# Patient Record
Sex: FEMALE | Race: WHITE | Employment: UNEMPLOYED | URBAN - METROPOLITAN AREA
[De-identification: names, ages, dates, MRNs, and addresses within clinical notes are randomized per-mention and may not be internally consistent; named-entity substitution may affect disease eponyms.]

---

## 2017-11-21 PROBLEM — M54.2 CERVICAL PAIN (NECK): Status: ACTIVE | Noted: 2017-11-21

## 2018-07-25 ENCOUNTER — HOSPITAL ENCOUNTER (EMERGENCY)
Age: 58
Discharge: PSYCHIATRIC OR SUBSTANCE USE FACILITY | End: 2018-07-26
Attending: EMERGENCY MEDICINE
Payer: COMMERCIAL

## 2018-07-25 VITALS
HEIGHT: 65 IN | TEMPERATURE: 98.1 F | OXYGEN SATURATION: 100 % | RESPIRATION RATE: 14 BRPM | BODY MASS INDEX: 17.99 KG/M2 | DIASTOLIC BLOOD PRESSURE: 63 MMHG | HEART RATE: 57 BPM | SYSTOLIC BLOOD PRESSURE: 126 MMHG | WEIGHT: 108 LBS

## 2018-07-25 DIAGNOSIS — F32.A DEPRESSION WITH SUICIDAL IDEATION: Primary | ICD-10-CM

## 2018-07-25 DIAGNOSIS — R45.851 DEPRESSION WITH SUICIDAL IDEATION: Primary | ICD-10-CM

## 2018-07-25 LAB
ACETAMINOPHEN LEVEL: <5 MCG/ML (ref 10–30)
ALBUMIN SERPL-MCNC: 4.7 G/DL (ref 3.5–5.2)
ALP BLD-CCNC: 66 U/L (ref 35–104)
ALT SERPL-CCNC: 12 U/L (ref 0–32)
AMPHETAMINE SCREEN, URINE: NOT DETECTED
ANION GAP SERPL CALCULATED.3IONS-SCNC: 17 MMOL/L (ref 7–16)
AST SERPL-CCNC: 23 U/L (ref 0–31)
BACTERIA: NORMAL /HPF
BARBITURATE SCREEN URINE: NOT DETECTED
BASOPHILS ABSOLUTE: 0.04 E9/L (ref 0–0.2)
BASOPHILS RELATIVE PERCENT: 0.8 % (ref 0–2)
BENZODIAZEPINE SCREEN, URINE: NOT DETECTED
BILIRUB SERPL-MCNC: 1.1 MG/DL (ref 0–1.2)
BILIRUBIN URINE: NEGATIVE
BLOOD, URINE: ABNORMAL
BUN BLDV-MCNC: 5 MG/DL (ref 6–20)
CALCIUM SERPL-MCNC: 10.2 MG/DL (ref 8.6–10.2)
CANNABINOID SCREEN URINE: NOT DETECTED
CHLORIDE BLD-SCNC: 88 MMOL/L (ref 98–107)
CLARITY: CLEAR
CO2: 23 MMOL/L (ref 22–29)
COCAINE METABOLITE SCREEN URINE: NOT DETECTED
COLOR: YELLOW
CREAT SERPL-MCNC: 0.7 MG/DL (ref 0.5–1)
EKG ATRIAL RATE: 59 BPM
EKG P AXIS: 75 DEGREES
EKG P-R INTERVAL: 158 MS
EKG Q-T INTERVAL: 436 MS
EKG QRS DURATION: 92 MS
EKG QTC CALCULATION (BAZETT): 431 MS
EKG R AXIS: 96 DEGREES
EKG T AXIS: 87 DEGREES
EKG VENTRICULAR RATE: 59 BPM
EOSINOPHILS ABSOLUTE: 0.02 E9/L (ref 0.05–0.5)
EOSINOPHILS RELATIVE PERCENT: 0.4 % (ref 0–6)
ETHANOL: <10 MG/DL (ref 0–0.08)
GFR AFRICAN AMERICAN: >60
GFR NON-AFRICAN AMERICAN: >60 ML/MIN/1.73
GLUCOSE BLD-MCNC: 78 MG/DL (ref 74–109)
GLUCOSE URINE: NEGATIVE MG/DL
HCT VFR BLD CALC: 34.6 % (ref 34–48)
HEMOGLOBIN: 12.3 G/DL (ref 11.5–15.5)
IMMATURE GRANULOCYTES #: 0.02 E9/L
IMMATURE GRANULOCYTES %: 0.4 % (ref 0–5)
KETONES, URINE: 15 MG/DL
LEUKOCYTE ESTERASE, URINE: NEGATIVE
LYMPHOCYTES ABSOLUTE: 1.74 E9/L (ref 1.5–4)
LYMPHOCYTES RELATIVE PERCENT: 34.9 % (ref 20–42)
MCH RBC QN AUTO: 31 PG (ref 26–35)
MCHC RBC AUTO-ENTMCNC: 35.5 % (ref 32–34.5)
MCV RBC AUTO: 87.2 FL (ref 80–99.9)
METHADONE SCREEN, URINE: NOT DETECTED
MONOCYTES ABSOLUTE: 0.3 E9/L (ref 0.1–0.95)
MONOCYTES RELATIVE PERCENT: 6 % (ref 2–12)
NEUTROPHILS ABSOLUTE: 2.87 E9/L (ref 1.8–7.3)
NEUTROPHILS RELATIVE PERCENT: 57.5 % (ref 43–80)
NITRITE, URINE: NEGATIVE
OPIATE SCREEN URINE: NOT DETECTED
PDW BLD-RTO: 12.2 FL (ref 11.5–15)
PH UA: 6 (ref 5–9)
PHENCYCLIDINE SCREEN URINE: NOT DETECTED
PLATELET # BLD: 274 E9/L (ref 130–450)
PMV BLD AUTO: 10.2 FL (ref 7–12)
POTASSIUM SERPL-SCNC: 3.4 MMOL/L (ref 3.5–5)
PROPOXYPHENE SCREEN: NOT DETECTED
PROTEIN UA: NEGATIVE MG/DL
RBC # BLD: 3.97 E12/L (ref 3.5–5.5)
RBC UA: NORMAL /HPF (ref 0–2)
SALICYLATE, SERUM: <0.3 MG/DL (ref 0–30)
SODIUM BLD-SCNC: 128 MMOL/L (ref 132–146)
SPECIFIC GRAVITY UA: <=1.005 (ref 1–1.03)
T4 TOTAL: 7.6 MCG/DL (ref 4.5–11.7)
TOTAL PROTEIN: 7.3 G/DL (ref 6.4–8.3)
TRICYCLIC ANTIDEPRESSANTS SCREEN SERUM: NEGATIVE NG/ML
TSH SERPL DL<=0.05 MIU/L-ACNC: 1.04 UIU/ML (ref 0.27–4.2)
UROBILINOGEN, URINE: 0.2 E.U./DL
WBC # BLD: 5 E9/L (ref 4.5–11.5)
WBC UA: NORMAL /HPF (ref 0–5)

## 2018-07-25 PROCEDURE — 81001 URINALYSIS AUTO W/SCOPE: CPT

## 2018-07-25 PROCEDURE — 80307 DRUG TEST PRSMV CHEM ANLYZR: CPT

## 2018-07-25 PROCEDURE — 85025 COMPLETE CBC W/AUTO DIFF WBC: CPT

## 2018-07-25 PROCEDURE — 2580000003 HC RX 258: Performed by: NURSE PRACTITIONER

## 2018-07-25 PROCEDURE — 80053 COMPREHEN METABOLIC PANEL: CPT

## 2018-07-25 PROCEDURE — G0480 DRUG TEST DEF 1-7 CLASSES: HCPCS

## 2018-07-25 PROCEDURE — 36415 COLL VENOUS BLD VENIPUNCTURE: CPT

## 2018-07-25 PROCEDURE — 6370000000 HC RX 637 (ALT 250 FOR IP): Performed by: EMERGENCY MEDICINE

## 2018-07-25 PROCEDURE — 84443 ASSAY THYROID STIM HORMONE: CPT

## 2018-07-25 PROCEDURE — 84436 ASSAY OF TOTAL THYROXINE: CPT

## 2018-07-25 PROCEDURE — 99285 EMERGENCY DEPT VISIT HI MDM: CPT

## 2018-07-25 RX ORDER — LANOLIN ALCOHOL/MO/W.PET/CERES
5 CREAM (GRAM) TOPICAL NIGHTLY PRN
Status: ON HOLD | COMMUNITY
End: 2019-04-26

## 2018-07-25 RX ORDER — TOPIRAMATE 100 MG/1
200 TABLET, FILM COATED ORAL DAILY
Status: ON HOLD | COMMUNITY
End: 2019-07-31 | Stop reason: HOSPADM

## 2018-07-25 RX ORDER — LANOLIN ALCOHOL/MO/W.PET/CERES
6 CREAM (GRAM) TOPICAL ONCE
Status: COMPLETED | OUTPATIENT
Start: 2018-07-25 | End: 2018-07-25

## 2018-07-25 RX ORDER — TOPIRAMATE 100 MG/1
200 TABLET, FILM COATED ORAL ONCE
Status: COMPLETED | OUTPATIENT
Start: 2018-07-25 | End: 2018-07-25

## 2018-07-25 RX ORDER — 0.9 % SODIUM CHLORIDE 0.9 %
1000 INTRAVENOUS SOLUTION INTRAVENOUS ONCE
Status: COMPLETED | OUTPATIENT
Start: 2018-07-25 | End: 2018-07-25

## 2018-07-25 RX ADMIN — SODIUM CHLORIDE 1000 ML: 9 INJECTION, SOLUTION INTRAVENOUS at 19:53

## 2018-07-25 RX ADMIN — TOPIRAMATE 200 MG: 100 TABLET, FILM COATED ORAL at 21:55

## 2018-07-25 RX ADMIN — MELATONIN 3 MG ORAL TABLET 6 MG: 3 TABLET ORAL at 21:55

## 2018-07-25 ASSESSMENT — PATIENT HEALTH QUESTIONNAIRE - PHQ9: SUM OF ALL RESPONSES TO PHQ QUESTIONS 1-9: 15

## 2018-07-25 NOTE — ED PROVIDER NOTES
None.     Able to care for self: Yes. Able to control self: Yes. ROS    Pertinent positives and negatives are stated within HPI, all other systems reviewed and are negative. Past Surgical History:  has a past surgical history that includes cyst removal (Right, 2008); Toe Surgery (1999); and laparoscopy (1980). Social History:  reports that she has never smoked. She has never used smokeless tobacco. She reports that she does not drink alcohol or use drugs. Family History: family history includes Cancer in her mother; Heart Failure in her father. Allergies: Ibuprofen and Carbamazepine and analogs    Physical Exam           ED Triage Vitals [07/25/18 1524]   BP Temp Temp src Pulse Resp SpO2 Height Weight   115/87 97.3 °F (36.3 °C) -- 92 18 100 % 5' 5\" (1.651 m) 108 lb (49 kg)      Oxygen Saturation Interpretation: Normal.    General Appearance:  street clothes. Constitutional:   Level of Consciousness: Awake and alert. ETOH: No.          Distress: none. Cooperativeness: cooperative. Eyes:  PERRL, EOMI, no discharge or conjunctival injection. Ears:  External ears without lesions. Throat:  Pharynx without injection, exudate, or tonsillar hypertrophy. Airway patient. Neck:  Normal ROM. Supple. Respiratory:  Clear to auscultation and breath sounds equal.  CV:  Regular rate and rhythm, normal heart sounds, without pathological murmurs, ectopy, gallops, or rubs. GI:  Abdomen Soft, nontender, good bowel sounds. No firm or pulsatile mass. Back:  No costovertebral tenderness. Integument:  Normal turgor. Warm, dry, without visible rash, unless noted elsewhere. Lymphatics: No lymphangitis or adenopathy noted. Neurological:  Oriented. Motor functions intact. Psychiatric:        Thought Process:       Coherent:  Yes. Delusions / Paranoia: no evidence of paranoia. Flight of ideas:  No.         Rambling conversation:  No.       Affect: sad; flat effect.        Suicidal ideation:  suicidal ideation with no plan or intent. Homicidal ideation:  no homicidal ideation. Perceptions:  denies any perceptual disturbance present. Insight: above average. Judgement: above average.     Lab / Imaging Results   (All laboratory and radiology results have been personally reviewed by myself)  Labs:  Results for orders placed or performed during the hospital encounter of 07/25/18   CBC auto differential   Result Value Ref Range    WBC 5.0 4.5 - 11.5 E9/L    RBC 3.97 3.50 - 5.50 E12/L    Hemoglobin 12.3 11.5 - 15.5 g/dL    Hematocrit 34.6 34.0 - 48.0 %    MCV 87.2 80.0 - 99.9 fL    MCH 31.0 26.0 - 35.0 pg    MCHC 35.5 (H) 32.0 - 34.5 %    RDW 12.2 11.5 - 15.0 fL    Platelets 659 516 - 925 E9/L    MPV 10.2 7.0 - 12.0 fL    Neutrophils % 57.5 43.0 - 80.0 %    Immature Granulocytes % 0.4 0.0 - 5.0 %    Lymphocytes % 34.9 20.0 - 42.0 %    Monocytes % 6.0 2.0 - 12.0 %    Eosinophils % 0.4 0.0 - 6.0 %    Basophils % 0.8 0.0 - 2.0 %    Neutrophils # 2.87 1.80 - 7.30 E9/L    Immature Granulocytes # 0.02 E9/L    Lymphocytes # 1.74 1.50 - 4.00 E9/L    Monocytes # 0.30 0.10 - 0.95 E9/L    Eosinophils # 0.02 (L) 0.05 - 0.50 E9/L    Basophils # 0.04 0.00 - 0.20 E9/L   Comprehensive Metabolic Panel   Result Value Ref Range    Sodium 128 (L) 132 - 146 mmol/L    Potassium 3.4 (L) 3.5 - 5.0 mmol/L    Chloride 88 (L) 98 - 107 mmol/L    CO2 23 22 - 29 mmol/L    Anion Gap 17 (H) 7 - 16 mmol/L    Glucose 78 74 - 109 mg/dL    BUN 5 (L) 6 - 20 mg/dL    CREATININE 0.7 0.5 - 1.0 mg/dL    GFR Non-African American >60 >=60 mL/min/1.73    GFR African American >60     Calcium 10.2 8.6 - 10.2 mg/dL    Total Protein 7.3 6.4 - 8.3 g/dL    Alb 4.7 3.5 - 5.2 g/dL    Total Bilirubin 1.1 0.0 - 1.2 mg/dL    Alkaline Phosphatase 66 35 - 104 U/L    ALT 12 0 - 32 U/L    AST 23 0 - 31 U/L   TSH without Reflex   Result Value Ref Range    TSH 1.040 0.270 - 4.200 uIU/mL   Serum Drug Screen   Result Value Ref Range ensure accuracy; however, inadvertent computerized transcription errors may be present.   END OF ED PROVIDER NOTE      Armin Ramirez, IFEANYI - CNP  07/25/18 1022

## 2018-07-26 ENCOUNTER — HOSPITAL ENCOUNTER (INPATIENT)
Dept: HOSPITAL 83 - 3N | Age: 58
LOS: 11 days | Discharge: HOME | DRG: 885 | End: 2018-08-06
Attending: PSYCHIATRY & NEUROLOGY | Admitting: PSYCHIATRY & NEUROLOGY
Payer: COMMERCIAL

## 2018-07-26 VITALS — BODY MASS INDEX: 17.99 KG/M2 | HEIGHT: 65 IN | WEIGHT: 108 LBS

## 2018-07-26 VITALS — DIASTOLIC BLOOD PRESSURE: 88 MMHG | SYSTOLIC BLOOD PRESSURE: 138 MMHG

## 2018-07-26 VITALS — DIASTOLIC BLOOD PRESSURE: 75 MMHG

## 2018-07-26 VITALS — DIASTOLIC BLOOD PRESSURE: 88 MMHG

## 2018-07-26 VITALS — DIASTOLIC BLOOD PRESSURE: 83 MMHG

## 2018-07-26 DIAGNOSIS — Z88.6: ICD-10-CM

## 2018-07-26 DIAGNOSIS — E78.5: ICD-10-CM

## 2018-07-26 DIAGNOSIS — R63.6: ICD-10-CM

## 2018-07-26 DIAGNOSIS — Z79.84: ICD-10-CM

## 2018-07-26 DIAGNOSIS — R35.0: ICD-10-CM

## 2018-07-26 DIAGNOSIS — F32.9: ICD-10-CM

## 2018-07-26 DIAGNOSIS — Z80.3: ICD-10-CM

## 2018-07-26 DIAGNOSIS — R03.0: ICD-10-CM

## 2018-07-26 DIAGNOSIS — Z79.899: ICD-10-CM

## 2018-07-26 DIAGNOSIS — Z88.8: ICD-10-CM

## 2018-07-26 DIAGNOSIS — K58.9: ICD-10-CM

## 2018-07-26 DIAGNOSIS — G43.909: ICD-10-CM

## 2018-07-26 DIAGNOSIS — K59.04: ICD-10-CM

## 2018-07-26 DIAGNOSIS — Z82.49: ICD-10-CM

## 2018-07-26 DIAGNOSIS — Z98.51: ICD-10-CM

## 2018-07-26 DIAGNOSIS — Z87.828: ICD-10-CM

## 2018-07-26 DIAGNOSIS — E87.1: ICD-10-CM

## 2018-07-26 DIAGNOSIS — F25.1: Primary | ICD-10-CM

## 2018-07-26 DIAGNOSIS — R45.851: ICD-10-CM

## 2018-07-26 LAB
25(OH)D3 SERPL-MCNC: 29.3 NG/ML (ref 30–100)
ALBUMIN SERPL-MCNC: 4.4 GM/DL (ref 3.1–4.5)
ALP SERPL-CCNC: 65 U/L (ref 45–117)
ALT SERPL W P-5'-P-CCNC: 21 U/L (ref 12–78)
APPEARANCE UR: CLEAR
AST SERPL-CCNC: 20 IU/L (ref 3–35)
BACTERIA #/AREA URNS HPF: (no result) /[HPF]
BASOPHILS # BLD AUTO: 0.1 10*3/UL (ref 0–0.1)
BASOPHILS NFR BLD AUTO: 1 % (ref 0–1)
BILIRUB UR QL STRIP: NEGATIVE
BUN SERPL-MCNC: 5 MG/DL (ref 7–24)
CHLORIDE SERPL-SCNC: 101 MMOL/L (ref 98–107)
CHOLEST SERPL-MCNC: 190 MG/DL (ref ?–200)
COLOR UR: YELLOW
CREAT SERPL-MCNC: 0.67 MG/DL (ref 0.55–1.02)
EOSINOPHIL # BLD AUTO: 0 10*3/UL (ref 0–0.4)
EOSINOPHIL # BLD AUTO: 0.2 % (ref 1–4)
EPI CELLS #/AREA URNS HPF: (no result) /[HPF]
ERYTHROCYTE [DISTWIDTH] IN BLOOD BY AUTOMATED COUNT: 12.2 % (ref 0–14.5)
GLUCOSE UR QL: NEGATIVE
HCT VFR BLD AUTO: 35.7 % (ref 37–47)
HDLC SERPL-MCNC: 71 MG/DL (ref 40–60)
HGB BLD-MCNC: 12.4 G/DL (ref 12–16)
HGB UR QL STRIP: (no result)
KETONES UR QL STRIP: (no result)
LDLC SERPL DIRECT ASSAY-MCNC: 104 MG/DL (ref 9–159)
LEUKOCYTE ESTERASE UR QL STRIP: NEGATIVE
LYMPHOCYTES # BLD AUTO: 1.1 10*3/UL (ref 1.3–4.4)
LYMPHOCYTES NFR BLD AUTO: 23 % (ref 27–41)
MCH RBC QN AUTO: 31.1 PG (ref 27–31)
MCHC RBC AUTO-ENTMCNC: 34.7 G/DL (ref 33–37)
MCV RBC AUTO: 89.5 FL (ref 81–99)
MONOCYTES # BLD AUTO: 0.2 10*3/UL (ref 0.1–1)
MONOCYTES NFR BLD MANUAL: 4.9 % (ref 3–9)
MUCOUS THREADS URNS QL MICRO: (no result)
NEUT #: 3.4 10*3/UL (ref 2.3–7.9)
NEUT %: 70.7 % (ref 47–73)
NITRITE UR QL STRIP: NEGATIVE
NRBC BLD QL AUTO: 0 10*3/UL (ref 0–0)
PH UR STRIP: 6 [PH] (ref 5–9)
PLATELET # BLD AUTO: 289 10*3/UL (ref 130–400)
PMV BLD AUTO: 10.1 FL (ref 9.6–12.3)
POTASSIUM SERPL-SCNC: 3.5 MMOL/L (ref 3.5–5.1)
PROT SERPL-MCNC: 7.3 GM/DL (ref 6.4–8.2)
RBC # BLD AUTO: 3.99 10*6/UL (ref 4.1–5.1)
RBC #/AREA URNS HPF: (no result) RBC/HPF (ref 0–2)
SODIUM SERPL-SCNC: 136 MMOL/L (ref 136–145)
SP GR UR: 1.01 (ref 1–1.03)
TRIGL SERPL-MCNC: 74 MG/DL (ref ?–150)
TSH SERPL DL<=0.005 MIU/L-ACNC: 1.16 UIU/ML (ref 0.36–4.75)
UROBILINOGEN UR STRIP-MCNC: 0.2 E.U./DL (ref 0.2–1)
VLDLC SERPL CALC-MCNC: 15 MG/DL (ref 6–40)
WBC #/AREA URNS HPF: (no result) WBC/HPF (ref 0–5)
WBC NRBC COR # BLD AUTO: 4.9 10*3/UL (ref 4.8–10.8)

## 2018-07-26 NOTE — PROGRESS NOTES
Tammy Richardson called and stated that [facility] will transport patient. ETA 0045 a.m.

## 2018-07-26 NOTE — ED NOTES
Report called to Tacho Ruth RN at [facility].    OK to be sent to [facility] with PIV in.      Malu Palencia RN  07/26/18 0002

## 2018-07-27 VITALS — SYSTOLIC BLOOD PRESSURE: 126 MMHG | DIASTOLIC BLOOD PRESSURE: 88 MMHG

## 2018-07-27 VITALS — SYSTOLIC BLOOD PRESSURE: 128 MMHG | DIASTOLIC BLOOD PRESSURE: 78 MMHG

## 2018-07-28 VITALS — SYSTOLIC BLOOD PRESSURE: 126 MMHG | DIASTOLIC BLOOD PRESSURE: 76 MMHG

## 2018-07-28 VITALS — DIASTOLIC BLOOD PRESSURE: 77 MMHG

## 2018-07-29 VITALS — SYSTOLIC BLOOD PRESSURE: 115 MMHG | DIASTOLIC BLOOD PRESSURE: 70 MMHG

## 2018-07-29 VITALS — DIASTOLIC BLOOD PRESSURE: 69 MMHG

## 2018-07-30 VITALS — DIASTOLIC BLOOD PRESSURE: 65 MMHG | SYSTOLIC BLOOD PRESSURE: 96 MMHG

## 2018-07-30 VITALS — DIASTOLIC BLOOD PRESSURE: 73 MMHG

## 2018-07-31 VITALS — DIASTOLIC BLOOD PRESSURE: 82 MMHG

## 2018-07-31 VITALS — DIASTOLIC BLOOD PRESSURE: 65 MMHG | SYSTOLIC BLOOD PRESSURE: 123 MMHG

## 2018-08-01 VITALS — DIASTOLIC BLOOD PRESSURE: 72 MMHG | SYSTOLIC BLOOD PRESSURE: 112 MMHG

## 2018-08-01 VITALS — DIASTOLIC BLOOD PRESSURE: 84 MMHG

## 2018-08-02 VITALS — DIASTOLIC BLOOD PRESSURE: 66 MMHG

## 2018-08-02 VITALS — DIASTOLIC BLOOD PRESSURE: 79 MMHG

## 2018-08-03 VITALS — SYSTOLIC BLOOD PRESSURE: 140 MMHG | DIASTOLIC BLOOD PRESSURE: 68 MMHG

## 2018-08-03 VITALS — DIASTOLIC BLOOD PRESSURE: 79 MMHG

## 2018-08-04 VITALS — DIASTOLIC BLOOD PRESSURE: 75 MMHG

## 2018-08-04 VITALS — DIASTOLIC BLOOD PRESSURE: 73 MMHG

## 2018-08-05 VITALS — DIASTOLIC BLOOD PRESSURE: 56 MMHG

## 2018-08-05 VITALS — DIASTOLIC BLOOD PRESSURE: 80 MMHG | SYSTOLIC BLOOD PRESSURE: 114 MMHG

## 2018-08-06 VITALS — DIASTOLIC BLOOD PRESSURE: 76 MMHG

## 2018-12-14 ENCOUNTER — TELEPHONE (OUTPATIENT)
Dept: NEUROLOGY | Age: 58
End: 2018-12-14

## 2019-02-07 ENCOUNTER — APPOINTMENT (OUTPATIENT)
Dept: CT IMAGING | Age: 59
End: 2019-02-07
Payer: COMMERCIAL

## 2019-02-07 ENCOUNTER — HOSPITAL ENCOUNTER (EMERGENCY)
Age: 59
Discharge: HOME | End: 2019-02-07
Payer: COMMERCIAL

## 2019-02-07 VITALS
SYSTOLIC BLOOD PRESSURE: 163 MMHG | BODY MASS INDEX: 16.66 KG/M2 | OXYGEN SATURATION: 100 % | HEART RATE: 59 BPM | TEMPERATURE: 98.4 F | DIASTOLIC BLOOD PRESSURE: 87 MMHG | WEIGHT: 100 LBS | RESPIRATION RATE: 16 BRPM | HEIGHT: 65 IN

## 2019-02-07 DIAGNOSIS — R63.4 RECENT UNEXPLAINED WEIGHT LOSS: ICD-10-CM

## 2019-02-07 DIAGNOSIS — R52 GENERALIZED BODY ACHES: Primary | ICD-10-CM

## 2019-02-07 LAB
ALBUMIN SERPL-MCNC: 4.3 G/DL (ref 3.5–5.2)
ALP BLD-CCNC: 46 U/L (ref 35–104)
ALT SERPL-CCNC: 14 U/L (ref 0–32)
AMORPHOUS: ABNORMAL
ANION GAP SERPL CALCULATED.3IONS-SCNC: 13 MMOL/L (ref 7–16)
AST SERPL-CCNC: 15 U/L (ref 0–31)
BACTERIA: ABNORMAL /HPF
BASOPHILS ABSOLUTE: 0.04 E9/L (ref 0–0.2)
BASOPHILS RELATIVE PERCENT: 1 % (ref 0–2)
BILIRUB SERPL-MCNC: 0.3 MG/DL (ref 0–1.2)
BILIRUBIN URINE: NEGATIVE
BLOOD, URINE: ABNORMAL
BUN BLDV-MCNC: 8 MG/DL (ref 6–20)
CALCIUM SERPL-MCNC: 9.5 MG/DL (ref 8.6–10.2)
CHLORIDE BLD-SCNC: 105 MMOL/L (ref 98–107)
CLARITY: ABNORMAL
CO2: 20 MMOL/L (ref 22–29)
COLOR: YELLOW
CREAT SERPL-MCNC: 0.7 MG/DL (ref 0.5–1)
EKG ATRIAL RATE: 62 BPM
EKG P AXIS: 82 DEGREES
EKG P-R INTERVAL: 142 MS
EKG Q-T INTERVAL: 406 MS
EKG QRS DURATION: 88 MS
EKG QTC CALCULATION (BAZETT): 412 MS
EKG R AXIS: 97 DEGREES
EKG T AXIS: 80 DEGREES
EKG VENTRICULAR RATE: 62 BPM
EOSINOPHILS ABSOLUTE: 0.03 E9/L (ref 0.05–0.5)
EOSINOPHILS RELATIVE PERCENT: 0.8 % (ref 0–6)
EPITHELIAL CELLS, UA: ABNORMAL /HPF
GFR AFRICAN AMERICAN: >60
GFR NON-AFRICAN AMERICAN: >60 ML/MIN/1.73
GLUCOSE BLD-MCNC: 90 MG/DL (ref 74–99)
GLUCOSE URINE: NEGATIVE MG/DL
HCT VFR BLD CALC: 39.3 % (ref 34–48)
HEMOGLOBIN: 12.6 G/DL (ref 11.5–15.5)
IMMATURE GRANULOCYTES #: 0.01 E9/L
IMMATURE GRANULOCYTES %: 0.3 % (ref 0–5)
KETONES, URINE: NEGATIVE MG/DL
LACTIC ACID: 1.2 MMOL/L (ref 0.5–2.2)
LEUKOCYTE ESTERASE, URINE: NEGATIVE
LIPASE: 48 U/L (ref 13–60)
LYMPHOCYTES ABSOLUTE: 1.31 E9/L (ref 1.5–4)
LYMPHOCYTES RELATIVE PERCENT: 33 % (ref 20–42)
MAGNESIUM: 2.1 MG/DL (ref 1.6–2.6)
MCH RBC QN AUTO: 30.9 PG (ref 26–35)
MCHC RBC AUTO-ENTMCNC: 32.1 % (ref 32–34.5)
MCV RBC AUTO: 96.3 FL (ref 80–99.9)
MONOCYTES ABSOLUTE: 0.21 E9/L (ref 0.1–0.95)
MONOCYTES RELATIVE PERCENT: 5.3 % (ref 2–12)
NEUTROPHILS ABSOLUTE: 2.37 E9/L (ref 1.8–7.3)
NEUTROPHILS RELATIVE PERCENT: 59.6 % (ref 43–80)
NITRITE, URINE: NEGATIVE
PDW BLD-RTO: 13.2 FL (ref 11.5–15)
PH UA: 7.5 (ref 5–9)
PLATELET # BLD: 288 E9/L (ref 130–450)
PMV BLD AUTO: 10.2 FL (ref 7–12)
POTASSIUM REFLEX MAGNESIUM: 3.2 MMOL/L (ref 3.5–5)
PROTEIN UA: NEGATIVE MG/DL
RBC # BLD: 4.08 E12/L (ref 3.5–5.5)
RBC UA: ABNORMAL /HPF (ref 0–2)
SODIUM BLD-SCNC: 138 MMOL/L (ref 132–146)
SPECIFIC GRAVITY UA: 1.01 (ref 1–1.03)
TOTAL PROTEIN: 7 G/DL (ref 6.4–8.3)
TROPONIN: <0.01 NG/ML (ref 0–0.03)
TSH SERPL DL<=0.05 MIU/L-ACNC: 1.57 UIU/ML (ref 0.27–4.2)
UROBILINOGEN, URINE: 0.2 E.U./DL
WBC # BLD: 4 E9/L (ref 4.5–11.5)
WBC UA: ABNORMAL /HPF (ref 0–5)

## 2019-02-07 PROCEDURE — 6360000002 HC RX W HCPCS: Performed by: NURSE PRACTITIONER

## 2019-02-07 PROCEDURE — 2580000003 HC RX 258: Performed by: RADIOLOGY

## 2019-02-07 PROCEDURE — 87088 URINE BACTERIA CULTURE: CPT

## 2019-02-07 PROCEDURE — 6360000004 HC RX CONTRAST MEDICATION: Performed by: RADIOLOGY

## 2019-02-07 PROCEDURE — 36415 COLL VENOUS BLD VENIPUNCTURE: CPT

## 2019-02-07 PROCEDURE — 74177 CT ABD & PELVIS W/CONTRAST: CPT

## 2019-02-07 PROCEDURE — 83690 ASSAY OF LIPASE: CPT

## 2019-02-07 PROCEDURE — 99284 EMERGENCY DEPT VISIT MOD MDM: CPT

## 2019-02-07 PROCEDURE — 6370000000 HC RX 637 (ALT 250 FOR IP): Performed by: NURSE PRACTITIONER

## 2019-02-07 PROCEDURE — 85025 COMPLETE CBC W/AUTO DIFF WBC: CPT

## 2019-02-07 PROCEDURE — 80053 COMPREHEN METABOLIC PANEL: CPT

## 2019-02-07 PROCEDURE — 84484 ASSAY OF TROPONIN QUANT: CPT

## 2019-02-07 PROCEDURE — 93005 ELECTROCARDIOGRAM TRACING: CPT | Performed by: NURSE PRACTITIONER

## 2019-02-07 PROCEDURE — 84443 ASSAY THYROID STIM HORMONE: CPT

## 2019-02-07 PROCEDURE — 96374 THER/PROPH/DIAG INJ IV PUSH: CPT

## 2019-02-07 PROCEDURE — 83735 ASSAY OF MAGNESIUM: CPT

## 2019-02-07 PROCEDURE — 83605 ASSAY OF LACTIC ACID: CPT

## 2019-02-07 PROCEDURE — 81001 URINALYSIS AUTO W/SCOPE: CPT

## 2019-02-07 RX ORDER — METRONIDAZOLE 500 MG/1
500 TABLET ORAL 3 TIMES DAILY
Status: ON HOLD | COMMUNITY
End: 2019-04-26

## 2019-02-07 RX ORDER — DEXAMETHASONE SODIUM PHOSPHATE 10 MG/ML
6 INJECTION INTRAMUSCULAR; INTRAVENOUS ONCE
Status: COMPLETED | OUTPATIENT
Start: 2019-02-07 | End: 2019-02-07

## 2019-02-07 RX ORDER — POTASSIUM CHLORIDE 20 MEQ/1
40 TABLET, EXTENDED RELEASE ORAL ONCE
Status: COMPLETED | OUTPATIENT
Start: 2019-02-07 | End: 2019-02-07

## 2019-02-07 RX ORDER — SODIUM CHLORIDE 0.9 % (FLUSH) 0.9 %
10 SYRINGE (ML) INJECTION
Status: COMPLETED | OUTPATIENT
Start: 2019-02-07 | End: 2019-02-07

## 2019-02-07 RX ADMIN — Medication 10 ML: at 13:46

## 2019-02-07 RX ADMIN — DEXAMETHASONE SODIUM PHOSPHATE 6 MG: 10 INJECTION INTRAMUSCULAR; INTRAVENOUS at 16:02

## 2019-02-07 RX ADMIN — IOPAMIDOL 110 ML: 755 INJECTION, SOLUTION INTRAVENOUS at 13:46

## 2019-02-07 RX ADMIN — POTASSIUM CHLORIDE 40 MEQ: 20 TABLET, EXTENDED RELEASE ORAL at 14:51

## 2019-02-09 LAB
ORGANISM: ABNORMAL
URINE CULTURE, ROUTINE: ABNORMAL
URINE CULTURE, ROUTINE: ABNORMAL

## 2019-04-01 ENCOUNTER — OFFICE VISIT (OUTPATIENT)
Dept: NEUROLOGY | Age: 59
End: 2019-04-01
Payer: COMMERCIAL

## 2019-04-01 VITALS
DIASTOLIC BLOOD PRESSURE: 90 MMHG | BODY MASS INDEX: 16.16 KG/M2 | RESPIRATION RATE: 12 BRPM | HEIGHT: 65 IN | WEIGHT: 97 LBS | SYSTOLIC BLOOD PRESSURE: 150 MMHG

## 2019-04-01 DIAGNOSIS — G89.29 CHRONIC FACIAL PAIN: Primary | ICD-10-CM

## 2019-04-01 DIAGNOSIS — R51.9 CHRONIC FACIAL PAIN: Primary | ICD-10-CM

## 2019-04-01 DIAGNOSIS — K06.8 PAIN IN GUMS: ICD-10-CM

## 2019-04-01 DIAGNOSIS — R68.84 PAIN IN LOWER JAW: ICD-10-CM

## 2019-04-01 PROCEDURE — G8427 DOCREV CUR MEDS BY ELIG CLIN: HCPCS | Performed by: PSYCHIATRY & NEUROLOGY

## 2019-04-01 PROCEDURE — 3017F COLORECTAL CA SCREEN DOC REV: CPT | Performed by: PSYCHIATRY & NEUROLOGY

## 2019-04-01 PROCEDURE — G8419 CALC BMI OUT NRM PARAM NOF/U: HCPCS | Performed by: PSYCHIATRY & NEUROLOGY

## 2019-04-01 PROCEDURE — 99204 OFFICE O/P NEW MOD 45 MIN: CPT | Performed by: PSYCHIATRY & NEUROLOGY

## 2019-04-01 PROCEDURE — 1036F TOBACCO NON-USER: CPT | Performed by: PSYCHIATRY & NEUROLOGY

## 2019-04-01 RX ORDER — VENLAFAXINE HYDROCHLORIDE 37.5 MG/1
CAPSULE, EXTENDED RELEASE ORAL
Status: ON HOLD | COMMUNITY
Start: 2019-03-01 | End: 2019-04-26 | Stop reason: SINTOL

## 2019-04-01 RX ORDER — ARIPIPRAZOLE 300 MG
KIT INTRAMUSCULAR
Status: ON HOLD | COMMUNITY
Start: 2019-03-12 | End: 2019-05-07 | Stop reason: HOSPADM

## 2019-04-01 RX ORDER — LACTULOSE 10 G/15ML
SOLUTION ORAL
Refills: 0 | Status: ON HOLD | COMMUNITY
Start: 2019-03-29 | End: 2019-07-31 | Stop reason: HOSPADM

## 2019-04-01 ASSESSMENT — ENCOUNTER SYMPTOMS
EYES NEGATIVE: 1
RESPIRATORY NEGATIVE: 1
ALLERGIC/IMMUNOLOGIC NEGATIVE: 1
GASTROINTESTINAL NEGATIVE: 1
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
## 2019-04-25 ENCOUNTER — HOSPITAL ENCOUNTER (INPATIENT)
Age: 59
LOS: 12 days | Discharge: HOME | DRG: 750 | End: 2019-05-07
Attending: EMERGENCY MEDICINE | Admitting: PSYCHIATRY & NEUROLOGY
Payer: COMMERCIAL

## 2019-04-25 DIAGNOSIS — R45.851 DEPRESSION WITH SUICIDAL IDEATION: Primary | ICD-10-CM

## 2019-04-25 DIAGNOSIS — F32.A DEPRESSION WITH SUICIDAL IDEATION: Primary | ICD-10-CM

## 2019-04-25 LAB
ACETAMINOPHEN LEVEL: <5 MCG/ML (ref 10–30)
ALBUMIN SERPL-MCNC: 4.8 G/DL (ref 3.5–5.2)
ALP BLD-CCNC: 77 U/L (ref 35–104)
ALT SERPL-CCNC: 25 U/L (ref 0–32)
AMPHETAMINE SCREEN, URINE: NOT DETECTED
ANION GAP SERPL CALCULATED.3IONS-SCNC: 10 MMOL/L (ref 7–16)
AST SERPL-CCNC: 22 U/L (ref 0–31)
BACTERIA: ABNORMAL /HPF
BARBITURATE SCREEN URINE: NOT DETECTED
BASOPHILS ABSOLUTE: 0.06 E9/L (ref 0–0.2)
BASOPHILS RELATIVE PERCENT: 1.7 % (ref 0–2)
BENZODIAZEPINE SCREEN, URINE: NOT DETECTED
BILIRUB SERPL-MCNC: 0.7 MG/DL (ref 0–1.2)
BILIRUBIN URINE: NEGATIVE
BLOOD, URINE: NEGATIVE
BUN BLDV-MCNC: 3 MG/DL (ref 6–20)
CALCIUM SERPL-MCNC: 10 MG/DL (ref 8.6–10.2)
CANNABINOID SCREEN URINE: NOT DETECTED
CHLORIDE BLD-SCNC: 94 MMOL/L (ref 98–107)
CLARITY: CLEAR
CO2: 26 MMOL/L (ref 22–29)
COCAINE METABOLITE SCREEN URINE: NOT DETECTED
COLOR: YELLOW
CREAT SERPL-MCNC: 0.6 MG/DL (ref 0.5–1)
EOSINOPHILS ABSOLUTE: 0.03 E9/L (ref 0.05–0.5)
EOSINOPHILS RELATIVE PERCENT: 0.8 % (ref 0–6)
ETHANOL: <10 MG/DL (ref 0–0.08)
GFR AFRICAN AMERICAN: >60
GFR NON-AFRICAN AMERICAN: >60 ML/MIN/1.73
GLUCOSE BLD-MCNC: 90 MG/DL (ref 74–99)
GLUCOSE URINE: NEGATIVE MG/DL
HCT VFR BLD CALC: 38.1 % (ref 34–48)
HEMOGLOBIN: 13.1 G/DL (ref 11.5–15.5)
IMMATURE GRANULOCYTES #: 0.01 E9/L
IMMATURE GRANULOCYTES %: 0.3 % (ref 0–5)
KETONES, URINE: NEGATIVE MG/DL
LEUKOCYTE ESTERASE, URINE: ABNORMAL
LYMPHOCYTES ABSOLUTE: 1.88 E9/L (ref 1.5–4)
LYMPHOCYTES RELATIVE PERCENT: 51.8 % (ref 20–42)
MCH RBC QN AUTO: 31.9 PG (ref 26–35)
MCHC RBC AUTO-ENTMCNC: 34.4 % (ref 32–34.5)
MCV RBC AUTO: 92.7 FL (ref 80–99.9)
METHADONE SCREEN, URINE: NOT DETECTED
MONOCYTES ABSOLUTE: 0.24 E9/L (ref 0.1–0.95)
MONOCYTES RELATIVE PERCENT: 6.6 % (ref 2–12)
NEUTROPHILS ABSOLUTE: 1.41 E9/L (ref 1.8–7.3)
NEUTROPHILS RELATIVE PERCENT: 38.8 % (ref 43–80)
NITRITE, URINE: NEGATIVE
OPIATE SCREEN URINE: NOT DETECTED
PDW BLD-RTO: 12 FL (ref 11.5–15)
PH UA: 7 (ref 5–9)
PHENCYCLIDINE SCREEN URINE: NOT DETECTED
PLATELET # BLD: 334 E9/L (ref 130–450)
PMV BLD AUTO: 10.2 FL (ref 7–12)
POTASSIUM SERPL-SCNC: 3.3 MMOL/L (ref 3.5–5)
PROPOXYPHENE SCREEN: NOT DETECTED
PROTEIN UA: NEGATIVE MG/DL
RBC # BLD: 4.11 E12/L (ref 3.5–5.5)
RBC UA: ABNORMAL /HPF (ref 0–2)
SALICYLATE, SERUM: <0.3 MG/DL (ref 0–30)
SODIUM BLD-SCNC: 130 MMOL/L (ref 132–146)
SPECIFIC GRAVITY UA: <=1.005 (ref 1–1.03)
TOTAL PROTEIN: 7.4 G/DL (ref 6.4–8.3)
TRICYCLIC ANTIDEPRESSANTS SCREEN SERUM: NEGATIVE NG/ML
UROBILINOGEN, URINE: 0.2 E.U./DL
WBC # BLD: 3.6 E9/L (ref 4.5–11.5)
WBC UA: ABNORMAL /HPF (ref 0–5)

## 2019-04-25 PROCEDURE — G0480 DRUG TEST DEF 1-7 CLASSES: HCPCS

## 2019-04-25 PROCEDURE — 81001 URINALYSIS AUTO W/SCOPE: CPT

## 2019-04-25 PROCEDURE — 85025 COMPLETE CBC W/AUTO DIFF WBC: CPT

## 2019-04-25 PROCEDURE — 83036 HEMOGLOBIN GLYCOSYLATED A1C: CPT

## 2019-04-25 PROCEDURE — 36415 COLL VENOUS BLD VENIPUNCTURE: CPT

## 2019-04-25 PROCEDURE — 80307 DRUG TEST PRSMV CHEM ANLYZR: CPT

## 2019-04-25 PROCEDURE — 80053 COMPREHEN METABOLIC PANEL: CPT

## 2019-04-25 PROCEDURE — 1240000000 HC EMOTIONAL WELLNESS R&B

## 2019-04-25 PROCEDURE — 99285 EMERGENCY DEPT VISIT HI MDM: CPT
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
## 2019-04-25 NOTE — ED NOTES
Pt is alert and oriented x4, cooperative. Pt admits to SI with thoughts of shooting herself. Pt does not have access to guns. Denies HI. Denies A/V hallucinations. No other complaints. Ambulates with a steady gait. Will continue to monitor.       Kayce Grissom RN  04/25/19 7600

## 2019-04-25 NOTE — ED NOTES
FIRST PROVIDER CONTACT ASSESSMENT NOTE      Department of Emergency Medicine   4/25/19  1:45 PM    Chief Complaint: Suicidal (sent from [employer] [facility] for eval, SI with plan)    History of Present Illness   Any Pollock is a 62 y.o. female who presents to the ED for suicidal ideation with plan to shoot herself in the head. She has a past psychiatric history of schizophrenia. She denies HI, AH/VH. PsychMargery Smaller     Focused Physical Exam:  VS:  BP (!) 140/87   Pulse 65   Temp 97.1 °F (36.2 °C) (Temporal)   Resp 17   Wt 94 lb (42.6 kg)   LMP 08/25/2011 (Exact Date)   SpO2 100%   BMI 15.64 kg/m²      General: Alert and in no apparent distress   CVS: Regular rate for age, normal rhythm  Resp: Clear and equal bilaterally with good airflow, no respiratory distress    Medical History:  has a past medical history of Chronic idiopathic constipation, Depression, Head injury, Hyperlipidemia, IBS (irritable bowel syndrome), Jaw pain, Migraines, neuralgic, Pain in gums, and Schizophrenia ([facility] [address] Surgical History:  has a past surgical history that includes cyst removal (Right, 2008); Toe Surgery (1999); and laparoscopy (1980). Social History:  reports that she has never smoked. She has never used smokeless tobacco. She reports that she does not drink alcohol or use drugs. Family History: family history includes Cancer in her mother; Heart Failure in her father.     *ALLERGIES*     Ibuprofen and Carbamazepine and analogs     Initial Plan of Care:  Initiate Treatment-Testing, Proceed toTreatment Area When Bed Available for ED Attending/MLP to Continue Care    -----------------END OF FIRST PROVIDER CONTACT ASSESSMENT NOTE--------------  Electronically signed by oYhana Morgan PA-C   DD: 4/25/19         Yohana Morgan PA-C  04/25/19 5806

## 2019-04-25 NOTE — ED NOTES
SW REVIEWED INTERN ASSESSMENT. PATIENT PRESENTING WITH SI W/ PLAN. PATIENT WILLING TO SIGN IN VOLUNTARILY. SW WILL PURSUE INPATIENT ADMISSION FOR SAFETY/STABILIZATION.      MAYRA Kinney, [place]  04/25/19 9974

## 2019-04-25 NOTE — ED PROVIDER NOTES
HPI:  4/25/19, Time: 7:23 PM         Any Jang is a 62 y.o. female presenting to the ED for SI with plans of shooting herself  from [place] today. She has mental health history. Agrees to stay voluntarily at this time. Does not have access to guns. The complaint has been persistent, moderate in severity, and worsened by nothing. Patient denies fever/chills, cough, congestion, chest pain, shortness of breath, edema, headache, visual disturbances, focal paresthesias, focal weakness, abdominal pain, nausea, vomiting, diarrhea, constipation, dysuria, hematuria, trauma, neck or back pain or other complaints. ROS:   Pertinent positives and negatives are stated within HPI, all other systems reviewed and are negative.      --------------------------------------------- PAST HISTORY ---------------------------------------------  Past Medical History:  has a past medical history of Chronic idiopathic constipation, Depression, Head injury, Hyperlipidemia, IBS (irritable bowel syndrome), Jaw pain, Migraines, neuralgic, Pain in gums, and Schizophrenia ([facility] Past Surgical History:  has a past surgical history that includes cyst removal (Right, 2008); Toe Surgery (1999); and laparoscopy (1980). Social History:  reports that she has never smoked. She has never used smokeless tobacco. She reports that she does not drink alcohol or use drugs. Family History: family history includes Cancer in her mother; Heart Failure in her father. The patients home medications have been reviewed.     Allergies: Ibuprofen and Carbamazepine and analogs        ---------------------------------------------------PHYSICAL EXAM--------------------------------------    Constitutional:  Well developed, well nourished, no acute distress, non-toxic appearance   Eyes:  PERRL, conjunctiva normal, EOMI  HENT:  Atraumatic, external ears normal, nose normal, oropharynx moist. Neck- normal range of motion  Respiratory:  No respiratory distress, normal breath sounds, no rales, no wheezing   Cardiovascular:  Normal rate, normal rhythm, no murmurs, no gallops, no rubs. Radial pulses 2+ bilaterally. GI:  Soft, nondistended, normal bowel sounds, nontender, no organomegaly, no mass, no rebound, no guarding   Musculoskeletal:  No edema, no deformities. Back- no tenderness  Integument:  Well hydrated. Adequate perfusion. Neurologic:  Alert & oriented x 3, CN 2-12 normal, normal gait, no focal deficits noted. Psychiatric:    General: She is cooperative. Mood: depressed. Affect: flat in affect. Thought Process: intact/within normal limits. Suicidal Thoughts: with specific plan    Homicidal Thoughts: none   Hallucinations: none        -------------------------------------------------- RESULTS -------------------------------------------------  I have personally reviewed all laboratory and imaging results for this patient. Results are listed below.      LABS:  Results for orders placed or performed during the hospital encounter of 04/25/19   CBC Auto Differential   Result Value Ref Range    WBC 3.6 (L) 4.5 - 11.5 E9/L    RBC 4.11 3.50 - 5.50 E12/L    Hemoglobin 13.1 11.5 - 15.5 g/dL    Hematocrit 38.1 34.0 - 48.0 %    MCV 92.7 80.0 - 99.9 fL    MCH 31.9 26.0 - 35.0 pg    MCHC 34.4 32.0 - 34.5 %    RDW 12.0 11.5 - 15.0 fL    Platelets 722 474 - 345 E9/L    MPV 10.2 7.0 - 12.0 fL    Neutrophils % 38.8 (L) 43.0 - 80.0 %    Immature Granulocytes % 0.3 0.0 - 5.0 %    Lymphocytes % 51.8 (H) 20.0 - 42.0 %    Monocytes % 6.6 2.0 - 12.0 %    Eosinophils % 0.8 0.0 - 6.0 %    Basophils % 1.7 0.0 - 2.0 %    Neutrophils # 1.41 (L) 1.80 - 7.30 E9/L    Immature Granulocytes # 0.01 E9/L    Lymphocytes # 1.88 1.50 - 4.00 E9/L    Monocytes # 0.24 0.10 - 0.95 E9/L    Eosinophils # 0.03 (L) 0.05 - 0.50 E9/L    Basophils # 0.06 0.00 - 0.20 E9/L   Comprehensive Metabolic Panel   Result Value Ref Range    Sodium 130 (L) 132 - 146 mmol/L Potassium 3.3 (L) 3.5 - 5.0 mmol/L    Chloride 94 (L) 98 - 107 mmol/L    CO2 26 22 - 29 mmol/L    Anion Gap 10 7 - 16 mmol/L    Glucose 90 74 - 99 mg/dL    BUN 3 (L) 6 - 20 mg/dL    CREATININE 0.6 0.5 - 1.0 mg/dL    GFR Non-African American >60 >=60 mL/min/1.73    GFR African American >60     Calcium 10.0 8.6 - 10.2 mg/dL    Total Protein 7.4 6.4 - 8.3 g/dL    Alb 4.8 3.5 - 5.2 g/dL    Total Bilirubin 0.7 0.0 - 1.2 mg/dL    Alkaline Phosphatase 77 35 - 104 U/L    ALT 25 0 - 32 U/L    AST 22 0 - 31 U/L   Urine Drug Screen   Result Value Ref Range    Amphetamine Screen, Urine NOT DETECTED Negative <1000 ng/mL    Barbiturate Screen, Ur NOT DETECTED Negative < 200 ng/mL    Benzodiazepine Screen, Urine NOT DETECTED Negative < 200 ng/mL    Cannabinoid Scrn, Ur NOT DETECTED Negative < 50ng/mL    Cocaine Metabolite Screen, Urine NOT DETECTED Negative < 300 ng/mL    Opiate Scrn, Ur NOT DETECTED Negative < 300ng/mL    PCP Screen, Urine NOT DETECTED Negative < 25 ng/mL    Methadone Screen, Urine NOT DETECTED Negative <300 ng/mL    Propoxyphene Scrn, Ur NOT DETECTED Negative <300 ng/mL   Serum Drug Screen   Result Value Ref Range    Ethanol Lvl <10 mg/dL    Acetaminophen Level <5.0 (L) 10.0 - 39.0 mcg/mL    Salicylate, Serum <9.7 0.0 - 30.0 mg/dL    TCA Scrn NEGATIVE Cutoff:300 ng/mL   Urinalysis   Result Value Ref Range    Color, UA Yellow Straw/Yellow    Clarity, UA Clear Clear    Glucose, Ur Negative Negative mg/dL    Bilirubin Urine Negative Negative    Ketones, Urine Negative Negative mg/dL    Specific Gravity, UA <=1.005 1.005 - 1.030    Blood, Urine Negative Negative    pH, UA 7.0 5.0 - 9.0    Protein, UA Negative Negative mg/dL    Urobilinogen, Urine 0.2 <2.0 E.U./dL    Nitrite, Urine Negative Negative    Leukocyte Esterase, Urine TRACE (A) Negative   Microscopic Urinalysis   Result Value Ref Range    WBC, UA 2-5 0 - 5 /HPF    RBC, UA 0-1 0 - 2 /HPF    Bacteria, UA RARE (A) /HPF       RADIOLOGY:  Interpreted by Radiologist.  No orders to display       ------------------------- NURSING NOTES AND VITALS REVIEWED ---------------------------   The nursing notes within the ED encounter and vital signs as below have been reviewed by myself. BP (!) 140/87   Pulse 65   Temp 97.1 °F (36.2 °C) (Temporal)   Resp 17   Wt 94 lb (42.6 kg)   LMP 08/25/2011 (Exact Date)   SpO2 100%   BMI 15.64 kg/m²   Oxygen Saturation Interpretation: Normal    The patients available past medical records and past encounters were reviewed. ------------------------------ ED COURSE/MEDICAL DECISION MAKING----------------------  Medications - No data to display        Procedures:  none      Medical Decision Making:    Admit to mental health. Medically clear at this time    This patient's ED course included: a personal history and physicial eaxmination    This patient has remained hemodynamically stable during their ED course. Consultations:             Social work    Critical Care: none        Counseling: The emergency provider has spoken with the patient and discussed todays results, in addition to providing specific details for the plan of care and counseling regarding the diagnosis and prognosis. Questions are answered at this time and they are agreeable with the plan.       --------------------------------- IMPRESSION AND DISPOSITION ---------------------------------    IMPRESSION  1.  Depression with suicidal ideation        DISPOSITION  Disposition: Admit to mental health unit - medically cleared for admission  Patient condition is stable                 Aashish Marcelo DO  04/25/19 2010

## 2019-04-25 NOTE — ED NOTES
Patient accepted to [address] [place] [address], room 7304 by Dr. Princeton Mcburney. Sarita in admitting notified.       Suzanna Romano RN  04/25/19 8136

## 2019-04-26 PROBLEM — E43 SEVERE PROTEIN-CALORIE MALNUTRITION (HCC): Status: ACTIVE | Noted: 2019-04-26

## 2019-04-26 PROBLEM — F25.1 SCHIZOAFFECTIVE DISORDER, DEPRESSIVE TYPE (HCC): Status: ACTIVE | Noted: 2019-04-26

## 2019-04-26 LAB
ALBUMIN SERPL-MCNC: 4.4 G/DL (ref 3.5–5.2)
ALP BLD-CCNC: 71 U/L (ref 35–104)
ALT SERPL-CCNC: 24 U/L (ref 0–32)
ANION GAP SERPL CALCULATED.3IONS-SCNC: 11 MMOL/L (ref 7–16)
AST SERPL-CCNC: 24 U/L (ref 0–31)
BILIRUB SERPL-MCNC: 0.5 MG/DL (ref 0–1.2)
BUN BLDV-MCNC: 6 MG/DL (ref 6–20)
CALCIUM SERPL-MCNC: 9.8 MG/DL (ref 8.6–10.2)
CHLORIDE BLD-SCNC: 100 MMOL/L (ref 98–107)
CO2: 23 MMOL/L (ref 22–29)
CREAT SERPL-MCNC: 0.6 MG/DL (ref 0.5–1)
GFR AFRICAN AMERICAN: >60
GFR NON-AFRICAN AMERICAN: >60 ML/MIN/1.73
GLUCOSE BLD-MCNC: 80 MG/DL (ref 74–99)
HBA1C MFR BLD: 5 % (ref 4–5.6)
METER GLUCOSE: 115 MG/DL (ref 74–99)
METER GLUCOSE: 67 MG/DL (ref 74–99)
POTASSIUM SERPL-SCNC: 3.9 MMOL/L (ref 3.5–5)
SODIUM BLD-SCNC: 134 MMOL/L (ref 132–146)
TOTAL PROTEIN: 6.6 G/DL (ref 6.4–8.3)

## 2019-04-26 PROCEDURE — 1240000000 HC EMOTIONAL WELLNESS R&B

## 2019-04-26 PROCEDURE — 36415 COLL VENOUS BLD VENIPUNCTURE: CPT

## 2019-04-26 PROCEDURE — 6370000000 HC RX 637 (ALT 250 FOR IP): Performed by: NURSE PRACTITIONER

## 2019-04-26 PROCEDURE — 80053 COMPREHEN METABOLIC PANEL: CPT

## 2019-04-26 PROCEDURE — 99221 1ST HOSP IP/OBS SF/LOW 40: CPT | Performed by: NURSE PRACTITIONER

## 2019-04-26 PROCEDURE — 82962 GLUCOSE BLOOD TEST: CPT

## 2019-04-26 RX ORDER — BENZTROPINE MESYLATE 1 MG/ML
2 INJECTION INTRAMUSCULAR; INTRAVENOUS 2 TIMES DAILY PRN
Status: DISCONTINUED | OUTPATIENT
Start: 2019-04-26 | End: 2019-05-07 | Stop reason: HOSPADM

## 2019-04-26 RX ORDER — LORAZEPAM 0.5 MG/1
0.5 TABLET ORAL EVERY 8 HOURS PRN
Status: DISCONTINUED | OUTPATIENT
Start: 2019-04-26 | End: 2019-05-07 | Stop reason: HOSPADM

## 2019-04-26 RX ORDER — ACETAMINOPHEN 325 MG/1
650 TABLET ORAL EVERY 4 HOURS PRN
Status: DISCONTINUED | OUTPATIENT
Start: 2019-04-26 | End: 2019-05-07 | Stop reason: HOSPADM

## 2019-04-26 RX ORDER — HALOPERIDOL 5 MG/ML
2 INJECTION INTRAMUSCULAR EVERY 4 HOURS PRN
Status: DISCONTINUED | OUTPATIENT
Start: 2019-04-26 | End: 2019-05-07 | Stop reason: HOSPADM

## 2019-04-26 RX ORDER — ARIPIPRAZOLE 2 MG/1
2 TABLET ORAL 2 TIMES DAILY
Status: DISCONTINUED | OUTPATIENT
Start: 2019-04-26 | End: 2019-04-30

## 2019-04-26 RX ORDER — VENLAFAXINE HYDROCHLORIDE 37.5 MG/1
37.5 CAPSULE, EXTENDED RELEASE ORAL
Status: DISCONTINUED | OUTPATIENT
Start: 2019-04-27 | End: 2019-04-27

## 2019-04-26 RX ORDER — NICOTINE 21 MG/24HR
1 PATCH, TRANSDERMAL 24 HOURS TRANSDERMAL DAILY
Status: DISCONTINUED | OUTPATIENT
Start: 2019-04-26 | End: 2019-04-28

## 2019-04-26 RX ORDER — CITALOPRAM 20 MG/1
20 TABLET ORAL DAILY
Status: ON HOLD | COMMUNITY
End: 2019-04-26

## 2019-04-26 RX ORDER — TOPIRAMATE 100 MG/1
200 TABLET, FILM COATED ORAL DAILY
Status: DISCONTINUED | OUTPATIENT
Start: 2019-04-26 | End: 2019-05-07 | Stop reason: HOSPADM

## 2019-04-26 RX ORDER — MAGNESIUM HYDROXIDE/ALUMINUM HYDROXICE/SIMETHICONE 120; 1200; 1200 MG/30ML; MG/30ML; MG/30ML
30 SUSPENSION ORAL PRN
Status: DISCONTINUED | OUTPATIENT
Start: 2019-04-26 | End: 2019-05-07 | Stop reason: HOSPADM

## 2019-04-26 RX ADMIN — ARIPIPRAZOLE 2 MG: 2 TABLET ORAL at 21:42

## 2019-04-26 RX ADMIN — ARIPIPRAZOLE 2 MG: 2 TABLET ORAL at 15:28

## 2019-04-26 RX ADMIN — TOPIRAMATE 200 MG: 100 TABLET, FILM COATED ORAL at 15:28

## 2019-04-26 ASSESSMENT — SLEEP AND FATIGUE QUESTIONNAIRES
DIFFICULTY ARISING: NO
RESTFUL SLEEP: NO
DIFFICULTY STAYING ASLEEP: YES
SLEEP PATTERN: DIFFICULTY FALLING ASLEEP
DO YOU USE A SLEEP AID: NO
DIFFICULTY FALLING ASLEEP: YES
AVERAGE NUMBER OF SLEEP HOURS: 2
DO YOU HAVE DIFFICULTY SLEEPING: YES

## 2019-04-26 ASSESSMENT — PAIN SCALES - GENERAL
PAINLEVEL_OUTOF10: 0

## 2019-04-26 ASSESSMENT — PATIENT HEALTH QUESTIONNAIRE - PHQ9: SUM OF ALL RESPONSES TO PHQ QUESTIONS 1-9: 21

## 2019-04-26 NOTE — PROGRESS NOTES
Recreation assessment completed. Shared she has been reluctant to participate in groups, due to her discomfort and \"stomach issues\". Also related pain in lower back, and \"not a social person\" as barriers. Accepting of encouragement to interact more this evening and try to sit with one person at dinner.

## 2019-04-26 NOTE — PLAN OF CARE
Problem: Malnutrition  (NI-5.2)  Goal: Food and/or Nutrient Delivery  Description- Ensure BID, Magic cup daily   Individualized approach for food/nutrient provision.   Outcome: Met This Shift
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
## 2019-04-26 NOTE — PROGRESS NOTES
poor appetite PTA)     · Anthropometric Measures:  · Ht: 5' 5\" (165.1 cm)   · Current Body Wt: 88 lb (39.9 kg)(4/26 standing scale )  · Usual Body Wt: (~120-130lb per pt.  No EMR hx on file)  · % Weight Change:subjective ~35lb (30%) wt loss in unknown time frame, unable to verify   · Ideal Body Wt: 125 lb (56.7 kg), % Ideal Body 70%  · BMI Classification: BMI <18.5 Underweight    Nutrition Interventions:   Education Initiated, Continued Inpatient Monitoring(protein importance/ ONS options discussed w/ pt)    Nutrition Evaluation:   · Evaluation: Goals set   · Goals: Pt to consume >75% meals and ONS    · Monitoring: Meal Intake, Supplement Intake, Diet Tolerance, Mental Status/Confusion, Monitor Bowel Function, Weight, Pertinent Labs      Electronically signed by Yennifer Crabtree RD, LD on 4/26/19 at 3:46 PM    Contact Number: Ext [phone]

## 2019-04-26 NOTE — PROGRESS NOTES
Patient attended goals group. Shared daily goal of \" Get better with my emotions, feel less depressed\".
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
## 2019-04-27 PROBLEM — E11.9 TYPE 2 DIABETES MELLITUS, WITHOUT LONG-TERM CURRENT USE OF INSULIN (HCC): Status: ACTIVE | Noted: 2019-04-27

## 2019-04-27 LAB
METER GLUCOSE: 72 MG/DL (ref 74–99)
METER GLUCOSE: 83 MG/DL (ref 74–99)

## 2019-04-27 PROCEDURE — 6370000000 HC RX 637 (ALT 250 FOR IP): Performed by: CLINICAL NURSE SPECIALIST

## 2019-04-27 PROCEDURE — 6370000000 HC RX 637 (ALT 250 FOR IP): Performed by: NURSE PRACTITIONER

## 2019-04-27 PROCEDURE — 6370000000 HC RX 637 (ALT 250 FOR IP): Performed by: PSYCHIATRY & NEUROLOGY

## 2019-04-27 PROCEDURE — 1240000000 HC EMOTIONAL WELLNESS R&B

## 2019-04-27 PROCEDURE — 82962 GLUCOSE BLOOD TEST: CPT

## 2019-04-27 PROCEDURE — 99231 SBSQ HOSP IP/OBS SF/LOW 25: CPT | Performed by: PSYCHIATRY & NEUROLOGY

## 2019-04-27 RX ORDER — VENLAFAXINE HYDROCHLORIDE 75 MG/1
75 CAPSULE, EXTENDED RELEASE ORAL
Status: DISCONTINUED | OUTPATIENT
Start: 2019-04-28 | End: 2019-05-02

## 2019-04-27 RX ORDER — LACTULOSE 10 G/15ML
10 SOLUTION ORAL DAILY
Status: DISCONTINUED | OUTPATIENT
Start: 2019-04-27 | End: 2019-05-07 | Stop reason: HOSPADM

## 2019-04-27 RX ADMIN — VENLAFAXINE HYDROCHLORIDE 37.5 MG: 37.5 CAPSULE, EXTENDED RELEASE ORAL at 09:54

## 2019-04-27 RX ADMIN — ARIPIPRAZOLE 2 MG: 2 TABLET ORAL at 09:54

## 2019-04-27 RX ADMIN — LACTULOSE 10 G: 20 SOLUTION ORAL at 15:09

## 2019-04-27 RX ADMIN — TOPIRAMATE 200 MG: 100 TABLET, FILM COATED ORAL at 09:54

## 2019-04-27 RX ADMIN — ACETAMINOPHEN 650 MG: 325 TABLET, FILM COATED ORAL at 11:42

## 2019-04-27 RX ADMIN — ARIPIPRAZOLE 2 MG: 2 TABLET ORAL at 20:36

## 2019-04-27 ASSESSMENT — PAIN SCALES - GENERAL
PAINLEVEL_OUTOF10: 0
PAINLEVEL_OUTOF10: 5
PAINLEVEL_OUTOF10: 1
PAINLEVEL_OUTOF10: 0

## 2019-04-27 NOTE — GROUP NOTE
Group Therapy Note    Date: April 27    Group Start Time: 2252  Group End Time: 4990  Group Topic: Healthy Living/Wellness    [facility]  [id number], RN    Pt attended and participated in self care group.         Signature:  Torrey Alegre RN

## 2019-04-27 NOTE — PROGRESS NOTES
DATE OF SERVICE:     4/27/2019    Tai Youngblood seen today for the purpose of continuation of care. Nursing, social work reports, laboratory studies and vital signs are reviewed. Patient chief complaint today is:             [x] Depression      [x] Anxiety        [] Psychosis         [x] Suicidal/Homicidal                         [] Delusions           [] Aggression          Subjective:     Patient was in her room, still feels depressed and suicidal, complaints of constipation, stomach and back pain. Sleep:  [] Good [x] Fair  [] Poor  Appetite:  [] Good [x] Fair  [] Poor    Depression:  [] Mild [] Moderate [x] Severe                [x] Constant [] Sporadic     Anxiety: [] Mild [x] Moderate [] Severe    [x] Constant [] Sporadic     Delusions: [] Mild [] Moderate [] Severe     [] Constant [] Sporadic     [] Paranoid [] Somatic [] Grandiose     Hallucinations: [] Mild [] Moderate [] Severe     [] Constant [] Sporadic    [] Auditory  [] Visual [] Tactile       Suicidal: [] Constant [x] Sporadic  Homicidal: [] Constant [] Sporadic    Unscheduled Medications     [] Patient Receiving Emergency Medications \" Chemical Restraint\"   [] Requesting PRN medications for anxiety    Psychiatric Review of systems  Delusions:  [x] Denies [] Endorses   Withdrawals:  [x] Denies [] Endorses    Hallucinations: [x] Denies [] Endorses    Extra Pyramidal Symptoms: [x] Denies [] Endorses      Medical Review of Systems:     All other than marked systmes have been reviewed and are all negative.     Constitutional Symptoms: []  fever []  Chills  Skin Symptoms: [] rash []  Pruritus   Eye Symptoms: [] Vision unchanged []  recent vision problems[] blurred vision   Respiratory Symptoms:[] shortness of breath [] cough  Cardiovascular Symptoms:  [] chest pain   [] palpitations   Gastrointestinal Symptoms: []  abdominal pain []  nausea []  vomiting []  diarrhea  Genitourinary Symptoms: []  dysuria  []  hematuria   Musculoskeletal

## 2019-04-27 NOTE — PLAN OF CARE
Pt denies SI, HI, and AVH. Pt states she sometimes has thoughts that the world would be better off without her. Pt rates depression 6/10 and denies anxiety. Pt appears worried. Pt has multiple somatic complaints. Pt states her neck, back, stomach, and vagina hurt. Pt is preoccupied with being constipated. IFEANYI Sierra notified. Will continue to observe and support.        Problem: Depressive Behavior With or Without Suicide Precautions:  Goal: Able to verbalize acceptance of life and situations over which he or she has no control  Description  Able to verbalize acceptance of life and situations over which he or she has no control  Outcome: Ongoing  Goal: Able to verbalize and/or display a decrease in depressive symptoms  Description  Able to verbalize and/or display a decrease in depressive symptoms  4/27/2019 0958 by Get Ureña RN  Outcome: Ongoing  4/26/2019 2327 by Debbie Vázquez RN  Outcome: Not Met This Shift  Goal: Ability to disclose and discuss suicidal ideas will improve  Description  Ability to disclose and discuss suicidal ideas will improve  Outcome: Ongoing

## 2019-04-27 NOTE — PLAN OF CARE
Patient is resting in bed with eyes closed. No signs of distress or discomfort. No unit issues. Safety needs met. Will continue to monitor closely.           Problem: Suicide risk  Goal: Provide patient with safe environment  Description  Provide patient with safe environment  Outcome: Met This Shift         Electronically signed by Frances Bah RN on 4/27/2019 at 3:28 AM

## 2019-04-27 NOTE — PROGRESS NOTES
Spoke with Leonid Smith, nurse at \[facility]\"", she stated the pt got long acting injection of 300mg Abilify Maintain on 4/10/19 and the pt takes this every 4 weeks. Leonid Smith faxed over documentation of this from their facility which was place in pt soft chart.

## 2019-04-27 NOTE — PROGRESS NOTES
Pt has no plans to hurt her self or others at this times. Pt did tell this nurse she wanted to at home but feels safe in the hospital. Pt took all medications. Pt is social with peers and attends groups.

## 2019-04-27 NOTE — PLAN OF CARE
Patient has been isolative to room. Did not come out for snacks or relaxation activity. Patient did take medication this evening. No complaints or concerns verbalized at this time. Will continue to monitor and offer support.        Problem: Depressive Behavior With or Without Suicide Precautions:  Goal: Able to verbalize and/or display a decrease in depressive symptoms  Description  Able to verbalize and/or display a decrease in depressive symptoms  Outcome: Not Met This Shift           Electronically signed by Abelino Francis RN on 4/26/2019 at 11:29 PM

## 2019-04-27 NOTE — GROUP NOTE
Group Therapy Note    Date: April 27    Group Start Time: 1135  Group End Time: 0135  Group Topic: Cognitive Skills    [facility]  [address], [address]        Group Therapy Note    Attendees: 7                                                                Type of Group: Community Meeting/psychoeducation     Wellness Binder Information  Module Name: basics to mental health wellness  Session Number: na  Objective: Will be able to identify simple steps to increase ones wellness. Status After Intervention:  Improved  Participation Level: Active Listener and Interactive  Participation Quality: Appropriate, Attentive and Sharing  Speech: normal   Discipline Responsible: Psychoeducational Specialist  Signature:  ALEX Ann        Notes: attended group pleasant, shared goal for the day as to keep taking my meds and take my shower.    Thought Process/Content: Logical  Affective Functioning: Congruent  Mood: euthymic  Level of consciousness:  Alert, Oriented x4 and Attentive  Response to Learning: Able to verbalize/acknowledge new learning, Able to retain information and Progressing to goal  Endings: None Reported  Modes of Intervention: Education, Support, Socialization, Exploration and Problem-solving  Discipline Responsible: Psychoeducational Specialist  Signature:  Lino James
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
## 2019-04-27 NOTE — GROUP NOTE
Group Therapy Note    Date: April 27    Group Start Time: 7983  Group End Time: 0360  Group Topic: Healthy Living/Wellness    [facility]  [address], RN    Pt attended and participated in nursing education group.       Signature:  Marlene Medrano RN

## 2019-04-28 ENCOUNTER — APPOINTMENT (OUTPATIENT)
Dept: GENERAL RADIOLOGY | Age: 59
DRG: 750 | End: 2019-04-28
Payer: COMMERCIAL

## 2019-04-28 LAB
METER GLUCOSE: 112 MG/DL (ref 74–99)
METER GLUCOSE: 85 MG/DL (ref 74–99)

## 2019-04-28 PROCEDURE — 6370000000 HC RX 637 (ALT 250 FOR IP): Performed by: NURSE PRACTITIONER

## 2019-04-28 PROCEDURE — 6370000000 HC RX 637 (ALT 250 FOR IP): Performed by: PSYCHIATRY & NEUROLOGY

## 2019-04-28 PROCEDURE — 1240000000 HC EMOTIONAL WELLNESS R&B

## 2019-04-28 PROCEDURE — 6370000000 HC RX 637 (ALT 250 FOR IP): Performed by: CLINICAL NURSE SPECIALIST

## 2019-04-28 PROCEDURE — 82962 GLUCOSE BLOOD TEST: CPT

## 2019-04-28 PROCEDURE — 99231 SBSQ HOSP IP/OBS SF/LOW 25: CPT | Performed by: PSYCHIATRY & NEUROLOGY

## 2019-04-28 PROCEDURE — 74018 RADEX ABDOMEN 1 VIEW: CPT

## 2019-04-28 RX ADMIN — ARIPIPRAZOLE 2 MG: 2 TABLET ORAL at 20:36

## 2019-04-28 RX ADMIN — TOPIRAMATE 200 MG: 100 TABLET, FILM COATED ORAL at 08:40

## 2019-04-28 RX ADMIN — VENLAFAXINE HYDROCHLORIDE 75 MG: 75 CAPSULE, EXTENDED RELEASE ORAL at 08:41

## 2019-04-28 RX ADMIN — LACTULOSE 10 G: 20 SOLUTION ORAL at 08:40

## 2019-04-28 RX ADMIN — ARIPIPRAZOLE 2 MG: 2 TABLET ORAL at 08:41

## 2019-04-28 ASSESSMENT — PAIN SCALES - GENERAL
PAINLEVEL_OUTOF10: 0
PAINLEVEL_OUTOF10: 0

## 2019-04-28 NOTE — PROGRESS NOTES
DATE OF SERVICE:     4/28/2019    Lula Carmichael seen today for the purpose of continuation of care. Nursing, social work reports, laboratory studies and vital signs are reviewed. Patient chief complaint today is:             [] Depression      [x] Anxiety        [] Psychosis         [] Suicidal/Homicidal                         [] Delusions           [] Aggression          Subjective:     Patient was in her room, still very somatic, talked about constipation and neck pain. She says, her mood is better and she is not suicidal.    Sleep:  [] Good [x] Fair  [] Poor  Appetite:  [] Good [x] Fair  [] Poor    Depression:  [x] Mild [] Moderate [] Severe                [x] Constant [] Sporadic     Anxiety: [] Mild [x] Moderate [] Severe    [x] Constant [] Sporadic     Delusions: [] Mild [] Moderate [] Severe     [] Constant [] Sporadic     [] Paranoid [] Somatic [] Grandiose     Hallucinations: [] Mild [] Moderate [] Severe     [] Constant [] Sporadic    [] Auditory  [] Visual [] Tactile       Suicidal: [] Constant [x] Sporadic  Homicidal: [] Constant [] Sporadic    Unscheduled Medications     [] Patient Receiving Emergency Medications \" Chemical Restraint\"   [] Requesting PRN medications for anxiety    Psychiatric Review of systems  Delusions:  [x] Denies [] Endorses   Withdrawals:  [x] Denies [] Endorses    Hallucinations: [x] Denies [] Endorses    Extra Pyramidal Symptoms: [x] Denies [] Endorses      Medical Review of Systems:     All other than marked systmes have been reviewed and are all negative.     Constitutional Symptoms: []  fever []  Chills  Skin Symptoms: [] rash []  Pruritus   Eye Symptoms: [] Vision unchanged []  recent vision problems[] blurred vision   Respiratory Symptoms:[] shortness of breath [] cough  Cardiovascular Symptoms:  [] chest pain   [] palpitations   Gastrointestinal Symptoms: []  abdominal pain []  nausea []  vomiting []  diarrhea  Genitourinary Symptoms: []  dysuria  []  hematuria Musculoskeletal Symptoms: []  back pain []  muscle pain []  joint pain  Neurologic Symptoms: []  headache []  dizziness  Hematolymphoid Symptoms: [] Adenopathy [] Bruises   [] Schimosis       Mental Status Examination:    Cognition:      [x] Alert  [x] Awake  [x] Oriented  [x] Person  [x] Place [x] Time      [] drowsy  [] tired  [] lethargic  [] distractable  [] Other     Attention/Concentration:   [x] Attentive  [] Distracted        Memory Recent and Remote: [] Intact   [] Impaired [] Partially Impaired     Language: [x] Able to recognize and name objects          [] Unable to recognize and name Objects    Fund of Knowledge:  [] Poor []  Fair  [] Good    Speech: [x] Normal  [] Soft  [] Slow  [] Fast [] Pressured            [] Loud [] Dysarthria  [] Incoherent    Appearance: [] Well Groomed  [x] Casual Dressed  [] Unkept  [] Disheveled          [] Normal weight[] Thin  [] Overweight  [] Obese           Attitude: [x] Positive  [] Hostile  [] Demanding  [] Guarded  [] Defensive         [x] Cooperative  []  Uncooperative      Behavior:  [x] Normal Gait  [] Walks with Assistance  [] Cinthya Chair    [] Walks with [place]  [] In Hospital Bed  [] Sitting in Chair    Muscle-Skeletal:  [] Normal Muscle Tone [] Muscle Atrophy       [] Abnormal Muscle Movement     Eye Contact:  [x] Good eye contact  [] Intermittent Eye Contact  [] Poor Eye Contact     Mood: [x] Depressed  [x] Anxious  [] Irritated  [] Euthymic   [] Angry [] Restless    Affect:  [x] Congruent  [] Incongruent  [] Labile  [] Constricted  [] Flat  [] Bizarre     Thought Process and Association:  [x] Logical [] Illogical       [x] Linear and Goal Directed  [] Tangential  [] Circumstantial     Thought Content:  [] Denies [x] Endorses [] Suicidal [] Homicidal  [] Delusional      [] Paranoid  [x] Somatic  [] Grandiose    Perception: [x]  None  [] Auditory   [] Visual  [] tactile   [] olfactory  [] Illusions         Insight: [] Intact  [x] Fair  [] Limited    Judgement: [] Intact  [x] Fair  [x] Limited      Assessment/Plan:        Patient Active Problem List   Diagnosis Code    Chest pain R07.9    Schizo-affective schizophrenia, chronic condition ([facility] [address] F25.8    Hyponatremia E87.1    Trigeminal neuralgia syndrome G50.0    Chronic nonintractable headache R51    Anxiety F41.9    Chronic constipation K59.09    Head injury S09.90XA    Jaw pain R68.84    Pain in gums K06.8    Schizophrenia (HCC) F20.9    PTSD (post-traumatic stress disorder) F43.10    Other headache syndrome G44.89    Cervical pain (neck) M54.2    Depression with suicidal ideation F32.9, R45.851    Schizoaffective disorder, depressive type ([facility] [address] F25.1    Severe protein-calorie malnutrition ([facility]) E43    Type 2 diabetes mellitus, without long-term current use of insulin ([facility]) E11.9         Plan:    []  Patient is refusing medications  [] Improving as expected   [x] Not improving as expected   [] Worsening    []  At Baseline      Cont meds  Encourage activity. Reason for more than one antipsychotic:  [] N/A  [] 3 failed monotherapy(drugs tried):  [] Cross over to a new antipsychotic  [] Taper to monotherapy from polypharmacy  [] Augmentation of Clozapine therapy due to treatment resistance to single therapy      Signed:   Haim Lemon  4/28/2019  2:10 PM

## 2019-04-28 NOTE — GROUP NOTE
Group Therapy Note    Date: April 28    Group Start Time: 1400  Group End Time: 7404  Group Topic: Healthy Living/Wellness    [facility]  [address], RN    Pt attended and participated in afternoon Healthy Living and Wellness group on Self-esteem and self-worth and the power of positive thinking.     Group Therapy Note    Attendees: 11/14

## 2019-04-28 NOTE — GROUP NOTE
Group Therapy Note    Date: April 28    Group Start Time: 1600  Group End Time: 1630  Group Topic: Healthy Living/Wellness    [facility] 2    Kasia Chan RN        Group Therapy Note: Pt attend group therapy and actively participated.      Attendees:9/13     Signature:  Kasia Chan RN

## 2019-04-28 NOTE — GROUP NOTE
Group Therapy Note    Date: April 28    Group Start Time: 1630  Group End Time: 1700  Group Topic: Healthy Living/Wellness    [facility] Renuka RN    Pt attended and participated in wellness group or music therapy.         Signature:  Cheyanne Franco RN

## 2019-04-28 NOTE — PROGRESS NOTES
Patient has been isolative to her room so far this shift. Patient denies all but does complain of neck pain and vaginal pain. Patient states that she went to the doctor and had a Pap smear done, but the pain started after she had that done so the \"doctor wouldn't know that I was in pain, but I do have another appointment in May. \"  Encouraged patient to keep that appointment. Patient denies burning on urination or any itching, the only complaint being pain. Patient is calm and cooperative with care. Patient did not want to come out for snack and states \"I've had enough to eat today. I feel bloated because I ate too much. \"  Patient is encouraged to continue to work towards discharge goal by complying with medications, attending groups and to seek staff if feelings are overwhelming. Environmental rounds completed per unit policy to maintain safety of everyone on the unit. Staff will offer support and interventions as requested or required.

## 2019-04-28 NOTE — PLAN OF CARE
Pt denies SI, HI, and AVH. Pt rates depression 2/10 and denies anxiety. Pt is obsessed with knowing that staff believes she takes her Topamax for headaches not weight loss. Pt told staff this over ten times today without being prompted and without mention of Topamax.    Problem: Depressive Behavior With or Without Suicide Precautions:  Goal: Able to verbalize and/or display a decrease in depressive symptoms  Description  Able to verbalize and/or display a decrease in depressive symptoms  Outcome: Ongoing  Goal: Ability to disclose and discuss suicidal ideas will improve  Description  Ability to disclose and discuss suicidal ideas will improve  Outcome: Ongoing     Problem: Depressive Behavior With or Without Suicide Precautions:  Goal: Able to verbalize acceptance of life and situations over which he or she has no control  Description  Able to verbalize acceptance of life and situations over which he or she has no control  Outcome: Not Met This Shift

## 2019-04-29 LAB
METER GLUCOSE: 119 MG/DL (ref 74–99)
METER GLUCOSE: 85 MG/DL (ref 74–99)

## 2019-04-29 PROCEDURE — 1240000000 HC EMOTIONAL WELLNESS R&B

## 2019-04-29 PROCEDURE — 6370000000 HC RX 637 (ALT 250 FOR IP): Performed by: NURSE PRACTITIONER

## 2019-04-29 PROCEDURE — 99231 SBSQ HOSP IP/OBS SF/LOW 25: CPT | Performed by: NURSE PRACTITIONER

## 2019-04-29 PROCEDURE — 6370000000 HC RX 637 (ALT 250 FOR IP): Performed by: PSYCHIATRY & NEUROLOGY

## 2019-04-29 PROCEDURE — 82962 GLUCOSE BLOOD TEST: CPT

## 2019-04-29 PROCEDURE — 6370000000 HC RX 637 (ALT 250 FOR IP): Performed by: CLINICAL NURSE SPECIALIST

## 2019-04-29 RX ADMIN — LACTULOSE 10 G: 20 SOLUTION ORAL at 09:42

## 2019-04-29 RX ADMIN — TOPIRAMATE 200 MG: 100 TABLET, FILM COATED ORAL at 09:42

## 2019-04-29 RX ADMIN — VENLAFAXINE HYDROCHLORIDE 75 MG: 75 CAPSULE, EXTENDED RELEASE ORAL at 09:42

## 2019-04-29 RX ADMIN — ACETAMINOPHEN 650 MG: 325 TABLET, FILM COATED ORAL at 20:22

## 2019-04-29 RX ADMIN — ARIPIPRAZOLE 2 MG: 2 TABLET ORAL at 09:42

## 2019-04-29 RX ADMIN — ARIPIPRAZOLE 2 MG: 2 TABLET ORAL at 20:22

## 2019-04-29 RX ADMIN — ACETAMINOPHEN 650 MG: 325 TABLET, FILM COATED ORAL at 15:09

## 2019-04-29 ASSESSMENT — PAIN DESCRIPTION - FREQUENCY: FREQUENCY: INTERMITTENT

## 2019-04-29 ASSESSMENT — PAIN SCALES - GENERAL
PAINLEVEL_OUTOF10: 0
PAINLEVEL_OUTOF10: 8
PAINLEVEL_OUTOF10: 0
PAINLEVEL_OUTOF10: 8
PAINLEVEL_OUTOF10: 0

## 2019-04-29 ASSESSMENT — PAIN DESCRIPTION - DESCRIPTORS: DESCRIPTORS: ACHING

## 2019-04-29 ASSESSMENT — PAIN DESCRIPTION - PROGRESSION: CLINICAL_PROGRESSION: NOT CHANGED

## 2019-04-29 ASSESSMENT — PAIN DESCRIPTION - ONSET: ONSET: ON-GOING

## 2019-04-29 ASSESSMENT — PAIN DESCRIPTION - PAIN TYPE: TYPE: CHRONIC PAIN

## 2019-04-29 NOTE — GROUP NOTE
Group Therapy Note    Date: April 29    Group Start Time: 1630  Group End Time: 1700  Group Topic: Group Therapy    [facility] 2    Mercy Crabtree RN        Group Therapy Note    Attendees: Patient attended and participated in self care discussion group           Signature:  Mercy Crabtree RN

## 2019-04-29 NOTE — PROGRESS NOTES
PT order received. Per nursing staff, pt is up ad raisa on unit and is independent for functional mobility. Pt does not demonstrate any skilled PT needs at this time. PT order will be discontinued at this time.     Belgica Nelson PT, DPT  License [id number]

## 2019-04-29 NOTE — PROGRESS NOTES
joint pain  Neurologic Symptoms: []  headache []  dizziness  Hematolymphoid Symptoms: [] Adenopathy [] Bruises   [] Schimosis       Mental Status Examination:    Cognition:      [x] Alert  [x] Awake  [x] Oriented  [x] Person  [x] Place [x] Time      [] drowsy  [] tired  [] lethargic  [] distractable  [] Other     Attention/Concentration:   [x] Attentive  [] Distracted        Memory Recent and Remote: [] Intact   [] Impaired [] Partially Impaired     Language: [x] Able to recognize and name objects          [] Unable to recognize and name Objects    Fund of Knowledge:  [] Poor []  Fair  [] Good    Speech: [x] Normal  [] Soft  [] Slow  [] Fast [] Pressured            [] Loud [] Dysarthria  [] Incoherent    Appearance: [] Well Groomed  [x] Casual Dressed  [] Unkept  [] Disheveled          [] Normal weight[] Thin  [] Overweight  [] Obese           Attitude: [x] Positive  [] Hostile  [] Demanding  [] Guarded  [] Defensive         [x] Cooperative  []  Uncooperative      Behavior:  [x] Normal Gait  [] Walks with Assistance  [] Low Kugel    [] Walks with [place]  [] In Hospital Bed  [] Sitting in Chair    Muscle-Skeletal:  [x] Normal Muscle Tone [] Muscle Atrophy       [] Abnormal Muscle Movement     Eye Contact:  [x] Good eye contact  [] Intermittent Eye Contact  [] Poor Eye Contact     Mood: [x] Depressed  [x] Anxious  [] Irritated  [] Euthymic   [] Angry [] Restless    Affect:  [x] Congruent  [] Incongruent  [] Labile  [] Constricted  [] Flat  [] Bizarre     Thought Process and Association:  [x] Logical [] Illogical       [x] Linear and Goal Directed  [] Tangential  [] Circumstantial     Thought Content:  [] Denies [x] Endorses [] Suicidal [] Homicidal  [] Delusional      [] Paranoid  [x] Somatic  [] Grandiose    Perception: [x]  None  [] Auditory   [] Visual  [] tactile   [] olfactory  [] Illusions         Insight: [] Intact  [x] Fair  [] Limited    Judgement:  [] Intact  [x] Fair  [x] Limited      Assessment/Plan: Patient Active Problem List   Diagnosis Code    Chest pain R07.9    Schizo-affective schizophrenia, chronic condition ([facility] [address] F25.8    Hyponatremia E87.1    Trigeminal neuralgia syndrome G50.0    Chronic nonintractable headache R51    Anxiety F41.9    Chronic constipation K59.09    Head injury S09.90XA    Jaw pain R68.84    Pain in gums K06.8    Schizophrenia (HCC) F20.9    PTSD (post-traumatic stress disorder) F43.10    Other headache syndrome G44.89    Cervical pain (neck) M54.2    Depression with suicidal ideation F32.9, R45.851    Schizoaffective disorder, depressive type (HCC) F25.1    Severe protein-calorie malnutrition (HCC) E43    Type 2 diabetes mellitus, without long-term current use of insulin ([facility]) E11.9         Plan:    []  Patient is refusing medications  [] Improving as expected   [x] Not improving as expected   [] Worsening    []  At Baseline      Cont meds  Encourage activity.       Reason for more than one antipsychotic:  [x] N/A  [] 3 failed monotherapy(drugs tried):  [] Cross over to a new antipsychotic  [] Taper to monotherapy from polypharmacy  [] Augmentation of Clozapine therapy due to treatment resistance to single therapy      Signed:  Desiree Junior  4/29/2019  11:22 AM

## 2019-04-29 NOTE — PROGRESS NOTES
Patient has been isolative to her room so far this shift. Patient is still having somatic complaints, complaining of inability to have a bowel movement, as well as neck and abdominal pain. Patient states that the constipation is a normal problem for her. Patient states that she has not had a BM since Wednesday. Patient denies all and states \"I really don't want to make it seem like my medical problems are an obsession, but I'm worried because I haven't been able to go. \"  Patient denies depression or anxiety, but states \"I don't feel like leaving my room\" when telling the patient that snack would be out soon and that she should get something to eat before bed. Patient is encouraged to continue to work towards discharge goal by complying with medications, attending groups and to seek staff if feelings are overwhelming. Environmental rounds completed per unit policy to maintain safety of everyone on the unit. Staff will offer support and interventions as requested or required.

## 2019-04-30 LAB
METER GLUCOSE: 79 MG/DL (ref 74–99)
METER GLUCOSE: 92 MG/DL (ref 74–99)

## 2019-04-30 PROCEDURE — 99232 SBSQ HOSP IP/OBS MODERATE 35: CPT | Performed by: NURSE PRACTITIONER

## 2019-04-30 PROCEDURE — 1240000000 HC EMOTIONAL WELLNESS R&B

## 2019-04-30 PROCEDURE — 82962 GLUCOSE BLOOD TEST: CPT

## 2019-04-30 PROCEDURE — 6370000000 HC RX 637 (ALT 250 FOR IP): Performed by: CLINICAL NURSE SPECIALIST

## 2019-04-30 PROCEDURE — 6370000000 HC RX 637 (ALT 250 FOR IP): Performed by: PSYCHIATRY & NEUROLOGY

## 2019-04-30 PROCEDURE — 6370000000 HC RX 637 (ALT 250 FOR IP): Performed by: NURSE PRACTITIONER

## 2019-04-30 RX ORDER — SENNA PLUS 8.6 MG/1
2 TABLET ORAL ONCE
Status: COMPLETED | OUTPATIENT
Start: 2019-04-30 | End: 2019-04-30

## 2019-04-30 RX ORDER — ARIPIPRAZOLE 15 MG/1
15 TABLET ORAL EVERY EVENING
Status: DISCONTINUED | OUTPATIENT
Start: 2019-04-30 | End: 2019-05-06

## 2019-04-30 RX ORDER — ARIPIPRAZOLE 5 MG/1
5 TABLET ORAL 2 TIMES DAILY
Status: DISCONTINUED | OUTPATIENT
Start: 2019-04-30 | End: 2019-04-30

## 2019-04-30 RX ADMIN — ARIPIPRAZOLE 2 MG: 2 TABLET ORAL at 08:25

## 2019-04-30 RX ADMIN — LACTULOSE 10 G: 20 SOLUTION ORAL at 08:26

## 2019-04-30 RX ADMIN — VENLAFAXINE HYDROCHLORIDE 75 MG: 75 CAPSULE, EXTENDED RELEASE ORAL at 08:26

## 2019-04-30 RX ADMIN — TOPIRAMATE 200 MG: 100 TABLET, FILM COATED ORAL at 08:25

## 2019-04-30 RX ADMIN — SENNOSIDES 17.2 MG: 8.6 TABLET, FILM COATED ORAL at 13:42

## 2019-04-30 RX ADMIN — ARIPIPRAZOLE 15 MG: 15 TABLET ORAL at 21:15

## 2019-04-30 ASSESSMENT — PAIN SCALES - GENERAL
PAINLEVEL_OUTOF10: 0
PAINLEVEL_OUTOF10: 4
PAINLEVEL_OUTOF10: 0

## 2019-04-30 ASSESSMENT — PAIN DESCRIPTION - PAIN TYPE: TYPE: CHRONIC PAIN

## 2019-04-30 ASSESSMENT — PAIN DESCRIPTION - FREQUENCY: FREQUENCY: INTERMITTENT

## 2019-04-30 NOTE — PROGRESS NOTES
Occupational Therapy      Occupational Therapy referral received. Patient up and observed ambulating in hallway, carrying food tray and water pitcher. Patient states she dressed herself, reviewed with patient proper donning of brace. Patient does not demonstrate any skilled OT needs   OT order discontinued.

## 2019-04-30 NOTE — PROGRESS NOTES
Pt resting in bed. Awake. Denies anxiety and depression states \"I'm just worried about everybody\", when asked who specifically Pt continues to say that she worries about everybody and everything. Pt isolative to room this shift. Declined relaxation group. Currently denies SI, HI and hallucinations. C/o neck pain and vaginal pain. Described vaginal pain as aching. States that she had the pain for years and couldn't tell how  It originated. Tylenol administered for her c/o aching neck and vaginal pain.

## 2019-04-30 NOTE — PLAN OF CARE
Pt resting in bed with eyes closed, no observed abnormalities. Close observations continue.   Problem: Depressive Behavior With or Without Suicide Precautions:  Goal: Absence of self-harm  Description  Absence of self-harm  Outcome: Met This Shift

## 2019-04-30 NOTE — GROUP NOTE
Group Therapy Note    Date: April 30    Group Start Time: 1630  Group End Time: 1700  Group Topic: Healthy Living/Wellness    [facility] [address], RN        Group Therapy Note    Attendees: 8/13    Self- Care group, discussed benefits of journaling              Signature:  Brittnee Mercado RN

## 2019-04-30 NOTE — PROGRESS NOTES
DATE OF SERVICE:     4/30/2019    Sary Thomason seen today for the purpose of continuation of care. Nursing, social work reports, laboratory studies and vital signs are reviewed. Patient chief complaint today is:             [] Depression      [x] Anxiety        [] Psychosis         [] Suicidal/Homicidal                         [x] Delusions           [] Aggression          Subjective:     Patient remains somatic and delusional. Appears sad. Denies SI. Med compliant and goes to groups. Sleep:  [] Good [x] Fair  [] Poor  Appetite:  [] Good [x] Fair  [] Poor    Depression:  [x] Mild [] Moderate [] Severe                [x] Constant [] Sporadic     Anxiety: [] Mild [x] Moderate [] Severe    [x] Constant [] Sporadic     Delusions: [] Mild [] Moderate [x] Severe     [x] Constant [] Sporadic     [] Paranoid [x] Somatic [] Grandiose     Hallucinations: [] Mild [] Moderate [] Severe     [] Constant [] Sporadic    [] Auditory  [] Visual [] Tactile       Suicidal: [] Constant [] Sporadic  Homicidal: [] Constant [] Sporadic    Unscheduled Medications     [] Patient Receiving Emergency Medications \" Chemical Restraint\"   [] Requesting PRN medications for anxiety    Psychiatric Review of systems  Delusions:  [x] Denies [] Endorses   Withdrawals:  [x] Denies [] Endorses    Hallucinations: [x] Denies [] Endorses    Extra Pyramidal Symptoms: [x] Denies [] Endorses      Medical Review of Systems:     All other than marked systmes have been reviewed and are all negative.     Constitutional Symptoms: []  fever []  Chills  Skin Symptoms: [] rash []  Pruritus   Eye Symptoms: [] Vision unchanged []  recent vision problems[] blurred vision   Respiratory Symptoms:[] shortness of breath [] cough  Cardiovascular Symptoms:  [] chest pain   [] palpitations   Gastrointestinal Symptoms: []  abdominal pain []  nausea []  vomiting []  diarrhea  Genitourinary Symptoms: []  dysuria  []  hematuria   Musculoskeletal Symptoms: []  back Limited      Assessment/Plan:        Patient Active Problem List   Diagnosis Code    Chest pain R07.9    Schizo-affective schizophrenia, chronic condition ([facility] [address] F25.8    Hyponatremia E87.1    Trigeminal neuralgia syndrome G50.0    Chronic nonintractable headache R51    Anxiety F41.9    Chronic constipation K59.09    Head injury S09.90XA    Jaw pain R68.84    Pain in gums K06.8    Schizophrenia (HCC) F20.9    PTSD (post-traumatic stress disorder) F43.10    Other headache syndrome G44.89    Cervical pain (neck) M54.2    Depression with suicidal ideation F32.9, R45.851    Schizoaffective disorder, depressive type ([facility] [address] F25.1    Severe protein-calorie malnutrition ([facility] [address] E43    Type 2 diabetes mellitus, without long-term current use of insulin ([facility]) E11.9         Plan:    []  Patient is refusing medications  [] Improving as expected   [x] Not improving as expected   [] Worsening    []  At Baseline      Increase Abilify 15 Q evening      Reason for more than one antipsychotic:  [x] N/A  [] 3 failed monotherapy(drugs tried):  [] Cross over to a new antipsychotic  [] Taper to monotherapy from polypharmacy  [] Augmentation of Clozapine therapy due to treatment resistance to single therapy      Signed:  Alex Lorenzana  4/30/2019  4:26 PM

## 2019-04-30 NOTE — PLAN OF CARE
Patient affect pleasant and cooperative. Denies SI, HI, and hallucinations. Accepting of meds and group therapies. No unit problems.

## 2019-05-01 LAB
METER GLUCOSE: 111 MG/DL (ref 74–99)
METER GLUCOSE: 62 MG/DL (ref 74–99)

## 2019-05-01 PROCEDURE — 6370000000 HC RX 637 (ALT 250 FOR IP): Performed by: NURSE PRACTITIONER

## 2019-05-01 PROCEDURE — 99231 SBSQ HOSP IP/OBS SF/LOW 25: CPT | Performed by: NURSE PRACTITIONER

## 2019-05-01 PROCEDURE — 1240000000 HC EMOTIONAL WELLNESS R&B

## 2019-05-01 PROCEDURE — 6370000000 HC RX 637 (ALT 250 FOR IP): Performed by: CLINICAL NURSE SPECIALIST

## 2019-05-01 PROCEDURE — 82962 GLUCOSE BLOOD TEST: CPT

## 2019-05-01 PROCEDURE — 6370000000 HC RX 637 (ALT 250 FOR IP): Performed by: PSYCHIATRY & NEUROLOGY

## 2019-05-01 RX ADMIN — VENLAFAXINE HYDROCHLORIDE 75 MG: 75 CAPSULE, EXTENDED RELEASE ORAL at 08:49

## 2019-05-01 RX ADMIN — ARIPIPRAZOLE 15 MG: 15 TABLET ORAL at 18:12

## 2019-05-01 RX ADMIN — ACETAMINOPHEN 650 MG: 325 TABLET, FILM COATED ORAL at 13:09

## 2019-05-01 RX ADMIN — LACTULOSE 10 G: 20 SOLUTION ORAL at 08:50

## 2019-05-01 RX ADMIN — ACETAMINOPHEN 650 MG: 325 TABLET, FILM COATED ORAL at 08:48

## 2019-05-01 RX ADMIN — TOPIRAMATE 200 MG: 100 TABLET, FILM COATED ORAL at 08:49

## 2019-05-01 ASSESSMENT — PAIN DESCRIPTION - LOCATION: LOCATION: VAGINA

## 2019-05-01 ASSESSMENT — PAIN DESCRIPTION - PROGRESSION: CLINICAL_PROGRESSION: NOT CHANGED

## 2019-05-01 ASSESSMENT — PAIN SCALES - GENERAL
PAINLEVEL_OUTOF10: 0
PAINLEVEL_OUTOF10: 3
PAINLEVEL_OUTOF10: 2
PAINLEVEL_OUTOF10: 3
PAINLEVEL_OUTOF10: 3
PAINLEVEL_OUTOF10: 2

## 2019-05-01 ASSESSMENT — PAIN DESCRIPTION - FREQUENCY: FREQUENCY: INTERMITTENT

## 2019-05-01 NOTE — PLAN OF CARE
Problem: Depressive Behavior With or Without Suicide Precautions:  Goal: Ability to disclose and discuss suicidal ideas will improve  Description  Ability to disclose and discuss suicidal ideas will improve  Outcome: Met This Shift     Problem: Suicide risk  Goal: Provide patient with safe environment  Description  Provide patient with safe environment  Outcome: Ongoing     Problem: Depressive Behavior With or Without Suicide Precautions:  Goal: Able to verbalize acceptance of life and situations over which he or she has no control  Description  Able to verbalize acceptance of life and situations over which he or she has no control  Outcome: Ongoing     Problem: Depressive Behavior With or Without Suicide Precautions:  Goal: Able to verbalize and/or display a decrease in depressive symptoms  Description  Able to verbalize and/or display a decrease in depressive symptoms  4/30/2019 2034 by Carin Beyer RN  Outcome: Ongoing     Problem: Depressive Behavior With or Without Suicide Precautions:  Goal: Able to verbalize support systems  Description  Able to verbalize support systems  4/30/2019 2034 by Carin Beyer RN  Outcome: Ongoing     Problem: Depressive Behavior With or Without Suicide Precautions:  Goal: Absence of self-harm  Description  Absence of self-harm  Outcome: Ongoing

## 2019-05-01 NOTE — PROGRESS NOTES
DATE OF SERVICE:     5/1/2019    Orville Ya seen today for the purpose of continuation of care. Nursing, social work reports, laboratory studies and vital signs are reviewed. Patient chief complaint today is:             [] Depression      [x] Anxiety        [] Psychosis         [] Suicidal/Homicidal                         [x] Delusions           [] Aggression          Subjective:     Patient is not eating much of meals. Remains delusional and somatic with c/o of vaginal pain for the last month. Has OB appt set up. Denies SI. Med compliant and goes to groups. Sleep:  [] Good [x] Fair  [] Poor  Appetite:  [] Good [x] Fair  [] Poor    Depression:  [x] Mild [] Moderate [] Severe                [x] Constant [] Sporadic     Anxiety: [] Mild [x] Moderate [] Severe    [x] Constant [] Sporadic     Delusions: [] Mild [] Moderate [x] Severe     [x] Constant [] Sporadic     [] Paranoid [x] Somatic [] Grandiose     Hallucinations: [] Mild [] Moderate [] Severe     [] Constant [] Sporadic    [] Auditory  [] Visual [] Tactile       Suicidal: [] Constant [] Sporadic  Homicidal: [] Constant [] Sporadic    Unscheduled Medications     [] Patient Receiving Emergency Medications \" Chemical Restraint\"   [] Requesting PRN medications for anxiety    Psychiatric Review of systems  Delusions:  [x] Denies [] Endorses   Withdrawals:  [x] Denies [] Endorses    Hallucinations: [x] Denies [] Endorses    Extra Pyramidal Symptoms: [x] Denies [] Endorses      Medical Review of Systems:     All other than marked systmes have been reviewed and are all negative.     Constitutional Symptoms: []  fever []  Chills  Skin Symptoms: [] rash []  Pruritus   Eye Symptoms: [] Vision unchanged []  recent vision problems[] blurred vision   Respiratory Symptoms:[] shortness of breath [] cough  Cardiovascular Symptoms:  [] chest pain   [] palpitations   Gastrointestinal Symptoms: []  abdominal pain []  nausea []  vomiting [] diarrhea  Genitourinary Symptoms: []  dysuria  []  hematuria   Musculoskeletal Symptoms: []  back pain []  muscle pain []  joint pain  Neurologic Symptoms: []  headache []  dizziness  Hematolymphoid Symptoms: [] Adenopathy [] Bruises   [] Schimosis       Mental Status Examination:    Cognition:      [x] Alert  [x] Awake  [x] Oriented  [x] Person  [x] Place [x] Time      [] drowsy  [] tired  [] lethargic  [] distractable  [] Other     Attention/Concentration:   [x] Attentive  [] Distracted        Memory Recent and Remote: [] Intact   [] Impaired [] Partially Impaired     Language: [x] Able to recognize and name objects          [] Unable to recognize and name Objects    Fund of Knowledge:  [] Poor []  Fair  [] Good    Speech: [x] Normal  [] Soft  [] Slow  [] Fast [] Pressured            [] Loud [] Dysarthria  [] Incoherent    Appearance: [] Well Groomed  [x] Casual Dressed  [] Unkept  [] Disheveled          [] Normal weight[] Thin  [] Overweight  [] Obese           Attitude: [x] Positive  [] Hostile  [] Demanding  [] Guarded  [] Defensive         [x] Cooperative  []  Uncooperative      Behavior:  [x] Normal Gait  [] Walks with Assistance  [] Cinthya Chair    [] Walks with Mallory Pickler  [] In Hospital Bed  [] Sitting in Chair    Muscle-Skeletal:  [x] Normal Muscle Tone [] Muscle Atrophy       [] Abnormal Muscle Movement     Eye Contact:  [x] Good eye contact  [] Intermittent Eye Contact  [] Poor Eye Contact     Mood: [x] Depressed  [x] Anxious  [] Irritated  [] Euthymic   [] Angry [] Restless    Affect:  [x] Congruent  [] Incongruent  [] Labile  [] Constricted  [] Flat  [] Bizarre     Thought Process and Association:  [x] Logical [] Illogical       [x] Linear and Goal Directed  [] Tangential  [] Circumstantial     Thought Content:  [] Denies [x] Endorses [] Suicidal [] Homicidal  [] Delusional      [] Paranoid  [x] Somatic  [] Grandiose    Perception: [x]  None  [] Auditory   [] Visual  [] tactile   [] olfactory  [] Illusions Insight: [] Intact  [x] Fair  [] Limited    Judgement:  [] Intact  [x] Fair  [x] Limited      Assessment/Plan:        Patient Active Problem List   Diagnosis Code    Chest pain R07.9    Schizo-affective schizophrenia, chronic condition ([facility] [address] F25.8    Hyponatremia E87.1    Trigeminal neuralgia syndrome G50.0    Chronic nonintractable headache R51    Anxiety F41.9    Chronic constipation K59.09    Head injury S09.90XA    Jaw pain R68.84    Pain in gums K06.8    Schizophrenia ([facility] [address] F20.9    PTSD (post-traumatic stress disorder) F43.10    Other headache syndrome G44.89    Cervical pain (neck) M54.2    Depression with suicidal ideation F32.9, R45.851    Schizoaffective disorder, depressive type ([facility] [address] F25.1    Severe protein-calorie malnutrition ([facility] [address] E43    Type 2 diabetes mellitus, without long-term current use of insulin ([facility] [address] E11.9         Plan:    []  Patient is refusing medications  [] Improving as expected   [x] Not improving as expected   [] Worsening    []  At Baseline      Calorie count  Daily wts.      Reason for more than one antipsychotic:  [x] N/A  [] 3 failed monotherapy(drugs tried):  [] Cross over to a new antipsychotic  [] Taper to monotherapy from polypharmacy  [] Augmentation of Clozapine therapy due to treatment resistance to single therapy      Signed:  Kofi Benavidez  5/1/2019  10:09 AM

## 2019-05-01 NOTE — PROGRESS NOTES
Emaciated and only 25 % of meal taken and refused her nutrition drink this evening. Isolative to room . Obsessive thoughts about having vaginal pain and neck pain which she said she had prior to admission. Denies vaginal drainage. Denies suicidal ideations or wanting to harm self.

## 2019-05-01 NOTE — PROGRESS NOTES
Delusional obsessions ,stated \"afraid there was going to be a law suite with the bank\" unable to explain why she thought this . Focused on vaginal discomfort and explained that this was specialty of OB-GYN  And has appoinment for this. Paranoia noted and isolative to room.

## 2019-05-02 LAB
METER GLUCOSE: 90 MG/DL (ref 74–99)
METER GLUCOSE: 97 MG/DL (ref 74–99)

## 2019-05-02 PROCEDURE — 6370000000 HC RX 637 (ALT 250 FOR IP): Performed by: NURSE PRACTITIONER

## 2019-05-02 PROCEDURE — 6370000000 HC RX 637 (ALT 250 FOR IP): Performed by: CLINICAL NURSE SPECIALIST

## 2019-05-02 PROCEDURE — 1240000000 HC EMOTIONAL WELLNESS R&B

## 2019-05-02 PROCEDURE — 99232 SBSQ HOSP IP/OBS MODERATE 35: CPT | Performed by: NURSE PRACTITIONER

## 2019-05-02 PROCEDURE — 82962 GLUCOSE BLOOD TEST: CPT

## 2019-05-02 RX ADMIN — LACTULOSE 10 G: 20 SOLUTION ORAL at 09:08

## 2019-05-02 RX ADMIN — VENLAFAXINE HYDROCHLORIDE 112.5 MG: 75 CAPSULE, EXTENDED RELEASE ORAL at 09:08

## 2019-05-02 RX ADMIN — TOPIRAMATE 200 MG: 100 TABLET, FILM COATED ORAL at 09:08

## 2019-05-02 RX ADMIN — ARIPIPRAZOLE 15 MG: 15 TABLET ORAL at 16:12

## 2019-05-02 ASSESSMENT — PAIN SCALES - GENERAL: PAINLEVEL_OUTOF10: 0

## 2019-05-02 NOTE — PROGRESS NOTES
Patient seclusive to room and self. Medication compliant. Flat and sad. Cooperative. Will continue to monitor.

## 2019-05-02 NOTE — PLAN OF CARE
Isolative to room most of this shift  denies SI/HI  States her depression is getting better but continues to have the vaginal pressure but will have to learn to live with it    Attending select groups     will continue to monitor

## 2019-05-02 NOTE — PROGRESS NOTES
Signing off, please call with any needs that arise.    Thank you for consultation   Sherif Aquino, [address] [facility] Team

## 2019-05-02 NOTE — PLAN OF CARE
Problem: Malnutrition  (NI-5.2)  Goal: Food and/or Nutrient Delivery  Description- Continue current ONS   Individualized approach for food/nutrient provision.   Outcome: Met This Shift

## 2019-05-02 NOTE — PROGRESS NOTES
None     · Current Nutrition Therapies:  · Oral Diet Orders: General   · Oral Diet intake: 26-50%(average/ variable )  · Oral Nutrition Supplement (ONS) Orders: Standard High Calorie Oral Supplement, Frozen Oral Supplement(Ensure BID & magic cup daily )  · ONS intake: 51-75%(average )     · Anthropometric Measures:  · Ht: 5' 5\" (165.1 cm)   · Current Body Wt: 93 lb (42.2 kg)(5/2 actual )  · Admission Body Wt: 88 lb (39.9 kg)(4/26 standing scale)  · Usual Body Wt: (~120-130lb per pt. No EMR hx on file)  · % Weight Change:   subjective ~35lb (30%) wt loss in unknown time frame, unable to verify   · Ideal Body Wt: 125 lb (56.7 kg), % Ideal Body 74%  · BMI Classification: BMI <18.5 Underweight    Nutrition Interventions:   Continued Inpatient Monitoring, Coordination of Care, Education Initiated(Re-encouraged po intake meals/ONS.  Calorie count envelope left w/ RN at desk )    Nutrition Evaluation:   · Evaluation: Goals set   · Goals: Pt to consume >75% meals and ONS    · Monitoring: Meal Intake, Supplement Intake, Diet Tolerance, Skin Integrity, I&O, Mental Status/Confusion, Weight, Pertinent Labs, Monitor Bowel Function      Electronically signed by Coleen Fothergill, RD, LD on 5/2/19 at 2:42 PM    Contact Number: Ext [phone]

## 2019-05-02 NOTE — PLAN OF CARE
Patient is isolative and evasive. Patient presents with delusions, obsessions and somatic delusions and possesses little insight into condition. Patient is fixated on itching. Patient is eating poorly, only 25% of meals. Patient denies SI, HI, and AVH. Patient is free from loud outbursts and is in good control. Patient presents depressed and anxious. However, patient presents anxious when talking to others. Patient is taking meds and is no on the floor issues. Will monitor closely.

## 2019-05-02 NOTE — PROGRESS NOTES
DATE OF SERVICE:     5/2/2019    Orville Ya seen today for the purpose of continuation of care. Nursing, social work reports, laboratory studies and vital signs are reviewed. Patient chief complaint today is:             [x] Depression      [x] Anxiety        [] Psychosis         [] Suicidal/Homicidal                         [x] Delusions           [] Aggression          Subjective:     Patient states that depression and anxiety is not getting any better. Appears flat and sad. Multiple somatic complaints. Remains delusional. Denies SI. Med compliant and goes to groups. Sleep:  [] Good [] Fair  [x] Poor  Appetite:  [] Good [x] Fair  [] Poor     Depression:  [] Mild [x] Moderate [] Severe                [x] Constant [] Sporadic     Anxiety: [] Mild [x] Moderate [] Severe    [x] Constant [] Sporadic     Delusions: [] Mild [] Moderate [x] Severe     [x] Constant [] Sporadic     [] Paranoid [x] Somatic [] Grandiose     Hallucinations: [] Mild [] Moderate [] Severe     [] Constant [] Sporadic    [] Auditory  [] Visual [] Tactile       Suicidal: [] Constant [] Sporadic  Homicidal: [] Constant [] Sporadic    Unscheduled Medications     [] Patient Receiving Emergency Medications \" Chemical Restraint\"   [] Requesting PRN medications for anxiety    Psychiatric Review of systems  Delusions:  [x] Denies [] Endorses   Withdrawals:  [x] Denies [] Endorses    Hallucinations: [x] Denies [] Endorses    Extra Pyramidal Symptoms: [x] Denies [] Endorses      Medical Review of Systems:     All other than marked systmes have been reviewed and are all negative.     Constitutional Symptoms: []  fever []  Chills  Skin Symptoms: [] rash []  Pruritus   Eye Symptoms: [] Vision unchanged []  recent vision problems[] blurred vision   Respiratory Symptoms:[] shortness of breath [] cough  Cardiovascular Symptoms:  [] chest pain   [] palpitations   Gastrointestinal Symptoms: []  abdominal pain []  nausea []  vomiting [] diarrhea  Genitourinary Symptoms: []  dysuria  []  hematuria   Musculoskeletal Symptoms: []  back pain []  muscle pain []  joint pain  Neurologic Symptoms: []  headache []  dizziness  Hematolymphoid Symptoms: [] Adenopathy [] Bruises   [] Schimosis       Mental Status Examination:    Cognition:      [x] Alert  [x] Awake  [x] Oriented  [x] Person  [x] Place [x] Time      [] drowsy  [] tired  [] lethargic  [] distractable  [] Other     Attention/Concentration:   [x] Attentive  [] Distracted        Memory Recent and Remote: [] Intact   [] Impaired [] Partially Impaired     Language: [x] Able to recognize and name objects          [] Unable to recognize and name Objects    Fund of Knowledge:  [] Poor []  Fair  [] Good    Speech: [x] Normal  [] Soft  [] Slow  [] Fast [] Pressured            [] Loud [] Dysarthria  [] Incoherent    Appearance: [] Well Groomed  [x] Casual Dressed  [] Unkept  [] Disheveled          [] Normal weight[] Thin  [] Overweight  [] Obese           Attitude: [x] Positive  [] Hostile  [] Demanding  [] Guarded  [] Defensive         [x] Cooperative  []  Uncooperative      Behavior:  [x] Normal Gait  [] Walks with Assistance  [] Cinthya Chair    [] Walks with Jose Sos  [] In Hospital Bed  [] Sitting in Chair    Muscle-Skeletal:  [x] Normal Muscle Tone [] Muscle Atrophy       [] Abnormal Muscle Movement     Eye Contact:  [x] Good eye contact  [] Intermittent Eye Contact  [] Poor Eye Contact     Mood: [x] Depressed  [x] Anxious  [] Irritated  [] Euthymic   [] Angry [] Restless    Affect:  [x] Congruent  [] Incongruent  [] Labile  [] Constricted  [] Flat  [] Bizarre     Thought Process and Association:  [x] Logical [] Illogical       [x] Linear and Goal Directed  [] Tangential  [] Circumstantial     Thought Content:  [] Denies [x] Endorses [] Suicidal [] Homicidal  [] Delusional      [] Paranoid  [x] Somatic  [] Grandiose    Perception: [x]  None  [] Auditory   [] Visual  [] tactile   [] olfactory  [] Illusions Insight: [] Intact  [x] Fair  [] Limited    Judgement:  [] Intact  [x] Fair  [x] Limited      Assessment/Plan:        Patient Active Problem List   Diagnosis Code    Chest pain R07.9    Schizo-affective schizophrenia, chronic condition ([facility] [address] F25.8    Hyponatremia E87.1    Trigeminal neuralgia syndrome G50.0    Chronic nonintractable headache R51    Anxiety F41.9    Chronic constipation K59.09    Head injury S09.90XA    Jaw pain R68.84    Pain in gums K06.8    Schizophrenia ([facility] [address] F20.9    PTSD (post-traumatic stress disorder) F43.10    Other headache syndrome G44.89    Cervical pain (neck) M54.2    Depression with suicidal ideation F32.9, R45.851    Schizoaffective disorder, depressive type ([facility] [address] F25.1    Severe protein-calorie malnutrition ([facility] [address] E43    Type 2 diabetes mellitus, without long-term current use of insulin ([facility] [address] E11.9         Plan:    []  Patient is refusing medications  [] Improving as expected   [x] Not improving as expected   [] Worsening    []  At Baseline      Increase Effexor    Reason for more than one antipsychotic:  [x] N/A  [] 3 failed monotherapy(drugs tried):  [] Cross over to a new antipsychotic  [] Taper to monotherapy from polypharmacy  [] Augmentation of Clozapine therapy due to treatment resistance to single therapy      Signed:  Papito Aranda  5/2/2019  5:26 PM

## 2019-05-02 NOTE — PROGRESS NOTES
Patient attended community meeting/morning stretch. Patient identified goal for the day as:  \"Get better\"

## 2019-05-02 NOTE — PLAN OF CARE
Patient was alert and oriented, denies SI,HI or hallucinations. Patient affect was flat and isolative to her room. Verbalizes worried about others and educated patient that if there is something that she can do about to fix what ever worried about then do it, if not then not worth worrying about. Also educated to get out of room and socialize, that by staying in room to self her mind will continue to think the same stuff that she worries about all the time. Will continue to monitor, provide needs and safe environment with continued rounding.

## 2019-05-03 LAB
METER GLUCOSE: 102 MG/DL (ref 74–99)
METER GLUCOSE: 86 MG/DL (ref 74–99)

## 2019-05-03 PROCEDURE — 82962 GLUCOSE BLOOD TEST: CPT

## 2019-05-03 PROCEDURE — 6370000000 HC RX 637 (ALT 250 FOR IP): Performed by: NURSE PRACTITIONER

## 2019-05-03 PROCEDURE — 6370000000 HC RX 637 (ALT 250 FOR IP): Performed by: CLINICAL NURSE SPECIALIST

## 2019-05-03 PROCEDURE — 1240000000 HC EMOTIONAL WELLNESS R&B

## 2019-05-03 PROCEDURE — 99231 SBSQ HOSP IP/OBS SF/LOW 25: CPT | Performed by: NURSE PRACTITIONER

## 2019-05-03 RX ADMIN — LACTULOSE 10 G: 20 SOLUTION ORAL at 08:50

## 2019-05-03 RX ADMIN — ARIPIPRAZOLE 15 MG: 15 TABLET ORAL at 17:06

## 2019-05-03 RX ADMIN — VENLAFAXINE HYDROCHLORIDE 112.5 MG: 75 CAPSULE, EXTENDED RELEASE ORAL at 08:50

## 2019-05-03 RX ADMIN — TOPIRAMATE 200 MG: 100 TABLET, FILM COATED ORAL at 08:50

## 2019-05-03 ASSESSMENT — PAIN SCALES - GENERAL
PAINLEVEL_OUTOF10: 0
PAINLEVEL_OUTOF10: 0

## 2019-05-03 NOTE — PROGRESS NOTES
Patient declined to attend community meeting. Patient identified goal for the day as:  \"Get better\"

## 2019-05-03 NOTE — PROGRESS NOTES
3:45- 4:35       Patient was active participant in leisure activity. Able to express way to increase satisfaction and socialization. Was 1 of 10 in group.

## 2019-05-03 NOTE — PROGRESS NOTES
DATE OF SERVICE:     5/3/2019    Ghanshyam Rodriguez seen today for the purpose of continuation of care. Nursing, social work reports, laboratory studies and vital signs are reviewed. Patient chief complaint today is:             [x] Depression      [x] Anxiety        [] Psychosis         [] Suicidal/Homicidal                         [x] Delusions           [] Aggression          Subjective:     Patient states she feels medication is helping with anxiety and depression. Moods appear to be improving. Patient on calorie count admits to giving food away to other patients. Asked her not to do that because we need an accurate calorie count. Remains somatic and delusional. Denies SI. Med compliant and goes to groups. Sleep:  [] Good [] Fair  [x] Poor  Appetite:  [] Good [x] Fair  [] Poor     Depression:  [] Mild [x] Moderate [] Severe                [x] Constant [] Sporadic     Anxiety: [] Mild [x] Moderate [] Severe    [x] Constant [] Sporadic     Delusions: [] Mild [] Moderate [x] Severe     [x] Constant [] Sporadic     [] Paranoid [x] Somatic [] Grandiose     Hallucinations: [] Mild [] Moderate [] Severe     [] Constant [] Sporadic    [] Auditory  [] Visual [] Tactile       Suicidal: [] Constant [] Sporadic  Homicidal: [] Constant [] Sporadic    Unscheduled Medications     [] Patient Receiving Emergency Medications \" Chemical Restraint\"   [] Requesting PRN medications for anxiety    Psychiatric Review of systems  Delusions:  [x] Denies [] Endorses   Withdrawals:  [x] Denies [] Endorses    Hallucinations: [x] Denies [] Endorses    Extra Pyramidal Symptoms: [x] Denies [] Endorses      Medical Review of Systems:     All other than marked systmes have been reviewed and are all negative.     Constitutional Symptoms: []  fever []  Chills  Skin Symptoms: [] rash []  Pruritus   Eye Symptoms: [] Vision unchanged []  recent vision problems[] blurred vision   Respiratory Symptoms:[] shortness of breath [] Delusional      [] Paranoid  [x] Somatic  [] Grandiose    Perception: [x]  None  [] Auditory   [] Visual  [] tactile   [] olfactory  [] Illusions         Insight: [] Intact  [x] Fair  [] Limited    Judgement:  [] Intact  [x] Fair  [x] Limited      Assessment/Plan:        Patient Active Problem List   Diagnosis Code    Chest pain R07.9    Schizo-affective schizophrenia, chronic condition ([facility] [address] F25.8    Hyponatremia E87.1    Trigeminal neuralgia syndrome G50.0    Chronic nonintractable headache R51    Anxiety F41.9    Chronic constipation K59.09    Head injury S09.90XA    Jaw pain R68.84    Pain in gums K06.8    Schizophrenia (HCC) F20.9    PTSD (post-traumatic stress disorder) F43.10    Other headache syndrome G44.89    Cervical pain (neck) M54.2    Depression with suicidal ideation F32.9, R45.851    Schizoaffective disorder, depressive type ([facility]) F25.1    Severe protein-calorie malnutrition ([facility] [address] E43    Type 2 diabetes mellitus, without long-term current use of insulin ([facility] E11.9         Plan:    []  Patient is refusing medications  [] Improving as expected   [x] Not improving as expected   [] Worsening    []  At Baseline      Continue current treatment      Reason for more than one antipsychotic:  [x] N/A  [] 3 failed monotherapy(drugs tried):  [] Cross over to a new antipsychotic  [] Taper to monotherapy from polypharmacy  [] Augmentation of Clozapine therapy due to treatment resistance to single therapy      Signed:  Oscar Rodriguez  5/3/2019  8:47 AM

## 2019-05-03 NOTE — PROGRESS NOTES
Observed to be isolative most of afternoon. Willing to accept encouragement to try to increase being out of room more.

## 2019-05-03 NOTE — GROUP NOTE
Group Therapy Note    Date: May 3    Group Start Time: 0125  Group End Time: 0200  Group Topic: Cognitive Skills    [facility]    MAYRA Joseph, LIZA      Number of participants:9  Type of group: Cognitive Skills  Mode of intervention: Education, Support, Exploration and Problem-solving  Topic: To identify strengths and growth areas  Objective: Identify personal strengths and interact with others         Status After Intervention:  Improved    Participation Level:  Active Listener and Interactive    Participation Quality: Appropriate, Attentive and Sharing      Speech:  normal      Thought Process/Content: Logical      Affective Functioning: Congruent      Mood: euthymic      Level of consciousness:  Oriented x4      Response to Learning: Able to verbalize current knowledge/experience and Able to verbalize/acknowledge new learning      Endings: None Reported        Discipline Responsible: /Counselor      Signature:  MAYRA Joseph, LIZA
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
## 2019-05-03 NOTE — PROGRESS NOTES
cough  Cardiovascular Symptoms:  [] chest pain   [] palpitations   Gastrointestinal Symptoms: []  abdominal pain []  nausea []  vomiting []  diarrhea  Genitourinary Symptoms: []  dysuria  []  hematuria   Musculoskeletal Symptoms: []  back pain []  muscle pain []  joint pain  Neurologic Symptoms: []  headache []  dizziness  Hematolymphoid Symptoms: [] Adenopathy [] Bruises   [] Schimosis       Mental Status Examination:    Cognition:      [x] Alert  [x] Awake  [x] Oriented  [x] Person  [x] Place [x] Time      [] drowsy  [] tired  [] lethargic  [] distractable  [] Other     Attention/Concentration:   [x] Attentive  [] Distracted        Memory Recent and Remote: [] Intact   [] Impaired [] Partially Impaired     Language: [x] Able to recognize and name objects          [] Unable to recognize and name Objects    Fund of Knowledge:  [] Poor []  Fair  [] Good    Speech: [x] Normal  [] Soft  [] Slow  [] Fast [] Pressured            [] Loud [] Dysarthria  [] Incoherent    Appearance: [] Well Groomed  [x] Casual Dressed  [] Unkept  [] Disheveled          [] Normal weight[] Thin  [] Overweight  [] Obese           Attitude: [x] Positive  [] Hostile  [] Demanding  [] Guarded  [] Defensive         [x] Cooperative  []  Uncooperative      Behavior:  [x] Normal Gait  [] Walks with Assistance  [] Cinthya Chair    [] Walks with Sai Los  [] In Hospital Bed  [] Sitting in Chair    Muscle-Skeletal:  [x] Normal Muscle Tone [] Muscle Atrophy       [] Abnormal Muscle Movement     Eye Contact:  [x] Good eye contact  [] Intermittent Eye Contact  [] Poor Eye Contact     Mood: [x] Depressed  [x] Anxious  [] Irritated  [] Euthymic   [] Angry [] Restless    Affect:  [x] Congruent  [] Incongruent  [] Labile  [] Constricted  [] Flat  [] Bizarre     Thought Process and Association:  [x] Logical [] Illogical       [x] Linear and Goal Directed  [] Tangential  [] Circumstantial     Thought Content:  [] Denies [x] Endorses [] Suicidal [] Homicidal  [] Delusional      [] Paranoid  [x] Somatic  [] Grandiose    Perception: [x]  None  [] Auditory   [] Visual  [] tactile   [] olfactory  [] Illusions         Insight: [] Intact  [x] Fair  [] Limited    Judgement:  [] Intact  [x] Fair  [x] Limited      Assessment/Plan:        Patient Active Problem List   Diagnosis Code    Chest pain R07.9    Schizo-affective schizophrenia, chronic condition ([facility] F25.8    Hyponatremia E87.1    Trigeminal neuralgia syndrome G50.0    Chronic nonintractable headache R51    Anxiety F41.9    Chronic constipation K59.09    Head injury S09.90XA    Jaw pain R68.84    Pain in gums K06.8    Schizophrenia (HCC) F20.9    PTSD (post-traumatic stress disorder) F43.10    Other headache syndrome G44.89    Cervical pain (neck) M54.2    Depression with suicidal ideation F32.9, R45.851    Schizoaffective disorder, depressive type ([facility]) F25.1    Severe protein-calorie malnutrition ([facility] E43    Type 2 diabetes mellitus, without long-term current use of insulin ([facility] E11.9         Plan:    []  Patient is refusing medications  [] Improving as expected   [x] Not improving as expected   [] Worsening    []  At Baseline      Continue current treatment  Have patient eat by herself to get accurate calorie count    Reason for more than one antipsychotic:  [x] N/A  [] 3 failed monotherapy(drugs tried):  [] Cross over to a new antipsychotic  [] Taper to monotherapy from polypharmacy  [] Augmentation of Clozapine therapy due to treatment resistance to single therapy      Signed:  Naida Patel  5/3/2019  6:02 PM

## 2019-05-04 LAB — METER GLUCOSE: 88 MG/DL (ref 74–99)

## 2019-05-04 PROCEDURE — 1240000000 HC EMOTIONAL WELLNESS R&B

## 2019-05-04 PROCEDURE — 99231 SBSQ HOSP IP/OBS SF/LOW 25: CPT | Performed by: NURSE PRACTITIONER

## 2019-05-04 PROCEDURE — 6370000000 HC RX 637 (ALT 250 FOR IP): Performed by: CLINICAL NURSE SPECIALIST

## 2019-05-04 PROCEDURE — 6370000000 HC RX 637 (ALT 250 FOR IP): Performed by: NURSE PRACTITIONER

## 2019-05-04 PROCEDURE — 82962 GLUCOSE BLOOD TEST: CPT

## 2019-05-04 RX ADMIN — LACTULOSE 10 G: 20 SOLUTION ORAL at 08:40

## 2019-05-04 RX ADMIN — VENLAFAXINE HYDROCHLORIDE 112.5 MG: 75 CAPSULE, EXTENDED RELEASE ORAL at 08:39

## 2019-05-04 RX ADMIN — ARIPIPRAZOLE 15 MG: 15 TABLET ORAL at 17:19

## 2019-05-04 RX ADMIN — TOPIRAMATE 200 MG: 100 TABLET, FILM COATED ORAL at 08:39

## 2019-05-04 ASSESSMENT — PAIN SCALES - GENERAL: PAINLEVEL_OUTOF10: 0

## 2019-05-04 NOTE — PLAN OF CARE
Patient presently denies suicidal, homicidal thoughts, or hallucinations. Patient is isolative this shift. Patient encouraged by this staff member to eat more. Compliant with medications. Will continue to monitor and support throughout remainder of shift.

## 2019-05-04 NOTE — PROGRESS NOTES
DATE OF SERVICE:     5/4/2019    Lazarus Needs seen today for the purpose of continuation of care. Nursing, social work reports, laboratory studies and vital signs are reviewed. Patient chief complaint today is:             [x] Depression      [x] Anxiety        [] Psychosis         [] Suicidal/Homicidal                         [x] Delusions           [] Aggression          Subjective:     Patient ate most of her breakfast today. States moods are better, but still worries a lot. Remains somatic and delusional. Denies SI. Med compliant and goes to groups. Slept fair. Sleep:  [] Good [x] Fair  [] Poor  Appetite:  [] Good [x] Fair  [] Poor     Depression:  [] Mild [x] Moderate [] Severe                [x] Constant [] Sporadic     Anxiety: [] Mild [] Moderate [x] Severe    [x] Constant [] Sporadic     Delusions: [] Mild [] Moderate [x] Severe     [x] Constant [] Sporadic     [] Paranoid [x] Somatic [] Grandiose     Hallucinations: [] Mild [] Moderate [] Severe     [] Constant [] Sporadic    [] Auditory  [] Visual [] Tactile       Suicidal: [] Constant [] Sporadic  Homicidal: [] Constant [] Sporadic    Unscheduled Medications     [] Patient Receiving Emergency Medications \" Chemical Restraint\"   [] Requesting PRN medications for anxiety    Psychiatric Review of systems  Delusions:  [x] Denies [] Endorses   Withdrawals:  [x] Denies [] Endorses    Hallucinations: [x] Denies [] Endorses    Extra Pyramidal Symptoms: [x] Denies [] Endorses      Medical Review of Systems:     All other than marked systmes have been reviewed and are all negative.     Constitutional Symptoms: []  fever []  Chills  Skin Symptoms: [] rash []  Pruritus   Eye Symptoms: [] Vision unchanged []  recent vision problems[] blurred vision   Respiratory Symptoms:[] shortness of breath [] cough  Cardiovascular Symptoms:  [] chest pain   [] palpitations   Gastrointestinal Symptoms: []  abdominal pain []  nausea []  vomiting [] diarrhea  Genitourinary Symptoms: []  dysuria  []  hematuria   Musculoskeletal Symptoms: []  back pain []  muscle pain []  joint pain  Neurologic Symptoms: []  headache []  dizziness  Hematolymphoid Symptoms: [] Adenopathy [] Bruises   [] Schimosis       Mental Status Examination:    Cognition:      [x] Alert  [x] Awake  [x] Oriented  [x] Person  [x] Place [x] Time      [] drowsy  [] tired  [] lethargic  [] distractable  [] Other     Attention/Concentration:   [x] Attentive  [] Distracted        Memory Recent and Remote: [] Intact   [] Impaired [] Partially Impaired     Language: [x] Able to recognize and name objects          [] Unable to recognize and name Objects    Fund of Knowledge:  [] Poor []  Fair  [] Good    Speech: [x] Normal  [] Soft  [] Slow  [] Fast [] Pressured            [] Loud [] Dysarthria  [] Incoherent    Appearance: [] Well Groomed  [x] Casual Dressed  [] Unkept  [] Disheveled          [] Normal weight[] Thin  [] Overweight  [] Obese           Attitude: [x] Positive  [] Hostile  [] Demanding  [] Guarded  [] Defensive         [x] Cooperative  []  Uncooperative      Behavior:  [x] Normal Gait  [] Walks with Assistance  [] Cinthya Chair    [] Walks with Francyne Boots  [] In Hospital Bed  [] Sitting in Chair    Muscle-Skeletal:  [x] Normal Muscle Tone [] Muscle Atrophy       [] Abnormal Muscle Movement     Eye Contact:  [x] Good eye contact  [] Intermittent Eye Contact  [] Poor Eye Contact     Mood: [x] Depressed  [x] Anxious  [] Irritated  [] Euthymic   [] Angry [] Restless    Affect:  [x] Congruent  [] Incongruent  [] Labile  [] Constricted  [] Flat  [] Bizarre     Thought Process and Association:  [x] Logical [] Illogical       [x] Linear and Goal Directed  [] Tangential  [] Circumstantial     Thought Content:  [] Denies [x] Endorses [] Suicidal [] Homicidal  [] Delusional      [] Paranoid  [x] Somatic  [] Grandiose    Perception: [x]  None  [] Auditory   [] Visual  [] tactile   [] olfactory  [] Illusions Insight: [] Intact  [x] Fair  [] Limited    Judgement:  [] Intact  [x] Fair  [x] Limited      Assessment/Plan:        Patient Active Problem List   Diagnosis Code    Chest pain R07.9    Schizo-affective schizophrenia, chronic condition ([facility] F25.8    Hyponatremia E87.1    Trigeminal neuralgia syndrome G50.0    Chronic nonintractable headache R51    Anxiety F41.9    Chronic constipation K59.09    Head injury S09.90XA    Jaw pain R68.84    Pain in gums K06.8    Schizophrenia ([facility] F20.9    PTSD (post-traumatic stress disorder) F43.10    Other headache syndrome G44.89    Cervical pain (neck) M54.2    Depression with suicidal ideation F32.9, R45.851    Schizoaffective disorder, depressive type ([facility] F25.1    Severe protein-calorie malnutrition ([facility] E43    Type 2 diabetes mellitus, without long-term current use of insulin ([facility] E11.9         Plan:    []  Patient is refusing medications  [x] Improving as expected   [] Not improving as expected   [] Worsening    []  At Baseline      Continue current treatment      Reason for more than one antipsychotic:  [x] N/A  [] 3 failed monotherapy(drugs tried):  [] Cross over to a new antipsychotic  [] Taper to monotherapy from polypharmacy  [] Augmentation of Clozapine therapy due to treatment resistance to single therapy      Signed:  Bola Heath  5/4/2019  9:40 AM

## 2019-05-04 NOTE — PLAN OF CARE
Pt is stable and overall cooperative. Pt denies suicidal ideations. Pt denies homicidal ideations. Pt denies hallucinations. Pt has eaten approx: 75% of breakfast tray. Pt reports goal \"to get better\" pr pt. Will follow and monitor.

## 2019-05-05 LAB — METER GLUCOSE: 81 MG/DL (ref 74–99)

## 2019-05-05 PROCEDURE — 1240000000 HC EMOTIONAL WELLNESS R&B

## 2019-05-05 PROCEDURE — 82962 GLUCOSE BLOOD TEST: CPT

## 2019-05-05 PROCEDURE — 6370000000 HC RX 637 (ALT 250 FOR IP): Performed by: CLINICAL NURSE SPECIALIST

## 2019-05-05 PROCEDURE — 6370000000 HC RX 637 (ALT 250 FOR IP): Performed by: NURSE PRACTITIONER

## 2019-05-05 PROCEDURE — 99231 SBSQ HOSP IP/OBS SF/LOW 25: CPT | Performed by: NURSE PRACTITIONER

## 2019-05-05 RX ADMIN — LACTULOSE 10 G: 20 SOLUTION ORAL at 09:44

## 2019-05-05 RX ADMIN — TOPIRAMATE 200 MG: 100 TABLET, FILM COATED ORAL at 09:45

## 2019-05-05 RX ADMIN — VENLAFAXINE HYDROCHLORIDE 112.5 MG: 75 CAPSULE, EXTENDED RELEASE ORAL at 09:45

## 2019-05-05 RX ADMIN — ARIPIPRAZOLE 15 MG: 15 TABLET ORAL at 17:31

## 2019-05-05 ASSESSMENT — PAIN SCALES - GENERAL: PAINLEVEL_OUTOF10: 0

## 2019-05-05 NOTE — GROUP NOTE
Group Therapy Note    Date: May 4    Group Start Time:   Group End Time:   Group Topic: Wrap-Up    [facility]  1    Azar Ramos RN        Group Therapy Note    Attendees: Patient attended [address] [place] [address] group.         Patient's Goal:  ***    Notes:  ***    Status After Intervention:  {Status After Intervention:732505811}    Participation Level: {Participation Level:016625398}    Participation Quality: {[facility] PARTICIPATION QUALITY:241410319}      Speech:  {[facility] CD_SPEECH:35828}      Thought Process/Content: {Thought Process/Content:207391922}      Affective Functioning: {Affective Functionin}      Mood: {Mood:151273602}      Level of consciousness:  {Level of consciousness:493099389}      Response to Learning: {[facility] Responses to Learnin}      Endings: {[facility] Endings:28740}    Modes of Intervention: {[facility] Modes of Intervention:930048109}      Discipline Responsible: {[facility] Multidisciplinary:194646452}      Signature:  Azar Ramos RN

## 2019-05-05 NOTE — PLAN OF CARE
Pt is stable. Appears to be flat in affect. Pt denies suicidal or homicidal ideations. Pt continues to have vaginal discomforts and lower neck pain as described yesterday and that Jey [email or web address] Is aware. Pt denies hallucinations. Will follow and monitor.

## 2019-05-05 NOTE — PROGRESS NOTES
diarrhea  Genitourinary Symptoms: []  dysuria  []  hematuria   Musculoskeletal Symptoms: []  back pain []  muscle pain []  joint pain  Neurologic Symptoms: []  headache []  dizziness  Hematolymphoid Symptoms: [] Adenopathy [] Bruises   [] Schimosis       Mental Status Examination:    Cognition:      [x] Alert  [x] Awake  [x] Oriented  [x] Person  [x] Place [x] Time      [] drowsy  [] tired  [] lethargic  [] distractable  [] Other     Attention/Concentration:   [x] Attentive  [] Distracted        Memory Recent and Remote: [] Intact   [] Impaired [] Partially Impaired     Language: [x] Able to recognize and name objects          [] Unable to recognize and name Objects    Fund of Knowledge:  [] Poor []  Fair  [] Good    Speech: [x] Normal  [] Soft  [] Slow  [] Fast [] Pressured            [] Loud [] Dysarthria  [] Incoherent    Appearance: [] Well Groomed  [x] Casual Dressed  [] Unkept  [] Disheveled          [] Normal weight[] Thin  [] Overweight  [] Obese           Attitude: [x] Positive  [] Hostile  [] Demanding  [] Guarded  [] Defensive         [x] Cooperative  []  Uncooperative      Behavior:  [x] Normal Gait  [] Walks with Assistance  [] Cinthya Chair    [] Walks with Jose Sos  [] In Hospital Bed  [] Sitting in Chair    Muscle-Skeletal:  [x] Normal Muscle Tone [] Muscle Atrophy       [] Abnormal Muscle Movement     Eye Contact:  [x] Good eye contact  [] Intermittent Eye Contact  [] Poor Eye Contact     Mood: [x] Depressed  [x] Anxious  [] Irritated  [] Euthymic   [] Angry [] Restless    Affect:  [x] Congruent  [] Incongruent  [] Labile  [] Constricted  [] Flat  [] Bizarre     Thought Process and Association:  [x] Logical [] Illogical       [x] Linear and Goal Directed  [] Tangential  [] Circumstantial     Thought Content:  [] Denies [x] Endorses [] Suicidal [] Homicidal  [] Delusional      [] Paranoid  [x] Somatic  [] Grandiose    Perception: [x]  None  [] Auditory   [] Visual  [] tactile   [] olfactory  [] Illusions Insight: [] Intact  [x] Fair  [] Limited    Judgement:  [] Intact  [x] Fair  [x] Limited      Assessment/Plan:        Patient Active Problem List   Diagnosis Code    Chest pain R07.9    Schizo-affective schizophrenia, chronic condition ([facility] F25.8    Hyponatremia E87.1    Trigeminal neuralgia syndrome G50.0    Chronic nonintractable headache R51    Anxiety F41.9    Chronic constipation K59.09    Head injury S09.90XA    Jaw pain R68.84    Pain in gums K06.8    Schizophrenia ([facility] F20.9    PTSD (post-traumatic stress disorder) F43.10    Other headache syndrome G44.89    Cervical pain (neck) M54.2    Depression with suicidal ideation F32.9, R45.851    Schizoaffective disorder, depressive type ([facility] F25.1    Severe protein-calorie malnutrition ([facility] E43    Type 2 diabetes mellitus, without long-term current use of insulin ([facility] E11.9         Plan:    []  Patient is refusing medications  [x] Improving as expected   [] Not improving as expected   [] Worsening    []  At Baseline      Continue current treatment  Pos d/c tomorrow or Tues.        Reason for more than one antipsychotic:  [x] N/A  [] 3 failed monotherapy(drugs tried):  [] Cross over to a new antipsychotic  [] Taper to monotherapy from polypharmacy  [] Augmentation of Clozapine therapy due to treatment resistance to single therapy      Signed:  Giovanna Castañeda  5/5/2019  10:44 AM

## 2019-05-05 NOTE — PROGRESS NOTES
5/5/ 2019Daily Calorie Count:     Breakfast Lunch Dinner Total   Calories (kcal) 630      Protein (gm) 26        Comments:     5/4 /2019  Daily Calorie Count:     Breakfast Lunch Dinner Total   Calories (kcal) 500    Protein (gm) 13 34 4 51     Comments:       5/3/19 Daily Calorie Count:   Breakfast Lunch Dinner Total   Calories (kcal) 710    Protein (gm) 33 15 9 27     Comments: Pt consumed 100% Ensure for breakfast     Electronically signed by Skip Thomas RD, LD on 5/5/2019 at 2:55 PM

## 2019-05-05 NOTE — GROUP NOTE
Group Therapy Note    Date: May 4    Group Start Time: 2000  Group End Time: 2030  Group Topic: Wrap-Up    [facility]  1    Catalina Mock, JAMAL        Group Therapy Note  Patient attended [address] [facility] group.

## 2019-05-05 NOTE — PLAN OF CARE
Pt is stable and alert. Pt denies suicidal or homicidal ideations. Pt denies hallucinations. Pt appears to be focused on her vaginal area discomfort. Pt reports goal \" to get better\" pr pt.

## 2019-05-06 LAB
METER GLUCOSE: 82 MG/DL (ref 74–99)
METER GLUCOSE: 88 MG/DL (ref 74–99)

## 2019-05-06 PROCEDURE — 82962 GLUCOSE BLOOD TEST: CPT

## 2019-05-06 PROCEDURE — 6370000000 HC RX 637 (ALT 250 FOR IP): Performed by: CLINICAL NURSE SPECIALIST

## 2019-05-06 PROCEDURE — 6370000000 HC RX 637 (ALT 250 FOR IP): Performed by: PSYCHIATRY & NEUROLOGY

## 2019-05-06 PROCEDURE — 6370000000 HC RX 637 (ALT 250 FOR IP): Performed by: NURSE PRACTITIONER

## 2019-05-06 PROCEDURE — 99231 SBSQ HOSP IP/OBS SF/LOW 25: CPT | Performed by: NURSE PRACTITIONER

## 2019-05-06 PROCEDURE — 1240000000 HC EMOTIONAL WELLNESS R&B

## 2019-05-06 RX ORDER — ARIPIPRAZOLE 10 MG/1
20 TABLET ORAL EVERY EVENING
Status: DISCONTINUED | OUTPATIENT
Start: 2019-05-06 | End: 2019-05-07 | Stop reason: HOSPADM

## 2019-05-06 RX ORDER — VENLAFAXINE HYDROCHLORIDE 150 MG/1
150 CAPSULE, EXTENDED RELEASE ORAL
Status: DISCONTINUED | OUTPATIENT
Start: 2019-05-06 | End: 2019-05-07 | Stop reason: HOSPADM

## 2019-05-06 RX ADMIN — LACTULOSE 10 G: 20 SOLUTION ORAL at 08:38

## 2019-05-06 RX ADMIN — ACETAMINOPHEN 650 MG: 325 TABLET, FILM COATED ORAL at 17:46

## 2019-05-06 RX ADMIN — TOPIRAMATE 200 MG: 100 TABLET, FILM COATED ORAL at 08:38

## 2019-05-06 RX ADMIN — ARIPIPRAZOLE 20 MG: 10 TABLET ORAL at 17:46

## 2019-05-06 RX ADMIN — VENLAFAXINE HYDROCHLORIDE 150 MG: 150 CAPSULE, EXTENDED RELEASE ORAL at 11:18

## 2019-05-06 ASSESSMENT — PAIN DESCRIPTION - LOCATION: LOCATION: VAGINA

## 2019-05-06 ASSESSMENT — PAIN SCALES - GENERAL
PAINLEVEL_OUTOF10: 0
PAINLEVEL_OUTOF10: 0
PAINLEVEL_OUTOF10: 6
PAINLEVEL_OUTOF10: 0

## 2019-05-06 ASSESSMENT — PAIN DESCRIPTION - PAIN TYPE: TYPE: CHRONIC PAIN

## 2019-05-06 NOTE — GROUP NOTE
Group Therapy Note    Date: May 6    Group Start Time: 7204  Group End Time: 7846  Group Topic: Psychoeducation    [facility]  [id number] [place], [place]        Group Therapy Note    Attendees:7                                                    Wellness Binder Information  Module Name:  Id of daily stressors   Objective: patient will be able to id daily and life stressors currently experiencing. Response to Learning: Able to verbalize/acknowledge new learning, Able to retain information and Progressing to goal  Endings: None Reported  Modes of Intervention: Education, Support, Socialization and Problem-solving  Discipline Responsible: Psychoeducation Specialist.   Signature:  ALEX Lombardo      Notes:  Kept to self and quiet in group. Willing to share when prompted. Status After Intervention:  Improved    Participation Level:  Active Listener and Interactive    Participation Quality: Appropriate, Attentive, Sharing and Supportive      Speech:  normal      Thought Process/Content: Logical      Affective Functioning: Congruent      Mood: euthymic      Level of consciousness:  Alert, Oriented x4 and Attentive      Response to Learning: Able to verbalize/acknowledge new learning, Able to retain information and Progressing to goal      Endings: None Reported    Modes of Intervention: Education, Support, Socialization, Exploration and Problem-solving      Discipline Responsible: Psychoeducational Specialist      Signature:  Reji Quinteros

## 2019-05-06 NOTE — PROGRESS NOTES
Patient isolates to room. Calm and cooperative. Pleasant. No reports of SI, HI, or hallucinations at this time. Purposeful rounding continued.

## 2019-05-06 NOTE — PLAN OF CARE
Patient remains somatic with pelvis pain. Pleasant and cooperative. Denies SI/HI and hallucinations.

## 2019-05-06 NOTE — GROUP NOTE
Group Therapy Note    Date: May 5    Group Start Time: 2000  Group End Time: 2022  Group Topic: Wrap-Up    [facility] 1    Fely Comer, JAMAL; Karthik Marte, JAMAL        Group Therapy Note           Pt attended and participated in  group.

## 2019-05-06 NOTE — PROGRESS NOTES
Musculoskeletal Symptoms: []  back pain []  muscle pain []  joint pain  Neurologic Symptoms: []  headache []  dizziness  Hematolymphoid Symptoms: [] Adenopathy [] Bruises   [] Schimosis       Mental Status Examination:    Cognition:      [x] Alert  [x] Awake  [x] Oriented  [x] Person  [x] Place [x] Time      [] drowsy  [] tired  [] lethargic  [] distractable  [] Other     Attention/Concentration:   [x] Attentive  [] Distracted        Memory Recent and Remote: [x] Intact   [] Impaired [] Partially Impaired     Language: [x] Able to recognize and name objects          [] Unable to recognize and name Objects    Fund of Knowledge:  [] Poor []  Fair  [] Good    Speech: [x] Normal  [] Soft  [] Slow  [] Fast [] Pressured            [] Loud [] Dysarthria  [] Incoherent    Appearance: [] Well Groomed  [x] Casual Dressed  [] Unkept  [] Disheveled          [] Normal weight[] Thin  [] Overweight  [] Obese           Attitude: [x] Positive  [] Hostile  [] Demanding  [] Guarded  [] Defensive         [x] Cooperative  []  Uncooperative      Behavior:  [x] Normal Gait  [] Walks with Assistance  [] Cinthya Chair    [] Walks with Paulo Chito  [] In Hospital Bed  [] Sitting in Chair    Muscle-Skeletal:  [x] Normal Muscle Tone [] Muscle Atrophy       [] Abnormal Muscle Movement     Eye Contact:  [x] Good eye contact  [] Intermittent Eye Contact  [] Poor Eye Contact     Mood: [x] Depressed  [x] Anxious  [] Irritated  [] Euthymic   [] Angry [] Restless    Affect:  [x] Congruent  [] Incongruent  [] Labile  [] Constricted  [] Flat  [] Bizarre     Thought Process and Association:  [x] Logical [] Illogical       [x] Linear and Goal Directed  [] Tangential  [] Circumstantial     Thought Content:  [] Denies [x] Endorses [] Suicidal [] Homicidal  [] Delusional      [] Paranoid  [x] Somatic  [] Grandiose    Perception: [x]  None  [] Auditory   [] Visual  [] tactile   [] olfactory  [] Illusions         Insight: [] Intact  [] Fair  [x] Limited Judgement:  [] Intact  [] Fair  [x] Limited      Assessment/Plan:        Patient Active Problem List   Diagnosis Code    Chest pain R07.9    Schizo-affective schizophrenia, chronic condition ([facility] F25.8    Hyponatremia E87.1    Trigeminal neuralgia syndrome G50.0    Chronic nonintractable headache R51    Anxiety F41.9    Chronic constipation K59.09    Head injury S09.90XA    Jaw pain R68.84    Pain in gums K06.8    Schizophrenia (HCC) F20.9    PTSD (post-traumatic stress disorder) F43.10    Other headache syndrome G44.89    Cervical pain (neck) M54.2    Depression with suicidal ideation F32.9, R45.851    Schizoaffective disorder, depressive type ([facility]) F25.1    Severe protein-calorie malnutrition ([facility]) E43    Type 2 diabetes mellitus, without long-term current use of insulin ([facility]) E11.9         Plan:    []  Patient is refusing medications  [x] Improving as expected   [] Not improving as expected   [] Worsening    []  At Baseline      Will increase Effexor to 150 mg and Abilify to 20 mg       Reason for more than one antipsychotic:  [x] N/A  [] 3 failed monotherapy(drugs tried):  [] Cross over to a new antipsychotic  [] Taper to monotherapy from polypharmacy  [] Augmentation of Clozapine therapy due to treatment resistance to single therapy      Signed:  Madhavi Garcia  5/6/2019  8:20 AM

## 2019-05-07 VITALS
HEIGHT: 65 IN | OXYGEN SATURATION: 100 % | SYSTOLIC BLOOD PRESSURE: 148 MMHG | RESPIRATION RATE: 14 BRPM | HEART RATE: 71 BPM | WEIGHT: 92.5 LBS | DIASTOLIC BLOOD PRESSURE: 91 MMHG | TEMPERATURE: 97.8 F | BODY MASS INDEX: 15.41 KG/M2

## 2019-05-07 LAB — METER GLUCOSE: 90 MG/DL (ref 74–99)

## 2019-05-07 PROCEDURE — 82962 GLUCOSE BLOOD TEST: CPT

## 2019-05-07 PROCEDURE — 6370000000 HC RX 637 (ALT 250 FOR IP): Performed by: NURSE PRACTITIONER

## 2019-05-07 PROCEDURE — 6370000000 HC RX 637 (ALT 250 FOR IP): Performed by: CLINICAL NURSE SPECIALIST

## 2019-05-07 PROCEDURE — 99238 HOSP IP/OBS DSCHRG MGMT 30/<: CPT | Performed by: NURSE PRACTITIONER

## 2019-05-07 RX ORDER — ARIPIPRAZOLE 20 MG/1
20 TABLET ORAL EVERY EVENING
Qty: 30 TABLET | Refills: 0 | Status: ON HOLD | OUTPATIENT
Start: 2019-05-07 | End: 2019-07-31 | Stop reason: HOSPADM

## 2019-05-07 RX ORDER — VENLAFAXINE HYDROCHLORIDE 150 MG/1
150 CAPSULE, EXTENDED RELEASE ORAL
Qty: 30 CAPSULE | Refills: 0 | Status: ON HOLD | OUTPATIENT
Start: 2019-05-07 | End: 2019-07-31 | Stop reason: HOSPADM

## 2019-05-07 RX ADMIN — VENLAFAXINE HYDROCHLORIDE 150 MG: 150 CAPSULE, EXTENDED RELEASE ORAL at 10:21

## 2019-05-07 RX ADMIN — TOPIRAMATE 200 MG: 100 TABLET, FILM COATED ORAL at 10:21

## 2019-05-07 RX ADMIN — LACTULOSE 10 G: 20 SOLUTION ORAL at 10:21

## 2019-05-07 ASSESSMENT — PAIN SCALES - GENERAL: PAINLEVEL_OUTOF10: 0

## 2019-05-07 NOTE — PROGRESS NOTES
CLINICAL PHARMACY NOTE: MEDS TO [address] Select Patient?: No  Total # of Prescriptions Filled: 2   The following medications were delivered to the patient:  · Venlafaxine er 150  Total # of Interventions Completed: 3  Time Spent (min): 30    Additional Documentation:

## 2019-05-07 NOTE — PROGRESS NOTES
Pt discharged with followings belongings:   Dentures: None  Vision - Corrective Lenses: Glasses  Hearing Aid: None  Jewelry: Ring, Watch  Body Piercings Removed: N/A  Clothing: Undergarments (Comment), Socks, Shirt, Pants  Were All Patient Medications Collected?: Not Applicable  Other Valuables: Cell phone, Money (Comment), Costella Cheadle, Other (Comment)   Valuables sent home with patient on discharge. Valuables retrieved from safe, Security envelope number:   13 $ and change and returned to patient. Patient left department with Departure Mode: By self, In cab via Mobility at Departure: Ambulatory, discharged to Discharged to: Private Residence. Patient education on aftercare instructions: yes  Patient verbalize understanding of AVS:   yes. Given suicide prevention handout   . Discharged in stable condition.   Status EXAM upon discharge:  Status and Exam  Normal: No  Facial Expression: Sad, Flat  Affect: Blunt  Level of Consciousness: Alert  Mood:Normal: No  Mood: Anxious, Depressed  Motor Activity:Normal: No  Motor Activity: Decreased  Interview Behavior: Cooperative  Preception: Johnson to Person, [place] Amen to Time, Johnson to Place, Johnson to Situation  Attention:Normal: No  Attention: Unable to Concentrate  Thought Processes: Perseveration  Thought Content:Normal: No  Thought Content: Obsessions, Preoccupations  Hallucinations: None  Delusions: Yes  Delusions: Obsessions  Memory:Normal: No  Memory: Poor Recent, Poor Remote  Insight and Judgment: No  Insight and Judgment: Poor Judgment, Poor Insight, Unrealistic  Present Suicidal Ideation: No  Present Homicidal Ideation: No    Brian Antunez RN

## 2019-05-07 NOTE — DISCHARGE SUMMARY
Physician Discharge Summary      Physical Examination   VS/Measurements:     BP (!) 148/91   Pulse 71   Temp 97.8 °F (36.6 °C) (Oral)   Resp 14   Ht 5' 5\" (1.651 m)   Wt 92 lb 8 oz (42 kg)   LMP 08/25/2011 (Exact Date)   SpO2 100%   BMI 15.39 kg/m²         Discharge Medications:              Leticia Kaplan   Home Medication Instructions [facility]:[id number]    Printed on:05/07/19 0818   Medication Information                      ARIPiprazole (ABILIFY) 20 MG tablet  Take 1 tablet by mouth every evening             lactulose (CHRONULAC) 10 GM/15ML solution  TK 15 ML PO QD PRN             metFORMIN (GLUCOPHAGE) 500 MG tablet  Take 1 tablet by mouth 2 times daily             topiramate (TOPAMAX) 100 MG tablet  Take 200 mg by mouth daily              venlafaxine (EFFEXOR XR) 150 MG extended release capsule  Take 1 capsule by mouth daily (with breakfast)                     Psychiatric: Mental Status Examination:    Cognition:      [x] Alert  [x] Awake  [x] Oriented  [x] Person  [x] Place [x] Time    [] drowsy  [] tired  [] lethargic  [] distractable       Attention/Concentration:   [x] Attentive  [] Distracted        Memory Recent and Remote: [x] Intact   [] Impaired [] Partially Impaired     Language: [] Able to recognize and name objects          [] Unable to recognize and name Objects    Fund of Knowledge:  [] Poor []  Fair  [] Good    Speech: [x] Normal  [] Soft  [] Slow  [] Fast [] Pressured            [] Loud [] Dysarthria  [] Incoherent       Appearance: [] Well Groomed  [x] Casual Dressed  [] Unkept  [] Disheveled          [] Normal weight[] Thin  [] Overweight  [] Obese           Attitude: [] Positive  [] Hostile  [] Demanding  [] Guarded  [] Defensive         [x] Cooperative  []  Uncooperative      Behavior:  [x] Normal Gait  [] Walks with Assistance  [] Cinthya Chair    [] Walks with Esau Hutton  [] In Hospital Bed  [] Sitting in Chair    Muscle-Skeletal:  [x] Normal Muscle Tone [] Muscle Atrophy       [] Abnormal Muscle Movement     Eye Contact:  [x] Good eye contact  [] Intermittent Eye Contact  [] Poor Eye Contact     Mood: [] Depressed  [] Anxious  [] Irritated  [x] Euthymic   [] Angry [] Restless    Affect:  [x] Congruent  [] Incongruent  [] Labile  [] Constricted  [] Flat  [] Bizarre     Thought Process and Association:  [] Logical [] Illogical       [x] Linear and Goal Directed  [] Tangential  [] Circumstantial     Thought Content:  [x] Denies [] Endorses [] Suicidal [] Homicidal  [] Delusional      [] Paranoid  [] Somatic  [] Grandiose    Perception: [x]  None  [] Auditory   [] Visual  [] tactile   [] olfactory  [] Illusions         Insight: [] Intact  [x] Fair  [] Limited    Judgement:  [] Intact  [x] Fair  [] Limited    Hospital Course:   Admit Date: 4/25/2019     Discharge Date: 5/7/2019  Admitted from:  [x]  Emergency Room  []  Home  []  Another facility   []  [facility]     Admitting diagnosis:   Patient Active Problem List   Diagnosis    Chest pain    [facility]ffective [facility] condition [facility]    Hyponatremia    Trigeminal neuralgia syndrome    Chronic nonintractable headache    Anxiety    Chronic constipation    Head injury    Jaw pain    Pain in gums    Schizophrenia ([facility]    PTSD (post-traumatic stress disorder)    Other headache syndrome    Cervical pain (neck)    Depression with suicidal ideation    Schizoaffective disorder, depressive type ([facility]    Severe protein-calorie malnutrition ([facility]    Type 2 diabetes mellitus, without long-term current use of insulin ([facility] [facility]      Length of stay:  12 days              Any Cole was admitted in Psychiatric unit  from ER with psychosis. Patient was treated            With the above . Patient responded well to the treatment.      Discharge Summary Plan:     Discharge Status:    [x] Improved [] Unchanged    [] Worse       Discharge instructions given:  [x] Patient    [] Family [] Other         Discharge disposition:  [x] Home [] Step Down unit  [] Group Home []  [facility]                                                    [] [facility]    [] AMA  [] Other           Prescriptions: Continue same medications, review with patient.        Reason for more than one antipsychotic:  [x] N/A  [] 3 failed monotherapy(drugs tried):  [] Cross over to a new antipsychotic  [] Taper to monotherapy from polypharmacy  [] Augmentation of Clozapine therapy due to treatment resistance to single therapy      Diagnosis:        Patient Active Problem List   Diagnosis Code    Chest pain R07.9    Schizo-affective schizophrenia, chronic condition ([address] F25.8    Hyponatremia E87.1    Trigeminal neuralgia syndrome G50.0    Chronic nonintractable headache R51    Anxiety F41.9    Chronic constipation K59.09    Head injury S09.90XA    Jaw pain R68.84    Pain in gums K06.8    Schizophrenia ([address] F20.9    PTSD (post-traumatic stress disorder) F43.10    Other headache syndrome G44.89    Cervical pain (neck) M54.2    Depression with suicidal ideation F32.9, R45.851    Schizoaffective disorder, depressive type ([address] F25.1    Severe protein-calorie malnutrition ([address] E43    Type 2 diabetes mellitus, without long-term current use of insulin ([address] E11.9       Education and Follow-up:  Counseled:  [x] Patient     [] Family    [] Guardian      Signed:   Rosi Anthony   5/7/2019   8:18 AM

## 2019-07-02 ENCOUNTER — HOSPITAL ENCOUNTER (INPATIENT)
Age: 59
LOS: 3 days | Discharge: HOME | DRG: 426 | End: 2019-07-05
Attending: EMERGENCY MEDICINE | Admitting: INTERNAL MEDICINE
Payer: COMMERCIAL

## 2019-07-02 ENCOUNTER — APPOINTMENT (OUTPATIENT)
Dept: GENERAL RADIOLOGY | Age: 59
DRG: 426 | End: 2019-07-02
Payer: COMMERCIAL

## 2019-07-02 DIAGNOSIS — R10.2 VAGINAL PAIN: ICD-10-CM

## 2019-07-02 DIAGNOSIS — E87.1 HYPONATREMIA: ICD-10-CM

## 2019-07-02 DIAGNOSIS — R06.02 SOB (SHORTNESS OF BREATH): Primary | ICD-10-CM

## 2019-07-02 LAB
ALBUMIN SERPL-MCNC: 4.8 G/DL (ref 3.5–5.2)
ALP BLD-CCNC: 70 U/L (ref 35–104)
ALT SERPL-CCNC: 23 U/L (ref 0–32)
ANION GAP SERPL CALCULATED.3IONS-SCNC: 12 MMOL/L (ref 7–16)
AST SERPL-CCNC: 24 U/L (ref 0–31)
BILIRUB SERPL-MCNC: 1.3 MG/DL (ref 0–1.2)
BILIRUBIN URINE: NEGATIVE
BLOOD, URINE: ABNORMAL
BUN BLDV-MCNC: 5 MG/DL (ref 6–20)
CALCIUM SERPL-MCNC: 9.8 MG/DL (ref 8.6–10.2)
CHLORIDE BLD-SCNC: 82 MMOL/L (ref 98–107)
CLARITY: CLEAR
CO2: 28 MMOL/L (ref 22–29)
COLOR: YELLOW
CREAT SERPL-MCNC: 0.6 MG/DL (ref 0.5–1)
D DIMER: <200 NG/ML DDU
GFR AFRICAN AMERICAN: >60
GFR NON-AFRICAN AMERICAN: >60 ML/MIN/1.73
GLUCOSE BLD-MCNC: 112 MG/DL (ref 74–99)
GLUCOSE URINE: NEGATIVE MG/DL
HCT VFR BLD CALC: 33.3 % (ref 34–48)
HEMOGLOBIN: 12 G/DL (ref 11.5–15.5)
KETONES, URINE: NEGATIVE MG/DL
LACTIC ACID: 2 MMOL/L (ref 0.5–2.2)
LEUKOCYTE ESTERASE, URINE: NEGATIVE
LIPASE: 101 U/L (ref 13–60)
MCH RBC QN AUTO: 31.9 PG (ref 26–35)
MCHC RBC AUTO-ENTMCNC: 36 % (ref 32–34.5)
MCV RBC AUTO: 88.6 FL (ref 80–99.9)
NITRITE, URINE: NEGATIVE
PDW BLD-RTO: 11.8 FL (ref 11.5–15)
PH UA: 8.5 (ref 5–9)
PLATELET # BLD: 275 E9/L (ref 130–450)
PMV BLD AUTO: 9.6 FL (ref 7–12)
POTASSIUM SERPL-SCNC: 3.2 MMOL/L (ref 3.5–5)
PROTEIN UA: NEGATIVE MG/DL
RBC # BLD: 3.76 E12/L (ref 3.5–5.5)
SODIUM BLD-SCNC: 122 MMOL/L (ref 132–146)
SPECIFIC GRAVITY UA: <=1.005 (ref 1–1.03)
TOTAL PROTEIN: 7.4 G/DL (ref 6.4–8.3)
TROPONIN: <0.01 NG/ML (ref 0–0.03)
UROBILINOGEN, URINE: 0.2 E.U./DL
WBC # BLD: 4.5 E9/L (ref 4.5–11.5)

## 2019-07-02 PROCEDURE — 2060000000 HC ICU INTERMEDIATE R&B

## 2019-07-02 PROCEDURE — 80053 COMPREHEN METABOLIC PANEL: CPT

## 2019-07-02 PROCEDURE — 87591 N.GONORRHOEAE DNA AMP PROB: CPT

## 2019-07-02 PROCEDURE — 71045 X-RAY EXAM CHEST 1 VIEW: CPT

## 2019-07-02 PROCEDURE — 87210 SMEAR WET MOUNT SALINE/INK: CPT

## 2019-07-02 PROCEDURE — 93005 ELECTROCARDIOGRAM TRACING: CPT | Performed by: EMERGENCY MEDICINE

## 2019-07-02 PROCEDURE — 83690 ASSAY OF LIPASE: CPT

## 2019-07-02 PROCEDURE — 36415 COLL VENOUS BLD VENIPUNCTURE: CPT

## 2019-07-02 PROCEDURE — 84484 ASSAY OF TROPONIN QUANT: CPT

## 2019-07-02 PROCEDURE — 85378 FIBRIN DEGRADE SEMIQUANT: CPT

## 2019-07-02 PROCEDURE — 99285 EMERGENCY DEPT VISIT HI MDM: CPT

## 2019-07-02 PROCEDURE — 2580000003 HC RX 258: Performed by: EMERGENCY MEDICINE

## 2019-07-02 PROCEDURE — 83605 ASSAY OF LACTIC ACID: CPT

## 2019-07-02 PROCEDURE — 81001 URINALYSIS AUTO W/SCOPE: CPT

## 2019-07-02 PROCEDURE — 87491 CHLMYD TRACH DNA AMP PROBE: CPT

## 2019-07-02 PROCEDURE — 85027 COMPLETE CBC AUTOMATED: CPT

## 2019-07-02 RX ORDER — SODIUM CHLORIDE 9 MG/ML
INJECTION, SOLUTION INTRAVENOUS CONTINUOUS
Status: DISCONTINUED | OUTPATIENT
Start: 2019-07-02 | End: 2019-07-05 | Stop reason: HOSPADM

## 2019-07-02 RX ADMIN — SODIUM CHLORIDE: 9 INJECTION, SOLUTION INTRAVENOUS at 22:04

## 2019-07-02 ASSESSMENT — PAIN DESCRIPTION - DESCRIPTORS: DESCRIPTORS: SORE

## 2019-07-02 ASSESSMENT — PAIN DESCRIPTION - PAIN TYPE: TYPE: CHRONIC PAIN

## 2019-07-02 ASSESSMENT — PAIN DESCRIPTION - FREQUENCY: FREQUENCY: CONTINUOUS

## 2019-07-02 ASSESSMENT — PAIN SCALES - GENERAL: PAINLEVEL_OUTOF10: 10

## 2019-07-03 PROBLEM — E43 SEVERE PROTEIN-CALORIE MALNUTRITION (HCC): Status: ACTIVE | Noted: 2019-07-03

## 2019-07-03 LAB
ALBUMIN SERPL-MCNC: 4.6 G/DL (ref 3.5–5.2)
ALP BLD-CCNC: 68 U/L (ref 35–104)
ALT SERPL-CCNC: 21 U/L (ref 0–32)
AMORPHOUS: ABNORMAL
ANION GAP SERPL CALCULATED.3IONS-SCNC: 13 MMOL/L (ref 7–16)
AST SERPL-CCNC: 20 U/L (ref 0–31)
BACTERIA: ABNORMAL /HPF
BILIRUB SERPL-MCNC: 1.1 MG/DL (ref 0–1.2)
BUN BLDV-MCNC: 4 MG/DL (ref 6–20)
CALCIUM SERPL-MCNC: 9.8 MG/DL (ref 8.6–10.2)
CHLORIDE BLD-SCNC: 91 MMOL/L (ref 98–107)
CLUE CELLS: NORMAL
CO2: 22 MMOL/L (ref 22–29)
CREAT SERPL-MCNC: 0.5 MG/DL (ref 0.5–1)
EKG ATRIAL RATE: 62 BPM
EKG P AXIS: 81 DEGREES
EKG P-R INTERVAL: 164 MS
EKG Q-T INTERVAL: 448 MS
EKG QRS DURATION: 100 MS
EKG QTC CALCULATION (BAZETT): 454 MS
EKG R AXIS: 94 DEGREES
EKG T AXIS: 85 DEGREES
EKG VENTRICULAR RATE: 62 BPM
EPITHELIAL CELLS, UA: ABNORMAL /HPF
GFR AFRICAN AMERICAN: >60
GFR NON-AFRICAN AMERICAN: >60 ML/MIN/1.73
GLUCOSE BLD-MCNC: 97 MG/DL (ref 74–99)
POTASSIUM SERPL-SCNC: 2.9 MMOL/L (ref 3.5–5)
RBC UA: ABNORMAL /HPF (ref 0–2)
SODIUM BLD-SCNC: 126 MMOL/L (ref 132–146)
SOURCE WET PREP: NORMAL
TOTAL PROTEIN: 6.6 G/DL (ref 6.4–8.3)
TRICHOMONAS PREP: NORMAL
WBC UA: ABNORMAL /HPF (ref 0–5)
YEAST WET PREP: NORMAL

## 2019-07-03 PROCEDURE — 2580000003 HC RX 258: Performed by: EMERGENCY MEDICINE

## 2019-07-03 PROCEDURE — 80053 COMPREHEN METABOLIC PANEL: CPT

## 2019-07-03 PROCEDURE — 6370000000 HC RX 637 (ALT 250 FOR IP): Performed by: STUDENT IN AN ORGANIZED HEALTH CARE EDUCATION/TRAINING PROGRAM

## 2019-07-03 PROCEDURE — 6370000000 HC RX 637 (ALT 250 FOR IP): Performed by: INTERNAL MEDICINE

## 2019-07-03 PROCEDURE — 2060000000 HC ICU INTERMEDIATE R&B

## 2019-07-03 PROCEDURE — 6360000002 HC RX W HCPCS: Performed by: INTERNAL MEDICINE

## 2019-07-03 PROCEDURE — 2580000003 HC RX 258: Performed by: INTERNAL MEDICINE

## 2019-07-03 PROCEDURE — 93010 ELECTROCARDIOGRAM REPORT: CPT | Performed by: INTERNAL MEDICINE

## 2019-07-03 PROCEDURE — 36415 COLL VENOUS BLD VENIPUNCTURE: CPT

## 2019-07-03 RX ORDER — LACTULOSE 10 G/15ML
10 SOLUTION ORAL 3 TIMES DAILY
Status: DISCONTINUED | OUTPATIENT
Start: 2019-07-03 | End: 2019-07-05 | Stop reason: HOSPADM

## 2019-07-03 RX ORDER — VENLAFAXINE HYDROCHLORIDE 150 MG/1
150 CAPSULE, EXTENDED RELEASE ORAL
Status: DISCONTINUED | OUTPATIENT
Start: 2019-07-03 | End: 2019-07-05 | Stop reason: HOSPADM

## 2019-07-03 RX ORDER — SODIUM CHLORIDE 0.9 % (FLUSH) 0.9 %
10 SYRINGE (ML) INJECTION EVERY 12 HOURS SCHEDULED
Status: DISCONTINUED | OUTPATIENT
Start: 2019-07-03 | End: 2019-07-05 | Stop reason: HOSPADM

## 2019-07-03 RX ORDER — ARIPIPRAZOLE 10 MG/1
20 TABLET ORAL EVERY EVENING
Status: DISCONTINUED | OUTPATIENT
Start: 2019-07-03 | End: 2019-07-05 | Stop reason: HOSPADM

## 2019-07-03 RX ORDER — ONDANSETRON 2 MG/ML
4 INJECTION INTRAMUSCULAR; INTRAVENOUS EVERY 6 HOURS PRN
Status: DISCONTINUED | OUTPATIENT
Start: 2019-07-03 | End: 2019-07-05 | Stop reason: HOSPADM

## 2019-07-03 RX ORDER — TOPIRAMATE 100 MG/1
200 TABLET, FILM COATED ORAL DAILY
Status: DISCONTINUED | OUTPATIENT
Start: 2019-07-03 | End: 2019-07-05 | Stop reason: HOSPADM

## 2019-07-03 RX ORDER — SODIUM CHLORIDE 0.9 % (FLUSH) 0.9 %
10 SYRINGE (ML) INJECTION PRN
Status: DISCONTINUED | OUTPATIENT
Start: 2019-07-03 | End: 2019-07-05 | Stop reason: HOSPADM

## 2019-07-03 RX ADMIN — VENLAFAXINE HYDROCHLORIDE 150 MG: 150 CAPSULE, EXTENDED RELEASE ORAL at 09:11

## 2019-07-03 RX ADMIN — LACTULOSE 10 G: 20 SOLUTION ORAL at 21:15

## 2019-07-03 RX ADMIN — TOPIRAMATE 200 MG: 100 TABLET, FILM COATED ORAL at 09:11

## 2019-07-03 RX ADMIN — SODIUM CHLORIDE: 9 INJECTION, SOLUTION INTRAVENOUS at 00:54

## 2019-07-03 RX ADMIN — ARIPIPRAZOLE 20 MG: 10 TABLET ORAL at 06:20

## 2019-07-03 RX ADMIN — ENOXAPARIN SODIUM 40 MG: 40 INJECTION SUBCUTANEOUS at 09:10

## 2019-07-03 RX ADMIN — Medication 10 ML: at 21:16

## 2019-07-03 RX ADMIN — POTASSIUM BICARBONATE 40 MEQ: 782 TABLET, EFFERVESCENT ORAL at 09:10

## 2019-07-03 RX ADMIN — SODIUM CHLORIDE: 9 INJECTION, SOLUTION INTRAVENOUS at 14:37

## 2019-07-03 RX ADMIN — ARIPIPRAZOLE 20 MG: 10 TABLET ORAL at 18:44

## 2019-07-03 RX ADMIN — LACTULOSE 10 G: 20 SOLUTION ORAL at 14:36

## 2019-07-03 RX ADMIN — LACTULOSE 10 G: 20 SOLUTION ORAL at 09:11

## 2019-07-03 ASSESSMENT — PAIN SCALES - GENERAL
PAINLEVEL_OUTOF10: 4
PAINLEVEL_OUTOF10: 0

## 2019-07-03 NOTE — CARE COORDINATION
Spoke with Pt about Discharge Plan. Return Home with no needs. Independent prior to admission. Discharge Plan is home with no needs.

## 2019-07-04 LAB
ANION GAP SERPL CALCULATED.3IONS-SCNC: 9 MMOL/L (ref 7–16)
BUN BLDV-MCNC: 5 MG/DL (ref 6–20)
CALCIUM SERPL-MCNC: 9.4 MG/DL (ref 8.6–10.2)
CHLORIDE BLD-SCNC: 96 MMOL/L (ref 98–107)
CO2: 23 MMOL/L (ref 22–29)
CORTISOL TOTAL: 8.26 MCG/DL (ref 2.68–18.4)
CREAT SERPL-MCNC: 0.6 MG/DL (ref 0.5–1)
CREATININE URINE: 22 MG/DL (ref 29–226)
CREATININE URINE: 27 MG/DL (ref 29–226)
GFR AFRICAN AMERICAN: >60
GFR NON-AFRICAN AMERICAN: >60 ML/MIN/1.73
GLUCOSE BLD-MCNC: 85 MG/DL (ref 74–99)
OSMOLALITY URINE: 203 MOSM/KG (ref 300–900)
OSMOLALITY URINE: 205 MOSM/KG (ref 300–900)
POTASSIUM SERPL-SCNC: 3.2 MMOL/L (ref 3.5–5)
SODIUM BLD-SCNC: 128 MMOL/L (ref 132–146)
SODIUM URINE: 42 MMOL/L
SODIUM URINE: 49 MMOL/L
TSH SERPL DL<=0.05 MIU/L-ACNC: 0.53 UIU/ML (ref 0.27–4.2)

## 2019-07-04 PROCEDURE — 82570 ASSAY OF URINE CREATININE: CPT

## 2019-07-04 PROCEDURE — 2580000003 HC RX 258: Performed by: EMERGENCY MEDICINE

## 2019-07-04 PROCEDURE — 2580000003 HC RX 258: Performed by: INTERNAL MEDICINE

## 2019-07-04 PROCEDURE — 6370000000 HC RX 637 (ALT 250 FOR IP): Performed by: STUDENT IN AN ORGANIZED HEALTH CARE EDUCATION/TRAINING PROGRAM

## 2019-07-04 PROCEDURE — 84443 ASSAY THYROID STIM HORMONE: CPT

## 2019-07-04 PROCEDURE — 6370000000 HC RX 637 (ALT 250 FOR IP): Performed by: INTERNAL MEDICINE

## 2019-07-04 PROCEDURE — 80048 BASIC METABOLIC PNL TOTAL CA: CPT

## 2019-07-04 PROCEDURE — 6360000002 HC RX W HCPCS: Performed by: INTERNAL MEDICINE

## 2019-07-04 PROCEDURE — 2060000000 HC ICU INTERMEDIATE R&B

## 2019-07-04 PROCEDURE — 82533 TOTAL CORTISOL: CPT

## 2019-07-04 PROCEDURE — 36415 COLL VENOUS BLD VENIPUNCTURE: CPT

## 2019-07-04 PROCEDURE — 84300 ASSAY OF URINE SODIUM: CPT

## 2019-07-04 PROCEDURE — 83935 ASSAY OF URINE OSMOLALITY: CPT

## 2019-07-04 RX ADMIN — TOPIRAMATE 200 MG: 100 TABLET, FILM COATED ORAL at 08:57

## 2019-07-04 RX ADMIN — SODIUM CHLORIDE: 9 INJECTION, SOLUTION INTRAVENOUS at 05:25

## 2019-07-04 RX ADMIN — LACTULOSE 10 G: 20 SOLUTION ORAL at 08:58

## 2019-07-04 RX ADMIN — VENLAFAXINE HYDROCHLORIDE 150 MG: 150 CAPSULE, EXTENDED RELEASE ORAL at 08:57

## 2019-07-04 RX ADMIN — Medication 10 ML: at 08:58

## 2019-07-04 RX ADMIN — LACTULOSE 10 G: 20 SOLUTION ORAL at 19:43

## 2019-07-04 RX ADMIN — Medication 10 ML: at 19:43

## 2019-07-04 RX ADMIN — ARIPIPRAZOLE 20 MG: 10 TABLET ORAL at 18:36

## 2019-07-04 RX ADMIN — POTASSIUM BICARBONATE 40 MEQ: 782 TABLET, EFFERVESCENT ORAL at 08:57

## 2019-07-04 RX ADMIN — ENOXAPARIN SODIUM 40 MG: 40 INJECTION SUBCUTANEOUS at 08:58

## 2019-07-04 RX ADMIN — LACTULOSE 10 G: 20 SOLUTION ORAL at 15:21

## 2019-07-04 ASSESSMENT — PAIN SCALES - GENERAL
PAINLEVEL_OUTOF10: 0

## 2019-07-04 NOTE — PROGRESS NOTES
Message left for RDLD to see the patient
-- --   07/03/19 1542 -- -- -- -- -- -- 5' 5\" (1.651 m) --   07/03/19 1530 -- -- -- -- -- -- -- 97 lb (44 kg)   07/03/19 1515 113/68 98.7 °F (37.1 °C) Temporal 64 16 100 % -- --   @      Intake/Output Summary (Last 24 hours) at 7/4/2019 1018  Last data filed at 7/4/2019 0606  Gross per 24 hour   Intake 2731.76 ml   Output 2150 ml   Net 581.76 ml         Wt Readings from Last 3 Encounters:   07/03/19 97 lb (44 kg)   04/01/19 97 lb (44 kg)   02/07/19 100 lb (45.4 kg)       Constitutional:  Pt is in no acute distress  Head: normocephalic, atraumatic  Neck: no JVD  Cardiovascular: regular rate and rhythm, no murmurs, gallops, or rubs  Respiratory:  No rales, rhochi, or wheezes  Gastrointestinal:  Soft, nontender, nondistended, bowel sounds x 4  Ext: no edema  Skin: dry, no rash  Neuro: aaox3    MEDS (scheduled):    sodium chloride flush  10 mL Intravenous 2 times per day    enoxaparin  40 mg Subcutaneous Daily    ARIPiprazole  20 mg Oral QPM    lactulose  10 g Oral TID    topiramate  200 mg Oral Daily    venlafaxine  150 mg Oral Daily with breakfast       MEDS (infusions):   sodium chloride 100 mL/hr at 07/04/19 0525       MEDS (prn):  sodium chloride flush, magnesium hydroxide, ondansetron    DATA:    Recent Labs     07/02/19  2210   WBC 4.5   HGB 12.0   HCT 33.3*   MCV 88.6        Recent Labs     07/02/19  2210 07/03/19  0530 07/04/19  0711   * 126* 128*   K 3.2* 2.9* 3.2*   CL 82* 91* 96*   CO2 28 22 23   BUN 5* 4* 5*   CREATININE 0.6 0.5 0.6   LABGLOM >60 >60 >60   GLUCOSE 112* 97 85   CALCIUM 9.8 9.8 9.4   ALT 23 21  --    AST 24 20  --    BILITOT 1.3* 1.1  --    ALKPHOS 70 68  --        Lab Results   Component Value Date    LABALBU 4.6 07/03/2019    LABALBU 4.8 07/02/2019    LABALBU 4.4 04/26/2019     Lab Results   Component Value Date    TSH 0.525 07/04/2019     No results found for: PHART, PO2ART, GVW8QGF    Iron Studies: No results found for: IRON, TIBC,
awake, alert and following commands       Labs:   Recent Labs     07/02/19  2210   WBC 4.5   HGB 12.0   HCT 33.3*        Recent Labs     07/02/19 2210 07/03/19  0530   * 126*   K 3.2* 2.9*   CL 82* 91*   CO2 28 22   BUN 5* 4*   CREATININE 0.6 0.5   CALCIUM 9.8 9.8     Recent Labs     07/02/19 2210 07/03/19  0530   AST 24 20   ALT 23 21   BILITOT 1.3* 1.1   ALKPHOS 70 68     No results for input(s): INR in the last 72 hours. Recent Labs     07/02/19 2210   TROPONINI <0.01       Imaging:  XR CHEST PORTABLE   Final Result   No acute cardiopulmonary process. Assessment/Plan:  Active Hospital Problems    Diagnosis Date Noted    Hyponatremia [E87.1] 10/20/2015     Continue home meds  NS started; nephrology following  Replace K as appropriate  Dietician consulted given poor intake  Oxygen support as needed     DVT Prophylaxis: lovenox  Diet: DIET GENERAL;  Code Status: Full Code     PT/OT Eval Status: na     Dispo - home at [address]  Please contact me through perfect serve    NOTE: This report was transcribed using voice recognition software. Every effort was made to ensure accuracy; however, inadvertent computerized transcription errors may be present.

## 2019-07-05 VITALS
SYSTOLIC BLOOD PRESSURE: 142 MMHG | HEIGHT: 65 IN | HEART RATE: 77 BPM | DIASTOLIC BLOOD PRESSURE: 81 MMHG | RESPIRATION RATE: 16 BRPM | OXYGEN SATURATION: 100 % | WEIGHT: 97 LBS | TEMPERATURE: 97.7 F | BODY MASS INDEX: 16.16 KG/M2

## 2019-07-05 LAB
ANION GAP SERPL CALCULATED.3IONS-SCNC: 12 MMOL/L (ref 7–16)
BUN BLDV-MCNC: 8 MG/DL (ref 6–20)
CALCIUM SERPL-MCNC: 9.8 MG/DL (ref 8.6–10.2)
CHLORIDE BLD-SCNC: 96 MMOL/L (ref 98–107)
CO2: 26 MMOL/L (ref 22–29)
CREAT SERPL-MCNC: 0.6 MG/DL (ref 0.5–1)
GFR AFRICAN AMERICAN: >60
GFR NON-AFRICAN AMERICAN: >60 ML/MIN/1.73
GLUCOSE BLD-MCNC: 83 MG/DL (ref 74–99)
POTASSIUM SERPL-SCNC: 3.7 MMOL/L (ref 3.5–5)
SODIUM BLD-SCNC: 134 MMOL/L (ref 132–146)

## 2019-07-05 PROCEDURE — 80048 BASIC METABOLIC PNL TOTAL CA: CPT

## 2019-07-05 PROCEDURE — 6360000002 HC RX W HCPCS: Performed by: INTERNAL MEDICINE

## 2019-07-05 PROCEDURE — 2580000003 HC RX 258: Performed by: EMERGENCY MEDICINE

## 2019-07-05 PROCEDURE — 36415 COLL VENOUS BLD VENIPUNCTURE: CPT

## 2019-07-05 PROCEDURE — 2580000003 HC RX 258: Performed by: INTERNAL MEDICINE

## 2019-07-05 PROCEDURE — 6370000000 HC RX 637 (ALT 250 FOR IP): Performed by: INTERNAL MEDICINE

## 2019-07-05 RX ADMIN — Medication 10 ML: at 08:25

## 2019-07-05 RX ADMIN — VENLAFAXINE HYDROCHLORIDE 150 MG: 150 CAPSULE, EXTENDED RELEASE ORAL at 08:25

## 2019-07-05 RX ADMIN — ENOXAPARIN SODIUM 40 MG: 40 INJECTION SUBCUTANEOUS at 08:33

## 2019-07-05 RX ADMIN — SODIUM CHLORIDE: 9 INJECTION, SOLUTION INTRAVENOUS at 09:27

## 2019-07-05 RX ADMIN — LACTULOSE 10 G: 20 SOLUTION ORAL at 08:29

## 2019-07-05 RX ADMIN — TOPIRAMATE 200 MG: 100 TABLET, FILM COATED ORAL at 08:25

## 2019-07-05 ASSESSMENT — PAIN SCALES - GENERAL: PAINLEVEL_OUTOF10: 0

## 2019-07-05 NOTE — DISCHARGE SUMMARY
°F (36.5 °C)   SpO2: 100%          General appearance: No apparent distress, appears stated age and cooperative. HEENT: Conjunctivae/corneas clear. Pupils equal and round. No injections noted. Neck: Supple. No lesions, scars or masses. Trachea midline. Respiratory:  Normal respiratory effort. Clear to auscultation, bilaterally without Rales/Wheezes/Rhonchi. Cardiovascular: Regular rate and rhythm with normal S1/S2 without murmurs, rubs or gallops. Abdomen: Soft, non-tender, non-distended with normal bowel sounds. Musculoskeletal: No clubbing, cyanosis or edema bilaterally. Brisk capillary refill. 2+ lower extremity pulses (dorsalis pedis). Skin:  No rashes    Neurologic: awake, alert and following commands       Labs: For convenience and continuity at follow-up the following most recent labs are provided:      CBC:    Lab Results   Component Value Date    WBC 4.5 07/02/2019    HGB 12.0 07/02/2019    HCT 33.3 07/02/2019     07/02/2019       Renal:    Lab Results   Component Value Date     07/05/2019    K 3.7 07/05/2019    K 3.2 02/07/2019    CL 96 07/05/2019    CO2 26 07/05/2019    BUN 8 07/05/2019    CREATININE 0.6 07/05/2019    CALCIUM 9.8 07/05/2019       Imaging:  XR CHEST PORTABLE   Final Result   No acute cardiopulmonary process.             Discharge Medications:     Current Discharge Medication List           Details   venlafaxine (EFFEXOR XR) 150 MG extended release capsule Take 1 capsule by mouth daily (with breakfast)  Qty: 30 capsule, Refills: 0      ARIPiprazole (ABILIFY) 20 MG tablet Take 1 tablet by mouth every evening  Qty: 30 tablet, Refills: 0      lactulose (CHRONULAC) 10 GM/15ML solution TK 15 ML PO QD PRN  Refills: 0      topiramate (TOPAMAX) 100 MG tablet Take 200 mg by mouth daily       metFORMIN (GLUCOPHAGE) 500 MG tablet Take 1 tablet by mouth 2 times daily  Qty: 60 tablet, Refills: 0    Comments: PATIENT MUST BE SEEN             Time Spent on discharge is more than 31 min

## 2019-07-05 NOTE — CARE COORDINATION
Attempted multiple times to reach a live person at [facility] phone [phone]. No matter what extension I chose no one answered (medical, ). I attempted to reach assigned CM Teri at extension listed however, that extension did not match the . Unable to reach anyone I left a detailed VM for Linn Knight at [id number] for status post hospital discharge with my call back information.

## 2019-07-08 LAB
C TRACH DNA GENITAL QL NAA+PROBE: NEGATIVE
N. GONORRHOEAE DNA: NEGATIVE
SOURCE: NORMAL

## 2019-07-25 ENCOUNTER — HOSPITAL ENCOUNTER (INPATIENT)
Age: 59
LOS: 7 days | Discharge: HOME | DRG: 751 | End: 2019-08-01
Attending: EMERGENCY MEDICINE | Admitting: PSYCHIATRY & NEUROLOGY
Payer: COMMERCIAL

## 2019-07-25 DIAGNOSIS — F25.9 SCHIZOAFFECTIVE DISORDER, UNSPECIFIED TYPE (HCC): Primary | ICD-10-CM

## 2019-07-25 PROBLEM — F23 ACUTE PSYCHOSIS (HCC): Status: ACTIVE | Noted: 2019-07-25

## 2019-07-25 PROBLEM — F29 PSYCHOSIS (HCC): Status: ACTIVE | Noted: 2019-07-25

## 2019-07-25 LAB
ACETAMINOPHEN LEVEL: <5 MCG/ML (ref 10–30)
AMPHETAMINE SCREEN, URINE: NOT DETECTED
ANION GAP SERPL CALCULATED.3IONS-SCNC: 11 MMOL/L (ref 7–16)
BARBITURATE SCREEN URINE: NOT DETECTED
BASOPHILS ABSOLUTE: 0.07 E9/L (ref 0–0.2)
BASOPHILS RELATIVE PERCENT: 1.4 % (ref 0–2)
BENZODIAZEPINE SCREEN, URINE: NOT DETECTED
BILIRUBIN URINE: NEGATIVE
BLOOD, URINE: NEGATIVE
BUN BLDV-MCNC: 8 MG/DL (ref 6–20)
CALCIUM SERPL-MCNC: 9.9 MG/DL (ref 8.6–10.2)
CANNABINOID SCREEN URINE: NOT DETECTED
CHLORIDE BLD-SCNC: 96 MMOL/L (ref 98–107)
CLARITY: CLEAR
CO2: 27 MMOL/L (ref 22–29)
COCAINE METABOLITE SCREEN URINE: NOT DETECTED
COLOR: YELLOW
CREAT SERPL-MCNC: 0.7 MG/DL (ref 0.5–1)
EKG ATRIAL RATE: 63 BPM
EKG P AXIS: 82 DEGREES
EKG P-R INTERVAL: 140 MS
EKG Q-T INTERVAL: 416 MS
EKG QRS DURATION: 94 MS
EKG QTC CALCULATION (BAZETT): 425 MS
EKG R AXIS: 94 DEGREES
EKG T AXIS: 77 DEGREES
EKG VENTRICULAR RATE: 63 BPM
EOSINOPHILS ABSOLUTE: 0.05 E9/L (ref 0.05–0.5)
EOSINOPHILS RELATIVE PERCENT: 1 % (ref 0–6)
ETHANOL: <10 MG/DL (ref 0–0.08)
GFR AFRICAN AMERICAN: >60
GFR NON-AFRICAN AMERICAN: >60 ML/MIN/1.73
GLUCOSE BLD-MCNC: 96 MG/DL (ref 74–99)
GLUCOSE URINE: NEGATIVE MG/DL
HCT VFR BLD CALC: 36.7 % (ref 34–48)
HEMOGLOBIN: 12.5 G/DL (ref 11.5–15.5)
IMMATURE GRANULOCYTES #: 0.02 E9/L
IMMATURE GRANULOCYTES %: 0.4 % (ref 0–5)
KETONES, URINE: NEGATIVE MG/DL
LEUKOCYTE ESTERASE, URINE: NEGATIVE
LYMPHOCYTES ABSOLUTE: 1.36 E9/L (ref 1.5–4)
LYMPHOCYTES RELATIVE PERCENT: 26.7 % (ref 20–42)
MCH RBC QN AUTO: 32.3 PG (ref 26–35)
MCHC RBC AUTO-ENTMCNC: 34.1 % (ref 32–34.5)
MCV RBC AUTO: 94.8 FL (ref 80–99.9)
METER GLUCOSE: 87 MG/DL (ref 74–99)
METHADONE SCREEN, URINE: NOT DETECTED
MONOCYTES ABSOLUTE: 0.33 E9/L (ref 0.1–0.95)
MONOCYTES RELATIVE PERCENT: 6.5 % (ref 2–12)
NEUTROPHILS ABSOLUTE: 3.26 E9/L (ref 1.8–7.3)
NEUTROPHILS RELATIVE PERCENT: 64 % (ref 43–80)
NITRITE, URINE: NEGATIVE
OPIATE SCREEN URINE: NOT DETECTED
PDW BLD-RTO: 12.3 FL (ref 11.5–15)
PH UA: 6.5 (ref 5–9)
PHENCYCLIDINE SCREEN URINE: NOT DETECTED
PLATELET # BLD: 320 E9/L (ref 130–450)
PMV BLD AUTO: 9.6 FL (ref 7–12)
POTASSIUM SERPL-SCNC: 3.6 MMOL/L (ref 3.5–5)
PROPOXYPHENE SCREEN: NOT DETECTED
PROTEIN UA: NEGATIVE MG/DL
RBC # BLD: 3.87 E12/L (ref 3.5–5.5)
SALICYLATE, SERUM: <0.3 MG/DL (ref 0–30)
SODIUM BLD-SCNC: 134 MMOL/L (ref 132–146)
SPECIFIC GRAVITY UA: <=1.005 (ref 1–1.03)
T4 FREE: 1.37 NG/DL (ref 0.93–1.7)
TRICYCLIC ANTIDEPRESSANTS SCREEN SERUM: NEGATIVE NG/ML
TSH SERPL DL<=0.05 MIU/L-ACNC: 2.61 UIU/ML (ref 0.27–4.2)
UROBILINOGEN, URINE: 0.2 E.U./DL
WBC # BLD: 5.1 E9/L (ref 4.5–11.5)

## 2019-07-25 PROCEDURE — 1240000000 HC EMOTIONAL WELLNESS R&B

## 2019-07-25 PROCEDURE — 6370000000 HC RX 637 (ALT 250 FOR IP): Performed by: NURSE PRACTITIONER

## 2019-07-25 PROCEDURE — G0480 DRUG TEST DEF 1-7 CLASSES: HCPCS

## 2019-07-25 PROCEDURE — 84443 ASSAY THYROID STIM HORMONE: CPT

## 2019-07-25 PROCEDURE — 99285 EMERGENCY DEPT VISIT HI MDM: CPT

## 2019-07-25 PROCEDURE — 93005 ELECTROCARDIOGRAM TRACING: CPT | Performed by: EMERGENCY MEDICINE

## 2019-07-25 PROCEDURE — 84439 ASSAY OF FREE THYROXINE: CPT

## 2019-07-25 PROCEDURE — 93010 ELECTROCARDIOGRAM REPORT: CPT | Performed by: INTERNAL MEDICINE

## 2019-07-25 PROCEDURE — 80307 DRUG TEST PRSMV CHEM ANLYZR: CPT

## 2019-07-25 PROCEDURE — 36415 COLL VENOUS BLD VENIPUNCTURE: CPT

## 2019-07-25 PROCEDURE — 80061 LIPID PANEL: CPT

## 2019-07-25 PROCEDURE — 85025 COMPLETE CBC W/AUTO DIFF WBC: CPT

## 2019-07-25 PROCEDURE — 80048 BASIC METABOLIC PNL TOTAL CA: CPT

## 2019-07-25 PROCEDURE — 82962 GLUCOSE BLOOD TEST: CPT

## 2019-07-25 PROCEDURE — 81003 URINALYSIS AUTO W/O SCOPE: CPT

## 2019-07-25 RX ORDER — ACETAMINOPHEN 325 MG/1
650 TABLET ORAL EVERY 4 HOURS PRN
Status: DISCONTINUED | OUTPATIENT
Start: 2019-07-25 | End: 2019-08-01 | Stop reason: HOSPADM

## 2019-07-25 RX ORDER — BENZTROPINE MESYLATE 1 MG/ML
2 INJECTION INTRAMUSCULAR; INTRAVENOUS 2 TIMES DAILY PRN
Status: DISCONTINUED | OUTPATIENT
Start: 2019-07-25 | End: 2019-08-01 | Stop reason: HOSPADM

## 2019-07-25 RX ORDER — OLANZAPINE 10 MG/1
5 INJECTION, POWDER, LYOPHILIZED, FOR SOLUTION INTRAMUSCULAR EVERY 4 HOURS PRN
Status: DISCONTINUED | OUTPATIENT
Start: 2019-07-25 | End: 2019-08-01 | Stop reason: HOSPADM

## 2019-07-25 RX ORDER — OLANZAPINE 2.5 MG/1
2.5 TABLET ORAL EVERY 4 HOURS PRN
Status: DISCONTINUED | OUTPATIENT
Start: 2019-07-25 | End: 2019-08-01 | Stop reason: HOSPADM

## 2019-07-25 RX ORDER — VENLAFAXINE HYDROCHLORIDE 150 MG/1
150 CAPSULE, EXTENDED RELEASE ORAL
Status: DISCONTINUED | OUTPATIENT
Start: 2019-07-26 | End: 2019-08-01 | Stop reason: HOSPADM

## 2019-07-25 RX ORDER — HYDROXYZINE PAMOATE 50 MG/1
50 CAPSULE ORAL 3 TIMES DAILY PRN
Status: DISCONTINUED | OUTPATIENT
Start: 2019-07-25 | End: 2019-08-01 | Stop reason: HOSPADM

## 2019-07-25 RX ORDER — MAGNESIUM HYDROXIDE/ALUMINUM HYDROXICE/SIMETHICONE 120; 1200; 1200 MG/30ML; MG/30ML; MG/30ML
30 SUSPENSION ORAL PRN
Status: DISCONTINUED | OUTPATIENT
Start: 2019-07-25 | End: 2019-08-01 | Stop reason: HOSPADM

## 2019-07-25 RX ORDER — TOPIRAMATE 100 MG/1
200 TABLET, FILM COATED ORAL DAILY
Status: DISCONTINUED | OUTPATIENT
Start: 2019-07-25 | End: 2019-08-01 | Stop reason: HOSPADM

## 2019-07-25 RX ADMIN — TOPIRAMATE 200 MG: 100 TABLET, FILM COATED ORAL at 14:00

## 2019-07-25 RX ADMIN — METFORMIN HYDROCHLORIDE 500 MG: 500 TABLET ORAL at 14:00

## 2019-07-25 RX ADMIN — METFORMIN HYDROCHLORIDE 500 MG: 500 TABLET ORAL at 20:31

## 2019-07-25 ASSESSMENT — PATIENT HEALTH QUESTIONNAIRE - PHQ9
SUM OF ALL RESPONSES TO PHQ QUESTIONS 1-9: 14
SUM OF ALL RESPONSES TO PHQ QUESTIONS 1-9: 14

## 2019-07-25 ASSESSMENT — SLEEP AND FATIGUE QUESTIONNAIRES
AVERAGE NUMBER OF SLEEP HOURS: 6
DO YOU HAVE DIFFICULTY SLEEPING: NO
DO YOU USE A SLEEP AID: NO

## 2019-07-25 ASSESSMENT — LIFESTYLE VARIABLES: HISTORY_ALCOHOL_USE: NO

## 2019-07-25 ASSESSMENT — PAIN SCALES - GENERAL: PAINLEVEL_OUTOF10: 0

## 2019-07-25 NOTE — ED NOTES
Assessment, CSSRS and SBIRT complete    Pt is pink slipped by ED Doc. Pt denies SI/HI and AVH    Pt is a 63 yo female presenting to the ED by EMS after calling 911 3x about someone being outside her apartment. 911 traced her call back to her apartment where Pt was found confused and visually hallucinating. Pt reports increased depression and that she feels no one cares about her, she has been worrying about everything. SW asked Pt why she called 911 3x's and Pt states it does not matter anymore. Pt has a MH HX of schizophrenia and depression. Pt reports that she is in current treatment with [facility] and is med compliant. Pt states she missed her appt yesterday with [facility] due to being very tired. Pt denies alcohol/drug use. Pt is depressed, tearful, low/pressued speech, thought blocking, evasive. Pt denies SI/HI and AVH. Pt will not answer how her sleep and appetite are. Pt appears to be emaciated and not caring for her basic needs. ARMEN SW reviewed chart with ED Doc, Pt requires inpatient admission to ensure safety and stabilization.     Once Pt is medically clear she can be reviewed for [address], [place]  07/25/19 3070

## 2019-07-25 NOTE — ED PROVIDER NOTES
-------------------------------------------------  All laboratory and radiology results have been personally reviewed by myself   LABS:  Results for orders placed or performed during the hospital encounter of 07/25/19   CBC Auto Differential   Result Value Ref Range    WBC 5.1 4.5 - 11.5 E9/L    RBC 3.87 3.50 - 5.50 E12/L    Hemoglobin 12.5 11.5 - 15.5 g/dL    Hematocrit 36.7 34.0 - 48.0 %    MCV 94.8 80.0 - 99.9 fL    MCH 32.3 26.0 - 35.0 pg    MCHC 34.1 32.0 - 34.5 %    RDW 12.3 11.5 - 15.0 fL    Platelets 274 743 - 386 E9/L    MPV 9.6 7.0 - 12.0 fL    Neutrophils % 64.0 43.0 - 80.0 %    Immature Granulocytes % 0.4 0.0 - 5.0 %    Lymphocytes % 26.7 20.0 - 42.0 %    Monocytes % 6.5 2.0 - 12.0 %    Eosinophils % 1.0 0.0 - 6.0 %    Basophils % 1.4 0.0 - 2.0 %    Neutrophils # 3.26 1.80 - 7.30 E9/L    Immature Granulocytes # 0.02 E9/L    Lymphocytes # 1.36 (L) 1.50 - 4.00 E9/L    Monocytes # 0.33 0.10 - 0.95 E9/L    Eosinophils # 0.05 0.05 - 0.50 E9/L    Basophils # 0.07 0.00 - 0.20 B1/X   Basic Metabolic Panel   Result Value Ref Range    Sodium 134 132 - 146 mmol/L    Potassium 3.6 3.5 - 5.0 mmol/L    Chloride 96 (L) 98 - 107 mmol/L    CO2 27 22 - 29 mmol/L    Anion Gap 11 7 - 16 mmol/L    Glucose 96 74 - 99 mg/dL    BUN 8 6 - 20 mg/dL    CREATININE 0.7 0.5 - 1.0 mg/dL    GFR Non-African American >60 >=60 mL/min/1.73    GFR African American >60     Calcium 9.9 8.6 - 10.2 mg/dL   Serum Drug Screen   Result Value Ref Range    Ethanol Lvl <10 mg/dL    Acetaminophen Level <5.0 (L) 10.0 - 54.6 mcg/mL    Salicylate, Serum <1.5 0.0 - 30.0 mg/dL    TCA Scrn NEGATIVE Cutoff:300 ng/mL   Urine Drug Screen   Result Value Ref Range    Amphetamine Screen, Urine NOT DETECTED Negative <1000 ng/mL    Barbiturate Screen, Ur NOT DETECTED Negative < 200 ng/mL    Benzodiazepine Screen, Urine NOT DETECTED Negative < 200 ng/mL    Cannabinoid Scrn, Ur NOT DETECTED Negative < 50ng/mL    Cocaine Metabolite Screen, Urine NOT DETECTED Negative

## 2019-07-26 PROBLEM — E43 SEVERE PROTEIN-CALORIE MALNUTRITION (HCC): Chronic | Status: ACTIVE | Noted: 2019-07-03

## 2019-07-26 LAB
CHOLESTEROL, TOTAL: 172 MG/DL (ref 0–199)
HDLC SERPL-MCNC: 69 MG/DL
LDL CHOLESTEROL CALCULATED: 85 MG/DL (ref 0–99)
TRIGL SERPL-MCNC: 90 MG/DL (ref 0–149)
VLDLC SERPL CALC-MCNC: 18 MG/DL

## 2019-07-26 PROCEDURE — 1240000000 HC EMOTIONAL WELLNESS R&B

## 2019-07-26 PROCEDURE — 6370000000 HC RX 637 (ALT 250 FOR IP): Performed by: NURSE PRACTITIONER

## 2019-07-26 PROCEDURE — 99221 1ST HOSP IP/OBS SF/LOW 40: CPT | Performed by: NURSE PRACTITIONER

## 2019-07-26 RX ORDER — ARIPIPRAZOLE 10 MG/1
20 TABLET ORAL EVERY EVENING
Status: DISCONTINUED | OUTPATIENT
Start: 2019-07-26 | End: 2019-07-28

## 2019-07-26 RX ADMIN — ARIPIPRAZOLE 20 MG: 10 TABLET ORAL at 16:14

## 2019-07-26 RX ADMIN — TOPIRAMATE 200 MG: 100 TABLET, FILM COATED ORAL at 09:45

## 2019-07-26 RX ADMIN — METFORMIN HYDROCHLORIDE 500 MG: 500 TABLET ORAL at 09:45

## 2019-07-26 RX ADMIN — VENLAFAXINE HYDROCHLORIDE 150 MG: 150 CAPSULE, EXTENDED RELEASE ORAL at 09:45

## 2019-07-26 ASSESSMENT — SLEEP AND FATIGUE QUESTIONNAIRES
DO YOU USE A SLEEP AID: NO
AVERAGE NUMBER OF SLEEP HOURS: 6
DO YOU HAVE DIFFICULTY SLEEPING: NO

## 2019-07-26 ASSESSMENT — LIFESTYLE VARIABLES: HISTORY_ALCOHOL_USE: NO

## 2019-07-26 ASSESSMENT — PAIN SCALES - GENERAL
PAINLEVEL_OUTOF10: 0
PAINLEVEL_OUTOF10: 0

## 2019-07-26 ASSESSMENT — PATIENT HEALTH QUESTIONNAIRE - PHQ9: SUM OF ALL RESPONSES TO PHQ QUESTIONS 1-9: 16

## 2019-07-26 NOTE — PROGRESS NOTES
Measures:  · Ht: 5' 5\" (165.1 cm)   · Current Body Wt: 92 lb (41.7 kg)(7/26 actual )  · Admission Body Wt: 100 lb (45.4 kg)(no method)  · Usual Body Wt: (92-97lb actual EMR wts 3 mon back)  · % Weight Change:  Unable to assess longterm wt changes, minimal hx on file  · Ideal Body Wt: 125 lb (56.7 kg), % Ideal Body 74%  · BMI Classification: BMI <18.5 Underweight    Nutrition Interventions:   Continued Inpatient Monitoring, Education not appropriate at this time, Coordination of Care    Nutrition Evaluation:   · Evaluation: Goals set   · Goals: Pt to consume >75% meals and ONS    · Monitoring: Meal Intake, Supplement Intake, Diet Tolerance, Skin Integrity, I&O, Mental Status/Confusion, Monitor Bowel Function, Weight, Pertinent Labs      Electronically signed by Eugenia Salcido RD, LD on 7/26/19 at 3:52 PM    Contact Number: Ext [phone]

## 2019-07-26 NOTE — PROGRESS NOTES
Patient declined to attend community meeting.  Patient identified goal for the day as: \"Just to get better\"

## 2019-07-26 NOTE — H&P
[] Hinduism Support      3. Level of Risk: [] Mild [] Moderate [x] Severe       Strengths & Weaknesses:     1. Strengths: [x] Ability to communicate feelings                          [] Independent ADL's                           [] Supportive Family                          [x] Current Health Status      2. Weaknesses: [x] Emotional                                [x] Motivational       MEDICATIONS: Current Facility-Administered Medications: acetaminophen (TYLENOL) tablet 650 mg, 650 mg, Oral, Q4H PRN  hydrOXYzine (VISTARIL) capsule 50 mg, 50 mg, Oral, TID PRN  OLANZapine (ZYPREXA) tablet 2.5 mg, 2.5 mg, Oral, Q4H PRN **OR** OLANZapine (ZYPREXA) injection 5 mg, 5 mg, Intramuscular, Q4H PRN  sterile water injection 2.1 mL, 2.1 mL, Intramuscular, Q4H PRN  benztropine mesylate (COGENTIN) injection 2 mg, 2 mg, Intramuscular, BID PRN  magnesium hydroxide (MILK OF MAGNESIA) 400 MG/5ML suspension 30 mL, 30 mL, Oral, Daily PRN  aluminum & magnesium hydroxide-simethicone (MAALOX) 200-200-20 MG/5ML suspension 30 mL, 30 mL, Oral, PRN  metFORMIN (GLUCOPHAGE) tablet 500 mg, 500 mg, Oral, BID  topiramate (TOPAMAX) tablet 200 mg, 200 mg, Oral, Daily  venlafaxine (EFFEXOR XR) extended release capsule 150 mg, 150 mg, Oral, Daily with breakfast    Medical Review of Systems:     All other than marked systmes have been reviewed and are all negative.     Constitutional Symptoms: []  fever []  Chills  Skin Symptoms: [] rash []  Pruritus   Eye Symptoms: [] Vision unchanged []  recent vision problems[] blurred vision   Respiratory Symptoms:[] shortness of breath [] cough  Cardiovascular Symptoms:  [] chest pain   [] palpitations   Gastrointestinal Symptoms: []  abdominal pain []  nausea []  vomiting []  diarrhea  Genitourinary Symptoms: []  dysuria  []  hematuria   Musculoskeletal Symptoms: []  back pain []  muscle pain []  joint pain  Neurologic Symptoms: []  headache []  dizziness  Hematolymphoid Symptoms: [] Adenopathy [] Bruises [] Schimosis       VITALS: /77   Pulse 65   Temp 98 °F (36.7 °C) (Oral)   Resp 16   Ht 5' 5\" (1.651 m)   Wt 92 lb 3 oz (41.8 kg)   LMP 08/25/2011 (Exact Date)   SpO2 100%   BMI 15.34 kg/m²     ALLERGIES: Ibuprofen and Carbamazepine and analogs            Physical Examination:    Head:  [x] Atraumatic:  [x] normocephalic  Skin and Mucosa       [] Moist [] Dry [] Pale [x] Normal   Neck: [x] Thyroid [] Palpable    [x] Not palpable []  venus distention [] adenopathy   Chest: [x] Clear [] Rhonchi  [] Wheezing   CV: [x] S1 [x] S2 [x] No murmer   Abdomen:  [x] Soft   [] Tender  [] Viceromegaly   Extremities:  [x] No Edema   [] Edema     Cranial Nerves Examination:     CN II: [x] Pupils are reactive to light [] Pupils are non reactive to light  CN III, IV, VI:[x] No eye deviation  [x] No diplopia or ptosis   CN V: [x] Facial Sensation is intact  [] Facial Sensation is not intact   CN IIIV:  [x] Hearing is normal to rubbing fingers   CN IX, X:  [x] Normal gag reflex and phonation   CN XI: [x] Shoulder shrug and neck rotation is normal  CNXII: [x] Tongue is midline no deviation or atrophy        For further PE refer to ED note      MENTAL STATUS EXAM:       Cognition:      [x] Alert  [x] Awake  [x] Oriented  [x] Person  [x] Place [x] Time      [] drowsy  [] tired  [] lethargic  [] distractable     Attention/Concentration:   [x] Attentive  [] Distracted        Memory Recent and Remote: [] Intact   [] Impaired [] Partially Impaired     Language: [] Able to recognize and name objects          [] Unable to recognize and name Objects    Fund of Knowledge:  [] Poor []  Fair  [] Good    Speech: [] Normal  [x] Soft  [x] Slow  [] Fast [] Pressured            [] Loud [] Dysarthria  [] Incoherent       Appearance: [] Well Groomed  [] Casual Dressed  [x] Unkept  [x] Disheveled          [] Normal weight  [x] Thin  [] Overweight  [] Obese           Attitude: [] Positive  [] Hostile  [] Demanding  [x] Guarded  [] Defensive

## 2019-07-26 NOTE — PLAN OF CARE
Problem: Altered Mood, Psychotic Behavior:  Goal: Compliance with prescribed medication regimen will improve  Description  Compliance with prescribed medication regimen will improve  7/26/2019 1153 by Aubrie Omalley, RN  Outcome: Met This Shift  MEDICATION COMPLIANT. 7/26/2019 0339 by Parul Garcia RN  Outcome: Met This Shift     Problem: Altered Mood, Psychotic Behavior:  Goal: Able to verbalize reality based thinking  Description  Able to verbalize reality based thinking  Outcome: Ongoing  REMAINS WITH PARANOID AND SOMATIC DELUSIONS. Goal: Ability to achieve adequate nutritional intake will improve  Description  Ability to achieve adequate nutritional intake will improve  Outcome: Ongoing  APPETITE POOR. DIETARY CONSULT ORDERED.

## 2019-07-26 NOTE — PLAN OF CARE
Problem: Altered Mood, Psychotic Behavior:  Goal: Compliance with prescribed medication regimen will improve  Description  Compliance with prescribed medication regimen will improve  7/26/2019 0339 by Kellee Agarwal, RN  Outcome: Met This Shift  7/25/2019 2012 by Oleksandr Perez, RN  Outcome: Ongoing

## 2019-07-26 NOTE — PLAN OF CARE
Problem: Malnutrition  (NI-5.2)  Goal: Food and/or Nutrient Delivery  Description- Ensure BID Magic cup daily   Individualized approach for food/nutrient provision.   Outcome: Met This Shift

## 2019-07-27 LAB
METER GLUCOSE: 76 MG/DL (ref 74–99)
METER GLUCOSE: 78 MG/DL (ref 74–99)

## 2019-07-27 PROCEDURE — 6370000000 HC RX 637 (ALT 250 FOR IP): Performed by: NURSE PRACTITIONER

## 2019-07-27 PROCEDURE — 82962 GLUCOSE BLOOD TEST: CPT

## 2019-07-27 PROCEDURE — 1240000000 HC EMOTIONAL WELLNESS R&B

## 2019-07-27 PROCEDURE — 99232 SBSQ HOSP IP/OBS MODERATE 35: CPT | Performed by: NURSE PRACTITIONER

## 2019-07-27 RX ORDER — POLYETHYLENE GLYCOL 3350 17 G/17G
17 POWDER, FOR SOLUTION ORAL DAILY
Status: DISCONTINUED | OUTPATIENT
Start: 2019-07-27 | End: 2019-08-01 | Stop reason: HOSPADM

## 2019-07-27 RX ORDER — DOCUSATE SODIUM 100 MG/1
100 CAPSULE, LIQUID FILLED ORAL DAILY
Status: DISCONTINUED | OUTPATIENT
Start: 2019-07-27 | End: 2019-08-01 | Stop reason: HOSPADM

## 2019-07-27 RX ADMIN — VENLAFAXINE HYDROCHLORIDE 150 MG: 150 CAPSULE, EXTENDED RELEASE ORAL at 09:58

## 2019-07-27 RX ADMIN — POLYETHYLENE GLYCOL 3350 17 G: 17 POWDER, FOR SOLUTION ORAL at 14:24

## 2019-07-27 RX ADMIN — METFORMIN HYDROCHLORIDE 500 MG: 500 TABLET ORAL at 09:58

## 2019-07-27 RX ADMIN — TOPIRAMATE 200 MG: 100 TABLET, FILM COATED ORAL at 09:57

## 2019-07-27 RX ADMIN — DOCUSATE SODIUM 100 MG: 100 CAPSULE, LIQUID FILLED ORAL at 14:24

## 2019-07-27 RX ADMIN — ARIPIPRAZOLE 20 MG: 10 TABLET ORAL at 19:35

## 2019-07-27 ASSESSMENT — PAIN SCALES - GENERAL
PAINLEVEL_OUTOF10: 0

## 2019-07-27 NOTE — PROGRESS NOTES
Symptoms: [] Adenopathy [] Bruises   [] Schimosis       Psychiatric Review of systems  Delusions:  [] Denies [] Endorses   Withdrawals:  [] Denies [] Endorses    Hallucinations: [] Denies [] Endorses    Extra Pyramidal Symptoms: [] Denies [] Endorses      /89   Pulse 65   Temp 97.4 °F (36.3 °C)   Resp 16   Ht 5' 5\" (1.651 m)   Wt 92 lb 3 oz (41.8 kg)   LMP 08/25/2011 (Exact Date)   SpO2 100%   BMI 15.34 kg/m²     MENTAL STATUS EXAM:         Cognition:       [x] Alert  [x] Awake  [x] Oriented  [x] Person  [x] Place [x] Time       [] drowsy  [] tired  [] lethargic  [] distractable      Attention/Concentration:   [x] Attentive  [] Distracted         Memory Recent and Remote: [] Intact   [] Impaired [] Partially Impaired      Language: [] Able to recognize and name objects                         [] Unable to recognize and name [address] [place] [address] of Knowledge:  [] Poor []  Fair  [] Good     Speech: [] Normal  [x] Soft  [x] Slow  [] Fast [] Pressured                                    [] Loud [] Dysarthria  [] Incoherent        Appearance: [] Well Groomed  [] Casual Dressed  [x] Unkept  [x] Disheveled                         [] Normal weight  [x] Thin  [] Overweight  [] Obese           Attitude: [] Positive  [] Hostile  [] Demanding  [x] Guarded  [] Defensive                    [x] Cooperative  []  Uncooperative       Behavior:  [x] Normal Gait  [] Abnormal Gait [] Walks with Assistance  [] Cinthya Chair                           [] Walks with Sarita Gutiérrez  [] In Hospital Bed  [] Sitting in Chair     Muscle-Skeletal:  [] Normal Muscle Tone [x] Muscle Atrophy                                  [] Abnormal Muscle Movement      Eye Contact:   [] Good eye contact  [x] Intermittent Eye Contact  [] Poor Eye Contact               [] Excessive Eye Contact   [] Intrusive Eye Contact     Mood: [x] Depressed  [x] Anxious  [] Irritated  [] Euthymic   [] Angry [] Restless                   [x] Apathetic     Affect:  [x] Congruent resistance to single therapy      Signed:  Paula Shore  7/27/2019  10:33 AM

## 2019-07-27 NOTE — PROGRESS NOTES
Patient resting in bed with eyes closed and even, unlabored respirations. No signs or symptoms of distress noted or observed. Safety needs met. Will continue to monitor/observe throughout shift.

## 2019-07-28 LAB — METER GLUCOSE: 85 MG/DL (ref 74–99)

## 2019-07-28 PROCEDURE — 82962 GLUCOSE BLOOD TEST: CPT

## 2019-07-28 PROCEDURE — 6370000000 HC RX 637 (ALT 250 FOR IP): Performed by: NURSE PRACTITIONER

## 2019-07-28 PROCEDURE — 1240000000 HC EMOTIONAL WELLNESS R&B

## 2019-07-28 PROCEDURE — 99232 SBSQ HOSP IP/OBS MODERATE 35: CPT | Performed by: NURSE PRACTITIONER

## 2019-07-28 RX ADMIN — VENLAFAXINE HYDROCHLORIDE 150 MG: 150 CAPSULE, EXTENDED RELEASE ORAL at 09:05

## 2019-07-28 RX ADMIN — TOPIRAMATE 200 MG: 100 TABLET, FILM COATED ORAL at 09:05

## 2019-07-28 RX ADMIN — DOCUSATE SODIUM 100 MG: 100 CAPSULE, LIQUID FILLED ORAL at 09:05

## 2019-07-28 RX ADMIN — METFORMIN HYDROCHLORIDE 500 MG: 500 TABLET ORAL at 09:05

## 2019-07-28 RX ADMIN — POLYETHYLENE GLYCOL 3350 17 G: 17 POWDER, FOR SOLUTION ORAL at 09:05

## 2019-07-28 NOTE — PROGRESS NOTES
DATE OF SERVICE:     7/28/2019    Coleen Hernandez seen today for the purpose of continuation of care. Nursing, social work reports, laboratory studies and vital signs are reviewed. Patient chief complaint today is:             [] Depression      [] Anxiety        [] Psychosis         [] Suicidal/Homicidal                         [x] Delusions           [] Aggression          Subjective: Today patient is pleasant and cooperative. States that she will take the Aristada injections. Remains minimally somatic. Med compliant. Denies SI, HI, and AVH. Sleep:  [] Good [x] Fair  [] Poor  Appetite:  [] Good [] Fair  [x] Poor    Depression:  [] Mild [] Moderate [] Severe                [] Constant [] Sporadic     Anxiety: [] Mild [] Moderate [] Severe    [] Constant [] Sporadic     Delusions: [x] Mild [] Moderate [] Severe     [x] Constant [] Sporadic     [x] Paranoid [x] Somatic [] Grandiose     Hallucinations: [] Mild [] Moderate [] Severe     [] Constant [] Sporadic    [] Auditory  [] Visual [] Tactile       Suicidal: [] Constant [] Sporadic  Homicidal: [] Constant [] Sporadic    Unscheduled Medications     [] Patient Receiving Emergency Medications \" Chemical Restraint\"   [] Requesting PRN medications for anxiety    Medical Review of Systems:     All other than marked systmes have been reviewed and are all negative.     Constitutional Symptoms: []  fever []  Chills  Skin Symptoms: [] rash []  Pruritus   Eye Symptoms: [] Vision unchanged []  recent vision problems[] blurred vision   Respiratory Symptoms:[] shortness of breath [] cough  Cardiovascular Symptoms:  [] chest pain   [] palpitations   Gastrointestinal Symptoms: []  abdominal pain []  nausea []  vomiting []  diarrhea  Genitourinary Symptoms: []  dysuria  []  hematuria   Musculoskeletal Symptoms: []  back pain []  muscle pain []  joint pain  Neurologic Symptoms: []  headache []  dizziness  Hematolymphoid Symptoms: [] Adenopathy [] Bruises   [] Constricted  [] Flat  [] Bizarre                     [] Heightened    [] Exaggerated       Thought Process and Association:  [] Logical [x] Illogical                                        [] Linear and Goal Directed  [] blocking  [x] Circumstantial      Thought Content:  [] Denies [] Endorses [] Suicidal [] Homicidal  [] Delusional                                       [] Paranoid  [x] Somatic  [] Grandiose     Perception: [x]  None  [] Auditory   [] Visual  [] tactile   [] olfactory  [] Illusions          Insight: [] Intact  [] Fair  [x] Limited    Judgement:  [] Intact  [] Fair  [x] Limited         Assessment/Plan:        Patient Active Problem List   Diagnosis Code    Chest pain R07.9    Schizo-affective schizophrenia, chronic condition ([facility]) F25.8    Hyponatremia E87.1    Trigeminal neuralgia syndrome G50.0    Chronic nonintractable headache R51    Anxiety F41.9    Chronic constipation K59.09    Head injury S09.90XA    Jaw pain R68.84    Pain in gums K06.8    Schizophrenia ([facility]) F20.9    PTSD (post-traumatic stress disorder) F43.10    Other headache syndrome G44.89    Cervical pain (neck) M54.2    Depression with suicidal ideation F32.9, R45.851    Schizoaffective disorder, depressive type ([facility]) F25.1    Severe protein-calorie malnutrition ([facility] [address] E43    Type 2 diabetes mellitus, without long-term current use of insulin ([facility]) E11.9    Psychosis ([facility] [address] F29    Acute psychosis ([facility] [address] F23         Plan:    []  Patient is refusing medications  [x] Improving as expected   [] Not improving as expected   [] Worsening    [x]  At Baseline     D/c oral Abilify  Add Aristada Initio and 882 mg injections  Continue current treatment    Reason for more than one antipsychotic:  [x] N/A  [] 3 failed monotherapy(drugs tried):  [] Cross over to a new antipsychotic  [] Taper to monotherapy from polypharmacy  [] Augmentation of Clozapine therapy due to treatment resistance to single therapy      Signed:  Jerry Queen

## 2019-07-29 PROBLEM — R10.2 PELVIC PAIN: Status: ACTIVE | Noted: 2019-07-29

## 2019-07-29 LAB — METER GLUCOSE: 89 MG/DL (ref 74–99)

## 2019-07-29 PROCEDURE — 99231 SBSQ HOSP IP/OBS SF/LOW 25: CPT | Performed by: NURSE PRACTITIONER

## 2019-07-29 PROCEDURE — 1240000000 HC EMOTIONAL WELLNESS R&B

## 2019-07-29 PROCEDURE — 6370000000 HC RX 637 (ALT 250 FOR IP): Performed by: NURSE PRACTITIONER

## 2019-07-29 PROCEDURE — 82962 GLUCOSE BLOOD TEST: CPT

## 2019-07-29 RX ADMIN — VENLAFAXINE HYDROCHLORIDE 150 MG: 150 CAPSULE, EXTENDED RELEASE ORAL at 08:33

## 2019-07-29 RX ADMIN — TOPIRAMATE 200 MG: 100 TABLET, FILM COATED ORAL at 08:33

## 2019-07-29 RX ADMIN — METFORMIN HYDROCHLORIDE 500 MG: 500 TABLET ORAL at 08:33

## 2019-07-29 RX ADMIN — POLYETHYLENE GLYCOL 3350 17 G: 17 POWDER, FOR SOLUTION ORAL at 08:32

## 2019-07-29 RX ADMIN — DOCUSATE SODIUM 100 MG: 100 CAPSULE, LIQUID FILLED ORAL at 08:33

## 2019-07-29 ASSESSMENT — PAIN SCALES - GENERAL: PAINLEVEL_OUTOF10: 0

## 2019-07-29 NOTE — GROUP NOTE
Group Therapy Note    Date: July 29    Group Start Time: 1010  Group End Time: 1045  Group Topic: Psychoeducation    [facility]    Johanna Tom, CTRS        Group Therapy Note  Number of participants: 14  Type of group: Psychoeducation  Mode of intervention: Education, Support, Socialization, Exploration, Clarifying and Problem-solving  Topic: 60932 Grounding Technique   Objective:  Patient will identify ways to utilize the 94495 grounding technique in recovery. Notes:  Patient was interactive during group sharing ways to utilize the 69127 grounding technique in recovery. Patient gave support and feedback to others. Status After Intervention:  Improved    Participation Level:  Active Listener and Interactive    Participation Quality: Appropriate, Attentive, Sharing and Supportive      Speech:  normal      Thought Process/Content: Logical      Affective Functioning: Congruent      Mood: euthymic      Level of consciousness:  Alert, Oriented x4 and Attentive      Response to Learning: Able to verbalize current knowledge/experience, Able to verbalize/acknowledge new learning, Able to retain information, Capable of insight, Able to change behavior and Progressing to goal      Endings: None Reported    Modes of Intervention: Education, Support, Socialization, Exploration, Clarifying and Problem-solving

## 2019-07-29 NOTE — PROGRESS NOTES
Attended afternoon meet and greet, participated in afternoon group of music. Actively engaged in group. Group ran from 4-445. There was 14 out of 19 patients in attendance.

## 2019-07-29 NOTE — CONSULTS
mouth every evening 5/7/19  Yes Reji Geovanna, APRN - CNP   lactulose (CHRONULAC) 10 GM/15ML solution TK 15 ML PO QD PRN 3/29/19  Yes Historical Provider, MD   topiramate (TOPAMAX) 100 MG tablet Take 200 mg by mouth daily    Yes Historical Provider, MD       Allergies:    Ibuprofen and Carbamazepine and analogs    Social History:      TOBACCO:   reports that she has never smoked. She has never used smokeless tobacco.  ETOH:   reports that she does not drink alcohol. Family History:          Problem Relation Age of Onset    Cancer Mother     Heart Failure Father       family history reviewed and found to be negative for contributing factors     REVIEW OF SYSTEMS:     Pertinent positives as noted in the HPI. All other systems reviewed and negative. PHYSICAL EXAM:  /80   Pulse 80   Temp 98.4 °F (36.9 °C)   Resp 14   Ht 5' 5\" (1.651 m)   Wt 92 lb 3 oz (41.8 kg)   LMP 08/25/2011 (Exact Date)   SpO2 100%   BMI 15.34 kg/m²   General appearance: No apparent distress, appears stated age and cooperative. HEENT: Normal cephalic, atraumatic without obvious deformity. Pupils equal, round, and reactive to light. Extra ocular muscles intact. Conjunctivae/corneas clear. Neck: Supple, with full range of motion. No jugular venous distention. Trachea midline. Respiratory:  Normal respiratory effort. Clear to auscultation, bilaterally without Rales/Wheezes/Rhonchi. Cardiovascular: Regular rate and rhythm with normal S1/S2 without murmurs, rubs or gallops. Brisk capillary refill. 2+ lower extremity pulses (dorsalis pedis). Abdomen: Soft, non-tender, non-distended with normal bowel sounds. Musculoskeletal: No clubbing, cyanosis or edema bilaterally. Full range of motion without deformity. Skin: Normal skin color. No rashes or lesions. Neurologic:  Neurovascularly intact without any focal sensory/motor deficits.   Psychiatric: Alert and oriented, thought content appropriate, normal insight      Labs:     No results for input(s): WBC, HGB, HCT, PLT in the last 72 hours. No results for input(s): NA, K, CL, CO2, BUN, CREATININE, CALCIUM, PHOS in the last 72 hours. Invalid input(s): MAGNES  No results for input(s): AST, ALT, BILIDIR, BILITOT, ALKPHOS in the last 72 hours. No results for input(s): INR in the last 72 hours. No results for input(s): Abbi Bk in the last 72 hours. Urinalysis:      Lab Results   Component Value Date    NITRU Negative 07/25/2019    WBCUA NONE 07/02/2019    WBCUA 0-2 07/26/2018    BACTERIA FEW 07/02/2019    RBCUA 0-1 07/02/2019    RBCUA NONE 09/15/2011    BLOODU Negative 07/25/2019    SPECGRAV <=1.005 07/25/2019    GLUCOSEU Negative 07/25/2019    GLUCOSEU NEGATIVE 09/15/2011         ASSESSMENT:  Pelvic pain  Malnutrition  Acute psychosis    Plan  UA  GC/chlamydia  ONS  Stop metformin d/t low BGL do not restart at discharge  Remainder per primary    -  has a past medical history of Chronic idiopathic constipation, Depression, Head injury, Hyperlipidemia, IBS (irritable bowel syndrome), Jaw pain, Migraines, neuralgic, Pain in gums, and Schizophrenia ([facility] [address] -  Body mass index is 15.34 kg/m².   - DVT Prophylaxis  Activity and ambulation    IFEANYI Goodson - CNS  [employer]  Please contact me via [address] [place] [address]

## 2019-07-30 LAB
AMORPHOUS: ABNORMAL
BACTERIA: PRESENT /HPF
BILIRUBIN URINE: NEGATIVE
BLOOD, URINE: NEGATIVE
CASTS 2: ABNORMAL /LPF
CLARITY: ABNORMAL
COLOR: YELLOW
GLUCOSE URINE: NEGATIVE MG/DL
KETONES, URINE: NEGATIVE MG/DL
LEUKOCYTE ESTERASE, URINE: ABNORMAL
METER GLUCOSE: 90 MG/DL (ref 74–99)
NITRITE, URINE: NEGATIVE
PH UA: 7.5 (ref 5–9)
PROTEIN UA: NEGATIVE MG/DL
RENAL EPITHELIAL, UA: ABNORMAL /HPF
SPECIFIC GRAVITY UA: 1.01 (ref 1–1.03)
UROBILINOGEN, URINE: 0.2 E.U./DL
WBC UA: ABNORMAL /HPF (ref 0–5)

## 2019-07-30 PROCEDURE — 82962 GLUCOSE BLOOD TEST: CPT

## 2019-07-30 PROCEDURE — 99231 SBSQ HOSP IP/OBS SF/LOW 25: CPT | Performed by: NURSE PRACTITIONER

## 2019-07-30 PROCEDURE — 87591 N.GONORRHOEAE DNA AMP PROB: CPT

## 2019-07-30 PROCEDURE — 1240000000 HC EMOTIONAL WELLNESS R&B

## 2019-07-30 PROCEDURE — 6370000000 HC RX 637 (ALT 250 FOR IP): Performed by: NURSE PRACTITIONER

## 2019-07-30 PROCEDURE — 81001 URINALYSIS AUTO W/SCOPE: CPT

## 2019-07-30 PROCEDURE — 6370000000 HC RX 637 (ALT 250 FOR IP): Performed by: CLINICAL NURSE SPECIALIST

## 2019-07-30 PROCEDURE — 87491 CHLMYD TRACH DNA AMP PROBE: CPT

## 2019-07-30 RX ADMIN — CONJUGATED ESTROGENS 0.5 G: 0.62 CREAM VAGINAL at 11:29

## 2019-07-30 RX ADMIN — POLYETHYLENE GLYCOL 3350 17 G: 17 POWDER, FOR SOLUTION ORAL at 09:39

## 2019-07-30 RX ADMIN — TOPIRAMATE 200 MG: 100 TABLET, FILM COATED ORAL at 09:39

## 2019-07-30 RX ADMIN — VENLAFAXINE HYDROCHLORIDE 150 MG: 150 CAPSULE, EXTENDED RELEASE ORAL at 09:39

## 2019-07-30 RX ADMIN — DOCUSATE SODIUM 100 MG: 100 CAPSULE, LIQUID FILLED ORAL at 09:40

## 2019-07-30 ASSESSMENT — PAIN SCALES - GENERAL
PAINLEVEL_OUTOF10: 0
PAINLEVEL_OUTOF10: 0

## 2019-07-30 NOTE — GROUP NOTE
Group Therapy Note    Date: July 30    Group Start Time: 1115  Group End Time: 1200  Group Topic: Psychoeducation    [facility]  [id number], CTRS        Group Therapy Note    Attendees: 14                                                                     Module Name: healthy vs unhealthy relationships       Patient's objective: patients will be able to identify characteristics of a healthy relationships. Status After Intervention:  Improved    Participation Level: Active Listener and Interactive    Participation Quality: Appropriate, Attentive, Sharing and Supportive      Speech:  normal       Thought Process/Content: Logical      Affective Functioning: Congruent      Mood: euthymic      Level of consciousness:  Alert, Oriented x4 and Attentive      Response to Learning: Able to verbalize/acknowledge new learning, Able to retain information and Progressing to goal      Endings: None Reported    Modes of Intervention: Education, Support, Socialization and Problem-solving      Discipline Responsible: Psychoeducational Specialist      Signature:  ALEX Arenas      Notes:  Sharing and engaged in group.

## 2019-07-30 NOTE — PROGRESS NOTES
DATE OF SERVICE:     7/30/2019    Marti Rodrigues seen today for the purpose of continuation of care. Nursing, social work reports, laboratory studies and vital signs are reviewed. Patient chief complaint today is:             [] Depression      [x] Anxiety        [] Psychosis         [] Suicidal/Homicidal                         [x] Delusions           [] Aggression          Subjective: Today patient is pleasant and cooperative, less somatic. Patient is medication compliant, states she feels better on Aristada. Denies SI, HI, and AVH. Sleep:  [] Good [x] Fair  [] Poor  Appetite:  [] Good [] Fair  [x] Poor    Depression:  [] Mild [] Moderate [] Severe                [] Constant [] Sporadic     Anxiety: [x] Mild [] Moderate [] Severe    [x] Constant [] Sporadic     Delusions: [x] Mild [] Moderate [] Severe     [x] Constant [] Sporadic     [x] Paranoid [x] Somatic [] Grandiose     Hallucinations: [] Mild [] Moderate [] Severe     [] Constant [] Sporadic    [] Auditory  [] Visual [] Tactile       Suicidal: [] Constant [] Sporadic  Homicidal: [] Constant [] Sporadic    Unscheduled Medications     [] Patient Receiving Emergency Medications \" Chemical Restraint\"   [] Requesting PRN medications for anxiety    Medical Review of Systems:     All other than marked systmes have been reviewed and are all negative.     Constitutional Symptoms: []  fever []  Chills  Skin Symptoms: [] rash []  Pruritus   Eye Symptoms: [] Vision unchanged []  recent vision problems[] blurred vision   Respiratory Symptoms:[] shortness of breath [] cough  Cardiovascular Symptoms:  [] chest pain   [] palpitations   Gastrointestinal Symptoms: []  abdominal pain []  nausea []  vomiting []  diarrhea  Genitourinary Symptoms: []  dysuria  []  hematuria   Musculoskeletal Symptoms: []  back pain []  muscle pain []  joint pain  Neurologic Symptoms: []  headache []  dizziness  Hematolymphoid Symptoms: [] Adenopathy [] Bruises   [] Schimosis

## 2019-07-31 LAB — METER GLUCOSE: 84 MG/DL (ref 74–99)

## 2019-07-31 PROCEDURE — 99231 SBSQ HOSP IP/OBS SF/LOW 25: CPT | Performed by: NURSE PRACTITIONER

## 2019-07-31 PROCEDURE — 82962 GLUCOSE BLOOD TEST: CPT

## 2019-07-31 PROCEDURE — 6370000000 HC RX 637 (ALT 250 FOR IP): Performed by: NURSE PRACTITIONER

## 2019-07-31 PROCEDURE — 1240000000 HC EMOTIONAL WELLNESS R&B

## 2019-07-31 RX ORDER — VENLAFAXINE HYDROCHLORIDE 150 MG/1
150 CAPSULE, EXTENDED RELEASE ORAL
Qty: 30 CAPSULE | Refills: 0 | Status: ON HOLD | OUTPATIENT
Start: 2019-07-31 | End: 2019-09-10 | Stop reason: HOSPADM

## 2019-07-31 RX ADMIN — CONJUGATED ESTROGENS 0.5 G: 0.62 CREAM VAGINAL at 21:25

## 2019-07-31 RX ADMIN — DOCUSATE SODIUM 100 MG: 100 CAPSULE, LIQUID FILLED ORAL at 08:22

## 2019-07-31 RX ADMIN — POLYETHYLENE GLYCOL 3350 17 G: 17 POWDER, FOR SOLUTION ORAL at 08:22

## 2019-07-31 RX ADMIN — TOPIRAMATE 200 MG: 100 TABLET, FILM COATED ORAL at 08:22

## 2019-07-31 RX ADMIN — VENLAFAXINE HYDROCHLORIDE 150 MG: 150 CAPSULE, EXTENDED RELEASE ORAL at 08:22

## 2019-07-31 ASSESSMENT — PAIN SCALES - GENERAL
PAINLEVEL_OUTOF10: 0
PAINLEVEL_OUTOF10: 0

## 2019-07-31 NOTE — GROUP NOTE
Group Therapy Note    Date: July 31    Group Start Time: 1005  Group End Time: 8402  Group Topic: Psychoeducation    [facility]    ALEX Gurrola        Group Therapy Note    Attendees:16                                                                      Group Therapy Note  Group facilitator   Module Name:positive coping skills  Session Number: na    Patient's Objective: patients will be able to id positive coping mechanisms to stress. Status After Intervention:  Improved    Participation Level: Active Listener and Interactive    Participation Quality: Appropriate, Attentive, Sharing and Supportive      Speech:  normal       Thought Process/Content: Logical      Affective Functioning: Congruent      Mood: euthymic      Level of consciousness:  Alert, Oriented x4 and Attentive      Response to Learning: Able to verbalize/acknowledge new learning, Able to retain information and Progressing to goal      Endings: None Reported    Modes of Intervention: Education, Support, Socialization, Exploration and Problem-solving      Discipline Responsible: Psychoeducational Specialist      Signature:  ALEX Gurrola      Notes:  Quiet and reserved in group. In and out of group.

## 2019-07-31 NOTE — PROGRESS NOTES
Psychiatric Review of systems  Delusions:  [] Denies [] Endorses   Withdrawals:  [] Denies [] Endorses    Hallucinations: [] Denies [] Endorses    Extra Pyramidal Symptoms: [] Denies [] Endorses      /71   Pulse 60   Temp 96.8 °F (36 °C) (Oral)   Resp 14   Ht 5' 5\" (1.651 m)   Wt 92 lb 3 oz (41.8 kg)   LMP 08/25/2011 (Exact Date)   SpO2 100%   BMI 15.34 kg/m²     MENTAL STATUS EXAM:         Cognition:       [x] Alert  [x] Awake  [x] Oriented  [x] Person  [x] Place [x] Time       [] drowsy  [] tired  [] lethargic  [] distractable      Attention/Concentration:   [x] Attentive  [] Distracted         Memory Recent and Remote: [] Intact   [] Impaired [] Partially Impaired      Language: [] Able to recognize and name objects                         [] Unable to recognize and name [facility] of Knowledge:  [] Poor []  Fair  [] Good     Speech: [x] Normal  [] Soft  [] Slow  [] Fast [] Pressured                                    [] Loud [] Dysarthria  [] Incoherent        Appearance: [] Well Groomed  [] Casual Dressed  [] Unkept  [x] Disheveled                         [] Normal weight  [x] Thin  [] Overweight  [] Obese           Attitude: [] Positive  [] Hostile  [] Demanding  [x] Guarded  [] Defensive                    [x] Cooperative  []  Uncooperative       Behavior:  [x] Normal Gait  [] Abnormal Gait [] Walks with Assistance  [] Cinthya Chair                           [] Walks with [employer]  [] In Hospital Bed  [] Sitting in Chair     Muscle-Skeletal:  [x] Normal Muscle Tone [] Muscle Atrophy                                  [] Abnormal Muscle Movement      Eye Contact:   [] Good eye contact  [x] Intermittent Eye Contact  [] Poor Eye Contact               [] Excessive Eye Contact   [] Intrusive Eye Contact     Mood: [] Depressed  [] Anxious  [] Irritated  [x] Euthymic   [] Angry [] Restless                   [] Apathetic     Affect:  [x] Congruent  [] Incongruent  [] Labile  [] Constricted  [] Flat

## 2019-07-31 NOTE — PROGRESS NOTES
Patient denies SI, HI and hallucinations at this time. Patient is calm and cooperative. Patient is isolative to room and did not attend evening groups. No complaints or concerns voiced at this time. No unit problems reported. Will continue to observe and support.

## 2019-07-31 NOTE — GROUP NOTE
Group Therapy Note    Date: 7/31/2019  Start Time: 1115  End Time: 12  Number of Participants: 14    Type of Group: Psychotherapy    Wellness Binder Information  Module Name:  n/a  Session Number:  n/a    Patient's Goal:  To increase socialization and improve communication with others. Notes:  Pt active participant in group focusing on barriers to healthy communication with others. Group members discussed aggression as the expectation in the majority of their communities and how being assertive would be mocked by others. Status After Intervention:  Improved    Participation Level:  Active Listener    Participation Quality: Appropriate, Attentive, Sharing and Supportive      Speech:  normal      Thought Process/Content: Logical      Affective Functioning: Congruent      Mood: euthymic      Level of consciousness:  Alert, Oriented x4 and Attentive      Response to Learning: Able to verbalize current knowledge/experience, Able to verbalize/acknowledge new learning and Progressing to goal      Endings: None Reported    Modes of Intervention: Support, Socialization, Exploration and Problem-solving      Discipline Responsible: /Counselor      Signature:  DAWSON Holguin

## 2019-08-01 VITALS
HEART RATE: 68 BPM | WEIGHT: 92.19 LBS | BODY MASS INDEX: 15.36 KG/M2 | HEIGHT: 65 IN | TEMPERATURE: 97.7 F | OXYGEN SATURATION: 100 % | SYSTOLIC BLOOD PRESSURE: 129 MMHG | RESPIRATION RATE: 16 BRPM | DIASTOLIC BLOOD PRESSURE: 84 MMHG

## 2019-08-01 LAB
C. TRACHOMATIS DNA ,URINE: NEGATIVE
METER GLUCOSE: 83 MG/DL (ref 74–99)
N. GONORRHOEAE DNA, URINE: NEGATIVE
SOURCE: NORMAL

## 2019-08-01 PROCEDURE — 99238 HOSP IP/OBS DSCHRG MGMT 30/<: CPT | Performed by: NURSE PRACTITIONER

## 2019-08-01 PROCEDURE — 82962 GLUCOSE BLOOD TEST: CPT

## 2019-08-01 PROCEDURE — 6370000000 HC RX 637 (ALT 250 FOR IP): Performed by: NURSE PRACTITIONER

## 2019-08-01 RX ADMIN — DOCUSATE SODIUM 100 MG: 100 CAPSULE, LIQUID FILLED ORAL at 09:06

## 2019-08-01 RX ADMIN — CONJUGATED ESTROGENS 0.5 G: 0.62 CREAM VAGINAL at 09:36

## 2019-08-01 RX ADMIN — VENLAFAXINE HYDROCHLORIDE 150 MG: 150 CAPSULE, EXTENDED RELEASE ORAL at 09:06

## 2019-08-01 RX ADMIN — TOPIRAMATE 200 MG: 100 TABLET, FILM COATED ORAL at 09:06

## 2019-08-01 RX ADMIN — POLYETHYLENE GLYCOL 3350 17 G: 17 POWDER, FOR SOLUTION ORAL at 09:06

## 2019-08-01 ASSESSMENT — PAIN SCALES - GENERAL: PAINLEVEL_OUTOF10: 0

## 2019-08-01 NOTE — DISCHARGE SUMMARY
Physician Discharge Summary      Physical Examination   VS/Measurements:     /84   Pulse 68   Temp 97.7 °F (36.5 °C) (Oral)   Resp 16   Ht 5' 5\" (1.651 m)   Wt 92 lb 3 oz (41.8 kg)   LMP 08/25/2011 (Exact Date)   SpO2 100%   BMI 15.34 kg/m²         Discharge Medications:              Leonid White   Home Medication Instructions MARC:[id number]    Printed on:08/01/19 5906   Medication Information                      ARIPiprazole lauroxil (ARISTADA) 882 MG/3.2ML PRSY injection  Inject 3.2 mLs into the muscle every 30 days             conjugated estrogens (PREMARIN) 0.625 MG/GM vaginal cream  Use daily as directed             topiramate (TOPAMAX) 200 MG tablet  Take 1 tablet by mouth daily             venlafaxine (EFFEXOR XR) 150 MG extended release capsule  Take 1 capsule by mouth daily (with breakfast)                     Psychiatric: Mental Status Examination:    Cognition:      [x] Alert  [x] Awake  [x] Oriented  [x] Person  [x] Place [x] Time    [] drowsy  [] tired  [] lethargic  [] distractable       Attention/Concentration:   [x] Attentive  [] Distracted        Memory Recent and Remote: [x] Intact   [] Impaired [] Partially Impaired     Language: [] Able to recognize and name objects          [] Unable to recognize and name Objects    Fund of Knowledge:  [] Poor []  Fair  [] Good    Speech: [x] Normal  [] Soft  [] Slow  [] Fast [] Pressured            [] Loud [] Dysarthria  [] Incoherent       Appearance: [] Well Groomed  [x] Casual Dressed  [] Unkept  [] Disheveled          [] Normal weight[] Thin  [] Overweight  [] Obese           Attitude: [] Positive  [] Hostile  [] Demanding  [] Guarded  [] Defensive         [x] Cooperative  []  Uncooperative      Behavior:  [x] Normal Gait  [] Walks with Assistance  [] Cinthya Chair    [] Walks with Tamia Amber  [] In Hospital Bed  [] Sitting in Chair    Muscle-Skeletal:  [x] Normal Muscle Tone [] Muscle Atrophy       [] Abnormal Muscle Movement     Eye

## 2019-08-01 NOTE — BH NOTE
[id number] [facility]  Discharge Note    Pt discharged with followings belongings:   Dentures: None  Vision - Corrective Lenses: Glasses  Hearing Aid: None  Jewelry: None  Body Piercings Removed: N/A  Clothing: Footwear, Shirt  Were All Patient Medications Collected?: Yes  Other Valuables: Cell phone, Money (Comment), Wallet, [address] Other (Comment)(1 cell phone, 2 chargers, 2 sets keys, purse, wallet, 3 debit cards, 1 [place] ID, 1 [place] direction card, 2 insurance cards, 2 SS cards, $4.00, loose change/rolled coins)   Valuables sent home with pt. Valuables retrieved from safe, Security envelope number:  [id number] & [phone]  and returned to patient. Patient education on aftercare instructions: yes. Patient verbalize understanding of AVS:  yes. Status EXAM upon discharge:  Status and Exam  Normal: Yes  Facial Expression: Brightened, Avoids Gaze  Affect: Constricted  Level of Consciousness: Alert  Mood:Normal: No  Mood: Depressed, Anxious  Motor Activity:Normal: No  Motor Activity: Increased  Interview Behavior: Cooperative  Preception: [place] to Person, Lylia Klippel to Place, [place] to Situation  Attention:Normal: Yes  Attention: Unable to Concentrate  Thought Processes: Circumstantial  Thought Content:Normal: Yes  Thought Content: Preoccupations  Hallucinations: None  Delusions: No  Delusions:  Other(See Comment)(somatic; paranoid)  Memory:Normal: Yes  Memory: Confabulation  Insight and Judgment: No  Insight and Judgment: Poor Judgment, Poor Insight  Present Suicidal Ideation: No  Present Homicidal Ideation: No      Metabolic Screening:    Lab Results   Component Value Date    LABA1C 5.0 04/25/2019       Lab Results   Component Value Date    CHOL 172 07/25/2019    CHOL 190 07/26/2018    CHOL 201 06/14/2017    CHOL 212 04/16/2015     Lab Results   Component Value Date    TRIG 90 07/25/2019    TRIG 74 07/26/2018    TRIG 59 06/14/2017    TRIG 49 04/16/2015     Lab Results   Component Value Date    HDL 69

## 2019-08-01 NOTE — GROUP NOTE
Group Therapy Note    Date: August 1    Group Start Time: 1010  Group End Time: 1055  Group Topic: Psychoeducation    [facility]    Johanna Tom, CTRS        Group Therapy Note    Number of participants: 12  Type of group: Psychoeducation  Mode of intervention: Education, Support, Socialization, Exploration, Clarifying, Problem-solving and Activity  Topic: Looking Back and Looking Forward   Objective: Patient will look back at their accomplishments and forward towards their goals. Notes:  Patient was interactive during group sharing accomplishments and looking forwards towards goals. Patient able to identify and connect with another peer in group and gave feedback to the group. Status After Intervention:  Improved    Participation Level:  Active Listener and Interactive    Participation Quality: Appropriate, Attentive, Sharing and Supportive      Speech:  normal      Thought Process/Content: Logical      Affective Functioning: Congruent      Mood: euthymic      Level of consciousness:  Alert, Oriented x4 and Attentive      Response to Learning: Able to verbalize current knowledge/experience, Able to verbalize/acknowledge new learning, Able to retain information, Capable of insight, Able to change behavior and Progressing to goal      Endings: None Reported    Modes of Intervention: Education, Support, Socialization, Exploration, Clarifying, Problem-solving and Activity

## 2019-08-30 ENCOUNTER — HOSPITAL ENCOUNTER (INPATIENT)
Age: 59
LOS: 11 days | Discharge: HOME | DRG: 751 | End: 2019-09-10
Attending: EMERGENCY MEDICINE | Admitting: PSYCHIATRY & NEUROLOGY
Payer: COMMERCIAL

## 2019-08-30 DIAGNOSIS — R45.851 SUICIDAL IDEATION: Primary | ICD-10-CM

## 2019-08-30 PROBLEM — F32.3 MAJOR DEPRESSION WITH PSYCHOTIC FEATURES (HCC): Status: ACTIVE | Noted: 2019-08-30

## 2019-08-30 LAB
ACETAMINOPHEN LEVEL: <5 MCG/ML (ref 10–30)
ALBUMIN SERPL-MCNC: 4.9 G/DL (ref 3.5–5.2)
ALP BLD-CCNC: 93 U/L (ref 35–104)
ALT SERPL-CCNC: 23 U/L (ref 0–32)
AMPHETAMINE SCREEN, URINE: NOT DETECTED
ANION GAP SERPL CALCULATED.3IONS-SCNC: 10 MMOL/L (ref 7–16)
AST SERPL-CCNC: 27 U/L (ref 0–31)
BARBITURATE SCREEN URINE: NOT DETECTED
BASOPHILS ABSOLUTE: 0.04 E9/L (ref 0–0.2)
BASOPHILS RELATIVE PERCENT: 0.7 % (ref 0–2)
BENZODIAZEPINE SCREEN, URINE: NOT DETECTED
BILIRUB SERPL-MCNC: 1 MG/DL (ref 0–1.2)
BUN BLDV-MCNC: 5 MG/DL (ref 6–20)
CALCIUM SERPL-MCNC: 10.2 MG/DL (ref 8.6–10.2)
CANNABINOID SCREEN URINE: NOT DETECTED
CHLORIDE BLD-SCNC: 89 MMOL/L (ref 98–107)
CO2: 28 MMOL/L (ref 22–29)
COCAINE METABOLITE SCREEN URINE: NOT DETECTED
CREAT SERPL-MCNC: 0.5 MG/DL (ref 0.5–1)
EOSINOPHILS ABSOLUTE: 0.02 E9/L (ref 0.05–0.5)
EOSINOPHILS RELATIVE PERCENT: 0.3 % (ref 0–6)
ETHANOL: <10 MG/DL (ref 0–0.08)
GFR AFRICAN AMERICAN: >60
GFR NON-AFRICAN AMERICAN: >60 ML/MIN/1.73
GLUCOSE BLD-MCNC: 86 MG/DL (ref 74–99)
HCT VFR BLD CALC: 36.8 % (ref 34–48)
HEMOGLOBIN: 12.3 G/DL (ref 11.5–15.5)
IMMATURE GRANULOCYTES #: 0.02 E9/L
IMMATURE GRANULOCYTES %: 0.3 % (ref 0–5)
LYMPHOCYTES ABSOLUTE: 1.15 E9/L (ref 1.5–4)
LYMPHOCYTES RELATIVE PERCENT: 19.2 % (ref 20–42)
Lab: NORMAL
MCH RBC QN AUTO: 31.6 PG (ref 26–35)
MCHC RBC AUTO-ENTMCNC: 33.4 % (ref 32–34.5)
MCV RBC AUTO: 94.6 FL (ref 80–99.9)
METHADONE SCREEN, URINE: NOT DETECTED
MONOCYTES ABSOLUTE: 0.34 E9/L (ref 0.1–0.95)
MONOCYTES RELATIVE PERCENT: 5.7 % (ref 2–12)
NEUTROPHILS ABSOLUTE: 4.43 E9/L (ref 1.8–7.3)
NEUTROPHILS RELATIVE PERCENT: 73.8 % (ref 43–80)
OPIATE SCREEN URINE: NOT DETECTED
PDW BLD-RTO: 12.2 FL (ref 11.5–15)
PHENCYCLIDINE SCREEN URINE: NOT DETECTED
PLATELET # BLD: 285 E9/L (ref 130–450)
PMV BLD AUTO: 9.3 FL (ref 7–12)
POTASSIUM SERPL-SCNC: 3.8 MMOL/L (ref 3.5–5)
PROPOXYPHENE SCREEN: NOT DETECTED
RBC # BLD: 3.89 E12/L (ref 3.5–5.5)
SALICYLATE, SERUM: <0.3 MG/DL (ref 0–30)
SODIUM BLD-SCNC: 127 MMOL/L (ref 132–146)
TOTAL PROTEIN: 7.7 G/DL (ref 6.4–8.3)
TRICYCLIC ANTIDEPRESSANTS SCREEN SERUM: NEGATIVE NG/ML
WBC # BLD: 6 E9/L (ref 4.5–11.5)

## 2019-08-30 PROCEDURE — 80307 DRUG TEST PRSMV CHEM ANLYZR: CPT

## 2019-08-30 PROCEDURE — 36415 COLL VENOUS BLD VENIPUNCTURE: CPT

## 2019-08-30 PROCEDURE — 80053 COMPREHEN METABOLIC PANEL: CPT

## 2019-08-30 PROCEDURE — 85025 COMPLETE CBC W/AUTO DIFF WBC: CPT

## 2019-08-30 PROCEDURE — G0480 DRUG TEST DEF 1-7 CLASSES: HCPCS

## 2019-08-30 PROCEDURE — 99285 EMERGENCY DEPT VISIT HI MDM: CPT

## 2019-08-30 PROCEDURE — 1240000000 HC EMOTIONAL WELLNESS R&B

## 2019-08-30 RX ORDER — BENZTROPINE MESYLATE 1 MG/ML
2 INJECTION INTRAMUSCULAR; INTRAVENOUS 2 TIMES DAILY PRN
Status: DISCONTINUED | OUTPATIENT
Start: 2019-08-30 | End: 2019-09-10 | Stop reason: HOSPADM

## 2019-08-30 RX ORDER — OLANZAPINE 2.5 MG/1
2.5 TABLET ORAL EVERY 4 HOURS PRN
Status: DISCONTINUED | OUTPATIENT
Start: 2019-08-30 | End: 2019-09-10 | Stop reason: HOSPADM

## 2019-08-30 RX ORDER — HYDROXYZINE PAMOATE 50 MG/1
50 CAPSULE ORAL 3 TIMES DAILY PRN
Status: DISCONTINUED | OUTPATIENT
Start: 2019-08-30 | End: 2019-09-10 | Stop reason: HOSPADM

## 2019-08-30 RX ORDER — ARIPIPRAZOLE 20 MG/1
20 TABLET ORAL DAILY
Status: ON HOLD | COMMUNITY
End: 2019-09-09 | Stop reason: HOSPADM

## 2019-08-30 RX ORDER — OLANZAPINE 10 MG/1
5 INJECTION, POWDER, LYOPHILIZED, FOR SOLUTION INTRAMUSCULAR EVERY 4 HOURS PRN
Status: DISCONTINUED | OUTPATIENT
Start: 2019-08-30 | End: 2019-09-10 | Stop reason: HOSPADM

## 2019-08-30 RX ORDER — ACETAMINOPHEN 325 MG/1
650 TABLET ORAL EVERY 4 HOURS PRN
Status: DISCONTINUED | OUTPATIENT
Start: 2019-08-30 | End: 2019-09-10 | Stop reason: HOSPADM

## 2019-08-30 RX ORDER — MAGNESIUM HYDROXIDE/ALUMINUM HYDROXICE/SIMETHICONE 120; 1200; 1200 MG/30ML; MG/30ML; MG/30ML
30 SUSPENSION ORAL PRN
Status: DISCONTINUED | OUTPATIENT
Start: 2019-08-30 | End: 2019-09-10 | Stop reason: HOSPADM

## 2019-08-30 ASSESSMENT — PAIN DESCRIPTION - LOCATION
LOCATION: NECK
LOCATION: NECK
LOCATION: HEAD

## 2019-08-30 ASSESSMENT — PATIENT HEALTH QUESTIONNAIRE - PHQ9
SUM OF ALL RESPONSES TO PHQ QUESTIONS 1-9: 27
SUM OF ALL RESPONSES TO PHQ QUESTIONS 1-9: 11

## 2019-08-30 ASSESSMENT — SLEEP AND FATIGUE QUESTIONNAIRES
DO YOU USE A SLEEP AID: NO
DO YOU HAVE DIFFICULTY SLEEPING: YES
RESTFUL SLEEP: NO
AVERAGE NUMBER OF SLEEP HOURS: 0
DIFFICULTY FALLING ASLEEP: YES
DIFFICULTY STAYING ASLEEP: YES
DIFFICULTY ARISING: YES

## 2019-08-30 ASSESSMENT — PAIN SCALES - GENERAL
PAINLEVEL_OUTOF10: 8
PAINLEVEL_OUTOF10: 4
PAINLEVEL_OUTOF10: 3

## 2019-08-30 ASSESSMENT — PAIN DESCRIPTION - DESCRIPTORS
DESCRIPTORS: ACHING
DESCRIPTORS: HEADACHE

## 2019-08-30 ASSESSMENT — PAIN DESCRIPTION - PAIN TYPE: TYPE: CHRONIC PAIN

## 2019-08-30 ASSESSMENT — PAIN SCALES - WONG BAKER: WONGBAKER_NUMERICALRESPONSE: 2

## 2019-08-30 ASSESSMENT — PAIN - FUNCTIONAL ASSESSMENT: PAIN_FUNCTIONAL_ASSESSMENT: PREVENTS OR INTERFERES SOME ACTIVE ACTIVITIES AND ADLS

## 2019-08-30 ASSESSMENT — PAIN DESCRIPTION - FREQUENCY: FREQUENCY: INTERMITTENT

## 2019-08-30 ASSESSMENT — LIFESTYLE VARIABLES: HISTORY_ALCOHOL_USE: NO
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
## 2019-08-30 NOTE — ED NOTES
Stated was suicidal ; does wish she was dead . After speaking with friend Ernestine Redman on phone and he was \"putting me down\" , started walking . When came back home she cut her arms. Paranoid. States she knows people are saying things about her when the are driving up and down and walking. Delusional. Hx of being on clozaril and does not like it treats at [facility]. Has had ángel injection. Nick Schmidt is  and states he may know [employer] and date.       Jm Thomas, RN  08/30/19 0114

## 2019-08-31 LAB
METER GLUCOSE: 108 MG/DL (ref 74–99)
METER GLUCOSE: 74 MG/DL (ref 74–99)
METER GLUCOSE: 86 MG/DL (ref 74–99)

## 2019-08-31 PROCEDURE — 6370000000 HC RX 637 (ALT 250 FOR IP): Performed by: NURSE PRACTITIONER

## 2019-08-31 PROCEDURE — 6370000000 HC RX 637 (ALT 250 FOR IP): Performed by: CLINICAL NURSE SPECIALIST

## 2019-08-31 PROCEDURE — 6370000000 HC RX 637 (ALT 250 FOR IP): Performed by: PSYCHIATRY & NEUROLOGY

## 2019-08-31 PROCEDURE — 99222 1ST HOSP IP/OBS MODERATE 55: CPT | Performed by: NURSE PRACTITIONER

## 2019-08-31 PROCEDURE — 1240000000 HC EMOTIONAL WELLNESS R&B

## 2019-08-31 PROCEDURE — 82962 GLUCOSE BLOOD TEST: CPT

## 2019-08-31 PROCEDURE — 6370000000 HC RX 637 (ALT 250 FOR IP): Performed by: STUDENT IN AN ORGANIZED HEALTH CARE EDUCATION/TRAINING PROGRAM

## 2019-08-31 PROCEDURE — 6370000000 HC RX 637 (ALT 250 FOR IP)

## 2019-08-31 RX ORDER — LORAZEPAM 0.5 MG/1
TABLET ORAL
Status: COMPLETED
Start: 2019-08-31 | End: 2019-08-31

## 2019-08-31 RX ORDER — CYCLOBENZAPRINE HCL 10 MG
5 TABLET ORAL 3 TIMES DAILY
Status: DISCONTINUED | OUTPATIENT
Start: 2019-08-31 | End: 2019-09-10 | Stop reason: HOSPADM

## 2019-08-31 RX ORDER — LACTULOSE 10 G/15ML
15 SOLUTION ORAL 3 TIMES DAILY
Refills: 11 | COMMUNITY
Start: 2019-08-19 | End: 2020-06-02

## 2019-08-31 RX ORDER — ONDANSETRON 2 MG/ML
4 INJECTION INTRAMUSCULAR; INTRAVENOUS EVERY 6 HOURS PRN
Status: DISCONTINUED | OUTPATIENT
Start: 2019-08-31 | End: 2019-09-10 | Stop reason: HOSPADM

## 2019-08-31 RX ORDER — POLYETHYLENE GLYCOL 3350 17 G/17G
17 POWDER, FOR SOLUTION ORAL DAILY
Status: DISCONTINUED | OUTPATIENT
Start: 2019-08-31 | End: 2019-09-10 | Stop reason: HOSPADM

## 2019-08-31 RX ORDER — CYCLOBENZAPRINE HCL 5 MG
5 TABLET ORAL 3 TIMES DAILY
Refills: 0 | COMMUNITY
Start: 2019-08-22 | End: 2020-06-02

## 2019-08-31 RX ORDER — LACTULOSE 10 G/15ML
15 SOLUTION ORAL DAILY
Status: DISCONTINUED | OUTPATIENT
Start: 2019-08-31 | End: 2019-09-10 | Stop reason: HOSPADM

## 2019-08-31 RX ORDER — IBUPROFEN 600 MG/1
600 TABLET ORAL 3 TIMES DAILY
Refills: 1 | Status: ON HOLD | COMMUNITY
Start: 2019-08-22 | End: 2019-09-09 | Stop reason: HOSPADM

## 2019-08-31 RX ORDER — TOPIRAMATE 100 MG/1
200 TABLET, FILM COATED ORAL DAILY
Status: DISCONTINUED | OUTPATIENT
Start: 2019-08-31 | End: 2019-09-10 | Stop reason: HOSPADM

## 2019-08-31 RX ORDER — VENLAFAXINE HYDROCHLORIDE 75 MG/1
75 CAPSULE, EXTENDED RELEASE ORAL
Status: DISCONTINUED | OUTPATIENT
Start: 2019-09-01 | End: 2019-09-07

## 2019-08-31 RX ORDER — VENLAFAXINE HYDROCHLORIDE 150 MG/1
150 CAPSULE, EXTENDED RELEASE ORAL
Status: DISCONTINUED | OUTPATIENT
Start: 2019-08-31 | End: 2019-08-31

## 2019-08-31 RX ORDER — LORAZEPAM 0.5 MG/1
0.5 TABLET ORAL 3 TIMES DAILY
Status: DISCONTINUED | OUTPATIENT
Start: 2019-08-31 | End: 2019-09-02

## 2019-08-31 RX ADMIN — CYCLOBENZAPRINE 5 MG: 10 TABLET, FILM COATED ORAL at 16:36

## 2019-08-31 RX ADMIN — METFORMIN HYDROCHLORIDE 500 MG: 500 TABLET ORAL at 11:16

## 2019-08-31 RX ADMIN — CYCLOBENZAPRINE 5 MG: 10 TABLET, FILM COATED ORAL at 10:25

## 2019-08-31 RX ADMIN — POLYETHYLENE GLYCOL 3350 17 G: 17 POWDER, FOR SOLUTION ORAL at 12:48

## 2019-08-31 RX ADMIN — MAGNESIUM HYDROXIDE 30 ML: 400 SUSPENSION ORAL at 20:30

## 2019-08-31 RX ADMIN — LORAZEPAM 0.5 MG: 0.5 TABLET ORAL at 11:22

## 2019-08-31 RX ADMIN — LACTULOSE 10 G: 20 SOLUTION ORAL at 11:15

## 2019-08-31 RX ADMIN — TOPIRAMATE 200 MG: 100 TABLET, FILM COATED ORAL at 10:26

## 2019-08-31 RX ADMIN — VENLAFAXINE HYDROCHLORIDE 150 MG: 150 CAPSULE, EXTENDED RELEASE ORAL at 10:25

## 2019-08-31 ASSESSMENT — PAIN SCALES - GENERAL
PAINLEVEL_OUTOF10: 0
PAINLEVEL_OUTOF10: 0

## 2019-08-31 NOTE — GROUP NOTE
Group Therapy Note    Date: August 31    Group Start Time: 4893  Group End Time: 4799  Group Topic: Healthy Living/Wellness    [facility] ACUTE BH 2    Marcy Ladig        Group Therapy Note    Attendees: 4/13    Discussed healthy foods. Also shared patient's favorite meals.            Signature:  Shiela Lynne

## 2019-08-31 NOTE — CONSULTS
tablet Take 500 mg by mouth daily 8/21/19  Yes Historical Provider, MD   topiramate (TOPAMAX) 200 MG tablet Take 1 tablet by mouth daily 7/31/19  Yes Isabel Agarwal MD   venlafaxine (EFFEXOR XR) 150 MG extended release capsule Take 1 capsule by mouth daily (with breakfast) 7/31/19  Yes Isabel Agarwal MD   ARIPiprazole lauroxil (ARISTADA) 882 MG/3.2ML PRSY injection Inject 3.2 mLs into the muscle every 30 days 8/27/19  Yes Isabel Agarwal MD   conjugated estrogens (PREMARIN) 0.625 MG/GM vaginal cream Use daily as directed 7/31/19  Yes Isabel Agarwal MD   ARIPiprazole (ABILIFY) 20 MG tablet Take 20 mg by mouth daily    Historical Provider, MD       Allergies:    Ibuprofen and Carbamazepine and analogs    Social History:      TOBACCO:   reports that she has never smoked. She has never used smokeless tobacco.  ETOH:   reports that she does not drink alcohol. Family History:          Problem Relation Age of Onset    Cancer Mother     Heart Failure Father       family history reviewed and found to be negative for contributing factors    REVIEW OF SYSTEMS:     Pertinent positives as noted in the HPI. All other systems reviewed and negative. PHYSICAL EXAM:  BP (!) 156/92   Pulse 67   Temp 96.5 °F (35.8 °C) (Temporal)   Resp 18   Ht 5' 5\" (1.651 m)   Wt 90 lb (40.8 kg)   LMP 08/25/2011 (Exact Date)   SpO2 100%   BMI 14.98 kg/m²   General appearance: No apparent distress, appears stated age and cooperative. HEENT: Normal cephalic, atraumatic without obvious deformity. Pupils equal, round, and reactive to light. Extra ocular muscles intact. Conjunctivae/corneas clear. Neck: Supple, with full range of motion. No jugular venous distention. Trachea midline. Respiratory:  Normal respiratory effort. Clear to auscultation, bilaterally without Rales/Wheezes/Rhonchi. Cardiovascular: Regular rate and rhythm with normal S1/S2 without murmurs, rubs or gallops. Brisk capillary refill.  2+ lower extremity pulses (dorsalis pedis). Abdomen: Soft, non-tender, non-distended with normal bowel sounds. Musculoskeletal: No clubbing, cyanosis or edema bilaterally. Full range of motion without deformity. Skin: Normal skin color. No rashes or lesions. Neurologic:  Neurovascularly intact without any focal sensory/motor deficits. Psychiatric: Alert and oriented, thought content appropriate, normal insight        Labs:     Recent Labs     08/30/19 0900   WBC 6.0   HGB 12.3   HCT 36.8        Recent Labs     08/30/19 0900   *   K 3.8   CL 89*   CO2 28   BUN 5*   CREATININE 0.5   CALCIUM 10.2     Recent Labs     08/30/19 0900   AST 27   ALT 23   BILITOT 1.0   ALKPHOS 93     No results for input(s): INR in the last 72 hours. No results for input(s): Osvaldo Brooks in the last 72 hours. Urinalysis:      Lab Results   Component Value Date    NITRU Negative 07/30/2019    WBCUA 2-5 07/30/2019    WBCUA 0-2 07/26/2018    BACTERIA PRESENT 07/30/2019    RBCUA 0-1 07/02/2019    RBCUA NONE 09/15/2011    BLOODU Negative 07/30/2019    SPECGRAV 1.010 07/30/2019    GLUCOSEU Negative 07/30/2019    GLUCOSEU NEGATIVE 09/15/2011         ASSESSMENT:  DM 2, controlled  HLD  IBS  Constipation  Depression    Plan  Resume home medications  Stop metformin, do not continue at discharge  Nausea control  miralax  Monitor bowel function  Remainder per primary team    Will sign off; please call if services are needed in the future    -  has a past medical history of Chronic idiopathic constipation, Depression, Head injury, Hyperlipidemia, IBS (irritable bowel syndrome), Jaw pain, Migraines, neuralgic, Pain in gums, and Schizophrenia ([facility] -  Body mass index is 14.98 kg/m².   - DVT Prophylaxis  Activity and ambulation    IFEANYI Roque - CNS  [facility]  Please contact me via [address]  Physicians Attending Note    Nina Hall is a 61 y.o.   female who was seen and examined independently by myself and, after review,  I agree with the history, physical, and assessment documented by the mid-level above. Physical exam:  Cardiovascular: heart rate normal and normal sinus rhythm  Respiratory: Lungs clear to auscultation bilaterally without wheeze, rales or rhonchi throughout all fields. The remainder of the physical exam is as dictated above. Assessment/Plan  · Agree with the plan, d/c metformin at d/c, resume home meds, rest as per primary team     Rosario Funk MD  [facility]  Please contact me via [employer]    NOTE: This report was transcribed using voice recognition software. Every effort was made to ensure accuracy; however, inadvertent computerized transcription errors may be present.

## 2019-08-31 NOTE — H&P
Weaknesses: [x] Emotional          [] Motivational     MEDICATIONS: Current Facility-Administered Medications: metFORMIN (GLUCOPHAGE) tablet 500 mg, 500 mg, Oral, Daily  [START ON 9/27/2019] ARIPiprazole lauroxil (ARISTADA) injection 882 mg, 882 mg, Intramuscular, Q30 Days  cyclobenzaprine (FLEXERIL) tablet 5 mg, 5 mg, Oral, TID  lactulose (CHRONULAC) 10 GM/15ML solution 10 g, 15 mL, Oral, Daily  topiramate (TOPAMAX) tablet 200 mg, 200 mg, Oral, Daily  venlafaxine (EFFEXOR XR) extended release capsule 150 mg, 150 mg, Oral, Daily with breakfast  acetaminophen (TYLENOL) tablet 650 mg, 650 mg, Oral, Q4H PRN  hydrOXYzine (VISTARIL) capsule 50 mg, 50 mg, Oral, TID PRN  OLANZapine (ZYPREXA) tablet 2.5 mg, 2.5 mg, Oral, Q4H PRN **OR** OLANZapine (ZYPREXA) injection 5 mg, 5 mg, Intramuscular, Q4H PRN  sterile water injection 2.1 mL, 2.1 mL, Intramuscular, Q4H PRN  benztropine mesylate (COGENTIN) injection 2 mg, 2 mg, Intramuscular, BID PRN  magnesium hydroxide (MILK OF MAGNESIA) 400 MG/5ML suspension 30 mL, 30 mL, Oral, Daily PRN  aluminum & magnesium hydroxide-simethicone (MAALOX) 200-200-20 MG/5ML suspension 30 mL, 30 mL, Oral, PRN    Medical Review of Systems:     All other than marked systmes have been reviewed and are all negative.     Constitutional Symptoms: []  fever []  Chills  Skin Symptoms: [] rash []  Pruritus   Eye Symptoms: [] Vision unchanged []  recent vision problems[] blurred vision   Respiratory Symptoms:[] shortness of breath [] cough  Cardiovascular Symptoms:  [] chest pain   [] palpitations   Gastrointestinal Symptoms: []  abdominal pain []  nausea []  vomiting []  diarrhea  Genitourinary Symptoms: []  dysuria  []  hematuria   Musculoskeletal Symptoms: []  back pain []  muscle pain []  joint pain  Neurologic Symptoms: []  headache []  dizziness  Hematolymphoid Symptoms: [] Adenopathy [] Bruises   [] Schimosis       VITALS: BP (!) 156/92   Pulse 67   Temp 96.5 °F (35.8 °C) (Temporal)   Resp 18   Ht Walks with Assistance  [] Cinthya Chair    [] Walks with Onita Bonds  [] In Hospital Bed  [] Sitting in Chair    Muscle-Skeletal:  [x] Normal Muscle Tone [] Muscle Atrophy       [] Abnormal Muscle Movement     Eye Contact:  [] Good eye contact  [x] Intermittent Eye Contact  [] Poor Eye Contact     Mood: [x] Depressed  [x] Anxious  [] Irritated  [] Euthymic   [] Angry [] Restless    Affect:  [] Congruent  [] Incongruent  [] Labile  [] Constricted  [] Flat  [x] Bizarre     Thought Process and Association:  [] Logical [] Illogical       [] Linear and Goal Directed  [] Tangential  [x] Circumstantial     Thought Content:  [] Denies [x] Endorses [x] Suicidal [] Homicidal  [x] Delusional      [x] Paranoid  [] Somatic  [] Grandiose    Perception: [x]  None  [] Auditory   [] Visual  [] tactile   [] olfactory  [] Illusions         Insight: [] Intact  [] Fair  [x] Limited    Judgement:  [] Intact  [] Fair  [x] Limited        ASSESSMENT  Patient Active Problem List   Diagnosis    Chest pain    Schizo[facility]affective schizophrenia, chronic condition [facility])    Hyponatremia    Trigeminal neuralgia syndrome    Chronic nonintractable headache    Anxiety    Chronic constipation    Head injury    Jaw pain    Pain in gums    Schizophrenia ([facility])    PTSD (post-traumatic stress disorder)    Other headache syndrome    Cervical pain (neck)    Depression with suicidal ideation    Schizoaffective disorder, depressive type ([facility])    Severe protein-calorie malnutrition (HCC)    Psychosis ([address]    Acute psychosis ([facility]    Pelvic pain    Major depression with psychotic features ([facility] [address] [facility]     Recommendations and plan of treatment:  1- admit to inpatient unit  2- Unit [facility]   3- Medication Management I discussed risk benefits and side effects of medications. Patient is aware and willing to comply with treatment. 4- Group therapy and one on one.   5- Routine precautions    Met with patient and discussed the risks and benefits associated with treatment and the patient expressed understanding.        Signed:  Jose D Restrepo  8/31/2019  9:07 AM

## 2019-09-01 ENCOUNTER — APPOINTMENT (OUTPATIENT)
Dept: GENERAL RADIOLOGY | Age: 59
DRG: 751 | End: 2019-09-01
Payer: COMMERCIAL

## 2019-09-01 LAB
AMORPHOUS: ABNORMAL
ANION GAP SERPL CALCULATED.3IONS-SCNC: 11 MMOL/L (ref 7–16)
BACTERIA: ABNORMAL /HPF
BILIRUBIN URINE: NEGATIVE
BLOOD, URINE: NEGATIVE
BUN BLDV-MCNC: 12 MG/DL (ref 6–20)
CALCIUM SERPL-MCNC: 10.3 MG/DL (ref 8.6–10.2)
CHLORIDE BLD-SCNC: 89 MMOL/L (ref 98–107)
CLARITY: ABNORMAL
CO2: 28 MMOL/L (ref 22–29)
COLOR: YELLOW
CREAT SERPL-MCNC: 0.7 MG/DL (ref 0.5–1)
GFR AFRICAN AMERICAN: >60
GFR NON-AFRICAN AMERICAN: >60 ML/MIN/1.73
GLUCOSE BLD-MCNC: 94 MG/DL (ref 74–99)
GLUCOSE URINE: NEGATIVE MG/DL
KETONES, URINE: NEGATIVE MG/DL
LEUKOCYTE ESTERASE, URINE: ABNORMAL
METER GLUCOSE: 96 MG/DL (ref 74–99)
NITRITE, URINE: NEGATIVE
OSMOLALITY URINE: 202 MOSM/KG (ref 300–900)
PH UA: 6.5 (ref 5–9)
POTASSIUM SERPL-SCNC: 3.7 MMOL/L (ref 3.5–5)
PROTEIN UA: NEGATIVE MG/DL
RBC UA: ABNORMAL /HPF (ref 0–2)
SODIUM BLD-SCNC: 128 MMOL/L (ref 132–146)
SODIUM URINE: <20 MMOL/L
SPECIFIC GRAVITY UA: <=1.005 (ref 1–1.03)
UROBILINOGEN, URINE: 0.2 E.U./DL
WBC UA: ABNORMAL /HPF (ref 0–5)

## 2019-09-01 PROCEDURE — 82962 GLUCOSE BLOOD TEST: CPT

## 2019-09-01 PROCEDURE — 36415 COLL VENOUS BLD VENIPUNCTURE: CPT

## 2019-09-01 PROCEDURE — 6370000000 HC RX 637 (ALT 250 FOR IP): Performed by: CLINICAL NURSE SPECIALIST

## 2019-09-01 PROCEDURE — 1240000000 HC EMOTIONAL WELLNESS R&B

## 2019-09-01 PROCEDURE — 6370000000 HC RX 637 (ALT 250 FOR IP): Performed by: NURSE PRACTITIONER

## 2019-09-01 PROCEDURE — 99231 SBSQ HOSP IP/OBS SF/LOW 25: CPT | Performed by: NURSE PRACTITIONER

## 2019-09-01 PROCEDURE — 74018 RADEX ABDOMEN 1 VIEW: CPT

## 2019-09-01 PROCEDURE — 81001 URINALYSIS AUTO W/SCOPE: CPT

## 2019-09-01 PROCEDURE — 83935 ASSAY OF URINE OSMOLALITY: CPT

## 2019-09-01 PROCEDURE — 80048 BASIC METABOLIC PNL TOTAL CA: CPT

## 2019-09-01 PROCEDURE — 84300 ASSAY OF URINE SODIUM: CPT

## 2019-09-01 RX ORDER — BISACODYL 10 MG
10 SUPPOSITORY, RECTAL RECTAL DAILY PRN
Status: DISCONTINUED | OUTPATIENT
Start: 2019-09-01 | End: 2019-09-10 | Stop reason: HOSPADM

## 2019-09-01 RX ADMIN — LORAZEPAM 0.5 MG: 0.5 TABLET ORAL at 14:17

## 2019-09-01 RX ADMIN — CYCLOBENZAPRINE 5 MG: 10 TABLET, FILM COATED ORAL at 14:18

## 2019-09-01 RX ADMIN — LORAZEPAM 0.5 MG: 0.5 TABLET ORAL at 20:46

## 2019-09-01 RX ADMIN — VENLAFAXINE HYDROCHLORIDE 75 MG: 75 CAPSULE, EXTENDED RELEASE ORAL at 08:58

## 2019-09-01 RX ADMIN — LORAZEPAM 0.5 MG: 0.5 TABLET ORAL at 08:58

## 2019-09-01 RX ADMIN — POLYETHYLENE GLYCOL 3350 17 G: 17 POWDER, FOR SOLUTION ORAL at 08:57

## 2019-09-01 RX ADMIN — CYCLOBENZAPRINE 5 MG: 10 TABLET, FILM COATED ORAL at 08:58

## 2019-09-01 RX ADMIN — LACTULOSE 10 G: 20 SOLUTION ORAL at 08:58

## 2019-09-01 RX ADMIN — TOPIRAMATE 200 MG: 100 TABLET, FILM COATED ORAL at 05:04

## 2019-09-01 ASSESSMENT — PAIN SCALES - GENERAL
PAINLEVEL_OUTOF10: 0
PAINLEVEL_OUTOF10: 7
PAINLEVEL_OUTOF10: 0

## 2019-09-01 ASSESSMENT — PAIN DESCRIPTION - LOCATION: LOCATION: HEAD

## 2019-09-01 NOTE — PROGRESS NOTES
Schimosis       Psychiatric Review of systems  Delusions:  [] Denies [] Endorses   Withdrawals:  [] Denies [] Endorses    Hallucinations: [] Denies [] Endorses    Extra Pyramidal Symptoms: [] Denies [] Endorses      /72   Pulse 120   Temp 97.8 °F (36.6 °C) (Temporal)   Resp 18   Ht 5' 5\" (1.651 m)   Wt 90 lb (40.8 kg)   LMP 08/25/2011 (Exact Date)   SpO2 100%   BMI 14.98 kg/m²     Mental Status Examination:         Cognition:       [x] Alert  [x] Awake  [x] Oriented  [x] Person  [x] Place [x] Time       [] drowsy  [] tired  [] lethargic  [] distractable  []      Attention/Concentration:   [] Attentive  [x] Distracted         Memory Recent and Remote: [] Intact   [x] Impaired [] Partially Impaired      Language: [] Able to recognize and name objects                         [] Unable to recognize and name [address] [place] [address] of Knowledge:  [] Poor []  Fair  [] Good     Speech: [] Normal  [x] Soft  [x] Slow  [] Fast [] Pressured                                    [] Loud [] Dysarthria  [] Incoherent        Appearance: [] Well Groomed  [] Casual Dressed  [] Unkept  [x] Disheveled                         [] Normal weight[] Thin  [] Overweight  [] Obese           Attitude: [] Positive  [] Hostile  [] Demanding  [x] Guarded  [] Defensive                    [x] Cooperative  []  Uncooperative       Behavior:  [x] Normal Gait  [] Walks with Assistance  [] Cinthya Chair               [] Walks with Palma An  [] In Hospital Bed  [] Sitting in Chair     Muscle-Skeletal:  [x] Normal Muscle Tone [] Muscle Atrophy                                        [] Abnormal Muscle Movement      Eye Contact:   [] Good eye contact  [x] Intermittent Eye Contact  [] Poor Eye Contact      Mood: [x] Depressed  [x] Anxious  [] Irritated  [] Euthymic   [] Angry [] Restless     Affect:  [] Congruent  [] Incongruent  [] Labile  [] Constricted  [] Flat  [x] Bizarre      Thought Process and Association:  [] Logical [] Illogical [] Linear and Goal Directed  [] Tangential  [x] Circumstantial      Thought Content:  [] Denies [] Endorses [] Suicidal [] Homicidal  [x] Delusional                                       [x] Paranoid  [x] Somatic  [] Grandiose     Perception: [x]  None  [] Auditory   [] Visual  [] tactile   [] olfactory  [] Illusions          Insight: [] Intact  [] Fair  [x] Limited    Judgement:  [] Intact  [] Fair  [x] Limited    Assessment/Plan:        Patient Active Problem List   Diagnosis Code    Chest pain R07.9    Schizo-affective schizophrenia, chronic condition ([facility] F25.8    Hyponatremia E87.1    Trigeminal neuralgia syndrome G50.0    Chronic nonintractable headache R51    Anxiety F41.9    Chronic constipation K59.09    Head injury S09.90XA    Jaw pain R68.84    Pain in gums K06.8    Schizophrenia ([facility]) F20.9    PTSD (post-traumatic stress disorder) F43.10    Other headache syndrome G44.89    Cervical pain (neck) M54.2    Depression with suicidal ideation F32.9, R45.851    Schizoaffective disorder, depressive type ([facility]) F25.1    Severe protein-calorie malnutrition ([address] E43    Psychosis ([address] F29    Acute psychosis ([address] F23    Pelvic pain R10.2    Major depression with psychotic features ([facility] F32.3         Plan:    []  Patient is refusing medications  [x] Improving as expected   [] Not improving as expected   [] Worsening    []  At Baseline     Continue current medications    Reason for more than one antipsychotic:  [x] N/A  [] 3 failed monotherapy(drugs tried):  [] Cross over to a new antipsychotic  [] Taper to monotherapy from polypharmacy  [] Augmentation of Clozapine therapy due to treatment resistance to single therapy      Signed:  Phillip Connolly  9/1/2019  1:34 PM

## 2019-09-01 NOTE — PROGRESS NOTES
Labs notified today of Na of 128. Up from previous at 127. Patient has history of hyponatremia. Will order additional labs and repeat CMP in the morning. According to staff, patient is not having good oral intake food but also is not drinking a lot of water.   Orders written     JEWEL Cohen Hospitalist Team

## 2019-09-02 LAB
ALBUMIN SERPL-MCNC: 4 G/DL (ref 3.5–5.2)
ALP BLD-CCNC: 73 U/L (ref 35–104)
ALT SERPL-CCNC: 15 U/L (ref 0–32)
ANION GAP SERPL CALCULATED.3IONS-SCNC: 14 MMOL/L (ref 7–16)
AST SERPL-CCNC: 16 U/L (ref 0–31)
BILIRUB SERPL-MCNC: 0.2 MG/DL (ref 0–1.2)
BUN BLDV-MCNC: 23 MG/DL (ref 6–20)
CALCIUM SERPL-MCNC: 9.3 MG/DL (ref 8.6–10.2)
CHLORIDE BLD-SCNC: 98 MMOL/L (ref 98–107)
CO2: 24 MMOL/L (ref 22–29)
CREAT SERPL-MCNC: 0.8 MG/DL (ref 0.5–1)
GFR AFRICAN AMERICAN: >60
GFR NON-AFRICAN AMERICAN: >60 ML/MIN/1.73
GLUCOSE BLD-MCNC: 84 MG/DL (ref 74–99)
METER GLUCOSE: 107 MG/DL (ref 74–99)
METER GLUCOSE: 86 MG/DL (ref 74–99)
POTASSIUM REFLEX MAGNESIUM: 4.2 MMOL/L (ref 3.5–5)
SODIUM BLD-SCNC: 136 MMOL/L (ref 132–146)
TOTAL PROTEIN: 6.5 G/DL (ref 6.4–8.3)

## 2019-09-02 PROCEDURE — 82962 GLUCOSE BLOOD TEST: CPT

## 2019-09-02 PROCEDURE — 1240000000 HC EMOTIONAL WELLNESS R&B

## 2019-09-02 PROCEDURE — 6370000000 HC RX 637 (ALT 250 FOR IP): Performed by: NURSE PRACTITIONER

## 2019-09-02 PROCEDURE — 80053 COMPREHEN METABOLIC PANEL: CPT

## 2019-09-02 PROCEDURE — 36415 COLL VENOUS BLD VENIPUNCTURE: CPT

## 2019-09-02 PROCEDURE — 6370000000 HC RX 637 (ALT 250 FOR IP): Performed by: CLINICAL NURSE SPECIALIST

## 2019-09-02 PROCEDURE — 6370000000 HC RX 637 (ALT 250 FOR IP): Performed by: PSYCHIATRY & NEUROLOGY

## 2019-09-02 PROCEDURE — 99231 SBSQ HOSP IP/OBS SF/LOW 25: CPT | Performed by: PSYCHIATRY & NEUROLOGY

## 2019-09-02 RX ORDER — LORAZEPAM 0.5 MG/1
0.5 TABLET ORAL 2 TIMES DAILY
Status: DISCONTINUED | OUTPATIENT
Start: 2019-09-02 | End: 2019-09-03

## 2019-09-02 RX ADMIN — POLYETHYLENE GLYCOL 3350 17 G: 17 POWDER, FOR SOLUTION ORAL at 09:31

## 2019-09-02 RX ADMIN — VENLAFAXINE HYDROCHLORIDE 75 MG: 75 CAPSULE, EXTENDED RELEASE ORAL at 09:32

## 2019-09-02 RX ADMIN — LACTULOSE 10 G: 20 SOLUTION ORAL at 09:30

## 2019-09-02 RX ADMIN — TOPIRAMATE 200 MG: 100 TABLET, FILM COATED ORAL at 09:30

## 2019-09-02 RX ADMIN — LORAZEPAM 0.5 MG: 0.5 TABLET ORAL at 09:30

## 2019-09-02 RX ADMIN — LORAZEPAM 0.5 MG: 0.5 TABLET ORAL at 21:02

## 2019-09-02 RX ADMIN — LORAZEPAM 0.5 MG: 0.5 TABLET ORAL at 14:46

## 2019-09-02 ASSESSMENT — PAIN SCALES - GENERAL
PAINLEVEL_OUTOF10: 0
PAINLEVEL_OUTOF10: 0

## 2019-09-02 ASSESSMENT — PAIN SCALES - WONG BAKER: WONGBAKER_NUMERICALRESPONSE: 0

## 2019-09-02 NOTE — PROGRESS NOTES
10:10 am-10:55 am    Briefly attended discussion on S.M.A.R.T. Goal setting. Able to share benefits of daily goals and apply to wellness recovery. Was 1 of 4 in attendance.

## 2019-09-03 LAB — METER GLUCOSE: 94 MG/DL (ref 74–99)

## 2019-09-03 PROCEDURE — 99231 SBSQ HOSP IP/OBS SF/LOW 25: CPT | Performed by: PSYCHIATRY & NEUROLOGY

## 2019-09-03 PROCEDURE — 6370000000 HC RX 637 (ALT 250 FOR IP): Performed by: CLINICAL NURSE SPECIALIST

## 2019-09-03 PROCEDURE — 6370000000 HC RX 637 (ALT 250 FOR IP): Performed by: PSYCHIATRY & NEUROLOGY

## 2019-09-03 PROCEDURE — 6370000000 HC RX 637 (ALT 250 FOR IP): Performed by: NURSE PRACTITIONER

## 2019-09-03 PROCEDURE — 1240000000 HC EMOTIONAL WELLNESS R&B

## 2019-09-03 PROCEDURE — 82962 GLUCOSE BLOOD TEST: CPT

## 2019-09-03 RX ORDER — LORAZEPAM 0.5 MG/1
0.5 TABLET ORAL NIGHTLY
Status: DISCONTINUED | OUTPATIENT
Start: 2019-09-04 | End: 2019-09-07

## 2019-09-03 RX ADMIN — VENLAFAXINE HYDROCHLORIDE 75 MG: 75 CAPSULE, EXTENDED RELEASE ORAL at 08:40

## 2019-09-03 RX ADMIN — TOPIRAMATE 200 MG: 100 TABLET, FILM COATED ORAL at 08:40

## 2019-09-03 RX ADMIN — POLYETHYLENE GLYCOL 3350 17 G: 17 POWDER, FOR SOLUTION ORAL at 08:38

## 2019-09-03 RX ADMIN — LORAZEPAM 0.5 MG: 0.5 TABLET ORAL at 08:40

## 2019-09-03 RX ADMIN — CYCLOBENZAPRINE 5 MG: 10 TABLET, FILM COATED ORAL at 20:58

## 2019-09-03 RX ADMIN — LORAZEPAM 0.5 MG: 0.5 TABLET ORAL at 20:59

## 2019-09-03 RX ADMIN — LACTULOSE 10 G: 20 SOLUTION ORAL at 08:37

## 2019-09-03 ASSESSMENT — PAIN SCALES - GENERAL: PAINLEVEL_OUTOF10: 0

## 2019-09-04 LAB — METER GLUCOSE: 89 MG/DL (ref 74–99)

## 2019-09-04 PROCEDURE — 6370000000 HC RX 637 (ALT 250 FOR IP): Performed by: PSYCHIATRY & NEUROLOGY

## 2019-09-04 PROCEDURE — 1240000000 HC EMOTIONAL WELLNESS R&B

## 2019-09-04 PROCEDURE — 6370000000 HC RX 637 (ALT 250 FOR IP): Performed by: CLINICAL NURSE SPECIALIST

## 2019-09-04 PROCEDURE — 99231 SBSQ HOSP IP/OBS SF/LOW 25: CPT | Performed by: PSYCHIATRY & NEUROLOGY

## 2019-09-04 PROCEDURE — 82962 GLUCOSE BLOOD TEST: CPT

## 2019-09-04 PROCEDURE — 6370000000 HC RX 637 (ALT 250 FOR IP): Performed by: NURSE PRACTITIONER

## 2019-09-04 RX ADMIN — LACTULOSE 10 G: 20 SOLUTION ORAL at 09:27

## 2019-09-04 RX ADMIN — CYCLOBENZAPRINE 5 MG: 10 TABLET, FILM COATED ORAL at 15:09

## 2019-09-04 RX ADMIN — CYCLOBENZAPRINE 5 MG: 10 TABLET, FILM COATED ORAL at 09:27

## 2019-09-04 RX ADMIN — POLYETHYLENE GLYCOL 3350 17 G: 17 POWDER, FOR SOLUTION ORAL at 12:37

## 2019-09-04 RX ADMIN — LORAZEPAM 0.5 MG: 0.5 TABLET ORAL at 21:16

## 2019-09-04 RX ADMIN — VENLAFAXINE HYDROCHLORIDE 75 MG: 75 CAPSULE, EXTENDED RELEASE ORAL at 09:27

## 2019-09-04 RX ADMIN — TOPIRAMATE 200 MG: 100 TABLET, FILM COATED ORAL at 09:27

## 2019-09-04 ASSESSMENT — PAIN SCALES - GENERAL
PAINLEVEL_OUTOF10: 0
PAINLEVEL_OUTOF10: 0

## 2019-09-04 NOTE — PROGRESS NOTES
DATE OF SERVICE:     9/3/2019    Trinidad Guthrie seen today for the purpose of continuation of care. Nursing, social work reports, laboratory studies and vital signs are reviewed. Patient chief complaint today is:             [x] Depression      [x] Anxiety        [] Psychosis         [x] Suicidal-- vague                       [] Delusions           [] Aggression          Subjective:     Patient seen and spoke with nursing staff, per staff she slept well, med compliant, no problems or issue. Patient was in the day area, says she still feels depressed and anxious, but better than before. She still have fleeting SI. She is  medication compliant and denies S/E    Sleep:  [x] Good [] Fair  [] Poor  Appetite:  [x] Good [] Fair  [] Poor    Depression:  [x] Mild [] Moderate [] Severe                [] Constant [x] Sporadic     Anxiety: [] Mild [x] Moderate [] Severe    [x] Constant [] Sporadic     Delusions: [] Mild [] Moderate [] Severe     [] Constant [] Sporadic     [] Paranoid [] Somatic [] Grandiose     Hallucinations: [] Mild [] Moderate [] Severe     [] Constant [] Sporadic    [] Auditory  [] Visual [] Tactile       Suicidal: [] Constant [x] Sporadic  Homicidal: [] Constant [] Sporadic    Unscheduled Medications     [] Patient Receiving Emergency Medications \" Chemical Restraint\"   [] Requesting PRN medications for anxiety    Medical Review of Systems:     All other than marked systmes have been reviewed and are all negative.     Constitutional Symptoms: []  fever []  Chills  Skin Symptoms: [] rash []  Pruritus   Eye Symptoms: [] Vision unchanged []  recent vision problems[] blurred vision   Respiratory Symptoms:[] shortness of breath [] cough  Cardiovascular Symptoms:  [] chest pain   [] palpitations   Gastrointestinal Symptoms: []  abdominal pain []  nausea []  vomiting []  diarrhea  Genitourinary Symptoms: []  dysuria  []  hematuria   Musculoskeletal Symptoms: []  back pain []  muscle pain []  joint

## 2019-09-04 NOTE — PROGRESS NOTES
2:00-2:30    Patient attended and participated in leisure group of music. Patient shared favorite song and enjoyed listening to peers share their favorite song. Patient was 1 out of 5 in attendance.

## 2019-09-05 LAB — METER GLUCOSE: 84 MG/DL (ref 74–99)

## 2019-09-05 PROCEDURE — 1240000000 HC EMOTIONAL WELLNESS R&B

## 2019-09-05 PROCEDURE — 6370000000 HC RX 637 (ALT 250 FOR IP): Performed by: NURSE PRACTITIONER

## 2019-09-05 PROCEDURE — 99231 SBSQ HOSP IP/OBS SF/LOW 25: CPT | Performed by: PSYCHIATRY & NEUROLOGY

## 2019-09-05 PROCEDURE — 87088 URINE BACTERIA CULTURE: CPT

## 2019-09-05 PROCEDURE — 6370000000 HC RX 637 (ALT 250 FOR IP): Performed by: PSYCHIATRY & NEUROLOGY

## 2019-09-05 PROCEDURE — 82962 GLUCOSE BLOOD TEST: CPT

## 2019-09-05 PROCEDURE — 6370000000 HC RX 637 (ALT 250 FOR IP): Performed by: CLINICAL NURSE SPECIALIST

## 2019-09-05 RX ADMIN — LORAZEPAM 0.5 MG: 0.5 TABLET ORAL at 20:33

## 2019-09-05 RX ADMIN — CYCLOBENZAPRINE 5 MG: 10 TABLET, FILM COATED ORAL at 14:42

## 2019-09-05 RX ADMIN — CYCLOBENZAPRINE 5 MG: 10 TABLET, FILM COATED ORAL at 08:16

## 2019-09-05 RX ADMIN — LACTULOSE 10 G: 20 SOLUTION ORAL at 08:17

## 2019-09-05 RX ADMIN — TOPIRAMATE 200 MG: 100 TABLET, FILM COATED ORAL at 08:16

## 2019-09-05 RX ADMIN — VENLAFAXINE HYDROCHLORIDE 75 MG: 75 CAPSULE, EXTENDED RELEASE ORAL at 08:16

## 2019-09-05 RX ADMIN — POLYETHYLENE GLYCOL 3350 17 G: 17 POWDER, FOR SOLUTION ORAL at 08:17

## 2019-09-05 ASSESSMENT — PAIN SCALES - GENERAL
PAINLEVEL_OUTOF10: 0

## 2019-09-05 NOTE — CARE COORDINATION
met with pt [address]  who report pt can return home until he gets evicted,Jermain will work on getting pt accepted @  Group home , hopefully  [employer]

## 2019-09-05 NOTE — PROGRESS NOTES
DATE OF SERVICE:     9/4/2019    Bob Craig seen today for the purpose of continuation of care. Nursing, social work reports, laboratory studies and vital signs are reviewed. Patient chief complaint today is:             [x] Depression      [x] Anxiety        [] Psychosis         [] Suicidal-                    [] Delusions           [] Aggression          Subjective:     Patient seen, she was in her room, says, she has burning in her vagina. She says she is \" getting better\". She is  medication compliant and denies S/E    Sleep:  [x] Good [] Fair  [] Poor  Appetite:  [x] Good [] Fair  [] Poor    Depression:  [x] Mild [] Moderate [] Severe                [] Constant [x] Sporadic     Anxiety: [] Mild [x] Moderate [] Severe    [x] Constant [] Sporadic     Delusions: [] Mild [] Moderate [] Severe     [] Constant [] Sporadic     [] Paranoid [] Somatic [] Grandiose     Hallucinations: [] Mild [] Moderate [] Severe     [] Constant [] Sporadic    [] Auditory  [] Visual [] Tactile       Suicidal: [] Constant [] Sporadic  Homicidal: [] Constant [] Sporadic    Unscheduled Medications     [] Patient Receiving Emergency Medications \" Chemical Restraint\"   [] Requesting PRN medications for anxiety    Medical Review of Systems:     All other than marked systmes have been reviewed and are all negative.     Constitutional Symptoms: []  fever []  Chills  Skin Symptoms: [] rash []  Pruritus   Eye Symptoms: [] Vision unchanged []  recent vision problems[] blurred vision   Respiratory Symptoms:[] shortness of breath [] cough  Cardiovascular Symptoms:  [] chest pain   [] palpitations   Gastrointestinal Symptoms: []  abdominal pain []  nausea []  vomiting []  diarrhea  Genitourinary Symptoms: []  dysuria  []  hematuria   Musculoskeletal Symptoms: []  back pain []  muscle pain []  joint pain  Neurologic Symptoms: []  headache []  dizziness  Hematolymphoid Symptoms: [] Adenopathy [] Bruises   [] Schimosis       Psychiatric Linear and Goal Directed  [] Tangential  [] Circumstantial      Thought Content:  [x] Denies [] Endorses [] Suicidal [] Homicidal  [] Delusional                                       [] Paranoid  [] Somatic  [] Grandiose     Perception: [x]  None  [] Auditory   [] Visual  [] tactile   [] olfactory  [] Illusions          Insight: [] Intact  [x] Fair  [] Limited    Judgement:  [] Intact  [x] Fair  [] Limited    Assessment/Plan:        Patient Active Problem List   Diagnosis Code    Chest pain R07.9    Schizo-affective schizophrenia, chronic condition ([facility]) F25.8    Hyponatremia E87.1    Trigeminal neuralgia syndrome G50.0    Chronic nonintractable headache R51    Anxiety F41.9    Chronic constipation K59.09    Head injury S09.90XA    Jaw pain R68.84    Pain in gums K06.8    Schizophrenia ([facility]) F20.9    PTSD (post-traumatic stress disorder) F43.10    Other headache syndrome G44.89    Cervical pain (neck) M54.2    Depression with suicidal ideation F32.9, R45.851    Schizoaffective disorder, depressive type ([facility]) F25.1    Severe protein-calorie malnutrition ([address] E43    Psychosis ([address] F29    Acute psychosis ([address] F23    Pelvic pain R10.2    Major depression with psychotic features ([address] F32.3         Plan:    []  Patient is refusing medications  [x] Improving as expected   [] Not improving as expected   [] Worsening    []  At Baseline     Cont meds  Continue 1:1 therapy and coping skills  Consult medicine for burning    Reason for more than one antipsychotic:  [x] N/A  [] 3 failed monotherapy(drugs tried):  [] Cross over to a new antipsychotic  [] Taper to monotherapy from polypharmacy  [] Augmentation of Clozapine therapy due to treatment resistance to single therapy      Signed:   Jared Lemon  9/4/2019  10:51 PM

## 2019-09-06 LAB — METER GLUCOSE: 70 MG/DL (ref 74–99)

## 2019-09-06 PROCEDURE — 82962 GLUCOSE BLOOD TEST: CPT

## 2019-09-06 PROCEDURE — 1240000000 HC EMOTIONAL WELLNESS R&B

## 2019-09-06 PROCEDURE — 6370000000 HC RX 637 (ALT 250 FOR IP): Performed by: CLINICAL NURSE SPECIALIST

## 2019-09-06 PROCEDURE — 99232 SBSQ HOSP IP/OBS MODERATE 35: CPT | Performed by: PSYCHIATRY & NEUROLOGY

## 2019-09-06 PROCEDURE — 6370000000 HC RX 637 (ALT 250 FOR IP): Performed by: PSYCHIATRY & NEUROLOGY

## 2019-09-06 PROCEDURE — 6370000000 HC RX 637 (ALT 250 FOR IP): Performed by: NURSE PRACTITIONER

## 2019-09-06 RX ADMIN — LACTULOSE 10 G: 20 SOLUTION ORAL at 08:54

## 2019-09-06 RX ADMIN — VENLAFAXINE HYDROCHLORIDE 75 MG: 75 CAPSULE, EXTENDED RELEASE ORAL at 08:54

## 2019-09-06 RX ADMIN — LORAZEPAM 0.5 MG: 0.5 TABLET ORAL at 21:05

## 2019-09-06 RX ADMIN — TOPIRAMATE 200 MG: 100 TABLET, FILM COATED ORAL at 08:53

## 2019-09-06 RX ADMIN — POLYETHYLENE GLYCOL 3350 17 G: 17 POWDER, FOR SOLUTION ORAL at 08:53

## 2019-09-06 ASSESSMENT — PAIN SCALES - GENERAL
PAINLEVEL_OUTOF10: 0

## 2019-09-06 NOTE — GROUP NOTE
Group Therapy Note    Date: September 6    Group Start Time: 5850  Group End Time: 4256  Group Topic: Healthy Living/Wellness    [facility] ACUTE BH 2    Ardine Jignesh, RN        Group Therapy Note  Patient attended and participated in healthy living/wellness group.   Attendees: 6/13           Signature:  Ellie Ryder RN

## 2019-09-07 LAB
METER GLUCOSE: 86 MG/DL (ref 74–99)
URINE CULTURE, ROUTINE: NORMAL

## 2019-09-07 PROCEDURE — 6370000000 HC RX 637 (ALT 250 FOR IP): Performed by: CLINICAL NURSE SPECIALIST

## 2019-09-07 PROCEDURE — 6370000000 HC RX 637 (ALT 250 FOR IP): Performed by: NURSE PRACTITIONER

## 2019-09-07 PROCEDURE — 99231 SBSQ HOSP IP/OBS SF/LOW 25: CPT | Performed by: PSYCHIATRY & NEUROLOGY

## 2019-09-07 PROCEDURE — 1240000000 HC EMOTIONAL WELLNESS R&B

## 2019-09-07 PROCEDURE — 82962 GLUCOSE BLOOD TEST: CPT

## 2019-09-07 RX ORDER — VENLAFAXINE HYDROCHLORIDE 150 MG/1
150 CAPSULE, EXTENDED RELEASE ORAL
Status: DISCONTINUED | OUTPATIENT
Start: 2019-09-08 | End: 2019-09-10 | Stop reason: HOSPADM

## 2019-09-07 RX ADMIN — VENLAFAXINE HYDROCHLORIDE 75 MG: 75 CAPSULE, EXTENDED RELEASE ORAL at 09:34

## 2019-09-07 RX ADMIN — TOPIRAMATE 200 MG: 100 TABLET, FILM COATED ORAL at 09:34

## 2019-09-07 RX ADMIN — POLYETHYLENE GLYCOL 3350 17 G: 17 POWDER, FOR SOLUTION ORAL at 09:33

## 2019-09-07 RX ADMIN — LACTULOSE 10 G: 20 SOLUTION ORAL at 09:35

## 2019-09-07 ASSESSMENT — PAIN SCALES - WONG BAKER: WONGBAKER_NUMERICALRESPONSE: 0

## 2019-09-07 ASSESSMENT — PAIN DESCRIPTION - PAIN TYPE: TYPE: ACUTE PAIN

## 2019-09-07 ASSESSMENT — PAIN SCALES - GENERAL
PAINLEVEL_OUTOF10: 8
PAINLEVEL_OUTOF10: 0

## 2019-09-07 ASSESSMENT — PAIN DESCRIPTION - LOCATION: LOCATION: VAGINA

## 2019-09-07 ASSESSMENT — PAIN DESCRIPTION - DESCRIPTORS: DESCRIPTORS: DISCOMFORT

## 2019-09-07 NOTE — PROGRESS NOTES
Patient has been isolative to her room so far this shift except for when she comes out to the nurses station to speak with staff. Patient denies all and states \"I'm starting to feel better, I'm just tired today. \"  Patient is still flat at times, but when approached is bright and smiling. Patient states that she is \"Still having that burning and pain in my vagina, but it comes and goes. It's been like this for months and no one knows why. \"  Patient asked about urine that was obtained yesterday and this nurse told patient that so far it is coming back normal with no indication of infection. Patient stated \"ya I didn't think it was anything with my urine, it doesn't hurt when I pee, it's just random. It comes and goes. \"  Patient came up to nurses station and stated \"can you please let the doctor know that I don't want that invega shot increased. I can't handle it. My body can't handle it. When I get that shot, I get severe breast pain and I can't take it. \"  Told patient that I would pass it on and she also notified every other staff member about her request.  Patient is encouraged to continue to work towards discharge goal by complying with medications, attending groups and to seek staff if feelings are overwhelming. Environmental rounds completed per unit policy to maintain safety of everyone on the unit. Staff will offer support and interventions as requested or required.

## 2019-09-07 NOTE — PROGRESS NOTES
Bizarre      Thought Process and Association:  [x] Logical [] Illogical                                        [x] Linear and Goal Directed  [] Tangential  [] Circumstantial      Thought Content:  [x] Denies [] Endorses [] Suicidal [] Homicidal  [] Delusional                                       [] Paranoid  [] Somatic  [] Grandiose     Perception: [x]  None  [] Auditory   [] Visual  [] tactile   [] olfactory  [] Illusions          Insight: [] Intact  [x] Fair  [] Limited    Judgement:  [] Intact  [x] Fair  [] Limited    Assessment/Plan:        Patient Active Problem List   Diagnosis Code    Chest pain R07.9    Schizo-affective schizophrenia, chronic condition (HCC) F25.8    Hyponatremia E87.1    Trigeminal neuralgia syndrome G50.0    Chronic nonintractable headache R51    Anxiety F41.9    Chronic constipation K59.09    Head injury S09.90XA    Jaw pain R68.84    Pain in gums K06.8    Schizophrenia (HCC) F20.9    PTSD (post-traumatic stress disorder) F43.10    Other headache syndrome G44.89    Cervical pain (neck) M54.2    Depression with suicidal ideation F32.9, R45.851    Schizoaffective disorder, depressive type ([address] F25.1    Severe protein-calorie malnutrition ([address] E43    Psychosis ([address] F29    Acute psychosis ([address] F23    Pelvic pain R10.2    Major depression with psychotic features ([address] F32.3         Plan:    []  Patient is refusing medications  [] Improving as expected   [x] Not improving as expected   [] Worsening    []  At Baseline     I will increase her Effexor XR to 150 mg  D/C HS Ativan  Continue 1:1 therapy and coping skills  Encourage activity and groups    Reason for more than one antipsychotic:  [x] N/A  [] 3 failed monotherapy(drugs tried):  [] Cross over to a new antipsychotic  [] Taper to monotherapy from polypharmacy  [] Augmentation of Clozapine therapy due to treatment resistance to single therapy      Signed:   Jared Lemon  9/7/2019  3:06 PM
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
## 2019-09-07 NOTE — GROUP NOTE
Attended most of the 1p-2p cogn skills group where subj was \"horses and healing. \"    Attentive to peers' comments and courteously attentive during their presentations. Although drawing a very good pic of her bro w a horse, she declined to stay long enough in group to do her narrative  pic presentation at the [facility] [address]

## 2019-09-08 LAB — METER GLUCOSE: 86 MG/DL (ref 74–99)

## 2019-09-08 PROCEDURE — 6370000000 HC RX 637 (ALT 250 FOR IP): Performed by: PSYCHIATRY & NEUROLOGY

## 2019-09-08 PROCEDURE — 1240000000 HC EMOTIONAL WELLNESS R&B

## 2019-09-08 PROCEDURE — 6370000000 HC RX 637 (ALT 250 FOR IP): Performed by: CLINICAL NURSE SPECIALIST

## 2019-09-08 PROCEDURE — 82962 GLUCOSE BLOOD TEST: CPT

## 2019-09-08 PROCEDURE — 99231 SBSQ HOSP IP/OBS SF/LOW 25: CPT | Performed by: PSYCHIATRY & NEUROLOGY

## 2019-09-08 PROCEDURE — 6370000000 HC RX 637 (ALT 250 FOR IP): Performed by: NURSE PRACTITIONER

## 2019-09-08 RX ADMIN — TOPIRAMATE 200 MG: 100 TABLET, FILM COATED ORAL at 08:44

## 2019-09-08 RX ADMIN — POLYETHYLENE GLYCOL 3350 17 G: 17 POWDER, FOR SOLUTION ORAL at 08:48

## 2019-09-08 RX ADMIN — VENLAFAXINE HYDROCHLORIDE 150 MG: 150 CAPSULE, EXTENDED RELEASE ORAL at 08:45

## 2019-09-08 RX ADMIN — LACTULOSE 10 G: 20 SOLUTION ORAL at 08:46

## 2019-09-08 ASSESSMENT — PAIN SCALES - GENERAL
PAINLEVEL_OUTOF10: 0

## 2019-09-09 LAB — METER GLUCOSE: 105 MG/DL (ref 74–99)

## 2019-09-09 PROCEDURE — 82962 GLUCOSE BLOOD TEST: CPT

## 2019-09-09 PROCEDURE — 99231 SBSQ HOSP IP/OBS SF/LOW 25: CPT | Performed by: NURSE PRACTITIONER

## 2019-09-09 PROCEDURE — 6370000000 HC RX 637 (ALT 250 FOR IP): Performed by: CLINICAL NURSE SPECIALIST

## 2019-09-09 PROCEDURE — 1240000000 HC EMOTIONAL WELLNESS R&B

## 2019-09-09 PROCEDURE — 6370000000 HC RX 637 (ALT 250 FOR IP): Performed by: PSYCHIATRY & NEUROLOGY

## 2019-09-09 PROCEDURE — 6370000000 HC RX 637 (ALT 250 FOR IP): Performed by: NURSE PRACTITIONER

## 2019-09-09 RX ADMIN — VENLAFAXINE HYDROCHLORIDE 150 MG: 150 CAPSULE, EXTENDED RELEASE ORAL at 08:30

## 2019-09-09 RX ADMIN — LACTULOSE 10 G: 20 SOLUTION ORAL at 08:29

## 2019-09-09 RX ADMIN — TOPIRAMATE 200 MG: 100 TABLET, FILM COATED ORAL at 08:30

## 2019-09-09 RX ADMIN — POLYETHYLENE GLYCOL 3350 17 G: 17 POWDER, FOR SOLUTION ORAL at 08:29

## 2019-09-09 ASSESSMENT — PAIN SCALES - GENERAL
PAINLEVEL_OUTOF10: 0

## 2019-09-09 NOTE — PROGRESS NOTES
Patient attended goals group and stated daily goal as to continue to feel less depressed and anxious.

## 2019-09-09 NOTE — CARE COORDINATION
BARBARA NOte: d/c planning: BARBARA left message for Wm Menard , re: pt refusing crisis unit. She wants to go straight home while  is working on finding her group home. She has appt tomorrow at [facility] for post hospital follow up at 200 with Nurse Bertrand and appt with Dr Annelise Drake on 11/08 at 1 pm. They will have her follow with nurse as needed for medication management needs in interim. BARBARA called Adult Protective Services re: referral. As soon as they heard that she is younger than age 61 they declined referral d/t her age being too young to receive services there. BARBARA alerted RN of this and she will call to confirm with Dr or NP that they are ok with her still being d/c.  has cancelled d/c today. BARBARA notified Wm Menard  via vm of above.

## 2019-09-10 VITALS
HEIGHT: 65 IN | TEMPERATURE: 97.9 F | WEIGHT: 90 LBS | OXYGEN SATURATION: 98 % | DIASTOLIC BLOOD PRESSURE: 76 MMHG | HEART RATE: 74 BPM | BODY MASS INDEX: 14.99 KG/M2 | RESPIRATION RATE: 18 BRPM | SYSTOLIC BLOOD PRESSURE: 118 MMHG

## 2019-09-10 LAB — METER GLUCOSE: 72 MG/DL (ref 74–99)

## 2019-09-10 PROCEDURE — 82962 GLUCOSE BLOOD TEST: CPT

## 2019-09-10 PROCEDURE — 6370000000 HC RX 637 (ALT 250 FOR IP): Performed by: CLINICAL NURSE SPECIALIST

## 2019-09-10 PROCEDURE — 6370000000 HC RX 637 (ALT 250 FOR IP): Performed by: NURSE PRACTITIONER

## 2019-09-10 PROCEDURE — 6370000000 HC RX 637 (ALT 250 FOR IP): Performed by: PSYCHIATRY & NEUROLOGY

## 2019-09-10 PROCEDURE — 99238 HOSP IP/OBS DSCHRG MGMT 30/<: CPT | Performed by: NURSE PRACTITIONER

## 2019-09-10 RX ORDER — VENLAFAXINE HYDROCHLORIDE 150 MG/1
150 CAPSULE, EXTENDED RELEASE ORAL
Qty: 30 CAPSULE | Refills: 0 | Status: SHIPPED | OUTPATIENT
Start: 2019-09-11 | End: 2020-02-07

## 2019-09-10 RX ADMIN — LACTULOSE 10 G: 20 SOLUTION ORAL at 09:13

## 2019-09-10 RX ADMIN — POLYETHYLENE GLYCOL 3350 17 G: 17 POWDER, FOR SOLUTION ORAL at 09:14

## 2019-09-10 RX ADMIN — VENLAFAXINE HYDROCHLORIDE 150 MG: 150 CAPSULE, EXTENDED RELEASE ORAL at 09:19

## 2019-09-10 RX ADMIN — TOPIRAMATE 200 MG: 100 TABLET, FILM COATED ORAL at 09:13

## 2019-09-10 ASSESSMENT — PAIN SCALES - GENERAL
PAINLEVEL_OUTOF10: 0

## 2019-09-10 NOTE — DISCHARGE SUMMARY
with Kayleigh Mckeon  [] In Hospital Bed  [] Sitting in Chair    Muscle-Skeletal:  [x] Normal Muscle Tone [] Muscle Atrophy       [] Abnormal Muscle Movement     Eye Contact:  [x] Good eye contact  [] Intermittent Eye Contact  [] Poor Eye Contact     Mood: [] Depressed  [] Anxious  [] Irritated  [x] Euthymic   [] Angry [] Restless    Affect:  [x] Congruent  [] Incongruent  [] Labile  [] Constricted  [] Flat  [] Bizarre     Thought Process and Association:  [] Logical [] Illogical       [x] Linear and Goal Directed  [] Tangential  [] Circumstantial     Thought Content:  [] Denies [] Endorses [] Suicidal [] Homicidal  [] Delusional      [] Paranoid  [x] Somatic  [] Grandiose    Perception: [x]  None  [] Auditory   [] Visual  [] tactile   [] olfactory  [] Illusions         Insight: [] Intact  [x] Fair  [] Limited    Judgement:  [] Intact  [x] Fair  [] Limited    Hospital Course:   Admit Date: 8/30/2019     Discharge Date: 9/10/2019  Admitted from:  [x]  Emergency Room  []  Home  []  Another facility   []  [facility]     Admitting diagnosis:   Patient Active Problem List   Diagnosis    Chest pain    [facility]-affective [facility], chronic condition ([facility]    Hyponatremia    Trigeminal neuralgia syndrome    Chronic nonintractable headache    Anxiety    Chronic constipation    Head injury    Jaw pain    Pain in gums    Schizophrenia ([facility]    PTSD (post-traumatic stress disorder)    Other headache syndrome    Cervical pain (neck)    Depression with suicidal ideation    Schizoaffective disorder, depressive type ([facility]    Severe protein-calorie malnutrition ([facility]    Psychosis ([facility]    Acute psychosis ([facility]    Pelvic pain    Major depression with psychotic features ([facility] [address] [facility]      Length of stay:  11 days              Any Jefferson was admitted in Psychiatric unit  from ER with depression and SI. Patient was treated            With the above . Patient responded well to the treatment.      Discharge Summary Plan: Discharge Status:    [x] Improved [] Unchanged    [] Worse       Discharge instructions given:  [x] Patient    [] Family [] Other         Discharge disposition:  [] Home [x] Step Down unit  [] Group Home []  [facility]                                                    [] [facility]    [] AMA  [] Other           Prescriptions: Continue same medications, review with patient.        Reason for more than one antipsychotic:  [x] N/A  [] 3 failed monotherapy(drugs tried):  [] Cross over to a new antipsychotic  [] Taper to monotherapy from polypharmacy  [] Augmentation of Clozapine therapy due to treatment resistance to single therapy      Diagnosis:        Patient Active Problem List   Diagnosis Code    Chest pain R07.9    Schizo-affective schizophrenia, chronic condition ([address] F25.8    Hyponatremia E87.1    Trigeminal neuralgia syndrome G50.0    Chronic nonintractable headache R51    Anxiety F41.9    Chronic constipation K59.09    Head injury S09.90XA    Jaw pain R68.84    Pain in gums K06.8    Schizophrenia ([address] F20.9    PTSD (post-traumatic stress disorder) F43.10    Other headache syndrome G44.89    Cervical pain (neck) M54.2    Depression with suicidal ideation F32.9, R45.851    Schizoaffective disorder, depressive type ([address] F25.1    Severe protein-calorie malnutrition ([address] E43    Psychosis ([address] F29    Acute psychosis ([address] F23    Pelvic pain R10.2    Major depression with psychotic features ([address] F32.3       Education and Follow-up:  Counseled:  [x] Patient     [] Family    [] Guardian      Signed:   Ramiro Brunson   9/10/2019   12:05 PM

## 2019-09-10 NOTE — PLAN OF CARE
Appeared to have had good visit w boyF who came to visit at supper time. (She had prev signed the roshan document to include Kendall Garnett ( the boyF )  and her bro Jaz Hernandez. Although w somewhat persistent flat affect, she engaged well in d/w peers and staff. She laughs at times and appears able to distract herself and work herself out of a quasi depressed state. Stays in her room at times and naps, but faithfully attends groups. Denies a/vH, si, hi.    See group note.
Denies SI, HI, and hallucinations. Denies depression and anxiety. Calm and cooperative with staff. Isolative this evening. Patient continues to refuse flexeril. No PRNs administered. No complaints or concerns verbalized. Will continue to monitor and offer support.         Problem: Depressive Behavior With or Without Suicide Precautions:  Goal: Able to verbalize and/or display a decrease in depressive symptoms  Description  Able to verbalize and/or display a decrease in depressive symptoms  9/8/2019 2130 by Cristian Reveles RN  Outcome: Met This Shift     Problem: Depressive Behavior With or Without Suicide Precautions:  Goal: Ability to disclose and discuss suicidal ideas will improve  Description  Ability to disclose and discuss suicidal ideas will improve  Outcome: Met This Shift             Electronically signed by Cristian Reveles RN on 9/8/2019 at 9:33 PM
Denies SI, HI, and hallucinations. Patient is sad and flat. Isolative this evening. Patient refused nightly flexeril. States \"It doesn't do anything. \" Patient continues to have complaints of vaginal pain and reports that she will follow up with gynecologist upon discharge. No other complaints or concerns verbalized at this time. Will continue to monitor and offer support.           Problem: Depressive Behavior With or Without Suicide Precautions:  Goal: Ability to disclose and discuss suicidal ideas will improve  Description  Ability to disclose and discuss suicidal ideas will improve  9/7/2019 2057 by Rainell Denver, RN  Outcome: Met This Shift     Problem: Pain:  Goal: Control of acute pain  Description  Control of acute pain  9/7/2019 2057 by Rainell Denver, RN  Outcome: Not Met This Shift             Electronically signed by Rainell Denver, RN on 9/7/2019 at 8:59 PM
Denies SI, HI, and hallucinations. Patient is sad, depressed, and anxious. Isolative to room. Patient has poor eye contact and is preoccupied with medications. States \"What medications am I getting here? I can't have lexapro. I can't take clozaril either or the injections. \" Explained to patient that she will meet with NP and doctor in the morning. No other complaints or concerns verbalized at this time. Will continue to monitor and offer support.         Problem: Depressive Behavior With or Without Suicide Precautions:  Goal: Able to verbalize acceptance of life and situations over which he or she has no control  Description  Able to verbalize acceptance of life and situations over which he or she has no control  Outcome: Not Met This Shift     Problem: Depressive Behavior With or Without Suicide Precautions:  Goal: Able to verbalize and/or display a decrease in depressive symptoms  Description  Able to verbalize and/or display a decrease in depressive symptoms  Outcome: Not Met This Shift           Electronically signed by Manuel Coleman RN on 8/31/2019 at 12:19 AM
Patient pleasant and cooperative. Denies SI,HI, and hallucinations. Patient is evasive, has poor eye contact. Patient complains of constipation. Patient encouraged to increase oral intake and fluids.  Patient come out or room but is minimally interactive with peers will continue to observe and support  Problem: Depressive Behavior With or Without Suicide Precautions:  Goal: Able to verbalize support systems  Description  Able to verbalize support systems  Outcome: Met This Shift  Goal: Absence of self-harm  Description  Absence of self-harm  Outcome: Met This Shift
Pt cooperative and pleasant. Denies SI, HI, and hallucinations at this time. Pt states that she is feeling less anxious, stating that her anxiety is a 3 out of 10. Pt is social with peers. No unit problems. Will continue to observe and support.
Pt is calm and cooperative. Pt denies SI, HI and denies A/V hallucinations. Pt states her anxiety is gone and she does not feel depressed. Pt attends groups and takes medications. Pt states she feels ready to be discharged.        Problem: Pain:  Goal: Control of acute pain  Description  Control of acute pain  9/9/2019 0026 by Shama Muro RN  Outcome: Met This Shift     Problem: Depressive Behavior With or Without Suicide Precautions:  Goal: Able to verbalize acceptance of life and situations over which he or she has no control  Description  Able to verbalize acceptance of life and situations over which he or she has no control  Outcome: Met This Shift  Goal: Able to verbalize and/or display a decrease in depressive symptoms  Description  Able to verbalize and/or display a decrease in depressive symptoms  9/9/2019 1004 by Mayra Ramírez RN  Outcome: Met This Shift  9/8/2019 2130 by Shama Muro RN  Outcome: Met This Shift  Goal: Ability to disclose and discuss suicidal ideas will improve  Description  Ability to disclose and discuss suicidal ideas will improve  9/9/2019 1004 by Mayra Ramírez RN  Outcome: Met This Shift  9/8/2019 2130 by Shama Muro RN  Outcome: Met This Shift  Goal: Able to verbalize support systems  Description  Able to verbalize support systems  Outcome: Met This Shift  Goal: Absence of self-harm  Description  Absence of self-harm  9/9/2019 1004 by Mayra Ramírez RN  Outcome: Met This Shift  9/9/2019 0026 by Shama Muro RN  Outcome: Met This Shift
Pt is calm and cooperative. Pt denies SI, HI and denies A/V hallucinations. Pt states she does not feel depressed and that her anxiety is much better. Pt took a shower and ate breakfast. Pt did not attend group this morning but did take her medications.       Problem: Pain:  Goal: Control of acute pain  Description  Control of acute pain  Outcome: Met This Shift     Problem: Depressive Behavior With or Without Suicide Precautions:  Goal: Able to verbalize acceptance of life and situations over which he or she has no control  Description  Able to verbalize acceptance of life and situations over which he or she has no control  9/6/2019 1058 by Duane Simpers, RN  Outcome: Met This Shift  9/5/2019 2108 by Albert Chicas RN  Outcome: Met This Shift  Goal: Able to verbalize and/or display a decrease in depressive symptoms  Description  Able to verbalize and/or display a decrease in depressive symptoms  9/6/2019 1058 by Duane Simpers, RN  Outcome: Met This Shift  9/5/2019 2108 by Alebrt Chicas RN  Outcome: Met This Shift  Goal: Ability to disclose and discuss suicidal ideas will improve  Description  Ability to disclose and discuss suicidal ideas will improve  9/6/2019 1058 by Duane Simpers, RN  Outcome: Met This Shift  9/5/2019 2108 by Albert Chicas RN  Outcome: Met This Shift  Goal: Able to verbalize support systems  Description  Able to verbalize support systems  9/6/2019 1058 by Duane Simpers, RN  Outcome: Met This Shift  9/5/2019 2108 by Albert Chicas RN  Outcome: Not Met This Shift  Goal: Absence of self-harm  Description  Absence of self-harm  9/6/2019 1058 by Duane Simpers, RN  Outcome: Met This Shift  9/5/2019 2108 by Albert Chicas RN  Outcome: Met This Shift
Pt is calm and cooperative. Pt denies SI, HI and denies A/V hallucinations. Pt took medications but did not attend groups.       Problem: Pain:  Goal: Control of acute pain  Description  Control of acute pain  9/10/2019 1109 by Reji Robb RN  Outcome: Met This Shift  9/10/2019 0020 by Arnie Miguel RN  Outcome: Met This Shift     Problem: Depressive Behavior With or Without Suicide Precautions:  Goal: Able to verbalize acceptance of life and situations over which he or she has no control  Description  Able to verbalize acceptance of life and situations over which he or she has no control  Outcome: Met This Shift  Goal: Able to verbalize and/or display a decrease in depressive symptoms  Description  Able to verbalize and/or display a decrease in depressive symptoms  9/9/2019 2244 by Arnie Miguel RN  Outcome: Met This Shift  Goal: Ability to disclose and discuss suicidal ideas will improve  Description  Ability to disclose and discuss suicidal ideas will improve  9/10/2019 1109 by Reji Robb RN  Outcome: Met This Shift  9/9/2019 2244 by Arnie Miguel RN  Outcome: Met This Shift  Goal: Able to verbalize support systems  Description  Able to verbalize support systems  Outcome: Met This Shift  Goal: Absence of self-harm  Description  Absence of self-harm  9/10/2019 1109 by Reji Robb RN  Outcome: Met This Shift  9/10/2019 0020 by Arnie Miguel RN  Outcome: Met This Shift
Pt resting in bed with eyes closed, no observed abnormalities. Close observations continue.    Problem: Depressive Behavior With or Without Suicide Precautions:  Goal: Absence of self-harm  Description  Absence of self-harm  9/3/2019 0200 by Farooq Barry  Outcome: Met This Shift
Pt resting in bed with eyes closed, no observed abnormalities. Close observations continue.    Problem: Depressive Behavior With or Without Suicide Precautions:  Goal: Absence of self-harm  Description  Absence of self-harm  9/7/2019 0106 by Fredy Russell  Outcome: Met This Shift
support    GOALS UPDATE:   Time frame for Short-Term Goals: 1-3 days      Kapil Waggoner RN

## 2020-02-07 ENCOUNTER — HOSPITAL ENCOUNTER (EMERGENCY)
Age: 60
Discharge: PSYCHIATRIC OR SUBSTANCE USE FACILITY | End: 2020-02-07
Attending: EMERGENCY MEDICINE
Payer: COMMERCIAL

## 2020-02-07 VITALS
OXYGEN SATURATION: 100 % | WEIGHT: 90 LBS | DIASTOLIC BLOOD PRESSURE: 57 MMHG | RESPIRATION RATE: 14 BRPM | HEART RATE: 66 BPM | TEMPERATURE: 97.8 F | SYSTOLIC BLOOD PRESSURE: 116 MMHG | BODY MASS INDEX: 14.98 KG/M2

## 2020-02-07 LAB
ACETAMINOPHEN LEVEL: <5 MCG/ML (ref 10–30)
ALBUMIN SERPL-MCNC: 4.7 G/DL (ref 3.5–5.2)
ALP BLD-CCNC: 102 U/L (ref 35–104)
ALT SERPL-CCNC: 10 U/L (ref 0–32)
AMPHETAMINE SCREEN, URINE: NOT DETECTED
ANION GAP SERPL CALCULATED.3IONS-SCNC: 9 MMOL/L (ref 7–16)
AST SERPL-CCNC: 20 U/L (ref 0–31)
BARBITURATE SCREEN URINE: NOT DETECTED
BASOPHILS ABSOLUTE: 0.06 E9/L (ref 0–0.2)
BASOPHILS RELATIVE PERCENT: 1.5 % (ref 0–2)
BENZODIAZEPINE SCREEN, URINE: NOT DETECTED
BILIRUB SERPL-MCNC: 0.5 MG/DL (ref 0–1.2)
BILIRUBIN URINE: NEGATIVE
BLOOD, URINE: NEGATIVE
BUN BLDV-MCNC: 3 MG/DL (ref 6–20)
CALCIUM SERPL-MCNC: 9.9 MG/DL (ref 8.6–10.2)
CANNABINOID SCREEN URINE: NOT DETECTED
CHLORIDE BLD-SCNC: 101 MMOL/L (ref 98–107)
CLARITY: CLEAR
CO2: 29 MMOL/L (ref 22–29)
COCAINE METABOLITE SCREEN URINE: NOT DETECTED
COLOR: YELLOW
CREAT SERPL-MCNC: 0.6 MG/DL (ref 0.5–1)
EOSINOPHILS ABSOLUTE: 0.06 E9/L (ref 0.05–0.5)
EOSINOPHILS RELATIVE PERCENT: 1.5 % (ref 0–6)
ETHANOL: <10 MG/DL (ref 0–0.08)
FENTANYL SCREEN, URINE: NOT DETECTED
GFR AFRICAN AMERICAN: >60
GFR NON-AFRICAN AMERICAN: >60 ML/MIN/1.73
GLUCOSE BLD-MCNC: 89 MG/DL (ref 74–99)
GLUCOSE URINE: NEGATIVE MG/DL
HCT VFR BLD CALC: 38.6 % (ref 34–48)
HEMOGLOBIN: 12.4 G/DL (ref 11.5–15.5)
IMMATURE GRANULOCYTES #: 0.01 E9/L
IMMATURE GRANULOCYTES %: 0.3 % (ref 0–5)
KETONES, URINE: NEGATIVE MG/DL
LEUKOCYTE ESTERASE, URINE: NEGATIVE
LYMPHOCYTES ABSOLUTE: 1.54 E9/L (ref 1.5–4)
LYMPHOCYTES RELATIVE PERCENT: 39.1 % (ref 20–42)
Lab: NORMAL
MCH RBC QN AUTO: 30.5 PG (ref 26–35)
MCHC RBC AUTO-ENTMCNC: 32.1 % (ref 32–34.5)
MCV RBC AUTO: 94.8 FL (ref 80–99.9)
METHADONE SCREEN, URINE: NOT DETECTED
MONOCYTES ABSOLUTE: 0.29 E9/L (ref 0.1–0.95)
MONOCYTES RELATIVE PERCENT: 7.4 % (ref 2–12)
NEUTROPHILS ABSOLUTE: 1.98 E9/L (ref 1.8–7.3)
NEUTROPHILS RELATIVE PERCENT: 50.2 % (ref 43–80)
NITRITE, URINE: NEGATIVE
OPIATE SCREEN URINE: NOT DETECTED
OXYCODONE URINE: NOT DETECTED
PDW BLD-RTO: 12.7 FL (ref 11.5–15)
PH UA: 8 (ref 5–9)
PHENCYCLIDINE SCREEN URINE: NOT DETECTED
PLATELET # BLD: 275 E9/L (ref 130–450)
PMV BLD AUTO: 9.5 FL (ref 7–12)
POTASSIUM REFLEX MAGNESIUM: 3.7 MMOL/L (ref 3.5–5)
PROTEIN UA: NEGATIVE MG/DL
RBC # BLD: 4.07 E12/L (ref 3.5–5.5)
SALICYLATE, SERUM: <0.3 MG/DL (ref 0–30)
SODIUM BLD-SCNC: 139 MMOL/L (ref 132–146)
SPECIFIC GRAVITY UA: 1.01 (ref 1–1.03)
TOTAL PROTEIN: 7.5 G/DL (ref 6.4–8.3)
TRICYCLIC ANTIDEPRESSANTS SCREEN SERUM: NEGATIVE NG/ML
UROBILINOGEN, URINE: 0.2 E.U./DL
WBC # BLD: 3.9 E9/L (ref 4.5–11.5)

## 2020-02-07 PROCEDURE — G0480 DRUG TEST DEF 1-7 CLASSES: HCPCS

## 2020-02-07 PROCEDURE — 80307 DRUG TEST PRSMV CHEM ANLYZR: CPT

## 2020-02-07 PROCEDURE — 36415 COLL VENOUS BLD VENIPUNCTURE: CPT

## 2020-02-07 PROCEDURE — 6370000000 HC RX 637 (ALT 250 FOR IP): Performed by: EMERGENCY MEDICINE

## 2020-02-07 PROCEDURE — 81003 URINALYSIS AUTO W/O SCOPE: CPT

## 2020-02-07 PROCEDURE — 85025 COMPLETE CBC W/AUTO DIFF WBC: CPT

## 2020-02-07 PROCEDURE — 99284 EMERGENCY DEPT VISIT MOD MDM: CPT

## 2020-02-07 PROCEDURE — 80053 COMPREHEN METABOLIC PANEL: CPT

## 2020-02-07 RX ORDER — TOPIRAMATE 100 MG/1
200 TABLET, FILM COATED ORAL ONCE
Status: COMPLETED | OUTPATIENT
Start: 2020-02-07 | End: 2020-02-07

## 2020-02-07 RX ORDER — TOPIRAMATE 25 MG/1
25 TABLET ORAL ONCE
Status: COMPLETED | OUTPATIENT
Start: 2020-02-07 | End: 2020-02-07

## 2020-02-07 RX ORDER — TOPIRAMATE 100 MG/1
100 TABLET, FILM COATED ORAL 2 TIMES DAILY
Status: ON HOLD | COMMUNITY
End: 2021-06-15 | Stop reason: HOSPADM

## 2020-02-07 RX ORDER — VENLAFAXINE HYDROCHLORIDE 75 MG/1
75 CAPSULE, EXTENDED RELEASE ORAL
COMMUNITY

## 2020-02-07 RX ORDER — ESTRADIOL 0.1 MG/G
CREAM VAGINAL
COMMUNITY
Start: 2020-01-30 | End: 2020-06-12

## 2020-02-07 RX ADMIN — TOPIRAMATE 200 MG: 100 TABLET, FILM COATED ORAL at 20:06

## 2020-02-07 RX ADMIN — TOPIRAMATE 25 MG: 25 TABLET, FILM COATED ORAL at 18:41

## 2020-02-07 ASSESSMENT — ENCOUNTER SYMPTOMS
BLOOD IN STOOL: 0
CHEST TIGHTNESS: 0
VOMITING: 0
COLOR CHANGE: 0
BACK PAIN: 0
COUGH: 0
ABDOMINAL PAIN: 1
DIARRHEA: 0
SHORTNESS OF BREATH: 0
NAUSEA: 0

## 2020-02-07 NOTE — ED PROVIDER NOTES
Patient is a 55-year-old female presenting for reported depressive symptoms, abdominal pain. States she was recently prescribed Effexor, became confused over her medications and discontinued briefly, notices that she is having increasing depressive symptoms, thinking about dying but does not have a specific plan. Was seen at [facility], pink slipped and sent here for evaluation. States she does have right lower quadrant abdominal pain for the past several weeks, is mild in severity, worsened by palpation, improved by nothing. States she has not been evaluated for this pain is she has been meaning to have an appointment to be evaluated for it. Is nonradiating. Denies vaginal bleeding or discharge, does admit to urinary frequency. Review of Systems   Constitutional: Negative for chills and fever. Respiratory: Negative for cough, chest tightness and shortness of breath. Cardiovascular: Negative for chest pain, palpitations and leg swelling. Gastrointestinal: Positive for abdominal pain (RLQ). Negative for blood in stool, diarrhea, nausea and vomiting. Genitourinary: Negative for dysuria, flank pain, frequency, menstrual problem, vaginal bleeding and vaginal discharge. Musculoskeletal: Negative for back pain and neck pain. Skin: Negative for color change, rash and wound. Neurological: Negative for dizziness, syncope, weakness and light-headedness. Psychiatric/Behavioral: Positive for suicidal ideas. Physical Exam  Vitals signs and nursing note reviewed. Constitutional:       General: She is not in acute distress. Appearance: Normal appearance. She is well-developed. She is not diaphoretic. HENT:      Head: Normocephalic and atraumatic. Eyes:      General: No scleral icterus. Pupils: Pupils are equal, round, and reactive to light. Neck:      Musculoskeletal: Normal range of motion and neck supple. Vascular: No JVD.    Cardiovascular:      Rate and Rhythm: Normal rate and regular rhythm. Heart sounds: Normal heart sounds, S1 normal and S2 normal. No murmur. Pulmonary:      Effort: Pulmonary effort is normal. No accessory muscle usage or respiratory distress. Breath sounds: Normal breath sounds. No wheezing, rhonchi or rales. Abdominal:      General: Bowel sounds are normal. There is no distension. Palpations: Abdomen is soft. Tenderness: There is no abdominal tenderness. There is no right CVA tenderness, left CVA tenderness, guarding or rebound. Comments: No tenderness to palpation of the abdomen, particularly of the right lower quadrant. Musculoskeletal: Normal range of motion. Lymphadenopathy:      Cervical: No cervical adenopathy. Skin:     General: Skin is warm and dry. Coloration: Skin is not pale. Findings: No rash. Neurological:      Mental Status: She is alert and oriented to person, place, and time. Psychiatric:      Comments: Admits to at times wishing she were dead, denies homicidal or suicidal ideation. No visual art for hallucinations. Procedures     ED Course as of Feb 07 1610 Fri Feb 07, 2020   1414 Went to evaluate patient, not currently in bed. [RU]      ED Course User Index  [RU] Gerri Machuca, DO      --------------------------------------------- PAST HISTORY ---------------------------------------------  Past Medical History:  has a past medical history of Chronic idiopathic constipation, Depression, Head injury, Hyperlipidemia, IBS (irritable bowel syndrome), Jaw pain, Migraines, neuralgic, Pain in gums, and Schizophrenia ([facility] Past Surgical History:  has a past surgical history that includes cyst removal (Right, 2008); Toe Surgery (1999); and laparoscopy (1980). Social History:  reports that she has never smoked. She has never used smokeless tobacco. She reports that she does not drink alcohol or use drugs.     Family History: family history includes Cancer in her mother; Heart NEGATIVE Cutoff:300 ng/mL       Radiology  No orders to display     ------------------------- NURSING NOTES AND VITALS REVIEWED ---------------------------  Date / Time Roomed:  2/7/2020  1:29 PM  ED Bed Assignment:  [facility]    The nursing notes within the ED encounter and vital signs as below have been reviewed. Patient Vitals for the past 24 hrs:   BP Temp Temp src Pulse Resp SpO2 Weight   02/07/20 1335 138/86 97.1 °F (36.2 °C) Temporal 73 17 100 % 90 lb (40.8 kg)       Oxygen Saturation Interpretation: Normal    --------------------------------------- ED Clinical Course/MDM --------------------------------------    Behavioral health eval admit for further inpt psych tx      --------------------------------- ADDITIONAL PROVIDER NOTES ---------------------------------  Counseling:  I have spoken with the patient and discussed todays results, in addition to providing specific details for the plan of care and counseling regarding the diagnosis and prognosis. Their questions are answered at this time and they are agreeable with the plan of admission. This patient has remained hemodynamically stable during their ED course. Diagnosis:  1. Suicidal ideation    2. Abdominal pain, unspecified abdominal location        Disposition:  Patient's disposition: Admit to mental health unit - medically cleared for admission  Patient's condition is stable. ATTENDING PROVIDER ATTESTATION:     Álvaro Almanza presented to the emergency department for evaluation of Depression (Pt states she has been off her Effexor for 1 week and now feels depressed and wants back on it. Denies SI or HI. Eriberto townsend slipped the patient after saying remarks that she did not want to live anymore. )   and was initially evaluated by the Medical Resident. See Original ED Note for H&P and ED course above.      I have reviewed and discussed the case, including pertinent history (medical, surgical, family and social) and exam findings with the Medical Resident assigned to [facility]. I have personally performed and/or participated in the history, exam, medical decision making, and procedures and agree with all pertinent clinical information. I have reviewed my findings and recommendations with the assigned Medical Resident, [facility] and members of family present at the time of disposition. My findings/plan: The primary encounter diagnosis was Suicidal ideation. A diagnosis of Abdominal pain, unspecified abdominal location was also pertinent to this visit.   Discharge Medication List as of 2/7/2020 10:31 PM        MD Sukhjinder Barahona MD  02/08/20 6720

## 2020-02-08 NOTE — ED NOTES
CALL TO JOSHUA AT [facility] AT 2230. [facility], RN notified at [address].          Lexie Lewis RN  02/07/20 0471

## 2020-03-04 ENCOUNTER — APPOINTMENT (OUTPATIENT)
Dept: CT IMAGING | Age: 60
End: 2020-03-04
Payer: COMMERCIAL

## 2020-03-04 ENCOUNTER — HOSPITAL ENCOUNTER (EMERGENCY)
Age: 60
Discharge: HOME | End: 2020-03-04
Attending: EMERGENCY MEDICINE
Payer: COMMERCIAL

## 2020-03-04 VITALS
BODY MASS INDEX: 16.66 KG/M2 | DIASTOLIC BLOOD PRESSURE: 94 MMHG | RESPIRATION RATE: 16 BRPM | SYSTOLIC BLOOD PRESSURE: 143 MMHG | OXYGEN SATURATION: 100 % | HEIGHT: 65 IN | WEIGHT: 100 LBS | TEMPERATURE: 97.4 F | HEART RATE: 84 BPM

## 2020-03-04 LAB
ALBUMIN SERPL-MCNC: 4.4 G/DL (ref 3.5–5.2)
ALP BLD-CCNC: 79 U/L (ref 35–104)
ALT SERPL-CCNC: 14 U/L (ref 0–32)
ANION GAP SERPL CALCULATED.3IONS-SCNC: 13 MMOL/L (ref 7–16)
AST SERPL-CCNC: 30 U/L (ref 0–31)
BACTERIA: ABNORMAL /HPF
BASOPHILS ABSOLUTE: 0.05 E9/L (ref 0–0.2)
BASOPHILS RELATIVE PERCENT: 1.1 % (ref 0–2)
BILIRUB SERPL-MCNC: 0.3 MG/DL (ref 0–1.2)
BILIRUBIN URINE: NEGATIVE
BLOOD, URINE: ABNORMAL
BUN BLDV-MCNC: 6 MG/DL (ref 6–20)
CALCIUM SERPL-MCNC: 10.1 MG/DL (ref 8.6–10.2)
CHLORIDE BLD-SCNC: 106 MMOL/L (ref 98–107)
CLARITY: CLEAR
CO2: 22 MMOL/L (ref 22–29)
COLOR: YELLOW
CREAT SERPL-MCNC: 0.6 MG/DL (ref 0.5–1)
CRYSTALS, UA: ABNORMAL /HPF
EOSINOPHILS ABSOLUTE: 0.03 E9/L (ref 0.05–0.5)
EOSINOPHILS RELATIVE PERCENT: 0.6 % (ref 0–6)
GFR AFRICAN AMERICAN: >60
GFR NON-AFRICAN AMERICAN: >60 ML/MIN/1.73
GLUCOSE BLD-MCNC: 98 MG/DL (ref 74–99)
GLUCOSE URINE: NEGATIVE MG/DL
HCT VFR BLD CALC: 36.7 % (ref 34–48)
HEMOGLOBIN: 11.8 G/DL (ref 11.5–15.5)
IMMATURE GRANULOCYTES #: 0.01 E9/L
IMMATURE GRANULOCYTES %: 0.2 % (ref 0–5)
KETONES, URINE: NEGATIVE MG/DL
LEUKOCYTE ESTERASE, URINE: NEGATIVE
LYMPHOCYTES ABSOLUTE: 1.7 E9/L (ref 1.5–4)
LYMPHOCYTES RELATIVE PERCENT: 35.9 % (ref 20–42)
MCH RBC QN AUTO: 30.6 PG (ref 26–35)
MCHC RBC AUTO-ENTMCNC: 32.2 % (ref 32–34.5)
MCV RBC AUTO: 95.1 FL (ref 80–99.9)
MONOCYTES ABSOLUTE: 0.34 E9/L (ref 0.1–0.95)
MONOCYTES RELATIVE PERCENT: 7.2 % (ref 2–12)
NEUTROPHILS ABSOLUTE: 2.6 E9/L (ref 1.8–7.3)
NEUTROPHILS RELATIVE PERCENT: 55 % (ref 43–80)
NITRITE, URINE: NEGATIVE
PDW BLD-RTO: 12.9 FL (ref 11.5–15)
PH UA: 5.5 (ref 5–9)
PLATELET # BLD: 325 E9/L (ref 130–450)
PMV BLD AUTO: 9.9 FL (ref 7–12)
POTASSIUM SERPL-SCNC: 4.3 MMOL/L (ref 3.5–5)
PROTEIN UA: NEGATIVE MG/DL
RBC # BLD: 3.86 E12/L (ref 3.5–5.5)
RBC UA: ABNORMAL /HPF (ref 0–2)
SODIUM BLD-SCNC: 141 MMOL/L (ref 132–146)
SPECIFIC GRAVITY UA: >=1.03 (ref 1–1.03)
TOTAL PROTEIN: 6.8 G/DL (ref 6.4–8.3)
UROBILINOGEN, URINE: 0.2 E.U./DL
WBC # BLD: 4.7 E9/L (ref 4.5–11.5)
WBC UA: ABNORMAL /HPF (ref 0–5)

## 2020-03-04 PROCEDURE — 80053 COMPREHEN METABOLIC PANEL: CPT

## 2020-03-04 PROCEDURE — 96372 THER/PROPH/DIAG INJ SC/IM: CPT

## 2020-03-04 PROCEDURE — 85025 COMPLETE CBC W/AUTO DIFF WBC: CPT

## 2020-03-04 PROCEDURE — 81001 URINALYSIS AUTO W/SCOPE: CPT

## 2020-03-04 PROCEDURE — 6360000002 HC RX W HCPCS: Performed by: EMERGENCY MEDICINE

## 2020-03-04 PROCEDURE — 99284 EMERGENCY DEPT VISIT MOD MDM: CPT

## 2020-03-04 PROCEDURE — 74176 CT ABD & PELVIS W/O CONTRAST: CPT

## 2020-03-04 RX ORDER — DICYCLOMINE HYDROCHLORIDE 10 MG/ML
20 INJECTION INTRAMUSCULAR ONCE
Status: COMPLETED | OUTPATIENT
Start: 2020-03-04 | End: 2020-03-04

## 2020-03-04 RX ADMIN — DICYCLOMINE HYDROCHLORIDE 20 MG: 10 INJECTION INTRAMUSCULAR at 14:47

## 2020-03-04 ASSESSMENT — PAIN SCALES - GENERAL: PAINLEVEL_OUTOF10: 5

## 2020-03-04 ASSESSMENT — PAIN DESCRIPTION - ORIENTATION: ORIENTATION: MID

## 2020-03-04 ASSESSMENT — PAIN DESCRIPTION - FREQUENCY: FREQUENCY: CONTINUOUS

## 2020-03-04 ASSESSMENT — PAIN DESCRIPTION - LOCATION: LOCATION: ABDOMEN

## 2020-03-04 ASSESSMENT — PAIN DESCRIPTION - DESCRIPTORS: DESCRIPTORS: ACHING;CONSTANT

## 2020-03-04 NOTE — ED NOTES
Faxed clinical information to [facility] [address] [facility] [phone].      MAYRA Lyman, [facility]  03/04/20 822 [address], [place]  03/04/20 3620

## 2020-03-04 NOTE — ED NOTES
Awaiting social work to fax paper work to receiving facility.  remains at pts bedside. Instructed to not DC until they receive acceptance to unit.       Kim Pozo RN  03/04/20 8165

## 2020-03-04 NOTE — ED NOTES
THE PT IS A 59 YR OLD WHITE FEMALE WHO PRESENTS TO THE ED WITH DEPRESSION AND NEEDS MED CLEARENCE FOR THE CSU. HER GUARDIAN IS WITH HER AND FEELS THAT SHE NEEDS STABILIZED B/C SHE HAS NOT BEEN TAKING HER MEDS FOR A LITTLE OVER A WEEK. THE PT WAS ADMITTED TO [facility] 2020 AND AFTER HER D/C SHE GOT A GUARDIAN TIP MARTIN. THE GUARDIAN STATED THAT SHE HAS A HX OF SI BUT IS NOT CURRENTLY. THE PT STATED THAT SHE HAD A GESTURE IN HER 30'S AFTER HER MOM . SHE DENIES A HX OF HI AND AOD. THE PT'S GUARDIAN STATED THAT THE PT IS PARANOID AND DELUSIONAL AT TIMES AND WILL ASK \"DID I RAPE SOMEONE\" FOR NO APPARENT REASON. THE PT HAS A LONG HX OF SCHZOAffective AND SHE LIVES ON HER OWN.  SHE STATED THAT SHE HAS DIFFICULTY SLEEPING AND STATED THAT SHE DOES EAT BUT THE GUARDIAN QUESTIONS THIS. INFO FAXED TO THE CSU.      [address] [facility]  [phone]

## 2020-03-04 NOTE — ED PROVIDER NOTES
HPI:  3/4/20, Time: 2:11 PM         Any Pool is a 61 y.o. female presenting to the ED for abdominal pain. Patient states she had some frozen food yesterday. She had couple hours after she developed nausea, with loose watery stools. She did have some abdominal cramping with it. She said she had no diarrhea today, but still felt mildly nauseous. She denies any fever, chills, chest pain or shortness breath, urinary symptoms, pelvic pain, vomiting, or any other symptoms or complaints. Review of Systems:   Pertinent positives and negatives are stated within HPI, all other systems reviewed and are negative.          --------------------------------------------- PAST HISTORY ---------------------------------------------  Past Medical History:  has a past medical history of Chronic idiopathic constipation, Depression, Head injury, Hyperlipidemia, IBS (irritable bowel syndrome), Jaw pain, Migraines, neuralgic, Pain in gums, and [facility] Past Surgical History:  has a past surgical history that includes cyst removal (Right, 2008); Toe Surgery (1999); and laparoscopy (1980). Social History:  reports that she has never smoked. She has never used smokeless tobacco. She reports that she does not drink alcohol or use drugs. Family History: family history includes Cancer in her mother; Heart Failure in her father. The patients home medications have been reviewed.     Allergies: Ibuprofen and Carbamazepine and analogs    -------------------------------------------------- RESULTS -------------------------------------------------  All laboratory and radiology results have been personally reviewed by myself   LABS:  Results for orders placed or performed during the hospital encounter of 03/04/20   CBC Auto Differential   Result Value Ref Range    WBC 4.7 4.5 - 11.5 E9/L    RBC 3.86 3.50 - 5.50 E12/L    Hemoglobin 11.8 11.5 - 15.5 g/dL    Hematocrit 36.7 34.0 - 48.0 %    MCV 95.1 80.0 - 99.9 left renal calculus. ------------------------- NURSING NOTES AND VITALS REVIEWED ---------------------------   The nursing notes within the ED encounter and vital signs as below have been reviewed. BP (!) 143/94   Pulse 84   Temp 97.4 °F (36.3 °C) (Oral)   Resp 16   Ht 5' 5\" (1.651 m)   Wt 100 lb (45.4 kg)   LMP 08/25/2011 (Exact Date)   SpO2 100%   BMI 16.64 kg/m²   Oxygen Saturation Interpretation: Normal      ---------------------------------------------------PHYSICAL EXAM--------------------------------------      Constitutional/General: Alert and oriented x3, well appearing, non toxic in NAD  Head: Normocephalic and atraumatic  Eyes: PERRL, EOMI  Mouth: Oropharynx clear, handling secretions, no trismus  Neck: Supple, full ROM,   Pulmonary: Lungs clear to auscultation bilaterally, no wheezes, rales, or rhonchi. Not in respiratory distress  Cardiovascular:  Regular rate and rhythm, no murmurs, gallops, or rubs. 2+ distal pulses  Abdomen: Soft, non tender, non distended, no guarding rebound, bowel sounds normal  Extremities: Moves all extremities x 4. Warm and well perfused  Skin: warm and dry without rash  Neurologic: GCS 15,  Psych: Normal Affect      ------------------------------ ED COURSE/MEDICAL DECISION MAKING----------------------  Medications - No data to display      ED COURSE:       Medical Decision Making:    Patient did not have an acute abdomen on arrival.  Labs and imaging reviewed. Reevaluation, patient's resting comfortably. Repeat examination at 2 PM does not indicate a surgical abdomen. Patient's had no episodes of diarrhea or vomiting in the department. Given this, along with his findings, did not feel that further emergent evaluation was indicated. Patient was discharged. Patient is to follow-up with the PCP in 1 to 2 days. Patient is to have a repeat abdominal examination on the emergent basis if new or worsening symptoms arise. Counseling:    The emergency

## 2020-04-26 ENCOUNTER — HOSPITAL ENCOUNTER (EMERGENCY)
Age: 60
Discharge: HOME | End: 2020-04-26
Attending: EMERGENCY MEDICINE
Payer: COMMERCIAL

## 2020-04-26 ENCOUNTER — APPOINTMENT (OUTPATIENT)
Dept: CT IMAGING | Age: 60
End: 2020-04-26
Payer: COMMERCIAL

## 2020-04-26 VITALS
HEART RATE: 66 BPM | SYSTOLIC BLOOD PRESSURE: 142 MMHG | HEIGHT: 65 IN | RESPIRATION RATE: 16 BRPM | DIASTOLIC BLOOD PRESSURE: 94 MMHG | WEIGHT: 113 LBS | TEMPERATURE: 98 F | OXYGEN SATURATION: 98 % | BODY MASS INDEX: 18.83 KG/M2

## 2020-04-26 LAB
ALBUMIN SERPL-MCNC: 4.6 G/DL (ref 3.5–5.2)
ALP BLD-CCNC: 89 U/L (ref 35–104)
ALT SERPL-CCNC: 12 U/L (ref 0–32)
ANION GAP SERPL CALCULATED.3IONS-SCNC: 14 MMOL/L (ref 7–16)
AST SERPL-CCNC: 20 U/L (ref 0–31)
BACTERIA: ABNORMAL /HPF
BASOPHILS ABSOLUTE: 0.05 E9/L (ref 0–0.2)
BASOPHILS RELATIVE PERCENT: 0.5 % (ref 0–2)
BILIRUB SERPL-MCNC: 0.3 MG/DL (ref 0–1.2)
BILIRUBIN URINE: NEGATIVE
BLOOD, URINE: ABNORMAL
BUN BLDV-MCNC: 6 MG/DL (ref 6–20)
CALCIUM SERPL-MCNC: 10.6 MG/DL (ref 8.6–10.2)
CHLORIDE BLD-SCNC: 96 MMOL/L (ref 98–107)
CLARITY: CLEAR
CO2: 26 MMOL/L (ref 22–29)
COLOR: YELLOW
CREAT SERPL-MCNC: 0.5 MG/DL (ref 0.5–1)
EOSINOPHILS ABSOLUTE: 0.2 E9/L (ref 0.05–0.5)
EOSINOPHILS RELATIVE PERCENT: 1.8 % (ref 0–6)
GFR AFRICAN AMERICAN: >60
GFR NON-AFRICAN AMERICAN: >60 ML/MIN/1.73
GLUCOSE BLD-MCNC: 105 MG/DL (ref 74–99)
GLUCOSE URINE: NEGATIVE MG/DL
HCT VFR BLD CALC: 39.1 % (ref 34–48)
HEMOGLOBIN: 12.6 G/DL (ref 11.5–15.5)
IMMATURE GRANULOCYTES #: 0.03 E9/L
IMMATURE GRANULOCYTES %: 0.3 % (ref 0–5)
KETONES, URINE: NEGATIVE MG/DL
LACTIC ACID: 1.9 MMOL/L (ref 0.5–2.2)
LEUKOCYTE ESTERASE, URINE: ABNORMAL
LIPASE: 77 U/L (ref 13–60)
LYMPHOCYTES ABSOLUTE: 1.78 E9/L (ref 1.5–4)
LYMPHOCYTES RELATIVE PERCENT: 16.4 % (ref 20–42)
MAGNESIUM: 2 MG/DL (ref 1.6–2.6)
MCH RBC QN AUTO: 31.3 PG (ref 26–35)
MCHC RBC AUTO-ENTMCNC: 32.2 % (ref 32–34.5)
MCV RBC AUTO: 97 FL (ref 80–99.9)
MONOCYTES ABSOLUTE: 0.73 E9/L (ref 0.1–0.95)
MONOCYTES RELATIVE PERCENT: 6.7 % (ref 2–12)
NEUTROPHILS ABSOLUTE: 8.05 E9/L (ref 1.8–7.3)
NEUTROPHILS RELATIVE PERCENT: 74.3 % (ref 43–80)
NITRITE, URINE: NEGATIVE
PDW BLD-RTO: 12.7 FL (ref 11.5–15)
PH UA: 7 (ref 5–9)
PLATELET # BLD: 249 E9/L (ref 130–450)
PMV BLD AUTO: 10.6 FL (ref 7–12)
POTASSIUM REFLEX MAGNESIUM: 3.5 MMOL/L (ref 3.5–5)
PROTEIN UA: NEGATIVE MG/DL
RBC # BLD: 4.03 E12/L (ref 3.5–5.5)
RBC UA: ABNORMAL /HPF (ref 0–2)
SODIUM BLD-SCNC: 136 MMOL/L (ref 132–146)
SPECIFIC GRAVITY UA: <=1.005 (ref 1–1.03)
TOTAL PROTEIN: 7.2 G/DL (ref 6.4–8.3)
UROBILINOGEN, URINE: 0.2 E.U./DL
WBC # BLD: 10.8 E9/L (ref 4.5–11.5)
WBC UA: ABNORMAL /HPF (ref 0–5)

## 2020-04-26 PROCEDURE — 83690 ASSAY OF LIPASE: CPT

## 2020-04-26 PROCEDURE — 83605 ASSAY OF LACTIC ACID: CPT

## 2020-04-26 PROCEDURE — 83735 ASSAY OF MAGNESIUM: CPT

## 2020-04-26 PROCEDURE — 2580000003 HC RX 258: Performed by: EMERGENCY MEDICINE

## 2020-04-26 PROCEDURE — 6360000004 HC RX CONTRAST MEDICATION: Performed by: RADIOLOGY

## 2020-04-26 PROCEDURE — 96375 TX/PRO/DX INJ NEW DRUG ADDON: CPT

## 2020-04-26 PROCEDURE — 6360000002 HC RX W HCPCS: Performed by: EMERGENCY MEDICINE

## 2020-04-26 PROCEDURE — 81001 URINALYSIS AUTO W/SCOPE: CPT

## 2020-04-26 PROCEDURE — 99284 EMERGENCY DEPT VISIT MOD MDM: CPT

## 2020-04-26 PROCEDURE — 6370000000 HC RX 637 (ALT 250 FOR IP): Performed by: EMERGENCY MEDICINE

## 2020-04-26 PROCEDURE — 96374 THER/PROPH/DIAG INJ IV PUSH: CPT

## 2020-04-26 PROCEDURE — 85025 COMPLETE CBC W/AUTO DIFF WBC: CPT

## 2020-04-26 PROCEDURE — 74177 CT ABD & PELVIS W/CONTRAST: CPT

## 2020-04-26 PROCEDURE — 80053 COMPREHEN METABOLIC PANEL: CPT

## 2020-04-26 RX ORDER — DOCUSATE SODIUM 100 MG/1
100 CAPSULE, LIQUID FILLED ORAL 2 TIMES DAILY PRN
Qty: 20 CAPSULE | Refills: 0 | Status: SHIPPED | OUTPATIENT
Start: 2020-04-26 | End: 2020-05-01

## 2020-04-26 RX ORDER — ONDANSETRON 4 MG/1
4 TABLET, ORALLY DISINTEGRATING ORAL EVERY 8 HOURS PRN
Qty: 10 TABLET | Refills: 0 | Status: SHIPPED | OUTPATIENT
Start: 2020-04-26 | End: 2020-06-12

## 2020-04-26 RX ORDER — SODIUM CHLORIDE 0.9 % (FLUSH) 0.9 %
10 SYRINGE (ML) INJECTION PRN
Status: DISCONTINUED | OUTPATIENT
Start: 2020-04-26 | End: 2020-04-27 | Stop reason: HOSPADM

## 2020-04-26 RX ORDER — CEFDINIR 300 MG/1
300 CAPSULE ORAL ONCE
Status: COMPLETED | OUTPATIENT
Start: 2020-04-26 | End: 2020-04-26

## 2020-04-26 RX ORDER — DICYCLOMINE HYDROCHLORIDE 10 MG/ML
20 INJECTION INTRAMUSCULAR ONCE
Status: COMPLETED | OUTPATIENT
Start: 2020-04-26 | End: 2020-04-26

## 2020-04-26 RX ORDER — 0.9 % SODIUM CHLORIDE 0.9 %
1000 INTRAVENOUS SOLUTION INTRAVENOUS ONCE
Status: COMPLETED | OUTPATIENT
Start: 2020-04-26 | End: 2020-04-26

## 2020-04-26 RX ORDER — CEFDINIR 300 MG/1
300 CAPSULE ORAL 2 TIMES DAILY
Qty: 14 CAPSULE | Refills: 0 | Status: SHIPPED | OUTPATIENT
Start: 2020-04-26 | End: 2020-05-03

## 2020-04-26 RX ORDER — ONDANSETRON 2 MG/ML
4 INJECTION INTRAMUSCULAR; INTRAVENOUS ONCE
Status: COMPLETED | OUTPATIENT
Start: 2020-04-26 | End: 2020-04-26

## 2020-04-26 RX ORDER — POLYETHYLENE GLYCOL 3350, SODIUM CHLORIDE, POTASSIUM CHLORIDE, SODIUM BICARBONATE, AND SODIUM SULFATE 240; 5.84; 2.98; 6.72; 22.72 G/4L; G/4L; G/4L; G/4L; G/4L
4000 POWDER, FOR SOLUTION ORAL ONCE
Qty: 4000 ML | Refills: 0 | Status: SHIPPED | OUTPATIENT
Start: 2020-04-26 | End: 2020-04-26

## 2020-04-26 RX ADMIN — CEFDINIR 300 MG: 300 CAPSULE ORAL at 23:19

## 2020-04-26 RX ADMIN — DICYCLOMINE HYDROCHLORIDE 20 MG: 10 INJECTION INTRAMUSCULAR at 21:41

## 2020-04-26 RX ADMIN — ONDANSETRON 4 MG: 2 INJECTION INTRAMUSCULAR; INTRAVENOUS at 21:41

## 2020-04-26 RX ADMIN — IOPAMIDOL 110 ML: 755 INJECTION, SOLUTION INTRAVENOUS at 22:24

## 2020-04-26 RX ADMIN — SODIUM CHLORIDE 1000 ML: 9 INJECTION, SOLUTION INTRAVENOUS at 21:41

## 2020-04-26 ASSESSMENT — ENCOUNTER SYMPTOMS
NAUSEA: 1
SORE THROAT: 0
ABDOMINAL PAIN: 1
SHORTNESS OF BREATH: 0
COLOR CHANGE: 0
VOMITING: 0
DIARRHEA: 0
CONSTIPATION: 1

## 2020-04-26 ASSESSMENT — PAIN SCALES - GENERAL: PAINLEVEL_OUTOF10: 8

## 2020-04-27 NOTE — ED PROVIDER NOTES
LARGE (A) Negative    WBC, UA 10-20 (A) 0 - 5 /HPF    RBC, UA 1-3 0 - 2 /HPF    Bacteria, UA FEW (A) None Seen /HPF   Magnesium   Result Value Ref Range    Magnesium 2.0 1.6 - 2.6 mg/dL       RADIOLOGY:    All Radiology results interpreted by Radiologist unless otherwise noted. CT ABDOMEN PELVIS W IV CONTRAST Additional Contrast? None   Final Result      1. Limited examination for detection of subtle inflammatory changes   due to paucity of intra-abdominal and pelvic fat. 2. Mild burden of diverticulosis involving sigmoid colon. 3. Large amount of stool seen throughout the colon. ---------------------------- NURSING NOTES AND VITALS REVIEWED -------------------------   The nursing notes within the ED encounter and vital signs as below have been reviewed. BP (!) 142/94   Pulse 66   Temp 98 °F (36.7 °C) (Oral)   Resp 16   Ht 5' 5\" (1.651 m)   Wt 113 lb (51.3 kg)   LMP 08/25/2011 (Exact Date)   SpO2 98%   BMI 18.80 kg/m²   Oxygen Saturation Interpretation: Normal      ------------------------------------------PROGRESS NOTES -------------------------------------------    ED COURSE MEDICATIONS:                Medications   sodium chloride flush 0.9 % injection 10 mL (has no administration in time range)   cefdinir (OMNICEF) capsule 300 mg (has no administration in time range)   ondansetron (ZOFRAN) injection 4 mg (4 mg Intravenous Given 4/26/20 2141)   0.9 % sodium chloride bolus (0 mLs Intravenous Stopped 4/26/20 2312)   dicyclomine (BENTYL) injection 20 mg (20 mg Intramuscular Given 4/26/20 2141)   iopamidol (ISOVUE-370) 76 % injection 110 mL (110 mLs Intravenous Given 4/26/20 2224)       CONSULTATIONS:            none.     COUNSELING:   I have spoken with the patient and discussed todays results, in addition to providing specific details for the plan of care and counseling regarding the diagnosis and prognosis.     --------------------------------------- IMPRESSION & DISPOSITION

## 2020-05-08 ENCOUNTER — HOSPITAL ENCOUNTER (EMERGENCY)
Age: 60
Discharge: HOME | End: 2020-05-08
Attending: EMERGENCY MEDICINE
Payer: COMMERCIAL

## 2020-05-08 ENCOUNTER — APPOINTMENT (OUTPATIENT)
Dept: GENERAL RADIOLOGY | Age: 60
End: 2020-05-08
Payer: COMMERCIAL

## 2020-05-08 VITALS
BODY MASS INDEX: 18.83 KG/M2 | HEIGHT: 65 IN | RESPIRATION RATE: 18 BRPM | OXYGEN SATURATION: 100 % | DIASTOLIC BLOOD PRESSURE: 78 MMHG | WEIGHT: 113 LBS | TEMPERATURE: 97.2 F | SYSTOLIC BLOOD PRESSURE: 124 MMHG | HEART RATE: 59 BPM

## 2020-05-08 LAB
BILIRUBIN URINE: NEGATIVE
BLOOD, URINE: NEGATIVE
CLARITY: CLEAR
COLOR: YELLOW
EKG ATRIAL RATE: 49 BPM
EKG P AXIS: 74 DEGREES
EKG P-R INTERVAL: 158 MS
EKG Q-T INTERVAL: 440 MS
EKG QRS DURATION: 94 MS
EKG QTC CALCULATION (BAZETT): 397 MS
EKG R AXIS: 95 DEGREES
EKG T AXIS: 85 DEGREES
EKG VENTRICULAR RATE: 49 BPM
GLUCOSE URINE: NEGATIVE MG/DL
KETONES, URINE: NEGATIVE MG/DL
LEUKOCYTE ESTERASE, URINE: NEGATIVE
NITRITE, URINE: NEGATIVE
PH UA: 7 (ref 5–9)
PROTEIN UA: NEGATIVE MG/DL
SPECIFIC GRAVITY UA: 1.02 (ref 1–1.03)
UROBILINOGEN, URINE: 0.2 E.U./DL

## 2020-05-08 PROCEDURE — 93010 ELECTROCARDIOGRAM REPORT: CPT | Performed by: INTERNAL MEDICINE

## 2020-05-08 PROCEDURE — 74018 RADEX ABDOMEN 1 VIEW: CPT

## 2020-05-08 PROCEDURE — 6360000002 HC RX W HCPCS: Performed by: EMERGENCY MEDICINE

## 2020-05-08 PROCEDURE — 6370000000 HC RX 637 (ALT 250 FOR IP): Performed by: EMERGENCY MEDICINE

## 2020-05-08 PROCEDURE — 99284 EMERGENCY DEPT VISIT MOD MDM: CPT

## 2020-05-08 PROCEDURE — 93005 ELECTROCARDIOGRAM TRACING: CPT | Performed by: EMERGENCY MEDICINE

## 2020-05-08 PROCEDURE — 81003 URINALYSIS AUTO W/O SCOPE: CPT

## 2020-05-08 PROCEDURE — 96374 THER/PROPH/DIAG INJ IV PUSH: CPT

## 2020-05-08 RX ORDER — LACTULOSE 10 G/15ML
10 SOLUTION ORAL ONCE
Status: COMPLETED | OUTPATIENT
Start: 2020-05-08 | End: 2020-05-08

## 2020-05-08 RX ORDER — KETOROLAC TROMETHAMINE 30 MG/ML
15 INJECTION, SOLUTION INTRAMUSCULAR; INTRAVENOUS ONCE
Status: DISCONTINUED | OUTPATIENT
Start: 2020-05-08 | End: 2020-05-08

## 2020-05-08 RX ORDER — KETOROLAC TROMETHAMINE 30 MG/ML
30 INJECTION, SOLUTION INTRAMUSCULAR; INTRAVENOUS ONCE
Status: COMPLETED | OUTPATIENT
Start: 2020-05-08 | End: 2020-05-08

## 2020-05-08 RX ADMIN — KETOROLAC TROMETHAMINE 30 MG: 30 INJECTION, SOLUTION INTRAMUSCULAR at 08:03

## 2020-05-08 RX ADMIN — LACTULOSE 10 G: 20 SOLUTION ORAL at 09:42

## 2020-05-08 ASSESSMENT — ENCOUNTER SYMPTOMS
WHEEZING: 0
VOMITING: 0
ABDOMINAL PAIN: 1
NAUSEA: 0
CONSTIPATION: 1
EYE DISCHARGE: 0
SHORTNESS OF BREATH: 0
COUGH: 0
DIARRHEA: 0
SINUS PRESSURE: 0
ABDOMINAL DISTENTION: 0
EYE REDNESS: 0
SORE THROAT: 0
BACK PAIN: 0
EYE PAIN: 0

## 2020-05-08 ASSESSMENT — PAIN SCALES - GENERAL
PAINLEVEL_OUTOF10: 9
PAINLEVEL_OUTOF10: 9

## 2020-05-08 ASSESSMENT — PAIN DESCRIPTION - LOCATION: LOCATION: RECTUM

## 2020-05-08 NOTE — ED PROVIDER NOTES
59-year-old female presenting with vaginal pain and concern for rectal prolapse. Patient was recently evaluated in the emergency department and did have a rectal prolapse which was reduced with sugar and manual pressure. Patient states that she was constipated at that time has been taking her bowel regimen and has been having normal bowel movements. However, she states that when she has a bowel movement she feels like her rectum is prolapsing again. She denies any rectal bleeding or any traumas to the rectum. She also notes some vulvar pain and she is unsure where this is coming from. She denies any recent injuries to the groin, sexual intercourse, or concern for STDs at this time. She was noted to have a urinary tract infection on a prior visit was treated at that time. States that she is having no hematuria or dysuria at this time. No systemic symptoms of fevers or chills, nausea or vomiting. No vaginal bleeding or discharge. Review of Systems   Constitutional: Negative for chills and fever. HENT: Negative for ear pain, sinus pressure and sore throat. Eyes: Negative for pain, discharge and redness. Respiratory: Negative for cough, shortness of breath and wheezing. Cardiovascular: Negative for chest pain. Gastrointestinal: Positive for abdominal pain and constipation. Negative for abdominal distention, diarrhea, nausea and vomiting. Genitourinary: Positive for pelvic pain and vaginal pain. Negative for dysuria and frequency. Musculoskeletal: Negative for arthralgias and back pain. Skin: Negative for rash and wound. Neurological: Negative for weakness and headaches. Hematological: Negative for adenopathy. All other systems reviewed and are negative. Physical Exam  Vitals signs and nursing note reviewed. Constitutional:       Appearance: She is well-developed. HENT:      Head: Normocephalic and atraumatic.    Eyes:      Pupils: Pupils are equal, round, and reactive to light. Neck:      Musculoskeletal: Normal range of motion and neck supple. Cardiovascular:      Rate and Rhythm: Normal rate and regular rhythm. Heart sounds: Normal heart sounds. No murmur. Pulmonary:      Effort: Pulmonary effort is normal. No respiratory distress. Breath sounds: No rales. Abdominal:      General: Bowel sounds are normal. There is no distension. Palpations: Abdomen is soft. There is no mass. Tenderness: There is no abdominal tenderness. There is no guarding or rebound. Hernia: No hernia is present. Comments: Abdomen is soft and nontender. No rebound, guarding. Genitourinary:     General: Normal vulva. Rectum: Normal.      Comments: No vaginal rectal prolapse noted on exam.  No obvious lesions. Skin:     General: Skin is warm and dry. Neurological:      Mental Status: She is alert and oriented to person, place, and time. Cranial Nerves: No cranial nerve deficit. Coordination: Coordination normal.          Procedures     MDM  Number of Diagnoses or Management Options  Constipation, unspecified constipation type:   Uterine pain:   Diagnosis management comments: 49-year-old female presenting with multiple complaints, including vulvar and rectal pain and concern for rectal prolapse. Patient does have a prior history of rectal prolapse as well as urinary tract infection which was treated on her last visit. Patient was examined and had no evidence of vulvar or vaginal irritation, rectal prolapse or vaginal prolapse. She responded well to a dose of Toradol. X-ray showed constipation, which is consistent with prior diagnoses and evaluations. She was counseled on need to continue her bowel regimen and it was recommend that she follow-up with her colorectal surgeon and PCP started next week for further treatment and adjustment of medications as needed. Patient was agreeable this and will return if any of her symptoms worsen. clinically for constipation                  ------------------------- NURSING NOTES AND VITALS REVIEWED ---------------------------  Date / Time Roomed:  5/8/2020  7:31 AM  ED Bed Assignment:  14A/14A-14    The nursing notes within the ED encounter and vital signs as below have been reviewed. /78   Pulse 59   Temp 97.2 °F (36.2 °C) (Infrared)   Resp 18   Ht 5' 5\" (1.651 m)   Wt 113 lb (51.3 kg)   LMP 08/25/2011 (Exact Date)   SpO2 100%   BMI 18.80 kg/m²   Oxygen Saturation Interpretation: Normal      ------------------------------------------ PROGRESS NOTES ------------------------------------------         8:03 AM EDT  I have spoken with the patient and discussed todays results, in addition to providing specific details for the plan of care and counseling regarding the diagnosis and prognosis. Their questions are answered at this time and they are agreeable with the plan. I discussed at length with them reasons for immediate return here for re evaluation. They will followup with their primary care physician and general surgeon by calling their office on Monday.      --------------------------------- ADDITIONAL PROVIDER NOTES ---------------------------------  At this time the patient is without objective evidence of an acute process requiring hospitalization or inpatient management. They have remained hemodynamically stable throughout their entire ED visit and are stable for discharge with outpatient follow-up. The plan has been discussed in detail and they are aware of the specific conditions for emergent return, as well as the importance of follow-up. Discharge Medication List as of 5/8/2020  9:41 AM          Diagnosis:  1. Constipation, unspecified constipation type    2. Uterine pain        Disposition:  Patient's disposition: Discharge to home  Patient's condition is stable. NOTE: This report was transcribed using voice recognition software.  Every effort was made to ensure

## 2020-05-27 ENCOUNTER — APPOINTMENT (OUTPATIENT)
Dept: GENERAL RADIOLOGY | Age: 60
End: 2020-05-27
Payer: COMMERCIAL

## 2020-05-27 ENCOUNTER — HOSPITAL ENCOUNTER (EMERGENCY)
Age: 60
Discharge: HOME | End: 2020-05-27
Payer: COMMERCIAL

## 2020-05-27 VITALS
OXYGEN SATURATION: 100 % | TEMPERATURE: 98.2 F | WEIGHT: 115 LBS | DIASTOLIC BLOOD PRESSURE: 70 MMHG | BODY MASS INDEX: 19.63 KG/M2 | RESPIRATION RATE: 18 BRPM | HEART RATE: 70 BPM | HEIGHT: 64 IN | SYSTOLIC BLOOD PRESSURE: 138 MMHG

## 2020-05-27 LAB
ALBUMIN SERPL-MCNC: 4.9 G/DL (ref 3.5–5.2)
ALP BLD-CCNC: 90 U/L (ref 35–104)
ALT SERPL-CCNC: 9 U/L (ref 0–32)
AMORPHOUS: ABNORMAL
ANION GAP SERPL CALCULATED.3IONS-SCNC: 13 MMOL/L (ref 7–16)
AST SERPL-CCNC: 17 U/L (ref 0–31)
BACTERIA: PRESENT /HPF
BASOPHILS ABSOLUTE: 0.06 E9/L (ref 0–0.2)
BASOPHILS RELATIVE PERCENT: 1.4 % (ref 0–2)
BILIRUB SERPL-MCNC: 0.2 MG/DL (ref 0–1.2)
BILIRUBIN URINE: NEGATIVE
BLOOD, URINE: NEGATIVE
BUN BLDV-MCNC: 11 MG/DL (ref 6–20)
CALCIUM SERPL-MCNC: 10.8 MG/DL (ref 8.6–10.2)
CHLORIDE BLD-SCNC: 97 MMOL/L (ref 98–107)
CLARITY: ABNORMAL
CO2: 31 MMOL/L (ref 22–29)
COLOR: YELLOW
CREAT SERPL-MCNC: 0.7 MG/DL (ref 0.5–1)
EOSINOPHILS ABSOLUTE: 0.1 E9/L (ref 0.05–0.5)
EOSINOPHILS RELATIVE PERCENT: 2.4 % (ref 0–6)
GFR AFRICAN AMERICAN: >60
GFR NON-AFRICAN AMERICAN: >60 ML/MIN/1.73
GLUCOSE BLD-MCNC: 89 MG/DL (ref 74–99)
GLUCOSE URINE: NEGATIVE MG/DL
HCT VFR BLD CALC: 40.1 % (ref 34–48)
HEMOGLOBIN: 12.9 G/DL (ref 11.5–15.5)
IMMATURE GRANULOCYTES #: 0.01 E9/L
IMMATURE GRANULOCYTES %: 0.2 % (ref 0–5)
KETONES, URINE: NEGATIVE MG/DL
LACTIC ACID: 1.7 MMOL/L (ref 0.5–2.2)
LEUKOCYTE ESTERASE, URINE: ABNORMAL
LYMPHOCYTES ABSOLUTE: 1.73 E9/L (ref 1.5–4)
LYMPHOCYTES RELATIVE PERCENT: 40.8 % (ref 20–42)
MAGNESIUM: 2.2 MG/DL (ref 1.6–2.6)
MCH RBC QN AUTO: 31.2 PG (ref 26–35)
MCHC RBC AUTO-ENTMCNC: 32.2 % (ref 32–34.5)
MCV RBC AUTO: 97.1 FL (ref 80–99.9)
MONOCYTES ABSOLUTE: 0.26 E9/L (ref 0.1–0.95)
MONOCYTES RELATIVE PERCENT: 6.1 % (ref 2–12)
NEUTROPHILS ABSOLUTE: 2.08 E9/L (ref 1.8–7.3)
NEUTROPHILS RELATIVE PERCENT: 49.1 % (ref 43–80)
NITRITE, URINE: NEGATIVE
PDW BLD-RTO: 12.9 FL (ref 11.5–15)
PH UA: 7.5 (ref 5–9)
PLATELET # BLD: 281 E9/L (ref 130–450)
PMV BLD AUTO: 10.8 FL (ref 7–12)
POTASSIUM REFLEX MAGNESIUM: 3.5 MMOL/L (ref 3.5–5)
PROTEIN UA: NEGATIVE MG/DL
RBC # BLD: 4.13 E12/L (ref 3.5–5.5)
RBC UA: ABNORMAL /HPF (ref 0–2)
SODIUM BLD-SCNC: 141 MMOL/L (ref 132–146)
SPECIFIC GRAVITY UA: 1.01 (ref 1–1.03)
TOTAL PROTEIN: 7.5 G/DL (ref 6.4–8.3)
UROBILINOGEN, URINE: 0.2 E.U./DL
WBC # BLD: 4.2 E9/L (ref 4.5–11.5)
WBC UA: ABNORMAL /HPF (ref 0–5)

## 2020-05-27 PROCEDURE — 80053 COMPREHEN METABOLIC PANEL: CPT

## 2020-05-27 PROCEDURE — 74018 RADEX ABDOMEN 1 VIEW: CPT

## 2020-05-27 PROCEDURE — 85025 COMPLETE CBC W/AUTO DIFF WBC: CPT

## 2020-05-27 PROCEDURE — 81001 URINALYSIS AUTO W/SCOPE: CPT

## 2020-05-27 PROCEDURE — 99284 EMERGENCY DEPT VISIT MOD MDM: CPT

## 2020-05-27 PROCEDURE — 83735 ASSAY OF MAGNESIUM: CPT

## 2020-05-27 PROCEDURE — 83605 ASSAY OF LACTIC ACID: CPT

## 2020-05-27 PROCEDURE — 2580000003 HC RX 258: Performed by: PHYSICIAN ASSISTANT

## 2020-05-27 PROCEDURE — 87088 URINE BACTERIA CULTURE: CPT

## 2020-05-27 RX ORDER — POLYETHYLENE GLYCOL 3350 17 G/17G
POWDER, FOR SOLUTION ORAL
Qty: 1 BOTTLE | Refills: 0 | Status: SHIPPED | OUTPATIENT
Start: 2020-05-27 | End: 2020-06-12

## 2020-05-27 RX ORDER — CEFDINIR 300 MG/1
300 CAPSULE ORAL 2 TIMES DAILY
Qty: 14 CAPSULE | Refills: 0 | Status: SHIPPED | OUTPATIENT
Start: 2020-05-27 | End: 2020-06-02

## 2020-05-27 RX ORDER — 0.9 % SODIUM CHLORIDE 0.9 %
1000 INTRAVENOUS SOLUTION INTRAVENOUS ONCE
Status: COMPLETED | OUTPATIENT
Start: 2020-05-27 | End: 2020-05-27

## 2020-05-27 RX ADMIN — SODIUM CHLORIDE 1000 ML: 9 INJECTION, SOLUTION INTRAVENOUS at 16:26

## 2020-05-27 ASSESSMENT — ENCOUNTER SYMPTOMS
SINUS PRESSURE: 0
BACK PAIN: 0
SHORTNESS OF BREATH: 0
CHEST TIGHTNESS: 0
CONSTIPATION: 1
SINUS PAIN: 0
FACIAL SWELLING: 0
ABDOMINAL PAIN: 1
DIARRHEA: 0
NAUSEA: 0
VOMITING: 0
COUGH: 0
TROUBLE SWALLOWING: 0
COLOR CHANGE: 0
SORE THROAT: 0

## 2020-05-27 ASSESSMENT — PAIN DESCRIPTION - ONSET
ONSET: ON-GOING
ONSET: ON-GOING

## 2020-05-27 ASSESSMENT — PAIN DESCRIPTION - PAIN TYPE
TYPE: CHRONIC PAIN
TYPE: ACUTE PAIN

## 2020-05-27 ASSESSMENT — PAIN SCALES - GENERAL
PAINLEVEL_OUTOF10: 10
PAINLEVEL_OUTOF10: 10

## 2020-05-27 ASSESSMENT — PAIN DESCRIPTION - ORIENTATION: ORIENTATION: LEFT;RIGHT;LOWER

## 2020-05-27 ASSESSMENT — PAIN DESCRIPTION - LOCATION
LOCATION: VAGINA;ABDOMEN
LOCATION: ABDOMEN

## 2020-05-27 ASSESSMENT — PAIN DESCRIPTION - FREQUENCY
FREQUENCY: INTERMITTENT
FREQUENCY: CONTINUOUS

## 2020-05-27 ASSESSMENT — PAIN DESCRIPTION - DESCRIPTORS
DESCRIPTORS: CONSTANT;DISCOMFORT
DESCRIPTORS: DISCOMFORT;ACHING

## 2020-05-27 NOTE — ED PROVIDER NOTES
for color change, pallor and rash. Neurological: Negative for dizziness, weakness, light-headedness and headaches. Psychiatric/Behavioral: Negative for agitation, behavioral problems and confusion. Pertinent positives and negatives are stated within HPI, all other systems reviewed and are negative.      --------------------------------------------- PAST HISTORY ---------------------------------------------  Past Medical History:  has a past medical history of Chronic idiopathic constipation, Depression, Head injury, Hyperlipidemia, IBS (irritable bowel syndrome), Jaw pain, Migraines, neuralgic, Pain in gums, and [facility] Past Surgical History:  has a past surgical history that includes cyst removal (Right, 2008); Toe Surgery (1999); and laparoscopy (1980). Social History:  reports that she has never smoked. She has never used smokeless tobacco. She reports that she does not drink alcohol or use drugs. Family History: family history includes Cancer in her mother; Heart Failure in her father. The patients home medications have been reviewed.     Allergies: Ibuprofen and Carbamazepine and analogs    -------------------------------------------------- RESULTS -------------------------------------------------  All laboratory and radiology results have been personally reviewed by myself   LABS:  Results for orders placed or performed during the hospital encounter of 05/27/20   Comprehensive Metabolic Panel w/ Reflex to MG   Result Value Ref Range    Sodium 141 132 - 146 mmol/L    Potassium reflex Magnesium 3.5 3.5 - 5.0 mmol/L    Chloride 97 (L) 98 - 107 mmol/L    CO2 31 (H) 22 - 29 mmol/L    Anion Gap 13 7 - 16 mmol/L    Glucose 89 74 - 99 mg/dL    BUN 11 6 - 20 mg/dL    CREATININE 0.7 0.5 - 1.0 mg/dL    GFR Non-African American >60 >=60 mL/min/1.73    GFR African American >60     Calcium 10.8 (H) 8.6 - 10.2 mg/dL    Total Protein 7.5 6.4 - 8.3 g/dL    Alb 4.9 3.5 - 5.2 g/dL    Total % sodium chloride bolus (0 mLs Intravenous Stopped 5/27/20 1922)         ED COURSE:      XR ABDOMEN (KUB) (SINGLE AP VIEW)   Final Result   Presently the bowel pattern is nonspecific. Procedures:  Procedures     Medical Decision Making:   MDM   63-year-old female with history of chronic constipation presenting the ED with urinary retention and abdominal pain and constipation although she had a bowel movement this morning. Patient is afebrile and hemodynamically stable. Her labs are unremarkable. UA does show bacteria and trace leukocytes. Urine culture sent. Patient was able to urinate normally in the emergency department. She also states she had a bowel movement and her abdominal pain improved. Dispo pending KUB. Patient to be treated for UTI with ALEKSANDR WILKS and given a prescription for MiraLAX. She is to follow-up with her PCP. KUB resulted showing nonspecific bowel pattern. There is no conspicuous indication for any severe component of constipation. Patient did have bowel movements here in the emergency department she also had a bowel movement at home. We do not suspect any bowel obstruction. Patient will be discharged home with MiraLAX as well as a antibiotic for the urinary tract infection. Patient overall nontoxic, neurovascularly intact she was educated on good follow-up care with her primary care physician. Patient expressed understanding and safely discharged home vitals on discharge 138/70, heart rate 70, temp 98.2, respiratory rate 18, pulse ox 100% on room air. Counseling: The emergency provider has spoken with the patient and discussed todays results, in addition to providing specific details for the plan of care and counseling regarding the diagnosis and prognosis. Questions are answered at this time and they are agreeable with the plan.      --------------------------------- IMPRESSION AND DISPOSITION ---------------------------------    IMPRESSION  1.  Constipation,

## 2020-05-29 LAB — URINE CULTURE, ROUTINE: NORMAL

## 2020-06-02 ENCOUNTER — HOSPITAL ENCOUNTER (EMERGENCY)
Age: 60
Discharge: HOME | End: 2020-06-02
Attending: EMERGENCY MEDICINE
Payer: COMMERCIAL

## 2020-06-02 VITALS
HEIGHT: 65 IN | SYSTOLIC BLOOD PRESSURE: 110 MMHG | HEART RATE: 66 BPM | TEMPERATURE: 97.2 F | DIASTOLIC BLOOD PRESSURE: 76 MMHG | WEIGHT: 106 LBS | BODY MASS INDEX: 17.66 KG/M2 | OXYGEN SATURATION: 97 % | RESPIRATION RATE: 16 BRPM

## 2020-06-02 LAB
ACETAMINOPHEN LEVEL: <5 MCG/ML (ref 10–30)
ALBUMIN SERPL-MCNC: 4.2 G/DL (ref 3.5–5.2)
ALP BLD-CCNC: 79 U/L (ref 35–104)
ALT SERPL-CCNC: 13 U/L (ref 0–32)
AMPHETAMINE SCREEN, URINE: NOT DETECTED
ANION GAP SERPL CALCULATED.3IONS-SCNC: 14 MMOL/L (ref 7–16)
AST SERPL-CCNC: 20 U/L (ref 0–31)
BARBITURATE SCREEN URINE: NOT DETECTED
BASOPHILS ABSOLUTE: 0.04 E9/L (ref 0–0.2)
BASOPHILS RELATIVE PERCENT: 0.8 % (ref 0–2)
BENZODIAZEPINE SCREEN, URINE: NOT DETECTED
BILIRUB SERPL-MCNC: 0.3 MG/DL (ref 0–1.2)
BILIRUBIN URINE: NEGATIVE
BLOOD, URINE: NEGATIVE
BUN BLDV-MCNC: 3 MG/DL (ref 6–20)
CALCIUM SERPL-MCNC: 9.1 MG/DL (ref 8.6–10.2)
CANNABINOID SCREEN URINE: NOT DETECTED
CHLORIDE BLD-SCNC: 103 MMOL/L (ref 98–107)
CLARITY: CLEAR
CO2: 24 MMOL/L (ref 22–29)
COCAINE METABOLITE SCREEN URINE: NOT DETECTED
COLOR: YELLOW
CREAT SERPL-MCNC: 0.7 MG/DL (ref 0.5–1)
EKG ATRIAL RATE: 68 BPM
EKG P AXIS: 82 DEGREES
EKG P-R INTERVAL: 168 MS
EKG Q-T INTERVAL: 398 MS
EKG QRS DURATION: 86 MS
EKG QTC CALCULATION (BAZETT): 423 MS
EKG R AXIS: 94 DEGREES
EKG T AXIS: 75 DEGREES
EKG VENTRICULAR RATE: 68 BPM
EOSINOPHILS ABSOLUTE: 0.07 E9/L (ref 0.05–0.5)
EOSINOPHILS RELATIVE PERCENT: 1.4 % (ref 0–6)
ETHANOL: <10 MG/DL (ref 0–0.08)
FENTANYL SCREEN, URINE: NOT DETECTED
GFR AFRICAN AMERICAN: >60
GFR NON-AFRICAN AMERICAN: >60 ML/MIN/1.73
GLUCOSE BLD-MCNC: 85 MG/DL (ref 74–99)
GLUCOSE URINE: NEGATIVE MG/DL
HCT VFR BLD CALC: 34 % (ref 34–48)
HEMOGLOBIN: 11 G/DL (ref 11.5–15.5)
IMMATURE GRANULOCYTES #: 0.02 E9/L
IMMATURE GRANULOCYTES %: 0.4 % (ref 0–5)
KETONES, URINE: NEGATIVE MG/DL
LEUKOCYTE ESTERASE, URINE: NEGATIVE
LYMPHOCYTES ABSOLUTE: 1.33 E9/L (ref 1.5–4)
LYMPHOCYTES RELATIVE PERCENT: 26.7 % (ref 20–42)
Lab: NORMAL
MCH RBC QN AUTO: 30.9 PG (ref 26–35)
MCHC RBC AUTO-ENTMCNC: 32.4 % (ref 32–34.5)
MCV RBC AUTO: 95.5 FL (ref 80–99.9)
METHADONE SCREEN, URINE: NOT DETECTED
MONOCYTES ABSOLUTE: 0.27 E9/L (ref 0.1–0.95)
MONOCYTES RELATIVE PERCENT: 5.4 % (ref 2–12)
NEUTROPHILS ABSOLUTE: 3.25 E9/L (ref 1.8–7.3)
NEUTROPHILS RELATIVE PERCENT: 65.3 % (ref 43–80)
NITRITE, URINE: NEGATIVE
OPIATE SCREEN URINE: NOT DETECTED
OXYCODONE URINE: NOT DETECTED
PDW BLD-RTO: 12.4 FL (ref 11.5–15)
PH UA: 7 (ref 5–9)
PHENCYCLIDINE SCREEN URINE: NOT DETECTED
PLATELET # BLD: 241 E9/L (ref 130–450)
PMV BLD AUTO: 9.6 FL (ref 7–12)
POTASSIUM SERPL-SCNC: 3.7 MMOL/L (ref 3.5–5)
PROTEIN UA: NEGATIVE MG/DL
RBC # BLD: 3.56 E12/L (ref 3.5–5.5)
SALICYLATE, SERUM: <0.3 MG/DL (ref 0–30)
SODIUM BLD-SCNC: 141 MMOL/L (ref 132–146)
SPECIFIC GRAVITY UA: <=1.005 (ref 1–1.03)
TOTAL PROTEIN: 6.3 G/DL (ref 6.4–8.3)
TRICYCLIC ANTIDEPRESSANTS SCREEN SERUM: NEGATIVE NG/ML
UROBILINOGEN, URINE: 0.2 E.U./DL
WBC # BLD: 5 E9/L (ref 4.5–11.5)

## 2020-06-02 PROCEDURE — 80053 COMPREHEN METABOLIC PANEL: CPT

## 2020-06-02 PROCEDURE — 93010 ELECTROCARDIOGRAM REPORT: CPT | Performed by: INTERNAL MEDICINE

## 2020-06-02 PROCEDURE — G0480 DRUG TEST DEF 1-7 CLASSES: HCPCS

## 2020-06-02 PROCEDURE — 80307 DRUG TEST PRSMV CHEM ANLYZR: CPT

## 2020-06-02 PROCEDURE — 93005 ELECTROCARDIOGRAM TRACING: CPT | Performed by: NURSE PRACTITIONER

## 2020-06-02 PROCEDURE — 81003 URINALYSIS AUTO W/O SCOPE: CPT

## 2020-06-02 PROCEDURE — 85025 COMPLETE CBC W/AUTO DIFF WBC: CPT

## 2020-06-02 PROCEDURE — 99284 EMERGENCY DEPT VISIT MOD MDM: CPT

## 2020-06-02 ASSESSMENT — PAIN DESCRIPTION - DESCRIPTORS
DESCRIPTORS: HEADACHE
DESCRIPTORS: CONSTANT;DISCOMFORT
DESCRIPTORS_2: OTHER (COMMENT);BURNING

## 2020-06-02 ASSESSMENT — PAIN DESCRIPTION - FREQUENCY
FREQUENCY: INTERMITTENT
FREQUENCY: CONTINUOUS

## 2020-06-02 ASSESSMENT — PAIN SCALES - GENERAL
PAINLEVEL_OUTOF10: 6
PAINLEVEL_OUTOF10: 4

## 2020-06-02 ASSESSMENT — PAIN DESCRIPTION - PAIN TYPE
TYPE: CHRONIC PAIN
TYPE: ACUTE PAIN

## 2020-06-02 ASSESSMENT — PAIN DESCRIPTION - LOCATION
LOCATION_2: GENERALIZED
LOCATION: HEAD
LOCATION: GENERALIZED

## 2020-06-02 ASSESSMENT — PAIN DESCRIPTION - PROGRESSION
CLINICAL_PROGRESSION_2: GRADUALLY WORSENING
CLINICAL_PROGRESSION: GRADUALLY IMPROVING
CLINICAL_PROGRESSION: NOT CHANGED

## 2020-06-02 ASSESSMENT — PAIN DESCRIPTION - ORIENTATION: ORIENTATION: ANTERIOR;POSTERIOR

## 2020-06-02 ASSESSMENT — PAIN DESCRIPTION - ONSET
ONSET_2: PROGRESSIVE
ONSET: ON-GOING
ONSET: ON-GOING

## 2020-06-02 ASSESSMENT — PAIN DESCRIPTION - INTENSITY
RATING_2: 0
RATING_2: 8

## 2020-06-02 ASSESSMENT — PAIN DESCRIPTION - DURATION: DURATION_2: INTERMITTENT

## 2020-06-02 NOTE — ED NOTES
Shana Mary called back [phone]      Permission to treat obtained     Shana Mary reports that pt has been iratic walking around half naked. Shana Mary reports that she presents well and then appears to be unstable. I did advise that pt had recent thoughts of walking to a bridge to jump - but denies any thoughts, plan or intent to self harm at this time. Shana Mary said \"that's her baseline\". At this time, Shana Mary does not want admission. Lena suggest CSU. Later - Scar Lincoln - psych nurse called in also, who confirmed that pt is stable enough for the CSU. Scar Lincoln explained that pt's \"suicidal thought comment\" about jumping off a bridge \"was over a week ago and she denied it the next day\". Scar Lincoln said that pt is \"somatic and loves coming to the hospital\". Plan is to medically clear and refer to CSU.           Rosalia LIZA Barger  06/02/20 1213

## 2020-06-12 ENCOUNTER — HOSPITAL ENCOUNTER (EMERGENCY)
Age: 60
Discharge: HOME | End: 2020-06-12
Attending: EMERGENCY MEDICINE
Payer: COMMERCIAL

## 2020-06-12 ENCOUNTER — APPOINTMENT (OUTPATIENT)
Dept: GENERAL RADIOLOGY | Age: 60
End: 2020-06-12
Payer: COMMERCIAL

## 2020-06-12 VITALS
RESPIRATION RATE: 16 BRPM | DIASTOLIC BLOOD PRESSURE: 69 MMHG | HEIGHT: 65 IN | TEMPERATURE: 96.8 F | BODY MASS INDEX: 18.66 KG/M2 | SYSTOLIC BLOOD PRESSURE: 113 MMHG | WEIGHT: 112 LBS | OXYGEN SATURATION: 100 % | HEART RATE: 61 BPM

## 2020-06-12 LAB
ALBUMIN SERPL-MCNC: 4.5 G/DL (ref 3.5–5.2)
ALP BLD-CCNC: 74 U/L (ref 35–104)
ALT SERPL-CCNC: 19 U/L (ref 0–32)
ANION GAP SERPL CALCULATED.3IONS-SCNC: 11 MMOL/L (ref 7–16)
AST SERPL-CCNC: 22 U/L (ref 0–31)
BACTERIA: NORMAL /HPF
BASOPHILS ABSOLUTE: 0.07 E9/L (ref 0–0.2)
BASOPHILS RELATIVE PERCENT: 1.4 % (ref 0–2)
BILIRUB SERPL-MCNC: <0.2 MG/DL (ref 0–1.2)
BILIRUBIN URINE: NEGATIVE
BLOOD, URINE: ABNORMAL
BUN BLDV-MCNC: 11 MG/DL (ref 6–20)
CALCIUM SERPL-MCNC: 9.8 MG/DL (ref 8.6–10.2)
CHLORIDE BLD-SCNC: 102 MMOL/L (ref 98–107)
CLARITY: CLEAR
CO2: 29 MMOL/L (ref 22–29)
COLOR: YELLOW
CREAT SERPL-MCNC: 0.9 MG/DL (ref 0.5–1)
EOSINOPHILS ABSOLUTE: 0.17 E9/L (ref 0.05–0.5)
EOSINOPHILS RELATIVE PERCENT: 3.4 % (ref 0–6)
GFR AFRICAN AMERICAN: >60
GFR NON-AFRICAN AMERICAN: >60 ML/MIN/1.73
GLUCOSE BLD-MCNC: 55 MG/DL (ref 74–99)
GLUCOSE URINE: NEGATIVE MG/DL
HCT VFR BLD CALC: 36.1 % (ref 34–48)
HEMOGLOBIN: 11.4 G/DL (ref 11.5–15.5)
IMMATURE GRANULOCYTES #: 0.01 E9/L
IMMATURE GRANULOCYTES %: 0.2 % (ref 0–5)
KETONES, URINE: NEGATIVE MG/DL
LEUKOCYTE ESTERASE, URINE: ABNORMAL
LIPASE: 51 U/L (ref 13–60)
LYMPHOCYTES ABSOLUTE: 2.16 E9/L (ref 1.5–4)
LYMPHOCYTES RELATIVE PERCENT: 43.5 % (ref 20–42)
MAGNESIUM: 2.1 MG/DL (ref 1.6–2.6)
MCH RBC QN AUTO: 30.8 PG (ref 26–35)
MCHC RBC AUTO-ENTMCNC: 31.6 % (ref 32–34.5)
MCV RBC AUTO: 97.6 FL (ref 80–99.9)
MONOCYTES ABSOLUTE: 0.4 E9/L (ref 0.1–0.95)
MONOCYTES RELATIVE PERCENT: 8 % (ref 2–12)
NEUTROPHILS ABSOLUTE: 2.16 E9/L (ref 1.8–7.3)
NEUTROPHILS RELATIVE PERCENT: 43.5 % (ref 43–80)
NITRITE, URINE: NEGATIVE
PDW BLD-RTO: 12.4 FL (ref 11.5–15)
PH UA: 7.5 (ref 5–9)
PLATELET # BLD: 215 E9/L (ref 130–450)
PMV BLD AUTO: 10.1 FL (ref 7–12)
POTASSIUM REFLEX MAGNESIUM: 3.1 MMOL/L (ref 3.5–5)
PROTEIN UA: NEGATIVE MG/DL
RBC # BLD: 3.7 E12/L (ref 3.5–5.5)
RBC UA: NORMAL /HPF (ref 0–2)
SODIUM BLD-SCNC: 142 MMOL/L (ref 132–146)
SPECIFIC GRAVITY UA: 1.01 (ref 1–1.03)
TOTAL PROTEIN: 6.8 G/DL (ref 6.4–8.3)
UROBILINOGEN, URINE: 0.2 E.U./DL
WBC # BLD: 5 E9/L (ref 4.5–11.5)
WBC UA: NORMAL /HPF (ref 0–5)

## 2020-06-12 PROCEDURE — 83735 ASSAY OF MAGNESIUM: CPT

## 2020-06-12 PROCEDURE — 83690 ASSAY OF LIPASE: CPT

## 2020-06-12 PROCEDURE — 80053 COMPREHEN METABOLIC PANEL: CPT

## 2020-06-12 PROCEDURE — 74018 RADEX ABDOMEN 1 VIEW: CPT

## 2020-06-12 PROCEDURE — 85025 COMPLETE CBC W/AUTO DIFF WBC: CPT

## 2020-06-12 PROCEDURE — 99284 EMERGENCY DEPT VISIT MOD MDM: CPT

## 2020-06-12 PROCEDURE — 6370000000 HC RX 637 (ALT 250 FOR IP): Performed by: STUDENT IN AN ORGANIZED HEALTH CARE EDUCATION/TRAINING PROGRAM

## 2020-06-12 PROCEDURE — 6370000000 HC RX 637 (ALT 250 FOR IP): Performed by: EMERGENCY MEDICINE

## 2020-06-12 PROCEDURE — 81001 URINALYSIS AUTO W/SCOPE: CPT

## 2020-06-12 RX ORDER — DOCUSATE SODIUM 100 MG/1
100 CAPSULE, LIQUID FILLED ORAL 2 TIMES DAILY
Qty: 10 CAPSULE | Refills: 0 | Status: SHIPPED | OUTPATIENT
Start: 2020-06-12 | End: 2020-06-17

## 2020-06-12 RX ORDER — POTASSIUM CHLORIDE 20 MEQ/1
40 TABLET, EXTENDED RELEASE ORAL ONCE
Status: COMPLETED | OUTPATIENT
Start: 2020-06-12 | End: 2020-06-12

## 2020-06-12 RX ORDER — POLYETHYLENE GLYCOL 3350 17 G/17G
17 POWDER, FOR SOLUTION ORAL DAILY
Qty: 85 G | Refills: 0 | Status: SHIPPED | OUTPATIENT
Start: 2020-06-12 | End: 2020-06-17

## 2020-06-12 RX ORDER — DOCUSATE SODIUM 50 MG/5ML
100 LIQUID ORAL ONCE
Status: COMPLETED | OUTPATIENT
Start: 2020-06-12 | End: 2020-06-12

## 2020-06-12 RX ADMIN — DOCUSATE SODIUM 100 MG: 50 LIQUID ORAL at 20:27

## 2020-06-12 RX ADMIN — LIDOCAINE HYDROCHLORIDE: 20 SOLUTION ORAL; TOPICAL at 20:27

## 2020-06-12 RX ADMIN — POTASSIUM CHLORIDE 40 MEQ: 20 TABLET, EXTENDED RELEASE ORAL at 21:56

## 2020-06-12 ASSESSMENT — ENCOUNTER SYMPTOMS
WHEEZING: 0
SHORTNESS OF BREATH: 0
ABDOMINAL DISTENTION: 0
EYE PAIN: 0
VOMITING: 0
NAUSEA: 1
DIARRHEA: 0
EYE REDNESS: 0
COUGH: 0
SORE THROAT: 0
EYE DISCHARGE: 0
CONSTIPATION: 1
SINUS PRESSURE: 0
BACK PAIN: 0

## 2020-06-12 ASSESSMENT — PAIN SCALES - GENERAL: PAINLEVEL_OUTOF10: 9

## 2020-06-12 ASSESSMENT — PAIN DESCRIPTION - ORIENTATION: ORIENTATION: LOWER

## 2020-06-12 ASSESSMENT — PAIN DESCRIPTION - PAIN TYPE: TYPE: ACUTE PAIN

## 2020-06-12 NOTE — ED NOTES
Bed: 17B-17  Expected date:   Expected time:   Means of arrival:   Comments:  Luis Antonio Diaz RN  06/12/20 1942

## 2020-06-13 ENCOUNTER — CARE COORDINATION (OUTPATIENT)
Dept: CARE COORDINATION | Age: 60
End: 2020-06-13

## 2020-06-13 NOTE — ED PROVIDER NOTES
motion and neck supple. Cardiovascular:      Rate and Rhythm: Normal rate and regular rhythm. Heart sounds: Normal heart sounds. No murmur. Pulmonary:      Effort: Pulmonary effort is normal. No respiratory distress. Breath sounds: Normal breath sounds. No wheezing or rales. Abdominal:      General: Bowel sounds are normal.      Palpations: Abdomen is soft. Tenderness: There is abdominal tenderness (Minimal tenderness generalized). There is no guarding or rebound. Skin:     General: Skin is warm and dry. Neurological:      Mental Status: She is alert and oriented to person, place, and time. Cranial Nerves: No cranial nerve deficit. Coordination: Coordination normal.          Procedures     MDM  Number of Diagnoses or Management Options  Diagnosis management comments: Patient is a 71-year-old female with a history of chronic constipation is presenting with no bowel movements for the last 2 days with minimal nausea and no bouts of emesis. Patient did have several bowel movements prior to the last 2 days. Patient exhibits minimal tenderness on palpation with no specific quadrant worsening another. Patient looks well sitting in bed. To obtain general labs provide GI cocktail and Colace for hopeful symptomatic improvement. Patient will then be reassessed. --------------------------------------------- PAST HISTORY ---------------------------------------------  Past Medical History:  has a past medical history of Chronic idiopathic constipation, Depression, Head injury, Hyperlipidemia, IBS (irritable bowel syndrome), Jaw pain, Migraines, neuralgic, Pain in gums, and [facility] Past Surgical History:  has a past surgical history that includes cyst removal (Right, 2008); Toe Surgery (1999); and laparoscopy (1980). Social History:  reports that she has never smoked.  She has never used smokeless tobacco. She reports that she does not drink alcohol or use Urinalysis, reflex to microscopic   Result Value Ref Range    Color, UA Yellow Straw/Yellow    Clarity, UA Clear Clear    Glucose, Ur Negative Negative mg/dL    Bilirubin Urine Negative Negative    Ketones, Urine Negative Negative mg/dL    Specific Gravity, UA 1.010 1.005 - 1.030    Blood, Urine TRACE-INTACT Negative    pH, UA 7.5 5.0 - 9.0    Protein, UA Negative Negative mg/dL    Urobilinogen, Urine 0.2 <2.0 E.U./dL    Nitrite, Urine Negative Negative    Leukocyte Esterase, Urine TRACE (A) Negative   Magnesium   Result Value Ref Range    Magnesium 2.1 1.6 - 2.6 mg/dL   Microscopic Urinalysis   Result Value Ref Range    WBC, UA NONE 0 - 5 /HPF    RBC, UA NONE 0 - 2 /HPF    Bacteria, UA NONE SEEN None Seen /HPF       Radiology:  XR ABDOMEN (KUB) (SINGLE AP VIEW)   Final Result   Colonic fecal distention is consistent with constipation.                      ------------------------- NURSING NOTES AND VITALS REVIEWED ---------------------------  Date / Time Roomed:  6/12/2020  7:42 PM  ED Bed Assignment:  17B/17B-17    The nursing notes within the ED encounter and vital signs as below have been reviewed. /69   Pulse 61   Temp 96.8 °F (36 °C)   Resp 16   Ht 5' 5\" (1.651 m)   Wt 112 lb (50.8 kg)   LMP 08/25/2011 (Exact Date)   SpO2 100%   BMI 18.64 kg/m²   Oxygen Saturation Interpretation: Normal      ------------------------------------------ PROGRESS NOTES ------------------------------------------  10:06 PM EDT  I have spoken with the patient and discussed todays results, in addition to providing specific details for the plan of care and counseling regarding the diagnosis and prognosis. Their questions are answered at this time and they are agreeable with the plan. I discussed at length with them reasons for immediate return here for re evaluation. They will followup with their primary care physician by calling their office tomorrow.       --------------------------------- ADDITIONAL PROVIDER NOTES

## 2020-06-14 ENCOUNTER — HOSPITAL ENCOUNTER (EMERGENCY)
Age: 60
Discharge: HOME | End: 2020-06-14
Attending: EMERGENCY MEDICINE
Payer: COMMERCIAL

## 2020-06-14 VITALS
DIASTOLIC BLOOD PRESSURE: 84 MMHG | HEART RATE: 55 BPM | RESPIRATION RATE: 16 BRPM | SYSTOLIC BLOOD PRESSURE: 129 MMHG | OXYGEN SATURATION: 100 % | TEMPERATURE: 96.4 F

## 2020-06-14 LAB
ACETAMINOPHEN LEVEL: <5 MCG/ML (ref 10–30)
AMPHETAMINE SCREEN, URINE: NOT DETECTED
ANION GAP SERPL CALCULATED.3IONS-SCNC: 12 MMOL/L (ref 7–16)
BARBITURATE SCREEN URINE: NOT DETECTED
BASOPHILS ABSOLUTE: 0.05 E9/L (ref 0–0.2)
BASOPHILS RELATIVE PERCENT: 1 % (ref 0–2)
BENZODIAZEPINE SCREEN, URINE: NOT DETECTED
BUN BLDV-MCNC: 8 MG/DL (ref 6–20)
CALCIUM SERPL-MCNC: 9.9 MG/DL (ref 8.6–10.2)
CANNABINOID SCREEN URINE: NOT DETECTED
CHLORIDE BLD-SCNC: 101 MMOL/L (ref 98–107)
CO2: 28 MMOL/L (ref 22–29)
COCAINE METABOLITE SCREEN URINE: NOT DETECTED
CREAT SERPL-MCNC: 0.9 MG/DL (ref 0.5–1)
EOSINOPHILS ABSOLUTE: 0.06 E9/L (ref 0.05–0.5)
EOSINOPHILS RELATIVE PERCENT: 1.2 % (ref 0–6)
ETHANOL: <10 MG/DL (ref 0–0.08)
FENTANYL SCREEN, URINE: NOT DETECTED
GFR AFRICAN AMERICAN: >60
GFR NON-AFRICAN AMERICAN: >60 ML/MIN/1.73
GLUCOSE BLD-MCNC: 94 MG/DL (ref 74–99)
HCT VFR BLD CALC: 39.1 % (ref 34–48)
HEMOGLOBIN: 12.5 G/DL (ref 11.5–15.5)
IMMATURE GRANULOCYTES #: 0.01 E9/L
IMMATURE GRANULOCYTES %: 0.2 % (ref 0–5)
LYMPHOCYTES ABSOLUTE: 1.49 E9/L (ref 1.5–4)
LYMPHOCYTES RELATIVE PERCENT: 29.1 % (ref 20–42)
Lab: NORMAL
MCH RBC QN AUTO: 31 PG (ref 26–35)
MCHC RBC AUTO-ENTMCNC: 32 % (ref 32–34.5)
MCV RBC AUTO: 97 FL (ref 80–99.9)
METHADONE SCREEN, URINE: NOT DETECTED
MONOCYTES ABSOLUTE: 0.24 E9/L (ref 0.1–0.95)
MONOCYTES RELATIVE PERCENT: 4.7 % (ref 2–12)
NEUTROPHILS ABSOLUTE: 3.27 E9/L (ref 1.8–7.3)
NEUTROPHILS RELATIVE PERCENT: 63.8 % (ref 43–80)
OPIATE SCREEN URINE: NOT DETECTED
OXYCODONE URINE: NOT DETECTED
PDW BLD-RTO: 12.5 FL (ref 11.5–15)
PHENCYCLIDINE SCREEN URINE: NOT DETECTED
PLATELET # BLD: 241 E9/L (ref 130–450)
PMV BLD AUTO: 10.4 FL (ref 7–12)
POTASSIUM REFLEX MAGNESIUM: 3.9 MMOL/L (ref 3.5–5)
RBC # BLD: 4.03 E12/L (ref 3.5–5.5)
SALICYLATE, SERUM: <0.3 MG/DL (ref 0–30)
SODIUM BLD-SCNC: 141 MMOL/L (ref 132–146)
TRICYCLIC ANTIDEPRESSANTS SCREEN SERUM: NEGATIVE NG/ML
WBC # BLD: 5.1 E9/L (ref 4.5–11.5)

## 2020-06-14 PROCEDURE — G0480 DRUG TEST DEF 1-7 CLASSES: HCPCS

## 2020-06-14 PROCEDURE — 99284 EMERGENCY DEPT VISIT MOD MDM: CPT

## 2020-06-14 PROCEDURE — 80307 DRUG TEST PRSMV CHEM ANLYZR: CPT

## 2020-06-14 PROCEDURE — 80048 BASIC METABOLIC PNL TOTAL CA: CPT

## 2020-06-14 PROCEDURE — 85025 COMPLETE CBC W/AUTO DIFF WBC: CPT

## 2020-06-14 ASSESSMENT — ENCOUNTER SYMPTOMS
SORE THROAT: 0
CHEST TIGHTNESS: 0
SINUS PAIN: 0
DIARRHEA: 0
SHORTNESS OF BREATH: 0
VOMITING: 0
NAUSEA: 0
BACK PAIN: 0
COUGH: 0
ABDOMINAL PAIN: 0

## 2020-06-14 ASSESSMENT — PAIN DESCRIPTION - PAIN TYPE: TYPE: ACUTE PAIN

## 2020-06-14 ASSESSMENT — PAIN SCALES - GENERAL: PAINLEVEL_OUTOF10: 6

## 2020-06-14 ASSESSMENT — PAIN DESCRIPTION - DESCRIPTORS: DESCRIPTORS: ACHING;CONSTANT;DISCOMFORT

## 2020-06-14 ASSESSMENT — PAIN DESCRIPTION - LOCATION: LOCATION: ABDOMEN

## 2020-06-14 NOTE — ED NOTES
BARBARA placed phone call to [facility] [phone] and spoke with Rep. Mcfadden. Patient was sent in for abdominal pain on 6/12. Plan was for her return back. Patient does have a hx of eloping/wandering away. BARBARA spoke with patient guardian, Angie Zamudio. Reports patient has been at the crisis unit for 14 days. Patient was sent in on 6/12 for abdominal pain and was released to the street without guardian being aware. Patient was supposed to return back to the crisis unit that night. Patient was found today at her apartment. Patient has not been sleeping well and off medications for 2 days. Patient has on-going memory & cognitive deficits. Patient has been med-compliant at the [facility] and treats with [facility]. Obtained consent to treat. If patient does not meet criteria for inpatient mental health, the [facility] crisis unit is agreeable to take patient back. All of her belongings and medications are there.      Juli Sr, MAYAR, LIZA  06/14/20 [facility], MAYRA, LIZA  06/14/20 [facility], MSW, [facility]  06/14/20 [id number]

## 2020-06-14 NOTE — ED NOTES
Call to [facility] and he stated that they can accept the pt. Call to guardian to make her aware of disposition. Pt will be sent by taxi. Gilford Lia, [facility]  06/14/20 0468

## 2020-06-14 NOTE — ED PROVIDER NOTES
This is a 31-year-old female presenting from home over concerns of behavioral changes. She has history of schizoaffective disorder, schizophrenia and PTSD and was seen in the hospital couple days ago and is supposed to follow-up with the crisis center but accidentally went home. She is not suicidal, homicidal or having any hallucinations. The history is provided by the patient. No  was used. Review of Systems   Constitutional: Negative for activity change, chills, fatigue and fever. HENT: Negative for congestion, sinus pain and sore throat. Eyes: Negative for visual disturbance. Respiratory: Negative for cough, chest tightness and shortness of breath. Cardiovascular: Negative for chest pain, palpitations and leg swelling. Gastrointestinal: Negative for abdominal pain, diarrhea, nausea and vomiting. Genitourinary: Negative for dysuria and urgency. Musculoskeletal: Negative for back pain, neck pain and neck stiffness. Skin: Negative for rash. Neurological: Negative for dizziness, syncope and headaches. Psychiatric/Behavioral: Negative for confusion. Physical Exam  Vitals signs and nursing note reviewed. Constitutional:       General: She is not in acute distress. Appearance: She is well-developed. She is not diaphoretic. HENT:      Head: Normocephalic and atraumatic. Nose: Nose normal.      Mouth/Throat:      Pharynx: No oropharyngeal exudate. Eyes:      Conjunctiva/sclera: Conjunctivae normal.      Pupils: Pupils are equal, round, and reactive to light. Neck:      Musculoskeletal: Normal range of motion and neck supple. Cardiovascular:      Rate and Rhythm: Normal rate and regular rhythm. Pulses: Normal pulses. Heart sounds: Normal heart sounds. Pulmonary:      Effort: Pulmonary effort is normal. No respiratory distress. Breath sounds: Normal breath sounds. Chest:      Chest wall: No tenderness.    Abdominal: performed during the hospital encounter of 06/14/20   CBC Auto Differential   Result Value Ref Range    WBC 5.1 4.5 - 11.5 E9/L    RBC 4.03 3.50 - 5.50 E12/L    Hemoglobin 12.5 11.5 - 15.5 g/dL    Hematocrit 39.1 34.0 - 48.0 %    MCV 97.0 80.0 - 99.9 fL    MCH 31.0 26.0 - 35.0 pg    MCHC 32.0 32.0 - 34.5 %    RDW 12.5 11.5 - 15.0 fL    Platelets 287 666 - 228 E9/L    MPV 10.4 7.0 - 12.0 fL    Neutrophils % 63.8 43.0 - 80.0 %    Immature Granulocytes % 0.2 0.0 - 5.0 %    Lymphocytes % 29.1 20.0 - 42.0 %    Monocytes % 4.7 2.0 - 12.0 %    Eosinophils % 1.2 0.0 - 6.0 %    Basophils % 1.0 0.0 - 2.0 %    Neutrophils Absolute 3.27 1.80 - 7.30 E9/L    Immature Granulocytes # 0.01 E9/L    Lymphocytes Absolute 1.49 (L) 1.50 - 4.00 E9/L    Monocytes Absolute 0.24 0.10 - 0.95 E9/L    Eosinophils Absolute 0.06 0.05 - 0.50 E9/L    Basophils Absolute 0.05 0.00 - 0.20 I7/J   Basic Metabolic Panel w/ Reflex to MG   Result Value Ref Range    Sodium 141 132 - 146 mmol/L    Potassium reflex Magnesium 3.9 3.5 - 5.0 mmol/L    Chloride 101 98 - 107 mmol/L    CO2 28 22 - 29 mmol/L    Anion Gap 12 7 - 16 mmol/L    Glucose 94 74 - 99 mg/dL    BUN 8 6 - 20 mg/dL    CREATININE 0.9 0.5 - 1.0 mg/dL    GFR Non-African American >60 >=60 mL/min/1.73    GFR African American >60     Calcium 9.9 8.6 - 10.2 mg/dL   Serum Drug Screen   Result Value Ref Range    Ethanol Lvl <10 mg/dL    Acetaminophen Level <5.0 (L) 10.0 - 65.2 mcg/mL    Salicylate, Serum <2.5 0.0 - 30.0 mg/dL    TCA Scrn NEGATIVE Cutoff:300 ng/mL   Urine Drug Screen   Result Value Ref Range    Amphetamine Screen, Urine NOT DETECTED Negative <1000 ng/mL    Barbiturate Screen, Ur NOT DETECTED Negative < 200 ng/mL    Benzodiazepine Screen, Urine NOT DETECTED Negative < 200 ng/mL    Cannabinoid Scrn, Ur NOT DETECTED Negative < 50ng/mL    Cocaine Metabolite Screen, Urine NOT DETECTED Negative < 300 ng/mL    Opiate Scrn, Ur NOT DETECTED Negative < 300ng/mL    PCP Screen, Urine NOT DETECTED

## 2020-07-13 ENCOUNTER — HOSPITAL ENCOUNTER (OUTPATIENT)
Age: 60
Discharge: HOME | End: 2020-07-13
Payer: COMMERCIAL

## 2020-07-13 LAB
FERRITIN: 101 NG/ML
FOLATE: 11.5 NG/ML (ref 4.8–24.2)
VITAMIN B-12: 511 PG/ML (ref 211–946)
VITAMIN D 25-HYDROXY: 36 NG/ML (ref 30–100)

## 2020-07-13 PROCEDURE — 82746 ASSAY OF FOLIC ACID SERUM: CPT

## 2020-07-13 PROCEDURE — 82607 VITAMIN B-12: CPT

## 2020-07-13 PROCEDURE — 82306 VITAMIN D 25 HYDROXY: CPT

## 2020-07-13 PROCEDURE — 36415 COLL VENOUS BLD VENIPUNCTURE: CPT

## 2020-07-13 PROCEDURE — 82728 ASSAY OF FERRITIN: CPT

## 2020-07-28 ENCOUNTER — OFFICE VISIT (OUTPATIENT)
Dept: NEUROLOGY | Age: 60
End: 2020-07-28
Payer: COMMERCIAL

## 2020-07-28 VITALS
BODY MASS INDEX: 18.66 KG/M2 | TEMPERATURE: 98.6 F | SYSTOLIC BLOOD PRESSURE: 120 MMHG | DIASTOLIC BLOOD PRESSURE: 83 MMHG | WEIGHT: 112 LBS | HEIGHT: 65 IN

## 2020-07-28 PROCEDURE — G8420 CALC BMI NORM PARAMETERS: HCPCS | Performed by: PSYCHIATRY & NEUROLOGY

## 2020-07-28 PROCEDURE — 1036F TOBACCO NON-USER: CPT | Performed by: PSYCHIATRY & NEUROLOGY

## 2020-07-28 PROCEDURE — G8427 DOCREV CUR MEDS BY ELIG CLIN: HCPCS | Performed by: PSYCHIATRY & NEUROLOGY

## 2020-07-28 PROCEDURE — 99214 OFFICE O/P EST MOD 30 MIN: CPT | Performed by: PSYCHIATRY & NEUROLOGY

## 2020-07-28 PROCEDURE — 3017F COLORECTAL CA SCREEN DOC REV: CPT | Performed by: PSYCHIATRY & NEUROLOGY

## 2020-07-28 ASSESSMENT — ENCOUNTER SYMPTOMS
RESPIRATORY NEGATIVE: 1
GASTROINTESTINAL NEGATIVE: 1
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
## 2020-08-20 ENCOUNTER — HOSPITAL ENCOUNTER (EMERGENCY)
Age: 60
Discharge: HOME | End: 2020-08-21
Attending: EMERGENCY MEDICINE
Payer: COMMERCIAL

## 2020-08-20 ENCOUNTER — APPOINTMENT (OUTPATIENT)
Dept: CT IMAGING | Age: 60
End: 2020-08-20
Payer: COMMERCIAL

## 2020-08-20 VITALS
SYSTOLIC BLOOD PRESSURE: 150 MMHG | HEART RATE: 81 BPM | OXYGEN SATURATION: 95 % | DIASTOLIC BLOOD PRESSURE: 90 MMHG | RESPIRATION RATE: 16 BRPM | TEMPERATURE: 98 F | WEIGHT: 120 LBS | BODY MASS INDEX: 19.99 KG/M2 | HEIGHT: 65 IN

## 2020-08-20 LAB
ALBUMIN SERPL-MCNC: 4.3 G/DL (ref 3.5–5.2)
ALP BLD-CCNC: 82 U/L (ref 35–104)
ALT SERPL-CCNC: 15 U/L (ref 0–32)
AMORPHOUS: ABNORMAL
ANION GAP SERPL CALCULATED.3IONS-SCNC: 9 MMOL/L (ref 7–16)
AST SERPL-CCNC: 21 U/L (ref 0–31)
BACTERIA: ABNORMAL /HPF
BASOPHILS ABSOLUTE: 0.07 E9/L (ref 0–0.2)
BASOPHILS RELATIVE PERCENT: 1 % (ref 0–2)
BILIRUB SERPL-MCNC: 0.4 MG/DL (ref 0–1.2)
BILIRUBIN URINE: NEGATIVE
BLOOD, URINE: ABNORMAL
BUN BLDV-MCNC: 18 MG/DL (ref 6–20)
CALCIUM SERPL-MCNC: 10.3 MG/DL (ref 8.6–10.2)
CHLORIDE BLD-SCNC: 102 MMOL/L (ref 98–107)
CLARITY: CLEAR
CO2: 27 MMOL/L (ref 22–29)
COARSE CASTS, UA: ABNORMAL /LPF (ref 0–2)
COLOR: YELLOW
CREAT SERPL-MCNC: 0.9 MG/DL (ref 0.5–1)
EOSINOPHILS ABSOLUTE: 0.07 E9/L (ref 0.05–0.5)
EOSINOPHILS RELATIVE PERCENT: 1 % (ref 0–6)
EPITHELIAL CELLS, UA: ABNORMAL /HPF
GFR AFRICAN AMERICAN: >60
GFR NON-AFRICAN AMERICAN: >60 ML/MIN/1.73
GLUCOSE BLD-MCNC: 95 MG/DL (ref 74–99)
GLUCOSE URINE: NEGATIVE MG/DL
HCT VFR BLD CALC: 39 % (ref 34–48)
HEMOGLOBIN: 13 G/DL (ref 11.5–15.5)
HYALINE CASTS: ABNORMAL /LPF (ref 0–2)
IMMATURE GRANULOCYTES #: 0.01 E9/L
IMMATURE GRANULOCYTES %: 0.1 % (ref 0–5)
KETONES, URINE: NEGATIVE MG/DL
LACTIC ACID, SEPSIS: 0.9 MMOL/L (ref 0.5–1.9)
LEUKOCYTE ESTERASE, URINE: ABNORMAL
LIPASE: 76 U/L (ref 13–60)
LYMPHOCYTES ABSOLUTE: 1.69 E9/L (ref 1.5–4)
LYMPHOCYTES RELATIVE PERCENT: 23.1 % (ref 20–42)
MCH RBC QN AUTO: 31.6 PG (ref 26–35)
MCHC RBC AUTO-ENTMCNC: 33.3 % (ref 32–34.5)
MCV RBC AUTO: 94.7 FL (ref 80–99.9)
MONOCYTES ABSOLUTE: 0.46 E9/L (ref 0.1–0.95)
MONOCYTES RELATIVE PERCENT: 6.3 % (ref 2–12)
NEUTROPHILS ABSOLUTE: 5.03 E9/L (ref 1.8–7.3)
NEUTROPHILS RELATIVE PERCENT: 68.5 % (ref 43–80)
NITRITE, URINE: NEGATIVE
PDW BLD-RTO: 12.6 FL (ref 11.5–15)
PH UA: 7 (ref 5–9)
PLATELET # BLD: 282 E9/L (ref 130–450)
PMV BLD AUTO: 10.1 FL (ref 7–12)
POTASSIUM SERPL-SCNC: 4.1 MMOL/L (ref 3.5–5)
PROTEIN UA: ABNORMAL MG/DL
RBC # BLD: 4.12 E12/L (ref 3.5–5.5)
RBC UA: ABNORMAL /HPF (ref 0–2)
SODIUM BLD-SCNC: 138 MMOL/L (ref 132–146)
SPECIFIC GRAVITY UA: 1.01 (ref 1–1.03)
TOTAL PROTEIN: 7.2 G/DL (ref 6.4–8.3)
UROBILINOGEN, URINE: 0.2 E.U./DL
WBC # BLD: 7.3 E9/L (ref 4.5–11.5)
WBC UA: ABNORMAL /HPF (ref 0–5)

## 2020-08-20 PROCEDURE — 2580000003 HC RX 258: Performed by: EMERGENCY MEDICINE

## 2020-08-20 PROCEDURE — 6370000000 HC RX 637 (ALT 250 FOR IP): Performed by: EMERGENCY MEDICINE

## 2020-08-20 PROCEDURE — 74177 CT ABD & PELVIS W/CONTRAST: CPT

## 2020-08-20 PROCEDURE — 99285 EMERGENCY DEPT VISIT HI MDM: CPT

## 2020-08-20 PROCEDURE — 83605 ASSAY OF LACTIC ACID: CPT

## 2020-08-20 PROCEDURE — 81001 URINALYSIS AUTO W/SCOPE: CPT

## 2020-08-20 PROCEDURE — 6360000002 HC RX W HCPCS: Performed by: EMERGENCY MEDICINE

## 2020-08-20 PROCEDURE — 85025 COMPLETE CBC W/AUTO DIFF WBC: CPT

## 2020-08-20 PROCEDURE — 96374 THER/PROPH/DIAG INJ IV PUSH: CPT

## 2020-08-20 PROCEDURE — 6360000004 HC RX CONTRAST MEDICATION: Performed by: RADIOLOGY

## 2020-08-20 PROCEDURE — 83690 ASSAY OF LIPASE: CPT

## 2020-08-20 PROCEDURE — 70450 CT HEAD/BRAIN W/O DYE: CPT

## 2020-08-20 PROCEDURE — 96361 HYDRATE IV INFUSION ADD-ON: CPT

## 2020-08-20 PROCEDURE — 80053 COMPREHEN METABOLIC PANEL: CPT

## 2020-08-20 RX ORDER — ONDANSETRON 2 MG/ML
4 INJECTION INTRAMUSCULAR; INTRAVENOUS ONCE
Status: COMPLETED | OUTPATIENT
Start: 2020-08-20 | End: 2020-08-20

## 2020-08-20 RX ORDER — AMOXICILLIN 250 MG
1 CAPSULE ORAL DAILY
Qty: 30 TABLET | Refills: 0 | Status: SHIPPED | OUTPATIENT
Start: 2020-08-20 | End: 2020-09-19

## 2020-08-20 RX ORDER — 0.9 % SODIUM CHLORIDE 0.9 %
500 INTRAVENOUS SOLUTION INTRAVENOUS ONCE
Status: COMPLETED | OUTPATIENT
Start: 2020-08-20 | End: 2020-08-20

## 2020-08-20 RX ADMIN — ONDANSETRON 4 MG: 2 INJECTION INTRAMUSCULAR; INTRAVENOUS at 19:15

## 2020-08-20 RX ADMIN — IOPAMIDOL 110 ML: 755 INJECTION, SOLUTION INTRAVENOUS at 22:05

## 2020-08-20 RX ADMIN — SODIUM CHLORIDE 500 ML: 9 INJECTION, SOLUTION INTRAVENOUS at 19:10

## 2020-08-20 RX ADMIN — MAGNESIUM CITRATE 296 ML: 1.75 LIQUID ORAL at 22:55

## 2020-08-20 NOTE — CARE COORDINATION
Social Work/Transition of Care:    BARBARA contacted pt legal Guardian Ross Miller to inform of pt presentation to the ED via [facility]. Per EMS \"Pt walked away from [facility] , pt guardian reports due to pt mental illness she is fixated on her bowels and feels if she does not have a bowel movement after every meal she feels there is a problem. BARBARA spoke with Jenifer Angulo pt Therapist at the [facility] who stated pt at baseline has delusional thoughts, pt can return to the Home once evaluated [facility] phone Number is [phone].     Electronically signed by Kavon Porter on 3/34/1442 at 7:00 PM

## 2020-08-20 NOTE — ED NOTES
Pt ambulating around department. Pt walked by self to restroom and gave urine sample. Pt then taken to room and placed into a hospital gown.          Grzegorz Arias RN  08/20/20 [id number]

## 2020-08-20 NOTE — ED PROVIDER NOTES
HPI:  8/20/20,   Time: 6:50 PM EDT       Dorys Callejas is a 61 y.o. female presenting to the ED for abdominal pain and constipation, beginning 3 days ago. The complaint has been intermittent, mild in severity, and worsened by nothing. Patient is a pleasant 59-year-old female with history of schizoaffective disorder. She also has a history depression and lives in a group home. She states that 7 admit constipation the last couple days she is only had very small bowel movements. She felt a little bit bloated but no nausea or vomiting. She also thought that she was not able to empty her bladder very well today but then states she was able to urinate normally waiting for bed in the emergency department. She denies any dysuria urgency or frequency. She is not had any fevers or chills. She does report bit of a dull headache about a 2 out of 10. This is chronic for her she has a history of chronic aches for which she takes Topamax. She states this is nothing new or different for her she has no neurologic deficits no visual changes no fevers no neck pain or neck stiffness. She has no chest pain or shortness of breath. Review of Systems:   Pertinent positives and negatives are stated within HPI, all other systems reviewed and are negative.          --------------------------------------------- PAST HISTORY ---------------------------------------------  Past Medical History:  has a past medical history of Chronic idiopathic constipation, Depression, Head injury, Hyperlipidemia, IBS (irritable bowel syndrome), Jaw pain, Migraines, neuralgic, Pain in gums, and Schizophrenia ([facility] Past Surgical History:  has a past surgical history that includes cyst removal (Right, 2008); Toe Surgery (1999); and laparoscopy (1980). Social History:  reports that she has never smoked. She has never used smokeless tobacco. She reports that she does not drink alcohol or use drugs.     Family History: family history includes Cancer in her mother; Heart Failure in her father. The patients home medications have been reviewed. Allergies: Clozapine; Ibuprofen; and Carbamazepine and analogs        ---------------------------------------------------PHYSICAL EXAM--------------------------------------    Constitutional/General: Alert and oriented x3, well appearing, non toxic in NAD  Head: Normocephalic and atraumatic  Eyes: PERRL, EOMI, conjunctive normal, sclera non icteric  Mouth: Oropharynx clear, handling secretions, no trismus, no asymmetry of the posterior oropharynx or uvular edema  Neck: Supple, full ROM, non tender to palpation in the midline, no stridor, no crepitus, no meningeal signs  Respiratory: Lungs clear to auscultation bilaterally, no wheezes, rales, or rhonchi. Not in respiratory distress  Cardiovascular:  Regular rate. Regular rhythm. No murmurs, gallops, or rubs. 2+ distal pulses  Chest: No chest wall tenderness  GI:  Abdomen Soft, Non tender, Non distended. +BS. No organomegaly, no palpable masses,  No rebound, guarding, or rigidity. No Bray sign or McBurney's point tenderness. Musculoskeletal: Moves all extremities x 4. Warm and well perfused, no clubbing, cyanosis, or edema. Capillary refill <3 seconds  Integument: skin warm and dry. No rashes. Lymphatic: no lymphadenopathy noted  Neurologic: GCS 15, no focal deficits, symmetric strength 5/5 in the upper and lower extremities bilaterally  Psychiatric: Normal Affect    -------------------------------------------------- RESULTS -------------------------------------------------  I have personally reviewed all laboratory and imaging results for this patient. Results are listed below.      LABS:  Results for orders placed or performed during the hospital encounter of 08/20/20   Comprehensive Metabolic Panel   Result Value Ref Range    Sodium 138 132 - 146 mmol/L    Potassium 4.1 3.5 - 5.0 mmol/L    Chloride 102 98 - 107 mmol/L    CO2 27 22 - 29 mmol/L    Anion Gap 9 7 - 16 mmol/L    Glucose 95 74 - 99 mg/dL    BUN 18 6 - 20 mg/dL    CREATININE 0.9 0.5 - 1.0 mg/dL    GFR Non-African American >60 >=60 mL/min/1.73    GFR African American >60     Calcium 10.3 (H) 8.6 - 10.2 mg/dL    Total Protein 7.2 6.4 - 8.3 g/dL    Alb 4.3 3.5 - 5.2 g/dL    Total Bilirubin 0.4 0.0 - 1.2 mg/dL    Alkaline Phosphatase 82 35 - 104 U/L    ALT 15 0 - 32 U/L    AST 21 0 - 31 U/L   CBC Auto Differential   Result Value Ref Range    WBC 7.3 4.5 - 11.5 E9/L    RBC 4.12 3.50 - 5.50 E12/L    Hemoglobin 13.0 11.5 - 15.5 g/dL    Hematocrit 39.0 34.0 - 48.0 %    MCV 94.7 80.0 - 99.9 fL    MCH 31.6 26.0 - 35.0 pg    MCHC 33.3 32.0 - 34.5 %    RDW 12.6 11.5 - 15.0 fL    Platelets 025 561 - 753 E9/L    MPV 10.1 7.0 - 12.0 fL    Neutrophils % 68.5 43.0 - 80.0 %    Immature Granulocytes % 0.1 0.0 - 5.0 %    Lymphocytes % 23.1 20.0 - 42.0 %    Monocytes % 6.3 2.0 - 12.0 %    Eosinophils % 1.0 0.0 - 6.0 %    Basophils % 1.0 0.0 - 2.0 %    Neutrophils Absolute 5.03 1.80 - 7.30 E9/L    Immature Granulocytes # 0.01 E9/L    Lymphocytes Absolute 1.69 1.50 - 4.00 E9/L    Monocytes Absolute 0.46 0.10 - 0.95 E9/L    Eosinophils Absolute 0.07 0.05 - 0.50 E9/L    Basophils Absolute 0.07 0.00 - 0.20 E9/L   Lipase   Result Value Ref Range    Lipase 76 (H) 13 - 60 U/L   Urinalysis   Result Value Ref Range    Color, UA Yellow Straw/Yellow    Clarity, UA Clear Clear    Glucose, Ur Negative Negative mg/dL    Bilirubin Urine Negative Negative    Ketones, Urine Negative Negative mg/dL    Specific Gravity, UA 1.015 1.005 - 1.030    Blood, Urine SMALL (A) Negative    pH, UA 7.0 5.0 - 9.0    Protein, UA TRACE Negative mg/dL    Urobilinogen, Urine 0.2 <2.0 E.U./dL    Nitrite, Urine Negative Negative    Leukocyte Esterase, Urine MODERATE (A) Negative   Lactate, Sepsis   Result Value Ref Range    Lactic Acid, Sepsis 0.9 0.5 - 1.9 mmol/L   Microscopic Urinalysis   Result Value Ref Range    Hyaline Casts, UA 0-2 0 - 2 /LPF    Coarse Casts, UA 0-2 0 - 2 /LPF    WBC, UA 5-10 (A) 0 - 5 /HPF    RBC, UA 1-3 0 - 2 /HPF    Epithelial Cells, UA RARE /HPF    Bacteria, UA NONE SEEN None Seen /HPF    Amorphous, UA RARE        RADIOLOGY:  Interpreted by Radiologist.  CT ABDOMEN PELVIS W IV CONTRAST Additional Contrast? None    (Results Pending)   CT HEAD WO CONTRAST    (Results Pending)             ------------------------- NURSING NOTES AND VITALS REVIEWED ---------------------------   The nursing notes within the ED encounter and vital signs as below have been reviewed by myself. BP (!) 145/98   Pulse 81   Temp 98 °F (36.7 °C)   Resp 18   Ht 5' 5\" (1.651 m)   Wt 120 lb (54.4 kg)   LMP 08/25/2011 (Exact Date)   SpO2 99%   BMI 19.97 kg/m²   Oxygen Saturation Interpretation: Normal    The patients available past medical records and past encounters were reviewed. ------------------------------ ED COURSE/MEDICAL DECISION MAKING----------------------  Medications   0.9 % sodium chloride bolus (500 mLs Intravenous New Bag 8/20/20 1910)   ondansetron (ZOFRAN) injection 4 mg (4 mg Intravenous Given 8/20/20 1915)         ED COURSE:       Medical Decision Making:   Patient is a pleasant 80-year-old female who presents with concerns for constipation urinary retention today she was able to actually urinate prior to me evaluating her she denies any abdominal pain at this time but is been having waxing waning symptoms for the past 3 days she also has a chronic dull headache. I will CT her head as well as the abdomen and pelvis to rule out any acute pathology will check basic lab work and urinalysis. Differential diagnosis includes constipation urinary tension bowel obstruction ileus, worried well, schizoaffective disorder, to cranial abnormality UTI dehydration. This patient has remained hemodynamically stable during their ED course. A normal CBC and a normal chemistry normal lipase normal lactic no signs of acute urinary infection.   She is awaiting CT of the head and abdomen. Patient be Signed out to my colleague Dr. Larisa Pacheco awaiting these as well as reassessment. I do feel she will likely be able to be discharged home if there is no acute pathology found. Re-Evaluations:             Re-evaluation. Patients symptoms are improving    Re-examination  8/20/20   6:50 PM EDT          Vital Signs:   Vitals:    08/20/20 1758 08/20/20 1911   BP: 118/73 (!) 145/98   Pulse: 101 81   Resp: 16 18   Temp: 98 °F (36.7 °C)    SpO2: 96% 99%   Weight:  120 lb (54.4 kg)   Height:  5' 5\" (1.651 m)                   Counseling: The emergency provider has spoken with the patient and discussed todays results, in addition to providing specific details for the plan of care and counseling regarding the diagnosis and prognosis. Questions are answered at this time and they are agreeable with the plan.       --------------------------------- IMPRESSION AND DISPOSITION ---------------------------------    IMPRESSION  1. Generalized abdominal pain        DISPOSITION  Disposition: {8:43 PM EDT  I have signed this patient out to the oncoming physician, Dr. Larisa Pacheco. I have discussed the patient's initial exam, treatment and plan of care with the on coming physician. I have notified the patient / family of the change in treating physician and answered their questions to this point. NOTE: This report was transcribed using voice recognition software.  Every effort was made to ensure accuracy; however, inadvertent computerized transcription errors may be present        Bernice Zelaya MD  08/20/20 2045

## 2020-09-30 ENCOUNTER — HOSPITAL ENCOUNTER (OUTPATIENT)
Age: 60
Discharge: HOME | End: 2020-09-30
Payer: COMMERCIAL

## 2020-09-30 LAB
FERRITIN: 132 NG/ML
FOLATE: 15.5 NG/ML (ref 4.8–24.2)
VITAMIN B-12: 1064 PG/ML (ref 211–946)
VITAMIN D 25-HYDROXY: 33 NG/ML (ref 30–100)

## 2020-09-30 PROCEDURE — 36415 COLL VENOUS BLD VENIPUNCTURE: CPT

## 2020-09-30 PROCEDURE — 82728 ASSAY OF FERRITIN: CPT

## 2020-09-30 PROCEDURE — 82607 VITAMIN B-12: CPT

## 2020-09-30 PROCEDURE — 80053 COMPREHEN METABOLIC PANEL: CPT

## 2020-09-30 PROCEDURE — 82306 VITAMIN D 25 HYDROXY: CPT

## 2020-09-30 PROCEDURE — 82746 ASSAY OF FOLIC ACID SERUM: CPT

## 2020-10-01 LAB
ALBUMIN SERPL-MCNC: 4.6 G/DL (ref 3.5–5.2)
ALP BLD-CCNC: 94 U/L (ref 35–104)
ALT SERPL-CCNC: 21 U/L (ref 0–32)
ANION GAP SERPL CALCULATED.3IONS-SCNC: 18 MMOL/L (ref 7–16)
AST SERPL-CCNC: 29 U/L (ref 0–31)
BILIRUB SERPL-MCNC: 0.6 MG/DL (ref 0–1.2)
BUN BLDV-MCNC: 9 MG/DL (ref 8–23)
CALCIUM SERPL-MCNC: 10.3 MG/DL (ref 8.6–10.2)
CHLORIDE BLD-SCNC: 90 MMOL/L (ref 98–107)
CO2: 23 MMOL/L (ref 22–29)
CREAT SERPL-MCNC: 0.6 MG/DL (ref 0.5–1)
GFR AFRICAN AMERICAN: >60
GFR NON-AFRICAN AMERICAN: >60 ML/MIN/1.73
GLUCOSE BLD-MCNC: 93 MG/DL (ref 74–99)
POTASSIUM SERPL-SCNC: 4.2 MMOL/L (ref 3.5–5)
SODIUM BLD-SCNC: 131 MMOL/L (ref 132–146)
TOTAL PROTEIN: 7 G/DL (ref 6.4–8.3)

## 2020-10-05 ENCOUNTER — APPOINTMENT (OUTPATIENT)
Dept: CT IMAGING | Age: 60
End: 2020-10-05
Payer: COMMERCIAL

## 2020-10-05 ENCOUNTER — HOSPITAL ENCOUNTER (EMERGENCY)
Age: 60
Discharge: ANOTHER HOSPITAL | End: 2020-10-06
Attending: EMERGENCY MEDICINE
Payer: COMMERCIAL

## 2020-10-05 ENCOUNTER — APPOINTMENT (OUTPATIENT)
Dept: GENERAL RADIOLOGY | Age: 60
End: 2020-10-05
Payer: COMMERCIAL

## 2020-10-05 VITALS
HEIGHT: 65 IN | TEMPERATURE: 98.2 F | WEIGHT: 130 LBS | HEART RATE: 74 BPM | SYSTOLIC BLOOD PRESSURE: 100 MMHG | OXYGEN SATURATION: 97 % | BODY MASS INDEX: 21.66 KG/M2 | RESPIRATION RATE: 18 BRPM | DIASTOLIC BLOOD PRESSURE: 63 MMHG

## 2020-10-05 LAB
ACETAMINOPHEN LEVEL: <5 MCG/ML (ref 10–30)
AMPHETAMINE SCREEN, URINE: NOT DETECTED
ANION GAP SERPL CALCULATED.3IONS-SCNC: 14 MMOL/L (ref 7–16)
BACTERIA: ABNORMAL /HPF
BARBITURATE SCREEN URINE: NOT DETECTED
BASOPHILS ABSOLUTE: 0.04 E9/L (ref 0–0.2)
BASOPHILS RELATIVE PERCENT: 0.4 % (ref 0–2)
BENZODIAZEPINE SCREEN, URINE: NOT DETECTED
BILIRUBIN URINE: NEGATIVE
BLOOD, URINE: ABNORMAL
BUN BLDV-MCNC: 20 MG/DL (ref 8–23)
CALCIUM SERPL-MCNC: 10.2 MG/DL (ref 8.6–10.2)
CANNABINOID SCREEN URINE: NOT DETECTED
CHLORIDE BLD-SCNC: 95 MMOL/L (ref 98–107)
CLARITY: ABNORMAL
CO2: 24 MMOL/L (ref 22–29)
COCAINE METABOLITE SCREEN URINE: NOT DETECTED
COLOR: YELLOW
CREAT SERPL-MCNC: 1 MG/DL (ref 0.5–1)
EKG ATRIAL RATE: 90 BPM
EKG P AXIS: 78 DEGREES
EKG P-R INTERVAL: 136 MS
EKG Q-T INTERVAL: 372 MS
EKG QRS DURATION: 90 MS
EKG QTC CALCULATION (BAZETT): 455 MS
EKG R AXIS: 95 DEGREES
EKG T AXIS: 73 DEGREES
EKG VENTRICULAR RATE: 90 BPM
EOSINOPHILS ABSOLUTE: 0 E9/L (ref 0.05–0.5)
EOSINOPHILS RELATIVE PERCENT: 0 % (ref 0–6)
EPITHELIAL CELLS, UA: ABNORMAL /HPF
ETHANOL: <10 MG/DL (ref 0–0.08)
FENTANYL SCREEN, URINE: NOT DETECTED
GFR AFRICAN AMERICAN: >60
GFR NON-AFRICAN AMERICAN: 57 ML/MIN/1.73
GLUCOSE BLD-MCNC: 76 MG/DL (ref 74–99)
GLUCOSE URINE: NEGATIVE MG/DL
HCT VFR BLD CALC: 37.5 % (ref 34–48)
HEMOGLOBIN: 12.7 G/DL (ref 11.5–15.5)
IMMATURE GRANULOCYTES #: 0.04 E9/L
IMMATURE GRANULOCYTES %: 0.4 % (ref 0–5)
KETONES, URINE: 15 MG/DL
LACTIC ACID: 1.1 MMOL/L (ref 0.5–2.2)
LEUKOCYTE ESTERASE, URINE: ABNORMAL
LYMPHOCYTES ABSOLUTE: 0.98 E9/L (ref 1.5–4)
LYMPHOCYTES RELATIVE PERCENT: 9.6 % (ref 20–42)
Lab: NORMAL
MCH RBC QN AUTO: 31.4 PG (ref 26–35)
MCHC RBC AUTO-ENTMCNC: 33.9 % (ref 32–34.5)
MCV RBC AUTO: 92.8 FL (ref 80–99.9)
METHADONE SCREEN, URINE: NOT DETECTED
MONOCYTES ABSOLUTE: 0.5 E9/L (ref 0.1–0.95)
MONOCYTES RELATIVE PERCENT: 4.9 % (ref 2–12)
NEUTROPHILS ABSOLUTE: 8.67 E9/L (ref 1.8–7.3)
NEUTROPHILS RELATIVE PERCENT: 84.7 % (ref 43–80)
NITRITE, URINE: NEGATIVE
OPIATE SCREEN URINE: NOT DETECTED
OXYCODONE URINE: NOT DETECTED
PDW BLD-RTO: 12.6 FL (ref 11.5–15)
PH UA: 7 (ref 5–9)
PHENCYCLIDINE SCREEN URINE: NOT DETECTED
PLATELET # BLD: 309 E9/L (ref 130–450)
PMV BLD AUTO: 10.1 FL (ref 7–12)
POTASSIUM SERPL-SCNC: 4.2 MMOL/L (ref 3.5–5)
PROTEIN UA: ABNORMAL MG/DL
RBC # BLD: 4.04 E12/L (ref 3.5–5.5)
RBC UA: ABNORMAL /HPF (ref 0–2)
RENAL EPITHELIAL, UA: ABNORMAL /HPF
SALICYLATE, SERUM: <0.3 MG/DL (ref 0–30)
SARS-COV-2, NAAT: NOT DETECTED
SODIUM BLD-SCNC: 133 MMOL/L (ref 132–146)
SPECIFIC GRAVITY UA: 1.01 (ref 1–1.03)
TRICYCLIC ANTIDEPRESSANTS SCREEN SERUM: NEGATIVE NG/ML
TROPONIN: <0.01 NG/ML (ref 0–0.03)
TSH SERPL DL<=0.05 MIU/L-ACNC: 1.8 UIU/ML (ref 0.27–4.2)
UROBILINOGEN, URINE: 0.2 E.U./DL
WBC # BLD: 10.2 E9/L (ref 4.5–11.5)
WBC UA: ABNORMAL /HPF (ref 0–5)

## 2020-10-05 PROCEDURE — U0002 COVID-19 LAB TEST NON-CDC: HCPCS

## 2020-10-05 PROCEDURE — 84443 ASSAY THYROID STIM HORMONE: CPT

## 2020-10-05 PROCEDURE — 71045 X-RAY EXAM CHEST 1 VIEW: CPT

## 2020-10-05 PROCEDURE — 2580000003 HC RX 258: Performed by: EMERGENCY MEDICINE

## 2020-10-05 PROCEDURE — 6360000002 HC RX W HCPCS: Performed by: EMERGENCY MEDICINE

## 2020-10-05 PROCEDURE — 96372 THER/PROPH/DIAG INJ SC/IM: CPT

## 2020-10-05 PROCEDURE — 80307 DRUG TEST PRSMV CHEM ANLYZR: CPT

## 2020-10-05 PROCEDURE — 84484 ASSAY OF TROPONIN QUANT: CPT

## 2020-10-05 PROCEDURE — 85025 COMPLETE CBC W/AUTO DIFF WBC: CPT

## 2020-10-05 PROCEDURE — 99285 EMERGENCY DEPT VISIT HI MDM: CPT

## 2020-10-05 PROCEDURE — G0480 DRUG TEST DEF 1-7 CLASSES: HCPCS

## 2020-10-05 PROCEDURE — 2500000003 HC RX 250 WO HCPCS: Performed by: EMERGENCY MEDICINE

## 2020-10-05 PROCEDURE — 81001 URINALYSIS AUTO W/SCOPE: CPT

## 2020-10-05 PROCEDURE — 93005 ELECTROCARDIOGRAM TRACING: CPT | Performed by: EMERGENCY MEDICINE

## 2020-10-05 PROCEDURE — 70450 CT HEAD/BRAIN W/O DYE: CPT

## 2020-10-05 PROCEDURE — 83605 ASSAY OF LACTIC ACID: CPT

## 2020-10-05 PROCEDURE — 80048 BASIC METABOLIC PNL TOTAL CA: CPT

## 2020-10-05 PROCEDURE — 36415 COLL VENOUS BLD VENIPUNCTURE: CPT

## 2020-10-05 RX ORDER — CEFTRIAXONE 1 G/1
INJECTION, POWDER, FOR SOLUTION INTRAMUSCULAR; INTRAVENOUS
Status: DISCONTINUED
Start: 2020-10-05 | End: 2020-10-06 | Stop reason: HOSPADM

## 2020-10-05 RX ORDER — LIDOCAINE HYDROCHLORIDE 10 MG/ML
INJECTION, SOLUTION EPIDURAL; INFILTRATION; INTRACAUDAL; PERINEURAL
Status: DISCONTINUED
Start: 2020-10-05 | End: 2020-10-06 | Stop reason: HOSPADM

## 2020-10-05 RX ORDER — 0.9 % SODIUM CHLORIDE 0.9 %
1000 INTRAVENOUS SOLUTION INTRAVENOUS ONCE
Status: COMPLETED | OUTPATIENT
Start: 2020-10-05 | End: 2020-10-05

## 2020-10-05 RX ADMIN — SODIUM CHLORIDE 1000 ML: 9 INJECTION, SOLUTION INTRAVENOUS at 16:30

## 2020-10-05 RX ADMIN — LIDOCAINE HYDROCHLORIDE 1 G: 10 INJECTION, SOLUTION EPIDURAL; INFILTRATION; INTRACAUDAL; PERINEURAL at 19:55

## 2020-10-05 NOTE — ED NOTES
Cooperative at this time, pleasant  Medicated IM for UTI  CO watching patient     Valentina Poon, JAMAL  10/05/20 3638

## 2020-10-05 NOTE — ED NOTES
Bed: 05  Expected date:   Expected time:   Means of arrival:   Comments:  Nathanael Angeles RN  10/05/20 6506

## 2020-10-05 NOTE — ED PROVIDER NOTES
HPI:  10/5/20,   Time: 5:43 PM EDT         Maggie Palmer is a 61 y.o. female presenting to the ED for Fatigue leaving [facility] without supervision, family concerned for inappropriate behavior, patient apparently was pink slipped by police and brought to emergency department for psychiatric evaluation. Found walking in street by police. , beginning several days ago. The complaint has been persistent, moderate in severity, and worsened by nothing. Patient awake and alert denies homicidal or suicidal ideation. States her family placed her in the home against her will. She has a history of schizophrenia. ROS:   Pertinent positives and negatives are stated within HPI, all other systems reviewed and are negative.  --------------------------------------------- PAST HISTORY ---------------------------------------------  Past Medical History:  has a past medical history of Chronic idiopathic constipation, Depression, Head injury, Hyperlipidemia, IBS (irritable bowel syndrome), Jaw pain, Migraines, neuralgic, Pain in gums, and Schizophrenia ([facility] Past Surgical History:  has a past surgical history that includes cyst removal (Right, 2008); Toe Surgery (1999); and laparoscopy (1980). Social History:  reports that she has never smoked. She has never used smokeless tobacco. She reports that she does not drink alcohol or use drugs. Family History: family history includes Cancer in her mother; Heart Failure in her father. The patients home medications have been reviewed. Allergies: Clozapine;  Ibuprofen; and Carbamazepine and analogs    -------------------------------------------------- RESULTS -------------------------------------------------  All laboratory and radiology results have been personally reviewed by myself   LABS:  Results for orders placed or performed during the hospital encounter of 10/05/20   CBC Auto Differential   Result Value Ref Range    WBC 10.2 4.5 - 11.5 E9/L    RBC 4.04 3.50 - Drug Screen   Result Value Ref Range    Amphetamine Screen, Urine NOT DETECTED Negative <1000 ng/mL    Barbiturate Screen, Ur NOT DETECTED Negative < 200 ng/mL    Benzodiazepine Screen, Urine NOT DETECTED Negative < 200 ng/mL    Cannabinoid Scrn, Ur NOT DETECTED Negative < 50ng/mL    Cocaine Metabolite Screen, Urine NOT DETECTED Negative < 300 ng/mL    Opiate Scrn, Ur NOT DETECTED Negative < 300ng/mL    PCP Screen, Urine NOT DETECTED Negative < 25 ng/mL    Methadone Screen, Urine NOT DETECTED Negative <300 ng/mL    Oxycodone Urine NOT DETECTED Negative <100 ng/mL    FENTANYL SCREEN, URINE NOT DETECTED Negative <1 ng/mL    Drug Screen Comment: see below    Microscopic Urinalysis   Result Value Ref Range    WBC, UA PACKED (A) 0 - 5 /HPF    RBC, UA 5-10 (A) 0 - 2 /HPF    Epithelial Cells, UA MANY /HPF    Renal Epithelial, UA RARE /HPF    Bacteria, UA MANY (A) None Seen /HPF   TSH without Reflex   Result Value Ref Range    TSH 1.800 0.270 - 4.200 uIU/mL   EKG 12 Lead   Result Value Ref Range    Ventricular Rate 90 BPM    Atrial Rate 90 BPM    P-R Interval 136 ms    QRS Duration 90 ms    Q-T Interval 372 ms    QTc Calculation (Bazett) 455 ms    P Axis 78 degrees    R Axis 95 degrees    T Axis 73 degrees       RADIOLOGY:  Interpreted by Radiologist.  CT HEAD WO CONTRAST   Final Result   No acute intracranial abnormality. XR CHEST PORTABLE   Final Result   Increased lung markings at the bilateral infrahilar regions, may be related   to bronchitis.             ------------------------- NURSING NOTES AND VITALS REVIEWED ---------------------------   The nursing notes within the ED encounter and vital signs as below have been reviewed.    /74   Pulse 92   Temp 98.2 °F (36.8 °C) (Oral)   Resp 16   Ht 5' 5\" (1.651 m)   Wt 130 lb (59 kg)   LMP 08/25/2011 (Exact Date)   SpO2 100%   BMI 21.63 kg/m²   Oxygen Saturation Interpretation: Normal      ---------------------------------------------------PHYSICAL EXAM--------------------------------------      Constitutional/General: Alert and oriented x3, well appearing, non toxic in NAD thin appearance. Head: NC/AT  Eyes: PERRL, EOMI  Mouth: Oropharynx clear, handling secretions, no trismus  Neck: Supple, full ROM, no meningeal signs  Pulmonary: Lungs clear to auscultation bilaterally, no wheezes, rales, or rhonchi. Not in respiratory distress  Cardiovascular:  Regular rate and rhythm, no murmurs, gallops, or rubs. 2+ distal pulses  Abdomen: Soft, non tender, non distended,   Extremities: Moves all extremities x 4. Warm and well perfused  Skin: warm and dry without rash  Neurologic: GCS 15 no focal deficits. Psych: Flat affect no flight of ideas. EKG #1:  Interpreted by emergency department physician unless otherwise noted. Time:  1631    Rate: 90  Rhythm: Sinus. Interpretation: RAD NSR .  ------------------------------ ED COURSE/MEDICAL DECISION MAKING----------------------  Medications   cefTRIAXone (ROCEPHIN) 1 g in sterile water 10 mL IV syringe (has no administration in time range)   0.9 % sodium chloride bolus (0 mLs Intravenous Stopped 10/5/20 1730)         Medical Decision Making:    Patient presents with found wandering outside her group home. Thinks she may be dehydrated. Family called and said she was out middle the street police found her she was pink slipped and brought to emergency department for psychiatric placement. Patient is medically cleared for inpatient psychiatric evaluation. [place] is signed. Counseling: The emergency provider has spoken with the patient and discussed todays results, in addition to providing specific details for the plan of care and counseling regarding the diagnosis and prognosis. Questions are answered at this time and they are agreeable with the plan.      --------------------------------- IMPRESSION AND DISPOSITION ---------------------------------    IMPRESSION  1.  Psychosis, unspecified psychosis type ([facility]    2. Fatigue, unspecified type    3.  Acute cystitis without hematuria        DISPOSITION  Disposition: Transfer to  Hospital mental health unit - medically cleared for admission  Patient condition is stable               Rosemarie Andrea DO  10/05/20 1928

## 2020-10-06 ENCOUNTER — HOSPITAL ENCOUNTER (INPATIENT)
Age: 60
LOS: 9 days | Discharge: NURSING, REHAB OR LONG-TERM CARE | DRG: 750 | End: 2020-10-15
Attending: PSYCHIATRY & NEUROLOGY | Admitting: PSYCHIATRY & NEUROLOGY
Payer: COMMERCIAL

## 2020-10-06 PROBLEM — F09 COGNITIVE DISORDER: Status: ACTIVE | Noted: 2020-10-06

## 2020-10-06 LAB
CHOLESTEROL, TOTAL: 156 MG/DL (ref 0–199)
HBA1C MFR BLD: 5.3 % (ref 4–5.6)
HDLC SERPL-MCNC: 81 MG/DL
LDL CHOLESTEROL CALCULATED: 69 MG/DL (ref 0–99)
TRIGL SERPL-MCNC: 31 MG/DL (ref 0–149)
VLDLC SERPL CALC-MCNC: 6 MG/DL

## 2020-10-06 PROCEDURE — 80061 LIPID PANEL: CPT

## 2020-10-06 PROCEDURE — 83036 HEMOGLOBIN GLYCOSYLATED A1C: CPT

## 2020-10-06 PROCEDURE — 1240000000 HC EMOTIONAL WELLNESS R&B

## 2020-10-06 PROCEDURE — 99223 1ST HOSP IP/OBS HIGH 75: CPT | Performed by: PSYCHIATRY & NEUROLOGY

## 2020-10-06 PROCEDURE — 36415 COLL VENOUS BLD VENIPUNCTURE: CPT

## 2020-10-06 PROCEDURE — 6370000000 HC RX 637 (ALT 250 FOR IP): Performed by: PSYCHIATRY & NEUROLOGY

## 2020-10-06 RX ORDER — VENLAFAXINE HYDROCHLORIDE 75 MG/1
75 CAPSULE, EXTENDED RELEASE ORAL
Status: DISCONTINUED | OUTPATIENT
Start: 2020-10-07 | End: 2020-10-15 | Stop reason: HOSPADM

## 2020-10-06 RX ORDER — HALOPERIDOL 5 MG/ML
3 INJECTION INTRAMUSCULAR EVERY 6 HOURS PRN
Status: DISCONTINUED | OUTPATIENT
Start: 2020-10-06 | End: 2020-10-15 | Stop reason: HOSPADM

## 2020-10-06 RX ORDER — PALIPERIDONE 3 MG/1
3 TABLET, EXTENDED RELEASE ORAL 2 TIMES DAILY
Status: DISCONTINUED | OUTPATIENT
Start: 2020-10-06 | End: 2020-10-10

## 2020-10-06 RX ORDER — TRAZODONE HYDROCHLORIDE 50 MG/1
25 TABLET ORAL NIGHTLY PRN
Status: DISCONTINUED | OUTPATIENT
Start: 2020-10-06 | End: 2020-10-15 | Stop reason: HOSPADM

## 2020-10-06 RX ORDER — ESTRADIOL 0.1 MG/G
2 CREAM VAGINAL SEE ADMIN INSTRUCTIONS
COMMUNITY

## 2020-10-06 RX ORDER — TOPIRAMATE 100 MG/1
100 TABLET, FILM COATED ORAL 2 TIMES DAILY
Status: DISCONTINUED | OUTPATIENT
Start: 2020-10-06 | End: 2020-10-15 | Stop reason: HOSPADM

## 2020-10-06 RX ORDER — ACETAMINOPHEN 325 MG/1
650 TABLET ORAL EVERY 4 HOURS PRN
Status: DISCONTINUED | OUTPATIENT
Start: 2020-10-06 | End: 2020-10-15 | Stop reason: HOSPADM

## 2020-10-06 RX ORDER — MIRTAZAPINE 15 MG/1
15 TABLET, FILM COATED ORAL NIGHTLY
Status: DISCONTINUED | OUTPATIENT
Start: 2020-10-06 | End: 2020-10-15 | Stop reason: HOSPADM

## 2020-10-06 RX ORDER — MAGNESIUM HYDROXIDE/ALUMINUM HYDROXICE/SIMETHICONE 120; 1200; 1200 MG/30ML; MG/30ML; MG/30ML
30 SUSPENSION ORAL PRN
Status: DISCONTINUED | OUTPATIENT
Start: 2020-10-06 | End: 2020-10-15 | Stop reason: HOSPADM

## 2020-10-06 RX ORDER — MIRTAZAPINE 15 MG/1
7.5 TABLET, FILM COATED ORAL NIGHTLY
Status: ON HOLD | COMMUNITY
End: 2020-10-15 | Stop reason: HOSPADM

## 2020-10-06 RX ORDER — POLYETHYLENE GLYCOL 3350 17 G/17G
17 POWDER, FOR SOLUTION ORAL 2 TIMES DAILY
COMMUNITY

## 2020-10-06 RX ORDER — SULFAMETHOXAZOLE AND TRIMETHOPRIM 800; 160 MG/1; MG/1
1 TABLET ORAL EVERY 12 HOURS SCHEDULED
Status: DISPENSED | OUTPATIENT
Start: 2020-10-06 | End: 2020-10-13

## 2020-10-06 RX ORDER — M-VIT,TX,IRON,MINS/CALC/FOLIC 27MG-0.4MG
1 TABLET ORAL DAILY
Status: DISCONTINUED | OUTPATIENT
Start: 2020-10-06 | End: 2020-10-15 | Stop reason: HOSPADM

## 2020-10-06 RX ORDER — HALOPERIDOL 2 MG/1
3 TABLET ORAL EVERY 6 HOURS PRN
Status: DISCONTINUED | OUTPATIENT
Start: 2020-10-06 | End: 2020-10-15 | Stop reason: HOSPADM

## 2020-10-06 RX ORDER — M-VIT,TX,IRON,MINS/CALC/FOLIC 27MG-0.4MG
1 TABLET ORAL DAILY
COMMUNITY

## 2020-10-06 RX ADMIN — MULTIPLE VITAMINS W/ MINERALS TAB 1 TABLET: TAB at 13:05

## 2020-10-06 RX ADMIN — PALIPERIDONE 3 MG: 3 TABLET, EXTENDED RELEASE ORAL at 13:05

## 2020-10-06 RX ADMIN — TOPIRAMATE 100 MG: 100 TABLET, FILM COATED ORAL at 13:05

## 2020-10-06 ASSESSMENT — PAIN - FUNCTIONAL ASSESSMENT: PAIN_FUNCTIONAL_ASSESSMENT: 0-10

## 2020-10-06 ASSESSMENT — LIFESTYLE VARIABLES: HISTORY_ALCOHOL_USE: NO

## 2020-10-06 ASSESSMENT — SLEEP AND FATIGUE QUESTIONNAIRES
AVERAGE NUMBER OF SLEEP HOURS: 6
DO YOU HAVE DIFFICULTY SLEEPING: NO
DO YOU USE A SLEEP AID: NO

## 2020-10-06 ASSESSMENT — PAIN SCALES - GENERAL: PAINLEVEL_OUTOF10: 0

## 2020-10-06 NOTE — PLAN OF CARE
Notified Dr. Mark Marker of need for admission. pt accepted to [facility]. Please ensure original [place] admission is sent with patients soft chart. LSW to contact primary care RN with further admission details.
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
## 2020-10-06 NOTE — ED NOTES
The pt was accepted to [address] by Dr. Christy Nugent - room 7302A. Accepting info given to Martita at the [facility]. Disposition given to Dia Em in admitting. [address] notified of admission. Call to [address] and spoke to [place] regarding pink slip- verified that it was made out to [facility] and was dated and timed.        Shanti Bhatia, [facility]  10/05/20 0573

## 2020-10-06 NOTE — PLAN OF CARE
Problem: Altered Mood, Depressive Behavior:  Goal: Absence of self-harm  Description: Absence of self-harm  Outcome: Met This Shift     Problem: Altered Mood, Depressive Behavior:  Goal: Able to verbalize acceptance of life and situations over which he or she has no control  Description: Able to verbalize acceptance of life and situations over which he or she has no control  Outcome: Ongoing  Goal: Able to verbalize and/or display a decrease in depressive symptoms  Description: Able to verbalize and/or display a decrease in depressive symptoms  Outcome: Ongoing  Goal: Ability to disclose and discuss suicidal ideas will improve  Description: Ability to disclose and discuss suicidal ideas will improve  Outcome: Ongoing     Problem: Altered Mood, Deterioration in Function:  Goal: Ability to perform activities of daily living will improve  Description: Ability to perform activities of daily living will improve  Outcome: Ongoing

## 2020-10-06 NOTE — CARE COORDINATION
Biopsychosocial Assessment Note    Social work met with patient to complete the biopsychosocial assessment   Pt was very confused ,and guarded shewould not answer questions  Mental Status Exam: PT is alert and oriented to person only    Chief Complaint: Pt was found walking in the street and was pink slipped  By  police ,and brought to Emergency department for psychiatric evaluation    Patient Report: pt  Very  confused  Unable to answer questions    Gender  [] Male [x] Female [] Transgender  [] Other    Sexual Orientation    [x] Heterosexual [] Homosexual [] Bisexual [] Other    Suicidal Ideation  [] Reports [] Denies    Homicidal Ideation  [] Reports [] Denies      Hallucinations/Delusions (Specify type)  [] Reports [] Denies     Substance Use/Alcohol Use/Addiction  [] Reports [x] Denies     Trauma History  [] Reports [] Denies     Collateral Contact (BENITO signed)  Name:  Benjamin Duque  [facility]  Number:  [phone]    Collateral Information:  Julienne Couch    Access to Weapons: none    Follow up provider:   Act  team With [employer] for discharge (where they live can they return):

## 2020-10-06 NOTE — PROGRESS NOTES
Patient upset and feeling hurt as she was only trying to help her roommate get around in the room since she was not using her walker all the time. She was not trying to hurt or threaten patient alone assist her and it was mistaken by roommate who became paranoid with getting help. Patient was instructed not to assist roommate and let staff help her if needed.   Patient resting in room
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
## 2020-10-06 NOTE — PLAN OF CARE
Alert and oriented x4 patient is soft spoken as has to be asked to speak up multiple times. She has a flat affect and avoids eye contact. She is lying in bed in fetal positions during assessment. States she is here because she is depressed and needs her meds changed or increase. Rates 9/10. States she is having some trouble with focusing and remembering things.         Problem: Altered Mood, Depressive Behavior:  Goal: Able to verbalize acceptance of life and situations over which he or she has no control  Description: Able to verbalize acceptance of life and situations over which he or she has no control  10/6/2020 1057 by Katheryn Ward RN  Outcome: Ongoing     Problem: Altered Mood, Depressive Behavior:  Goal: Able to verbalize and/or display a decrease in depressive symptoms  Description: Able to verbalize and/or display a decrease in depressive symptoms  10/6/2020 1057 by Katheryn Ward RN  Outcome: Ongoing     Problem: Altered Mood, Manic Behavior:  Goal: Mood stable  Description: Mood stable  Outcome: Ongoing
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
## 2020-10-07 PROBLEM — Z87.19 H/O IRRITABLE BOWEL SYNDROME: Status: ACTIVE | Noted: 2020-10-07

## 2020-10-07 PROBLEM — F01.53 DEMENTIA, VASCULAR, WITH DEPRESSION: Status: ACTIVE | Noted: 2020-10-06

## 2020-10-07 PROBLEM — N39.0 UTI (URINARY TRACT INFECTION): Status: ACTIVE | Noted: 2020-10-07

## 2020-10-07 PROCEDURE — 1240000000 HC EMOTIONAL WELLNESS R&B

## 2020-10-07 PROCEDURE — 6370000000 HC RX 637 (ALT 250 FOR IP): Performed by: CLINICAL NURSE SPECIALIST

## 2020-10-07 PROCEDURE — 99231 SBSQ HOSP IP/OBS SF/LOW 25: CPT | Performed by: NURSE PRACTITIONER

## 2020-10-07 PROCEDURE — 6370000000 HC RX 637 (ALT 250 FOR IP): Performed by: PSYCHIATRY & NEUROLOGY

## 2020-10-07 RX ORDER — POLYETHYLENE GLYCOL 3350 17 G/17G
17 POWDER, FOR SOLUTION ORAL 2 TIMES DAILY
Status: DISCONTINUED | OUTPATIENT
Start: 2020-10-07 | End: 2020-10-15 | Stop reason: HOSPADM

## 2020-10-07 RX ORDER — DONEPEZIL HYDROCHLORIDE 5 MG/1
5 TABLET, FILM COATED ORAL NIGHTLY
Status: DISCONTINUED | OUTPATIENT
Start: 2020-10-07 | End: 2020-10-15 | Stop reason: HOSPADM

## 2020-10-07 RX ADMIN — POLYETHYLENE GLYCOL 3350 17 G: 17 POWDER, FOR SOLUTION ORAL at 20:56

## 2020-10-07 RX ADMIN — VENLAFAXINE HYDROCHLORIDE 75 MG: 75 CAPSULE, EXTENDED RELEASE ORAL at 09:33

## 2020-10-07 RX ADMIN — SULFAMETHOXAZOLE AND TRIMETHOPRIM 1 TABLET: 800; 160 TABLET ORAL at 09:33

## 2020-10-07 RX ADMIN — POLYETHYLENE GLYCOL 3350 17 G: 17 POWDER, FOR SOLUTION ORAL at 10:50

## 2020-10-07 RX ADMIN — PALIPERIDONE 3 MG: 3 TABLET, EXTENDED RELEASE ORAL at 09:33

## 2020-10-07 RX ADMIN — MULTIPLE VITAMINS W/ MINERALS TAB 1 TABLET: TAB at 09:33

## 2020-10-07 RX ADMIN — TOPIRAMATE 100 MG: 100 TABLET, FILM COATED ORAL at 20:49

## 2020-10-07 RX ADMIN — TOPIRAMATE 100 MG: 100 TABLET, FILM COATED ORAL at 09:33

## 2020-10-07 RX ADMIN — MIRTAZAPINE 15 MG: 15 TABLET, FILM COATED ORAL at 20:48

## 2020-10-07 ASSESSMENT — PAIN SCALES - GENERAL
PAINLEVEL_OUTOF10: 0
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
## 2020-10-07 NOTE — PROGRESS NOTES
Hospitalist Progress Note      PCP: Jermaine Knox MD    Date of Admission: 10/6/2020    Chief Complaint: medical management    Hospital Course:   61 y.o. female who we are asked to see/evaluate by Esther Joshi MD for medical management. Admitted to [facility] after being pink slipped by police after found walking around in streets and brought to ED for psychiatric evaluation where she was found to have a UTI. Patient transferred to [place] [id number]. Patient is denying suicidal and homicidal ideation along with auditory and visual hallucinations. Patient is alert and oriented to self, time, and place but appears confused and has delayed responses during admission. She admits to depression without any known stressors at this time. Patient has poor eye contact, is soft spoken, and is evasive. Patient does not offer any conversation. Sound physician group consulted for medical management. Subjective:    Patient up in [place], she has no complaints at the present time.      Medications:  Reviewed    Infusion Medications   Scheduled Medications    donepezil  5 mg Oral Nightly    polyethylene glycol  17 g Oral BID    mirtazapine  15 mg Oral Nightly    therapeutic multivitamin-minerals  1 tablet Oral Daily    topiramate  100 mg Oral BID    venlafaxine  75 mg Oral Daily with breakfast    paliperidone  3 mg Oral BID    sulfamethoxazole-trimethoprim  1 tablet Oral 2 times per day     PRN Meds: magnesium hydroxide, acetaminophen, haloperidol lactate **OR** haloperidol, traZODone, magnesium hydroxide, aluminum & magnesium hydroxide-simethicone      Intake/Output Summary (Last 24 hours) at 10/7/2020 1003  Last data filed at 10/7/2020 0948  Gross per 24 hour   Intake 360 ml   Output --   Net 360 ml       Exam:    /69   Pulse 88   Temp 99.2 °F (37.3 °C) (Temporal)   Resp 16   LMP 08/25/2011 (Exact Date)   SpO2 100%     General appearance: No apparent distress, appears stated age and cooperative. HEENT: Pupils equal, round, and reactive to light. Conjunctivae/corneas clear. Neck: Supple, with full range of motion. No jugular venous distention. Trachea midline. Respiratory:  Normal respiratory effort. Cardiovascular: Regular rate and rhythm   Abdomen: Soft, non-tender, non-distended   Musculoskeletal: No clubbing, cyanosis or edema bilaterally. Full range of motion without deformity. Skin: Skin color, texture, turgor normal.  No rashes or lesions. Neurologic:  Neurovascularly intact without any focal sensory/motor deficits. Psychiatric: Alert and oriented, thought content appropriate, normal insight  Capillary Refill: Brisk,< 3 seconds   Peripheral Pulses: +2 palpable, equal bilaterally       Labs:   Recent Labs     10/05/20  1632   WBC 10.2   HGB 12.7   HCT 37.5        Recent Labs     10/05/20  1632      K 4.2   CL 95*   CO2 24   BUN 20   CREATININE 1.0   CALCIUM 10.2     No results for input(s): AST, ALT, BILIDIR, BILITOT, ALKPHOS in the last 72 hours. No results for input(s): INR in the last 72 hours. Recent Labs     10/05/20  1632   TROPONINI <0.01     No orders to display       Assessment/Plan:    Active Hospital Problems    Diagnosis Date Noted    UTI (urinary tract infection) [N39.0] 10/07/2020    H/O irritable bowel syndrome [Z87.19] 10/07/2020    Dementia, vascular, with depression ([facility] [address] [F01.50, F32.9] 10/06/2020    Psychosis ([facility] [address] [F29] 07/25/2019    Severe protein-calorie malnutrition ([facility] [E43] 07/03/2019    Schizoaffective disorder, depressive type ([address] [F25.1] 04/26/2019     Bactrim  Resume home medications  Remainder per primary    DVT Prophylaxis: activity and ambulation  Diet: DIET GENERAL;  Code Status: Full Code    PT/OT Eval Status: n/a    Dispo - per primary. Will sign off. Please call if services are needed in the future.      Sandra Cohen, CNP

## 2020-10-07 NOTE — PROGRESS NOTES
Patient is resting quietly in bed with eyes closed at this time. No signs of distress or discomfort noted. No PRN medications given thus far. Patient has not had any further behavioral outbursts. Safety needs met. No unit problems reported. Will continue to observe and support.

## 2020-10-07 NOTE — PLAN OF CARE
Patient cooperative, evasive. Patient denies SI,HI, and hallucinations. Patient has very poor eye contact and talks in a whisper. Appears paranoid, isolative to room. No unit problems observed or reported.  Will continue to observe and support  Problem: Altered Mood, Depressive Behavior:  Goal: Able to verbalize and/or display a decrease in depressive symptoms  Description: Able to verbalize and/or display a decrease in depressive symptoms  Outcome: Ongoing  Goal: Able to verbalize support systems  Description: Able to verbalize support systems  Outcome: Ongoing

## 2020-10-08 PROCEDURE — 6370000000 HC RX 637 (ALT 250 FOR IP): Performed by: CLINICAL NURSE SPECIALIST

## 2020-10-08 PROCEDURE — 6370000000 HC RX 637 (ALT 250 FOR IP): Performed by: PSYCHIATRY & NEUROLOGY

## 2020-10-08 PROCEDURE — 99231 SBSQ HOSP IP/OBS SF/LOW 25: CPT | Performed by: NURSE PRACTITIONER

## 2020-10-08 PROCEDURE — 1240000000 HC EMOTIONAL WELLNESS R&B

## 2020-10-08 RX ADMIN — POLYETHYLENE GLYCOL 3350 17 G: 17 POWDER, FOR SOLUTION ORAL at 20:27

## 2020-10-08 RX ADMIN — POLYETHYLENE GLYCOL 3350 17 G: 17 POWDER, FOR SOLUTION ORAL at 08:55

## 2020-10-08 RX ADMIN — VENLAFAXINE HYDROCHLORIDE 75 MG: 75 CAPSULE, EXTENDED RELEASE ORAL at 08:55

## 2020-10-08 RX ADMIN — MULTIPLE VITAMINS W/ MINERALS TAB 1 TABLET: TAB at 08:55

## 2020-10-08 RX ADMIN — PALIPERIDONE 3 MG: 3 TABLET, EXTENDED RELEASE ORAL at 08:55

## 2020-10-08 RX ADMIN — SULFAMETHOXAZOLE AND TRIMETHOPRIM 1 TABLET: 800; 160 TABLET ORAL at 08:55

## 2020-10-08 RX ADMIN — TOPIRAMATE 100 MG: 100 TABLET, FILM COATED ORAL at 20:27

## 2020-10-08 RX ADMIN — TOPIRAMATE 100 MG: 100 TABLET, FILM COATED ORAL at 08:55

## 2020-10-08 ASSESSMENT — PAIN SCALES - GENERAL
PAINLEVEL_OUTOF10: 0

## 2020-10-08 NOTE — PLAN OF CARE
Patient is cooperative, tearful and worried at times. Denies SI, HI, and hallucinations. Patient denies paranoia but appears paranoid. Patient made few attempts at running out of door and [place], when asked what patient was doing patient states \"Im leaving\". Patient is easily redirectable. Takes medication and attended group. No unit problems observed or reported.   Problem: Altered Mood, Depressive Behavior:  Goal: Able to verbalize acceptance of life and situations over which he or she has no control  Description: Able to verbalize acceptance of life and situations over which he or she has no control  Outcome: Ongoing  Goal: Ability to disclose and discuss suicidal ideas will improve  Description: Ability to disclose and discuss suicidal ideas will improve  Outcome: Ongoing
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
## 2020-10-09 PROCEDURE — 6370000000 HC RX 637 (ALT 250 FOR IP): Performed by: CLINICAL NURSE SPECIALIST

## 2020-10-09 PROCEDURE — 99231 SBSQ HOSP IP/OBS SF/LOW 25: CPT | Performed by: NURSE PRACTITIONER

## 2020-10-09 PROCEDURE — 6370000000 HC RX 637 (ALT 250 FOR IP): Performed by: PSYCHIATRY & NEUROLOGY

## 2020-10-09 PROCEDURE — 1240000000 HC EMOTIONAL WELLNESS R&B

## 2020-10-09 RX ADMIN — POLYETHYLENE GLYCOL 3350 17 G: 17 POWDER, FOR SOLUTION ORAL at 21:00

## 2020-10-09 RX ADMIN — DONEPEZIL HYDROCHLORIDE 5 MG: 5 TABLET, FILM COATED ORAL at 20:59

## 2020-10-09 RX ADMIN — TOPIRAMATE 100 MG: 100 TABLET, FILM COATED ORAL at 20:59

## 2020-10-09 RX ADMIN — SULFAMETHOXAZOLE AND TRIMETHOPRIM 1 TABLET: 800; 160 TABLET ORAL at 09:55

## 2020-10-09 RX ADMIN — TOPIRAMATE 100 MG: 100 TABLET, FILM COATED ORAL at 09:55

## 2020-10-09 RX ADMIN — VENLAFAXINE HYDROCHLORIDE 75 MG: 75 CAPSULE, EXTENDED RELEASE ORAL at 09:55

## 2020-10-09 RX ADMIN — PALIPERIDONE 3 MG: 3 TABLET, EXTENDED RELEASE ORAL at 09:55

## 2020-10-09 RX ADMIN — MULTIPLE VITAMINS W/ MINERALS TAB 1 TABLET: TAB at 09:55

## 2020-10-09 RX ADMIN — SULFAMETHOXAZOLE AND TRIMETHOPRIM 1 TABLET: 800; 160 TABLET ORAL at 20:59

## 2020-10-09 RX ADMIN — PALIPERIDONE 3 MG: 3 TABLET, EXTENDED RELEASE ORAL at 20:59

## 2020-10-09 RX ADMIN — POLYETHYLENE GLYCOL 3350 17 G: 17 POWDER, FOR SOLUTION ORAL at 09:55

## 2020-10-09 ASSESSMENT — PAIN SCALES - GENERAL: PAINLEVEL_OUTOF10: 0

## 2020-10-09 ASSESSMENT — PAIN - FUNCTIONAL ASSESSMENT: PAIN_FUNCTIONAL_ASSESSMENT: RESPIRATORY RATE >10

## 2020-10-09 NOTE — PROGRESS NOTES
Unable to assess/interview patient. In room resting quietly in bed with no signs of distress. Will reassess later in shift.

## 2020-10-09 NOTE — PLAN OF CARE
Denies SI, HI, and hallucinations. Patient is flat and has poor eye contact. Isolative to room this evening. States \"I'm just tired. \" No behavioral issues or PRN administered. No complaints or concerns verbalized at this time. Will continue to monitor and offer support.           Problem: Altered Mood, Depressive Behavior:  Goal: Ability to disclose and discuss suicidal ideas will improve  Description: Ability to disclose and discuss suicidal ideas will improve  Outcome: Met This Shift  Goal: Absence of self-harm  Description: Absence of self-harm  Outcome: Met This Shift     Problem: Altered Mood, Manic Behavior:  Goal: Mood stable  Description: Mood stable  Outcome: Met This Shift     Problem: Altered Mood, Depressive Behavior:  Goal: Able to verbalize acceptance of life and situations over which he or she has no control  Description: Able to verbalize acceptance of life and situations over which he or she has no control  10/9/2020 1652 by Fidelia Solomon RN  Outcome: Not Met This Shift  Goal: Able to verbalize and/or display a decrease in depressive symptoms  Description: Able to verbalize and/or display a decrease in depressive symptoms  10/9/2020 1652 by Fidelia Solomon RN  Outcome: Not Met This Shift  Problem: Altered Mood, Deterioration in Function:  Goal: Able to verbalize reality based thinking  Description: Able to verbalize reality based thinking  10/9/2020 1652 by Fidelia Solomon RN  Outcome: Not Met This Shift  Problem: Altered Mood, Manic Behavior:  Goal: Able to sleep  Description: Able to sleep  Outcome: Not Met This Shift  Goal: Ability to interact with others will improve  Description: Ability to interact with others will improve  Outcome: Not Met This Shift           Electronically signed by Fidelia Solomon RN on 10/9/2020 at 4:54 PM

## 2020-10-09 NOTE — PLAN OF CARE
Currently denies SI/HI does not report any auditory or visual hallucinations however while in her room she was seen talking to unseen others. She made statement \"I didn't call anyone any names\". States she is feeling better but continues to have flat affect and poor eye contact. Patient is isolative to room she does come out to walk to door then back to her room.         Problem: Altered Mood, Depressive Behavior:  Goal: Able to verbalize acceptance of life and situations over which he or she has no control  Description: Able to verbalize acceptance of life and situations over which he or she has no control  10/9/2020 1127 by Shelia Cruz RN  Outcome: Ongoing     Problem: Altered Mood, Depressive Behavior:  Goal: Able to verbalize and/or display a decrease in depressive symptoms  Description: Able to verbalize and/or display a decrease in depressive symptoms  Outcome: Ongoing     Problem: Altered Mood, Deterioration in Function:  Goal: Ability to perform activities of daily living will improve  Description: Ability to perform activities of daily living will improve  10/9/2020 1127 by Shelia Cruz RN  Outcome: Ongoing     Problem: Altered Mood, Deterioration in Function:  Goal: Able to verbalize reality based thinking  Description: Able to verbalize reality based thinking  Outcome: Ongoing
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
## 2020-10-10 PROCEDURE — 6370000000 HC RX 637 (ALT 250 FOR IP): Performed by: CLINICAL NURSE SPECIALIST

## 2020-10-10 PROCEDURE — 99231 SBSQ HOSP IP/OBS SF/LOW 25: CPT | Performed by: NURSE PRACTITIONER

## 2020-10-10 PROCEDURE — 6370000000 HC RX 637 (ALT 250 FOR IP): Performed by: PSYCHIATRY & NEUROLOGY

## 2020-10-10 PROCEDURE — 6370000000 HC RX 637 (ALT 250 FOR IP): Performed by: NURSE PRACTITIONER

## 2020-10-10 PROCEDURE — 1240000000 HC EMOTIONAL WELLNESS R&B

## 2020-10-10 RX ORDER — PALIPERIDONE 3 MG/1
3 TABLET, EXTENDED RELEASE ORAL DAILY
Status: DISCONTINUED | OUTPATIENT
Start: 2020-10-11 | End: 2020-10-15 | Stop reason: HOSPADM

## 2020-10-10 RX ORDER — PALIPERIDONE 6 MG/1
6 TABLET, EXTENDED RELEASE ORAL NIGHTLY
Status: DISCONTINUED | OUTPATIENT
Start: 2020-10-10 | End: 2020-10-15 | Stop reason: HOSPADM

## 2020-10-10 RX ADMIN — MULTIPLE VITAMINS W/ MINERALS TAB 1 TABLET: TAB at 08:40

## 2020-10-10 RX ADMIN — POLYETHYLENE GLYCOL 3350 17 G: 17 POWDER, FOR SOLUTION ORAL at 20:46

## 2020-10-10 RX ADMIN — TOPIRAMATE 100 MG: 100 TABLET, FILM COATED ORAL at 20:46

## 2020-10-10 RX ADMIN — DONEPEZIL HYDROCHLORIDE 5 MG: 5 TABLET, FILM COATED ORAL at 20:46

## 2020-10-10 RX ADMIN — VENLAFAXINE HYDROCHLORIDE 75 MG: 75 CAPSULE, EXTENDED RELEASE ORAL at 08:40

## 2020-10-10 RX ADMIN — PALIPERIDONE 3 MG: 3 TABLET, EXTENDED RELEASE ORAL at 08:40

## 2020-10-10 RX ADMIN — POLYETHYLENE GLYCOL 3350 17 G: 17 POWDER, FOR SOLUTION ORAL at 08:39

## 2020-10-10 RX ADMIN — TOPIRAMATE 100 MG: 100 TABLET, FILM COATED ORAL at 08:40

## 2020-10-10 RX ADMIN — SULFAMETHOXAZOLE AND TRIMETHOPRIM 1 TABLET: 800; 160 TABLET ORAL at 08:40

## 2020-10-10 RX ADMIN — SULFAMETHOXAZOLE AND TRIMETHOPRIM 1 TABLET: 800; 160 TABLET ORAL at 20:46

## 2020-10-10 RX ADMIN — PALIPERIDONE 6 MG: 6 TABLET, EXTENDED RELEASE ORAL at 20:46

## 2020-10-10 ASSESSMENT — PAIN SCALES - GENERAL
PAINLEVEL_OUTOF10: 0
PAINLEVEL_OUTOF10: 0

## 2020-10-10 NOTE — PLAN OF CARE
Denies SI, HI, and hallucinations. Patient is flat, sad, paranoid, and has poor eye contact. Denies depression and states \"I feel a little bit better. \" She was out on the unit for dinner then returned to room. No behavioral issues or PRNs administered. No complaints or concerns verbalized at this time. Will continue to monitor and offer support.           Problem: Altered Mood, Depressive Behavior:  Goal: Ability to disclose and discuss suicidal ideas will improve  Description: Ability to disclose and discuss suicidal ideas will improve  10/10/2020 1859 by Fidelia Solomon RN  Outcome: Met This Shift  Goal: Absence of self-harm  Description: Absence of self-harm  10/10/2020 1859 by Fidelia Solomon RN  Outcome: Met This Shift     Problem: Altered Mood, Manic Behavior:  Goal: Mood stable  Description: Mood stable  Outcome: Met This Shift     Problem: Altered Mood, Depressive Behavior:  Goal: Able to verbalize and/or display a decrease in depressive symptoms  Description: Able to verbalize and/or display a decrease in depressive symptoms  Outcome: Not Met This Shift     Problem: Altered Mood, Deterioration in Function:  Goal: Ability to perform activities of daily living will improve  Description: Ability to perform activities of daily living will improve  Outcome: Not Met This Shift  Goal: Able to verbalize reality based thinking  Description: Able to verbalize reality based thinking  Outcome: Not Met This Shift             Electronically signed by Fidelia Solomon RN on 10/10/2020 at 7:00 PM

## 2020-10-10 NOTE — PLAN OF CARE
Patient pleasant and cooperative. Denies SI, HI, and hallucinations. Patient can be observed talking to unseen others, but states that she is not talking to anyone when asked specifically. Patient also comes to nurses station stating that she is leaving and occasionally runs down the [place]. Per patients roommate, patient wakes her up at night stating \"we need to get out of here, we need to leave the building\" and also apparently tell her roommate to not touch the doorknobs, not go into the bathroom, and was looking under her roommates bed. Patient appears paranoid but denies feeling paranoid. Patient also has odd behaviors, frequently asking to get into the shower but after being let into the shower room would not get into the shower, then comes back to nurses station asking to get into the shower again. Patient did eventually take a shower after being let in multiple times. Will continue to observe and support.     Problem: Altered Mood, Depressive Behavior:  Goal: Ability to disclose and discuss suicidal ideas will improve  Description: Ability to disclose and discuss suicidal ideas will improve  Outcome: Ongoing  Goal: Absence of self-harm  Description: Absence of self-harm  Outcome: Ongoing
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
## 2020-10-11 PROCEDURE — 1240000000 HC EMOTIONAL WELLNESS R&B

## 2020-10-11 PROCEDURE — 6370000000 HC RX 637 (ALT 250 FOR IP): Performed by: PSYCHIATRY & NEUROLOGY

## 2020-10-11 PROCEDURE — 99231 SBSQ HOSP IP/OBS SF/LOW 25: CPT | Performed by: NURSE PRACTITIONER

## 2020-10-11 PROCEDURE — 6370000000 HC RX 637 (ALT 250 FOR IP): Performed by: CLINICAL NURSE SPECIALIST

## 2020-10-11 PROCEDURE — 6370000000 HC RX 637 (ALT 250 FOR IP): Performed by: NURSE PRACTITIONER

## 2020-10-11 RX ADMIN — POLYETHYLENE GLYCOL 3350 17 G: 17 POWDER, FOR SOLUTION ORAL at 09:13

## 2020-10-11 RX ADMIN — TOPIRAMATE 100 MG: 100 TABLET, FILM COATED ORAL at 09:13

## 2020-10-11 RX ADMIN — PALIPERIDONE 3 MG: 3 TABLET, EXTENDED RELEASE ORAL at 09:13

## 2020-10-11 RX ADMIN — SULFAMETHOXAZOLE AND TRIMETHOPRIM 1 TABLET: 800; 160 TABLET ORAL at 09:13

## 2020-10-11 RX ADMIN — POLYETHYLENE GLYCOL 3350 17 G: 17 POWDER, FOR SOLUTION ORAL at 21:09

## 2020-10-11 RX ADMIN — MULTIPLE VITAMINS W/ MINERALS TAB 1 TABLET: TAB at 09:13

## 2020-10-11 RX ADMIN — TOPIRAMATE 100 MG: 100 TABLET, FILM COATED ORAL at 21:09

## 2020-10-11 RX ADMIN — SULFAMETHOXAZOLE AND TRIMETHOPRIM 1 TABLET: 800; 160 TABLET ORAL at 21:09

## 2020-10-11 RX ADMIN — PALIPERIDONE 6 MG: 6 TABLET, EXTENDED RELEASE ORAL at 21:09

## 2020-10-11 RX ADMIN — VENLAFAXINE HYDROCHLORIDE 75 MG: 75 CAPSULE, EXTENDED RELEASE ORAL at 09:13

## 2020-10-11 RX ADMIN — DONEPEZIL HYDROCHLORIDE 5 MG: 5 TABLET, FILM COATED ORAL at 21:09

## 2020-10-11 ASSESSMENT — PAIN SCALES - GENERAL
PAINLEVEL_OUTOF10: 0
PAINLEVEL_OUTOF10: 0
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
## 2020-10-12 PROCEDURE — 1240000000 HC EMOTIONAL WELLNESS R&B

## 2020-10-12 PROCEDURE — 6370000000 HC RX 637 (ALT 250 FOR IP): Performed by: CLINICAL NURSE SPECIALIST

## 2020-10-12 PROCEDURE — 99231 SBSQ HOSP IP/OBS SF/LOW 25: CPT | Performed by: NURSE PRACTITIONER

## 2020-10-12 PROCEDURE — 6370000000 HC RX 637 (ALT 250 FOR IP): Performed by: NURSE PRACTITIONER

## 2020-10-12 PROCEDURE — 6370000000 HC RX 637 (ALT 250 FOR IP): Performed by: PSYCHIATRY & NEUROLOGY

## 2020-10-12 RX ADMIN — TOPIRAMATE 100 MG: 100 TABLET, FILM COATED ORAL at 09:30

## 2020-10-12 RX ADMIN — SULFAMETHOXAZOLE AND TRIMETHOPRIM 1 TABLET: 800; 160 TABLET ORAL at 20:59

## 2020-10-12 RX ADMIN — MULTIPLE VITAMINS W/ MINERALS TAB 1 TABLET: TAB at 09:29

## 2020-10-12 RX ADMIN — TOPIRAMATE 100 MG: 100 TABLET, FILM COATED ORAL at 21:00

## 2020-10-12 RX ADMIN — PALIPERIDONE 6 MG: 6 TABLET, EXTENDED RELEASE ORAL at 21:00

## 2020-10-12 RX ADMIN — SULFAMETHOXAZOLE AND TRIMETHOPRIM 1 TABLET: 800; 160 TABLET ORAL at 09:30

## 2020-10-12 RX ADMIN — PALIPERIDONE 3 MG: 3 TABLET, EXTENDED RELEASE ORAL at 09:29

## 2020-10-12 RX ADMIN — DONEPEZIL HYDROCHLORIDE 5 MG: 5 TABLET, FILM COATED ORAL at 20:59

## 2020-10-12 RX ADMIN — POLYETHYLENE GLYCOL 3350 17 G: 17 POWDER, FOR SOLUTION ORAL at 21:00

## 2020-10-12 RX ADMIN — POLYETHYLENE GLYCOL 3350 17 G: 17 POWDER, FOR SOLUTION ORAL at 09:29

## 2020-10-12 RX ADMIN — VENLAFAXINE HYDROCHLORIDE 75 MG: 75 CAPSULE, EXTENDED RELEASE ORAL at 09:30

## 2020-10-12 ASSESSMENT — PAIN SCALES - GENERAL
PAINLEVEL_OUTOF10: 0

## 2020-10-12 NOTE — PROGRESS NOTES
Patient observed standing in the puckett by her room appearing confused. This RN approached her and redirected her back into her room. Patient stated \"Oh I didn't know if I could go back in there or not. \" Will continue to monitor.

## 2020-10-12 NOTE — PROGRESS NOTES
Patient out on unit. This RN approached her and she stated \"Im worried. \" Patient would not elaborate and returned to her room.

## 2020-10-12 NOTE — PLAN OF CARE
Pt denied SI/HI & AV hallucinations. Pt reports having anxiety because she is worrying about \"everyone else. \" When I asked the pt about it she then stated \"it's ok. \" Pt appeared to be brightened during conversation and was smiling,  laughing, and making eye contact. Pt did not go to goal group. Med complaint. Will continue to monitor and provide support.    Problem: Altered Mood, Depressive Behavior:  Goal: Ability to disclose and discuss suicidal ideas will improve  Description: Ability to disclose and discuss suicidal ideas will improve  10/12/2020 1042 by Burton Houston RN  Outcome: Met This Shift     Problem: Altered Mood, Depressive Behavior:  Goal: Absence of self-harm  Description: Absence of self-harm  10/12/2020 1042 by Burton Houston RN  Outcome: Met This Shift
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
## 2020-10-13 PROCEDURE — 6370000000 HC RX 637 (ALT 250 FOR IP): Performed by: PSYCHIATRY & NEUROLOGY

## 2020-10-13 PROCEDURE — 6370000000 HC RX 637 (ALT 250 FOR IP): Performed by: NURSE PRACTITIONER

## 2020-10-13 PROCEDURE — 99231 SBSQ HOSP IP/OBS SF/LOW 25: CPT | Performed by: NURSE PRACTITIONER

## 2020-10-13 PROCEDURE — 6370000000 HC RX 637 (ALT 250 FOR IP): Performed by: CLINICAL NURSE SPECIALIST

## 2020-10-13 PROCEDURE — 1240000000 HC EMOTIONAL WELLNESS R&B

## 2020-10-13 RX ORDER — ARIPIPRAZOLE LAUROXIL 882 MG/3.2ML
882 INJECTION, SUSPENSION, EXTENDED RELEASE INTRAMUSCULAR ONCE
Status: ON HOLD | COMMUNITY
End: 2020-11-10

## 2020-10-13 RX ADMIN — PALIPERIDONE 6 MG: 6 TABLET, EXTENDED RELEASE ORAL at 22:01

## 2020-10-13 RX ADMIN — PALIPERIDONE 3 MG: 3 TABLET, EXTENDED RELEASE ORAL at 08:48

## 2020-10-13 RX ADMIN — DONEPEZIL HYDROCHLORIDE 5 MG: 5 TABLET, FILM COATED ORAL at 22:01

## 2020-10-13 RX ADMIN — TOPIRAMATE 100 MG: 100 TABLET, FILM COATED ORAL at 08:48

## 2020-10-13 RX ADMIN — TOPIRAMATE 100 MG: 100 TABLET, FILM COATED ORAL at 21:56

## 2020-10-13 RX ADMIN — SULFAMETHOXAZOLE AND TRIMETHOPRIM 1 TABLET: 800; 160 TABLET ORAL at 08:48

## 2020-10-13 RX ADMIN — VENLAFAXINE HYDROCHLORIDE 75 MG: 75 CAPSULE, EXTENDED RELEASE ORAL at 08:48

## 2020-10-13 RX ADMIN — MULTIPLE VITAMINS W/ MINERALS TAB 1 TABLET: TAB at 08:48

## 2020-10-13 RX ADMIN — POLYETHYLENE GLYCOL 3350 17 G: 17 POWDER, FOR SOLUTION ORAL at 08:48

## 2020-10-13 RX ADMIN — POLYETHYLENE GLYCOL 3350 17 G: 17 POWDER, FOR SOLUTION ORAL at 21:55

## 2020-10-13 ASSESSMENT — PAIN SCALES - GENERAL
PAINLEVEL_OUTOF10: 0
PAINLEVEL_OUTOF10: 0
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
## 2020-10-13 NOTE — PLAN OF CARE
Problem: Altered Mood, Depressive Behavior:  Goal: Ability to disclose and discuss suicidal ideas will improve  Description: Ability to disclose and discuss suicidal ideas will improve  10/13/2020 1224 by Shiva Lugo RN  Outcome: Met This Shift  10/12/2020 2225 by Josiah Rg RN  Outcome: Met This Shift     Problem: Altered Mood, Depressive Behavior:  Goal: Absence of self-harm  Description: Absence of self-harm  10/13/2020 1224 by Shiva Lugo RN  Outcome: Met This Shift  10/12/2020 2225 by Josiah Rg RN  Outcome: Met This Shift     Problem: Altered Mood, Deterioration in Function:  Goal: Maintenance of adequate nutrition will improve  Description: Maintenance of adequate nutrition will improve  Outcome: Met This Shift     Problem: Altered Mood, Depressive Behavior:  Goal: Participates in care planning  Description: Participates in care planning  Outcome: Ongoing     Problem: Altered Mood, Deterioration in Function:  Goal: Ability to perform activities of daily living will improve  Description: Ability to perform activities of daily living will improve  Outcome: Ongoing     Problem: Altered Mood, Manic Behavior:  Goal: Able to verbalize decrease in frequency and intensity of racing thoughts  Description: Able to verbalize decrease in frequency and intensity of racing thoughts  Outcome: Ongoing     Patient denies SI/HI/Hallucinations, anxiety or depression. Patient A&Ox3, disoriented to situation. Patient isolative and withdrawn to her room. Patient appears flat and anxious, avoids eye contact. Patient restless and fidgeting in her room. Patient bizarre and exit seeking, observed running in the hallway with her belongings. Patient able to be verbally redirected. Patient taking prescribed medications, eating provided meals, and attending select groups.

## 2020-10-13 NOTE — PROGRESS NOTES
Group Therapy Note    Patient attended goals group and stated daily goal as to go home. Group Therapy Note    Date: 10/13/2020  Start Time:10:00  End Time:  10:30  Number of Participants: 11    Type of Group: Psychoeducation    Wellness Binder Information  Module Name: Coping Skills  Session Number: NA    Patient's Goal: To id positive coping skills to use on a daily basis. Notes: attended group and was able to participate as an active listener. Status After Intervention:  Unchanged    Participation Level:  Active Listener    Participation Quality: Attentive      Speech:  hesitant      Thought Process/Content: Linear      Affective Functioning: Flat      Mood: anxious and depressed      Level of consciousness:  Alert      Response to Learning: Progressing to goal      Endings: None Reported    Modes of Intervention: Education      Discipline Responsible: Psychoeducational Specialist      Signature:  LIZA Dyson

## 2020-10-13 NOTE — PROGRESS NOTES
Patient has been in her room for most of this shift, except when she came out with all of her belongings in a bag and was standing at the door stating \"it's time to go. \"  Patient would then randomly run back down the hallway to her room. Patient easily redirectable when asked to walk instead of run. Patient denies all and denies any depression or anxiety but stated to this nurse that \"I think my depression meds need upped. I think I cry a lot. \"  Told patient that I would pass it along. Patient accepted medications without issue. Patient is encouraged to continue to work towards discharge goal by complying with medications, attending groups and to seek staff if feelings are overwhelming. Environmental rounds completed per unit policy to maintain safety of everyone on the unit. Staff will offer support and interventions as requested or required.

## 2020-10-13 NOTE — PROGRESS NOTES
Distractible  Thought Processes: Circumstantial  Thought Content:Normal: No  Thought Content: Preoccupations  Hallucinations: None  Delusions: No  Delusions: Influence  Memory:Normal: No  Memory: Poor Recent  Insight and Judgment: No  Insight and Judgment: Poor Judgment, Poor Insight  Present Suicidal Ideation: No  Present Homicidal Ideation: No    Daily Assessment Last Entry:   Daily Sleep (WDL): Exceptions to WDL         Patient Currently in Pain: Denies  Daily Nutrition (WDL): Within Defined Limits  Appetite Change: Decreased  Barriers to Nutrition: Cognitive impairment  Level of Assistance: Independent/Self    Patient Monitoring:  Frequency of Checks: 4 times per hour, close    Psychiatric Symptoms:   Depression Symptoms  Depression Symptoms: Isolative, Impaired concentration  Anxiety Symptoms  Anxiety Symptoms: Generalized  Marce Symptoms  Marce Symptoms: Less need to sleep, Poor judgment     Psychosis Symptoms  Delusion Type: No problems reported or observed. Suicide Risk CSSR-S:  1) Within the past month, have you wished you were dead or wished you could go to sleep and not wake up? : No  2) Have you actually had any thoughts of killing yourself? : No  6) Have you ever done anything, started to do anything, or prepared to do anything to end your life?: No  Change in Result No Change in Plan of care No    EDUCATION:   Learner Progress Toward Treatment Goals: Reviewed results and recommendations of this team and Reviewed goals and plan of care    Method: Small group    Outcome: Needs reinforcement    PATIENT GOALS: \"To go home\"    PLAN/TREATMENT RECOMMENDATIONS UPDATE:  Take prescribed medications, attend/participate in groups. Continue to provide emotional support to patient.     GOALS UPDATE:  Time frame for Short-Term Goals: 1-3 days      Mis Eid RN

## 2020-10-14 VITALS
DIASTOLIC BLOOD PRESSURE: 64 MMHG | HEART RATE: 73 BPM | OXYGEN SATURATION: 100 % | BODY MASS INDEX: 17.06 KG/M2 | WEIGHT: 102.4 LBS | SYSTOLIC BLOOD PRESSURE: 114 MMHG | RESPIRATION RATE: 16 BRPM | TEMPERATURE: 97.7 F | HEIGHT: 65 IN

## 2020-10-14 PROCEDURE — 6370000000 HC RX 637 (ALT 250 FOR IP): Performed by: NURSE PRACTITIONER

## 2020-10-14 PROCEDURE — 99231 SBSQ HOSP IP/OBS SF/LOW 25: CPT | Performed by: NURSE PRACTITIONER

## 2020-10-14 PROCEDURE — 1240000000 HC EMOTIONAL WELLNESS R&B

## 2020-10-14 PROCEDURE — 6370000000 HC RX 637 (ALT 250 FOR IP): Performed by: CLINICAL NURSE SPECIALIST

## 2020-10-14 PROCEDURE — 6370000000 HC RX 637 (ALT 250 FOR IP): Performed by: PSYCHIATRY & NEUROLOGY

## 2020-10-14 RX ADMIN — MULTIPLE VITAMINS W/ MINERALS TAB 1 TABLET: TAB at 09:23

## 2020-10-14 RX ADMIN — VENLAFAXINE HYDROCHLORIDE 75 MG: 75 CAPSULE, EXTENDED RELEASE ORAL at 09:23

## 2020-10-14 RX ADMIN — MIRTAZAPINE 15 MG: 15 TABLET, FILM COATED ORAL at 20:56

## 2020-10-14 RX ADMIN — PALIPERIDONE 6 MG: 6 TABLET, EXTENDED RELEASE ORAL at 20:56

## 2020-10-14 RX ADMIN — POLYETHYLENE GLYCOL 3350 17 G: 17 POWDER, FOR SOLUTION ORAL at 09:23

## 2020-10-14 RX ADMIN — PALIPERIDONE 3 MG: 3 TABLET, EXTENDED RELEASE ORAL at 09:23

## 2020-10-14 RX ADMIN — POLYETHYLENE GLYCOL 3350 17 G: 17 POWDER, FOR SOLUTION ORAL at 20:56

## 2020-10-14 RX ADMIN — TOPIRAMATE 100 MG: 100 TABLET, FILM COATED ORAL at 09:23

## 2020-10-14 RX ADMIN — TOPIRAMATE 100 MG: 100 TABLET, FILM COATED ORAL at 20:55

## 2020-10-14 RX ADMIN — DONEPEZIL HYDROCHLORIDE 5 MG: 5 TABLET, FILM COATED ORAL at 20:56

## 2020-10-14 ASSESSMENT — PAIN SCALES - GENERAL: PAINLEVEL_OUTOF10: 0

## 2020-10-14 NOTE — CARE COORDINATION
Per Linda Fuller of  @ [facility] they can accept pt tomorrow @ [address] worker notified pt's guardian Levonne Sicard @ [facility] family (guardianship) pt has john accepted at [facility] with discharge for tomorrow.

## 2020-10-14 NOTE — CARE COORDINATION
Pass/rr was approved   made referrals to  [facility]- unable to [address] [facility] reported there is a possibility of placement at [facility]

## 2020-10-14 NOTE — PLAN OF CARE
Problem: Altered Mood, Depressive Behavior:  Goal: Ability to disclose and discuss suicidal ideas will improve  Description: Ability to disclose and discuss suicidal ideas will improve  Outcome: Met This Shift     Problem: Altered Mood, Depressive Behavior:  Goal: Absence of self-harm  Description: Absence of self-harm  10/14/2020 1415 by Harvey Steinberg RN  Outcome: Met This Shift  10/14/2020 0258 by Srinivas Munoz RN  Outcome: Met This Shift     Problem: Altered Mood, Deterioration in Function:  Goal: Maintenance of adequate nutrition will improve  Description: Maintenance of adequate nutrition will improve  Outcome: Met This Shift     Problem: Risk of Harm:  Goal: Ability to remain free from injury will improve  Description: Ability to remain free from injury will improve  Outcome: Met This Shift     Problem: Altered Mood, Depressive Behavior:  Goal: Able to verbalize acceptance of life and situations over which he or she has no control  Description: Able to verbalize acceptance of life and situations over which he or she has no control  Outcome: Ongoing     Problem: Altered Mood, Depressive Behavior:  Goal: Able to verbalize and/or display a decrease in depressive symptoms  Description: Able to verbalize and/or display a decrease in depressive symptoms  Outcome: Ongoing     Problem: Altered Mood, Depressive Behavior:  Goal: Participates in care planning  Description: Participates in care planning  Outcome: Ongoing     Patient denies SI/HI/Hallucinations, anxiety or depression. Patient appears flat and anxious. Patient mainly isolative to her room. Patient observed out on the unit, exit seeking. Patient up at the desk multiple times asking if she can leave. Patient soft spoken and avoids eye contact during conversation, easily distractible. Patient taking prescribed medications and eating provided meals without issue.
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
## 2020-10-15 PROCEDURE — 99239 HOSP IP/OBS DSCHRG MGMT >30: CPT | Performed by: NURSE PRACTITIONER

## 2020-10-15 PROCEDURE — 6370000000 HC RX 637 (ALT 250 FOR IP): Performed by: PSYCHIATRY & NEUROLOGY

## 2020-10-15 PROCEDURE — 6370000000 HC RX 637 (ALT 250 FOR IP): Performed by: CLINICAL NURSE SPECIALIST

## 2020-10-15 PROCEDURE — 6370000000 HC RX 637 (ALT 250 FOR IP): Performed by: NURSE PRACTITIONER

## 2020-10-15 RX ORDER — DONEPEZIL HYDROCHLORIDE 5 MG/1
5 TABLET, FILM COATED ORAL NIGHTLY
Qty: 30 TABLET | Refills: 0 | Status: ON HOLD | OUTPATIENT
Start: 2020-10-15 | End: 2020-11-18 | Stop reason: HOSPADM

## 2020-10-15 RX ORDER — MIRTAZAPINE 15 MG/1
15 TABLET, FILM COATED ORAL NIGHTLY
Qty: 30 TABLET | Refills: 3 | Status: SHIPPED | OUTPATIENT
Start: 2020-10-15

## 2020-10-15 RX ORDER — PALIPERIDONE 6 MG/1
6 TABLET, EXTENDED RELEASE ORAL NIGHTLY
Qty: 30 TABLET | Refills: 0 | Status: ON HOLD | OUTPATIENT
Start: 2020-10-15 | End: 2020-11-18 | Stop reason: HOSPADM

## 2020-10-15 RX ORDER — PALIPERIDONE 3 MG/1
3 TABLET, EXTENDED RELEASE ORAL DAILY
Qty: 30 TABLET | Refills: 0 | Status: ON HOLD | OUTPATIENT
Start: 2020-10-15 | End: 2021-06-15 | Stop reason: HOSPADM

## 2020-10-15 RX ADMIN — PALIPERIDONE 3 MG: 3 TABLET, EXTENDED RELEASE ORAL at 09:14

## 2020-10-15 RX ADMIN — MULTIPLE VITAMINS W/ MINERALS TAB 1 TABLET: TAB at 09:14

## 2020-10-15 RX ADMIN — POLYETHYLENE GLYCOL 3350 17 G: 17 POWDER, FOR SOLUTION ORAL at 09:14

## 2020-10-15 RX ADMIN — TOPIRAMATE 100 MG: 100 TABLET, FILM COATED ORAL at 09:14

## 2020-10-15 RX ADMIN — VENLAFAXINE HYDROCHLORIDE 75 MG: 75 CAPSULE, EXTENDED RELEASE ORAL at 09:14

## 2020-10-15 NOTE — PROGRESS NOTES
Pt is medication compliant. Pleasant, cooperative. Actually able to hold an appropriate conversation with this nurse. Pt remains fixated on discharge.

## 2020-10-15 NOTE — CARE COORDINATION
Patient will be discharged to  [facility] today @ [address] will provide transportation  Nurse to nurse  was requested , nursing is aware     faxed Pass/rr approval to Miguel Angel   Fax number [phone]   Mercy Moore guardian was notified of discharge for today.  informed Marlene Chou who reported she would inform Yaritza.      inform, pt's guardian who will lpick up patient's belongings from [facility] and transfer them  to  [facility]

## 2020-10-15 NOTE — PROGRESS NOTES
[facility]  Discharge Note    Pt discharged with followings belongings:   Dentures: None  Vision - Corrective Lenses: Glasses  Hearing Aid: None  Jewelry: None  Body Piercings Removed: N/A  Clothing: Jacket / coat, Footwear, Socks, Undergarments (Comment), Shirt, Pants  Were All Patient Medications Collected?: Not Applicable  Other Valuables: Wallet(3 visa, ebt, [place] id, 2 ssc)   Valuables sent [facility] with patient [facility]. Valuables retrieved from safe, Security envelope number:  [id number] and sent with patient to [facility]. Patient education on aftercare instructions: nurse to nurse given to [facility] at facility.    Status EXAM upon discharge:  Status and Exam  Normal: No  Facial Expression: Avoids Gaze, Brightened  Affect: Stable  Level of Consciousness: Alert  Mood:Normal: No  Mood: Anxious  Motor Activity:Normal: No  Motor Activity: Increased  Interview Behavior: Cooperative, Impulsive  Preception: [place] to Person, Isidor Babinski to Time, [place] to Place  Attention:Normal: No  Attention: Distractible  Thought Processes: Circumstantial  Thought Content:Normal: No  Thought Content: Preoccupations  Hallucinations: None  Delusions: No  Delusions: Influence  Memory:Normal: No  Memory: Poor Recent  Insight and Judgment: No  Insight and Judgment: Poor Judgment, Poor Insight  Present Suicidal Ideation: No  Present Homicidal Ideation: No      Metabolic Screening:    Lab Results   Component Value Date    LABA1C 5.3 10/06/2020       Lab Results   Component Value Date    CHOL 156 10/06/2020    CHOL 172 07/25/2019    CHOL 190 07/26/2018    CHOL 201 06/14/2017    CHOL 212 04/16/2015     Lab Results   Component Value Date    TRIG 31 10/06/2020    TRIG 90 07/25/2019    TRIG 74 07/26/2018    TRIG 59 06/14/2017    TRIG 49 04/16/2015     Lab Results   Component Value Date    HDL 81 10/06/2020    HDL 69 07/25/2019    HDL 71 (H) 07/26/2018    HDL 76 (A) 06/14/2017    HDL 99 (A) 04/16/2015     No components found

## 2020-10-15 NOTE — DISCHARGE INSTR - COC
Continuity of Care Form    Patient Name: Janice Reese   :  1960  MRN:  [id number]    Admit date:  10/6/2020  Discharge date:  10/15/2020    Code Status Order: Full Code   Advance Directives:   [facility] Documentation     Date/Time Healthcare Directive Type of Healthcare Directive Copy in [address] Agent's Name Healthcare Agent's Phone Number    10/06/20 0234  No, patient does not have an advance directive for healthcare treatment -- -- -- -- --          Admitting Physician:  Zamzam Hawk MD  PCP: Billy Weston MD    Discharging Nurse:  Matthieu Gan Unit/Room#: 1310/2182-Y  Discharging Unit Phone Number: [phone]    Emergency Contact:   Extended Emergency Contact Information  Primary Emergency Contact: [address] [facility], [address] [facility] Phone: [phone]  Mobile Phone: [phone]  Relation: Legal Guardian  Preferred language: English   needed?  No  Secondary Emergency Contact: [address] Phone: [phone]  Mobile Phone: [phone]  Relation: Domestic Partner    Past Surgical History:  Past Surgical History:   Procedure Laterality Date    CYST REMOVAL Right 2008    behind knee by Dr. Alexa Hurtado    by Dr. Romana Hay       Immunization History:   Immunization History   Administered Date(s) Administered    Influenza Virus Vaccine 2012    Influenza Whole 2015    Influenza, Quadv, IM, PF (6 mo and older Fluzone, Flulaval, Fluarix, and 3 yrs and older Afluria) 09/15/2016, 2017       Active Problems:  Patient Active Problem List   Diagnosis Code    Chest pain R07.9    Schizo-affective schizophrenia, chronic condition ([facility] [address] F25.9    Hyponatremia E87.1    Trigeminal neuralgia syndrome G50.0    Chronic nonintractable headache R51.9, G89.29    Anxiety F41.9    Chronic constipation K59.09    Head injury S09.90XA    Jaw pain R68.84  Pain in gums K06.8    Schizophrenia (HCC) F20.9    PTSD (post-traumatic stress disorder) F43.10    Other headache syndrome G44.89    Cervical pain (neck) M54.2    Depression with suicidal ideation F32.9, R45.851    Schizoaffective disorder, depressive type ([facility]) F25.1    Severe protein-calorie malnutrition ([address] E43    Psychosis ([address] F29    Acute psychosis ([address] F23    Pelvic pain R10.2    Major depression with psychotic features ([address] F32.3    Dementia, vascular, with depression ([address] F01.50, F32.9    UTI (urinary tract infection) N39.0    H/O irritable bowel syndrome Z87.19       Isolation/Infection:   Isolation          No Isolation        Patient Infection Status     Infection Onset Added Last Indicated Last Indicated By Review Planned Expiration Resolved Resolved By    None active    Resolved    COVID-19 Rule Out 10/05/20 10/05/20 10/05/20 COVID-19 (Ordered)   10/05/20 Rule-Out Test Resulted    COVID-19 Rule Out 20 COVID-19 (Ordered)   20           Nurse Assessment:  Last Vital Signs: /64   Pulse 73   Temp 97.7 °F (36.5 °C) (Temporal)   Resp 16   Ht 5' 5\" (1.651 m)   Wt 102 lb 6.4 oz (46.4 kg)   LMP 2011 (Exact Date)   SpO2 100%   BMI 17.04 kg/m²     Last documented pain score (0-10 scale): Pain Level: 0  Last Weight:   Wt Readings from Last 1 Encounters:   10/10/20 102 lb 6.4 oz (46.4 kg)     Mental Status:  alert and pleasent and cooperative    IV Access:  - None    Nursing Mobility/ADLs:  Walking   Independent  Transfer  Independent  Bathing  Assisted  Dressing  Independent  Toileting  Independent  Feeding  Independent  Med Admin  Dependent  Med Delivery   whole    Wound Care Documentation and Therapy:  Wound 19 (Active)   Number of days: 411        Elimination:  Continence:   · Bowel:  Yes  · Bladder: Yes  Urinary Catheter: None   Colostomy/Ileostomy/Ileal Conduit: No       Date of Last BM: 10/14/2020    Intake/Output Summary (Last 24 hours) at 10/15/2020 0837  Last data filed at 10/14/2020 1050  Gross per 24 hour   Intake 240 ml   Output --   Net 240 ml     I/O last 3 completed shifts: In: 240 [P.O.:240]  Out: -     Safety Concerns:     can be exist seeking at times    Impairments/Disabilities:      Glasses    Nutrition Therapy:  Current Nutrition Therapy:   - Oral Diet:  General    Routes of Feeding: Oral  Liquids: No Restrictions  Daily Fluid Restriction: no  Last Modified Barium Swallow with Video (Video Swallowing Test): not done    Treatments at the Time of Hospital Discharge:   Respiratory Treatments: none  Oxygen Therapy:  is not on home oxygen therapy. Ventilator:    - No ventilator support    Rehab Therapies: Physical Therapy  Weight Bearing Status/Restrictions: No weight bearing restirctions  Other Medical Equipment (for information only, NOT a DME order): none  Other Treatments:     Patient's personal belongings (please select all that are sent with patient):  Glasses, Social Security card, 3 [employer] gift cards, [place] direction card, photo id card      RN SIGNATURE:  Electronically signed by Ivania Turner RN on 10/15/20 at 11:58 AM EDT    CASE MANAGEMENT/SOCIAL WORK SECTION    Inpatient Status Date: ***    Readmission Risk Assessment Score:  Readmission Risk              Risk of Unplanned Readmission:        23           Discharging to Facility/ Agency   · Name: [facility]  · [facility]. [facility]  · Phone: [phone]  · Fax:      [phone]    Dialysis Facility (if applicable)   · Name:  · Address:  · Dialysis Schedule:  · Phone:  · Fax:    / signature: Electronically signed by MARANDA Landry on 10/15/2020 at 8:52 AM    PHYSICIAN SECTION    Prognosis: {Prognosis:1051411733}    Condition at Discharge: [facility] Patient Condition:[id number]}    Rehab Potential (if transferring to Rehab): {Prognosis:7360998310}    Recommended Labs or Other Treatments After Discharge: ***    Physician Certification: I certify the above information and transfer of Romina Mcmillan  is necessary for the continuing treatment of the diagnosis listed and that she requires {Admit to Appropriate Level of Care:89186} for {GREATER/LESS:759129928} 30 days.      Update Admission H&P: [facility]    PHYSICIAN SIGNATURE:  {Esignature:[id number]}

## 2020-10-15 NOTE — DISCHARGE INSTR - COC
Continuity of Care Form    Patient Name: Anni Caputo   :  1960  MRN:  [id number]    Admit date:  10/6/2020  Discharge date:  ***    Code Status Order: Full Code   Advance Directives:   [facility] Documentation       Date/Time Healthcare Directive Type of Healthcare Directive Copy in [facility] [address] Agent's Name Healthcare Agent's Phone Number    10/06/20 0234  No, patient does not have an advance directive for healthcare treatment -- -- -- -- --            Admitting Physician:  Kedar Hernandez MD  PCP: Annalisa Daniels MD    Discharging Nurse: [facility]/Room#: 7330/4366-W  Discharging Unit Phone Number: ***    Emergency Contact:   Extended Emergency Contact Information  Primary Emergency Contact: [address] [facility], [address] [facility] Phone: [phone]  Mobile Phone: [phone]  Relation: Legal Guardian  Preferred language: English   needed?  No  Secondary Emergency Contact: Piyush Gil [address] Phone: [phone]  Mobile Phone: [phone]  Relation: Domestic Partner    Past Surgical History:  Past Surgical History:   Procedure Laterality Date    CYST REMOVAL Right 2008    behind knee by Dr. John Peters    by Dr. Dyan Ashton       Immunization History:   Immunization History   Administered Date(s) Administered    Influenza Virus Vaccine 2012    Influenza Whole 2015    Influenza, Quadv, IM, PF (6 mo and older Fluzone, Flulaval, Fluarix, and 3 yrs and older Afluria) 09/15/2016, 2017       Active Problems:  Patient Active Problem List   Diagnosis Code    Chest pain R07.9    Schizo-affective schizophrenia, chronic condition ([facility] [address] F25.9    Hyponatremia E87.1    Trigeminal neuralgia syndrome G50.0    Chronic nonintractable headache R51.9, G89.29    Anxiety F41.9    Chronic constipation K59.09    Head injury S09.90XA    Jaw pain R68.84    Pain in gums K06.8 Schizophrenia ([facility] [address] F20.9    PTSD (post-traumatic stress disorder) F43.10    Other headache syndrome G44.89    Cervical pain (neck) M54.2    Depression with suicidal ideation F32.9, R45.851    Schizoaffective disorder, depressive type ([facility] [address] F25.1    Severe protein-calorie malnutrition ([address] E43    Psychosis ([facility] [address] F29    Acute psychosis ([facility] [address] F23    Pelvic pain R10.2    Major depression with psychotic features ([facility] [address] F32.3    Dementia, vascular, with depression ([address] F01.50, F32.9    UTI (urinary tract infection) N39.0    H/O irritable bowel syndrome Z87.19       Isolation/Infection:   Isolation            No Isolation          Patient Infection Status       Infection Onset Added Last Indicated Last Indicated By Review Planned Expiration Resolved Resolved By    None active    Resolved    COVID-19 Rule Out 10/05/20 10/05/20 10/05/20 COVID-19 (Ordered)   10/05/20 Rule-Out Test Resulted    COVID-19 Rule Out 20 COVID-19 (Ordered)   20             Nurse Assessment:  Last Vital Signs: /64   Pulse 73   Temp 97.7 °F (36.5 °C) (Temporal)   Resp 16   Ht 5' 5\" (1.651 m)   Wt 102 lb 6.4 oz (46.4 kg)   LMP 2011 (Exact Date)   SpO2 100%   BMI 17.04 kg/m²     Last documented pain score (0-10 scale): Pain Level: 0  Last Weight:   Wt Readings from Last 1 Encounters:   10/10/20 102 lb 6.4 oz (46.4 kg)     Mental Status:  {IP PT MENTAL STATUS::::0}    IV Access:  { BRIGITTE IV ACCESS:705609099:::0}    Nursing Mobility/ADLs:  Walking   {CHP DME ADLs:111907919:::0}  Transfer  {CHP DME ADLs:339938071:::0}  Bathing  {CHP DME ADLs:947012639:::0}  Dressing  {CHP DME ADLs:227488017:::0}  Toileting  {CHP DME ADLs:025612483:::0}  Feeding  {CHP DME ADLs:366654708:::0}  Med Admin  {CHP DME ADLs:453669941:::0}  Med Delivery   { BRIGITTE MED Delivery:769722265:::0}    Wound Care Documentation and Therapy:  Wound 19 (Active)   Number of days: 411        Elimination:  Continence:    Bowel: Discharge: Stable    Rehab Potential (if transferring to Rehab): Fair    Recommended Labs or Other Treatments After Discharge: CBC, [address]    Physician Certification: I certify the above information and transfer of Jocelyn Anthony  is necessary for the continuing treatment of the diagnosis listed and that she requires Located within [facility] for greater 30 days.      Update Admission H&P: No change in H&P    PHYSICIAN SIGNATURE:  Electronically signed by IFEANYI Wynn CNP on 32/06/18 at 8:49 AM EDT
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
## 2020-10-29 ENCOUNTER — HOSPITAL ENCOUNTER (OUTPATIENT)
Age: 60
Discharge: HOME | End: 2020-10-31
Payer: COMMERCIAL

## 2020-10-29 PROCEDURE — U0003 INFECTIOUS AGENT DETECTION BY NUCLEIC ACID (DNA OR RNA); SEVERE ACUTE RESPIRATORY SYNDROME CORONAVIRUS 2 (SARS-COV-2) (CORONAVIRUS DISEASE [COVID-19]), AMPLIFIED PROBE TECHNIQUE, MAKING USE OF HIGH THROUGHPUT TECHNOLOGIES AS DESCRIBED BY CMS-2020-01-R: HCPCS

## 2020-10-30 LAB
SARS-COV-2: NOT DETECTED
SOURCE: NORMAL

## 2020-11-06 PROBLEM — N39.0 UTI (URINARY TRACT INFECTION): Status: RESOLVED | Noted: 2020-10-07 | Resolved: 2020-11-06

## 2020-11-06 LAB
SARS-COV-2: NOT DETECTED
SOURCE: NORMAL

## 2020-11-09 ENCOUNTER — HOSPITAL ENCOUNTER (INPATIENT)
Age: 60
LOS: 9 days | Discharge: ANOTHER HOSPITAL | DRG: 750 | End: 2020-11-18
Attending: EMERGENCY MEDICINE | Admitting: PSYCHIATRY & NEUROLOGY
Payer: COMMERCIAL

## 2020-11-09 PROBLEM — F29 ATYPICAL PSYCHOSIS (HCC): Status: ACTIVE | Noted: 2020-11-09

## 2020-11-09 LAB
ACETAMINOPHEN LEVEL: <5 MCG/ML (ref 10–30)
ALBUMIN SERPL-MCNC: 4.5 G/DL (ref 3.5–5.2)
ALP BLD-CCNC: 91 U/L (ref 35–104)
ALT SERPL-CCNC: 26 U/L (ref 0–32)
AMPHETAMINE SCREEN, URINE: NOT DETECTED
ANION GAP SERPL CALCULATED.3IONS-SCNC: 10 MMOL/L (ref 7–16)
AST SERPL-CCNC: 20 U/L (ref 0–31)
BACTERIA: NORMAL /HPF
BARBITURATE SCREEN URINE: NOT DETECTED
BASOPHILS ABSOLUTE: 0.06 E9/L (ref 0–0.2)
BASOPHILS RELATIVE PERCENT: 1.2 % (ref 0–2)
BENZODIAZEPINE SCREEN, URINE: NOT DETECTED
BILIRUB SERPL-MCNC: 0.7 MG/DL (ref 0–1.2)
BILIRUBIN URINE: NEGATIVE
BLOOD, URINE: ABNORMAL
BUN BLDV-MCNC: 13 MG/DL (ref 8–23)
CALCIUM SERPL-MCNC: 9.6 MG/DL (ref 8.6–10.2)
CANNABINOID SCREEN URINE: NOT DETECTED
CHLORIDE BLD-SCNC: 94 MMOL/L (ref 98–107)
CLARITY: CLEAR
CO2: 26 MMOL/L (ref 22–29)
COCAINE METABOLITE SCREEN URINE: NOT DETECTED
COLOR: YELLOW
CREAT SERPL-MCNC: 0.7 MG/DL (ref 0.5–1)
EOSINOPHILS ABSOLUTE: 0.09 E9/L (ref 0.05–0.5)
EOSINOPHILS RELATIVE PERCENT: 1.9 % (ref 0–6)
ETHANOL: <10 MG/DL (ref 0–0.08)
FENTANYL SCREEN, URINE: NOT DETECTED
GFR AFRICAN AMERICAN: >60
GFR NON-AFRICAN AMERICAN: >60 ML/MIN/1.73
GLUCOSE BLD-MCNC: 90 MG/DL (ref 74–99)
GLUCOSE URINE: NEGATIVE MG/DL
HCT VFR BLD CALC: 38.6 % (ref 34–48)
HEMOGLOBIN: 12.6 G/DL (ref 11.5–15.5)
IMMATURE GRANULOCYTES #: 0.02 E9/L
IMMATURE GRANULOCYTES %: 0.4 % (ref 0–5)
KETONES, URINE: NEGATIVE MG/DL
LEUKOCYTE ESTERASE, URINE: ABNORMAL
LYMPHOCYTES ABSOLUTE: 1.86 E9/L (ref 1.5–4)
LYMPHOCYTES RELATIVE PERCENT: 38.5 % (ref 20–42)
Lab: NORMAL
MCH RBC QN AUTO: 31.1 PG (ref 26–35)
MCHC RBC AUTO-ENTMCNC: 32.6 % (ref 32–34.5)
MCV RBC AUTO: 95.3 FL (ref 80–99.9)
METHADONE SCREEN, URINE: NOT DETECTED
MONOCYTES ABSOLUTE: 0.44 E9/L (ref 0.1–0.95)
MONOCYTES RELATIVE PERCENT: 9.1 % (ref 2–12)
NEUTROPHILS ABSOLUTE: 2.36 E9/L (ref 1.8–7.3)
NEUTROPHILS RELATIVE PERCENT: 48.9 % (ref 43–80)
NITRITE, URINE: NEGATIVE
OPIATE SCREEN URINE: NOT DETECTED
OXYCODONE URINE: NOT DETECTED
PDW BLD-RTO: 13.1 FL (ref 11.5–15)
PH UA: 8 (ref 5–9)
PHENCYCLIDINE SCREEN URINE: NOT DETECTED
PLATELET # BLD: 303 E9/L (ref 130–450)
PMV BLD AUTO: 10 FL (ref 7–12)
POTASSIUM SERPL-SCNC: 3.5 MMOL/L (ref 3.5–5)
PROTEIN UA: NEGATIVE MG/DL
RBC # BLD: 4.05 E12/L (ref 3.5–5.5)
RBC UA: NORMAL /HPF (ref 0–2)
SALICYLATE, SERUM: <0.3 MG/DL (ref 0–30)
SARS-COV-2, NAAT: NOT DETECTED
SODIUM BLD-SCNC: 130 MMOL/L (ref 132–146)
SPECIFIC GRAVITY UA: <=1.005 (ref 1–1.03)
TOTAL PROTEIN: 7 G/DL (ref 6.4–8.3)
TRICYCLIC ANTIDEPRESSANTS SCREEN SERUM: NEGATIVE NG/ML
UROBILINOGEN, URINE: 0.2 E.U./DL
WBC # BLD: 4.8 E9/L (ref 4.5–11.5)
WBC UA: NORMAL /HPF (ref 0–5)

## 2020-11-09 PROCEDURE — 1240000000 HC EMOTIONAL WELLNESS R&B

## 2020-11-09 PROCEDURE — 96372 THER/PROPH/DIAG INJ SC/IM: CPT

## 2020-11-09 PROCEDURE — 99283 EMERGENCY DEPT VISIT LOW MDM: CPT

## 2020-11-09 PROCEDURE — G0480 DRUG TEST DEF 1-7 CLASSES: HCPCS

## 2020-11-09 PROCEDURE — U0002 COVID-19 LAB TEST NON-CDC: HCPCS

## 2020-11-09 PROCEDURE — 80307 DRUG TEST PRSMV CHEM ANLYZR: CPT

## 2020-11-09 PROCEDURE — 85025 COMPLETE CBC W/AUTO DIFF WBC: CPT

## 2020-11-09 PROCEDURE — 93005 ELECTROCARDIOGRAM TRACING: CPT | Performed by: EMERGENCY MEDICINE

## 2020-11-09 PROCEDURE — 80053 COMPREHEN METABOLIC PANEL: CPT

## 2020-11-09 PROCEDURE — 81001 URINALYSIS AUTO W/SCOPE: CPT

## 2020-11-09 PROCEDURE — 6360000002 HC RX W HCPCS: Performed by: EMERGENCY MEDICINE

## 2020-11-09 RX ORDER — LORAZEPAM 2 MG/ML
2 INJECTION INTRAMUSCULAR ONCE
Status: COMPLETED | OUTPATIENT
Start: 2020-11-09 | End: 2020-11-09

## 2020-11-09 RX ORDER — HALOPERIDOL 5 MG/ML
5 INJECTION INTRAMUSCULAR ONCE
Status: COMPLETED | OUTPATIENT
Start: 2020-11-09 | End: 2020-11-09

## 2020-11-09 RX ADMIN — HALOPERIDOL LACTATE 5 MG: 5 INJECTION INTRAMUSCULAR at 14:50

## 2020-11-09 RX ADMIN — LORAZEPAM 2 MG: 2 INJECTION INTRAMUSCULAR; INTRAVENOUS at 14:50

## 2020-11-09 ASSESSMENT — LIFESTYLE VARIABLES: HISTORY_ALCOHOL_USE: NO

## 2020-11-09 ASSESSMENT — SLEEP AND FATIGUE QUESTIONNAIRES
AVERAGE NUMBER OF SLEEP HOURS: 5
DO YOU HAVE DIFFICULTY SLEEPING: NO
DO YOU USE A SLEEP AID: NO

## 2020-11-09 ASSESSMENT — PAIN SCALES - GENERAL: PAINLEVEL_OUTOF10: 0

## 2020-11-09 NOTE — ED NOTES
PT  [facility], [facility] [place]  11/09/20 6530

## 2020-11-09 NOTE — ED PROVIDER NOTES
Department of Emergency Medicine   ED  Provider Note  Admit Date/RoomTime: 11/9/2020 12:20 PM  ED Room: [facility]/BH-29              11/9/20  12:29 PM EST    HPI: Tony Kumar 61 y.o. female presents with a complaint of aggressive behavior and acute psychosis beginning a few days ago. Complaint has been constant and became more severe today which is what prompted the visit. Sent from nursing facility as she is not taking her psychiatric medications per the report she is agitated and combative, and said she is not taking her meds. She has a complex psychiatric history of schizophrenia. She is agitated on arrival.  She denies suicidal ideation. Review of Systems:   A complete review of systems was performed and pertinent positives and negatives are stated within HPI, all other systems reviewed and are negative.            --------------------------------------------- PAST HISTORY ---------------------------------------------  Past Medical History:  has a past medical history of Chronic idiopathic constipation, Depression, Diabetes mellitus ([facility] [address] Head injury, Hyperlipidemia, IBS (irritable bowel syndrome), Jaw pain, Migraines, neuralgic, Pain in gums, and Schizophrenia ([facility] [address] Past Surgical History:  has a past surgical history that includes cyst removal (Right, 2008); Toe Surgery (1999); and laparoscopy (1980). Social History:  reports that she has never smoked. She has never used smokeless tobacco. She reports that she does not drink alcohol or use drugs. Family History: family history includes Cancer in her mother; Heart Failure in her father. Family history is non contributory unless otherwise noted    The patients home medications have been reviewed. Allergies: Clozapine;  Ibuprofen; and Carbamazepine and analogs    -------------------------------------------------- RESULTS -------------------------------------------------  All laboratory and imaging studies were reviewed by myself.     LABS: reviewed and interpreted by me  Results for orders placed or performed during the hospital encounter of 11/09/20   Comprehensive Metabolic Panel   Result Value Ref Range    Sodium 130 (L) 132 - 146 mmol/L    Potassium 3.5 3.5 - 5.0 mmol/L    Chloride 94 (L) 98 - 107 mmol/L    CO2 26 22 - 29 mmol/L    Anion Gap 10 7 - 16 mmol/L    Glucose 90 74 - 99 mg/dL    BUN 13 8 - 23 mg/dL    CREATININE 0.7 0.5 - 1.0 mg/dL    GFR Non-African American >60 >=60 mL/min/1.73    GFR African American >60     Calcium 9.6 8.6 - 10.2 mg/dL    Total Protein 7.0 6.4 - 8.3 g/dL    Alb 4.5 3.5 - 5.2 g/dL    Total Bilirubin 0.7 0.0 - 1.2 mg/dL    Alkaline Phosphatase 91 35 - 104 U/L    ALT 26 0 - 32 U/L    AST 20 0 - 31 U/L   CBC Auto Differential   Result Value Ref Range    WBC 4.8 4.5 - 11.5 E9/L    RBC 4.05 3.50 - 5.50 E12/L    Hemoglobin 12.6 11.5 - 15.5 g/dL    Hematocrit 38.6 34.0 - 48.0 %    MCV 95.3 80.0 - 99.9 fL    MCH 31.1 26.0 - 35.0 pg    MCHC 32.6 32.0 - 34.5 %    RDW 13.1 11.5 - 15.0 fL    Platelets 412 988 - 001 E9/L    MPV 10.0 7.0 - 12.0 fL    Neutrophils % 48.9 43.0 - 80.0 %    Immature Granulocytes % 0.4 0.0 - 5.0 %    Lymphocytes % 38.5 20.0 - 42.0 %    Monocytes % 9.1 2.0 - 12.0 %    Eosinophils % 1.9 0.0 - 6.0 %    Basophils % 1.2 0.0 - 2.0 %    Neutrophils Absolute 2.36 1.80 - 7.30 E9/L    Immature Granulocytes # 0.02 E9/L    Lymphocytes Absolute 1.86 1.50 - 4.00 E9/L    Monocytes Absolute 0.44 0.10 - 0.95 E9/L    Eosinophils Absolute 0.09 0.05 - 0.50 E9/L    Basophils Absolute 0.06 0.00 - 0.20 E9/L   Serum Drug Screen   Result Value Ref Range    Ethanol Lvl <10 mg/dL    Acetaminophen Level <5.0 (L) 10.0 - 74.0 mcg/mL    Salicylate, Serum <4.2 0.0 - 30.0 mg/dL    TCA Scrn NEGATIVE Cutoff:300 ng/mL   Urine Drug Screen   Result Value Ref Range    Amphetamine Screen, Urine NOT DETECTED Negative <1000 ng/mL    Barbiturate Screen, Ur NOT DETECTED Negative < 200 ng/mL    Benzodiazepine Screen, Urine NOT DETECTED Negative < 200 ng/mL    Cannabinoid Scrn, Ur NOT DETECTED Negative < 50ng/mL    Cocaine Metabolite Screen, Urine NOT DETECTED Negative < 300 ng/mL    Opiate Scrn, Ur NOT DETECTED Negative < 300ng/mL    PCP Screen, Urine NOT DETECTED Negative < 25 ng/mL    Methadone Screen, Urine NOT DETECTED Negative <300 ng/mL    Oxycodone Urine NOT DETECTED Negative <100 ng/mL    FENTANYL SCREEN, URINE NOT DETECTED Negative <1 ng/mL    Drug Screen Comment: see below    Urinalysis   Result Value Ref Range    Color, UA Yellow Straw/Yellow    Clarity, UA Clear Clear    Glucose, Ur Negative Negative mg/dL    Bilirubin Urine Negative Negative    Ketones, Urine Negative Negative mg/dL    Specific Gravity, UA <=1.005 1.005 - 1.030    Blood, Urine TRACE-INTACT Negative    pH, UA 8.0 5.0 - 9.0    Protein, UA Negative Negative mg/dL    Urobilinogen, Urine 0.2 <2.0 E.U./dL    Nitrite, Urine Negative Negative    Leukocyte Esterase, Urine TRACE (A) Negative   Microscopic Urinalysis   Result Value Ref Range    WBC, UA 1-3 0 - 5 /HPF    RBC, UA 0-1 0 - 2 /HPF    Bacteria, UA NONE SEEN None Seen /HPF       RADIOLOGY:  Interpreted by Radiologist.  No orders to display         ------------------------- NURSING NOTES AND VITALS REVIEWED ---------------------------   The nursing notes within the ED encounter and vital signs as below have been reviewed. BP (!) 159/82   Pulse 76   Temp 97.5 °F (36.4 °C)   Resp 14   LMP 08/25/2011 (Exact Date)   SpO2 98%   Oxygen Saturation Interpretation: Normal            ---------------------------------------------------PHYSICAL EXAM--------------------------------------      Constitutional/General: Alert and oriented x3   Head: Normocephalic, atraumatic  Eyes: PERRL, EOMI  Mouth: Oropharynx clear, handling secretions, no trismus  Neck: Supple, full ROM, no meningeal signs  Pulmonary: Lungs clear to auscultation bilaterally, no wheezes, rales, or rhonchi.  Not in respiratory distress  Cardiovascular: Regular rate and rhythm, no murmurs, gallops, or rubs. 2+ distal pulses  Abdomen: Soft, non tender, non distended, +BS, no rebound, guarding, or rigidity. No pulsatile masses appreciated  Extremities: Moves all extremities x 4. Warm and well perfused, no clubbing, cyanosis, or edema. Capillary refill <3 seconds  Skin: warm and dry without rash  Neurologic: GCS 15, no focal deficits  Psych: Agitated, internally preoccupied, delusional and paranoid    ------------------------------------------ PROGRESS NOTES ------------------------------------------     Medical decision making:  Patient is medically stable for mental health evaluation    Consultations:   Behavioral Health    Re-Evaluations:        Counseling: The emergency provider has spoken with thepatient and discussed todays results, in addition to providing specific details for the plan of care and counseling regarding the diagnosis and prognosis. Questions are answered at this time and they are agreeable with the plan.         --------------------------------- IMPRESSION AND DISPOSITION ---------------------------------    IMPRESSION  1. Schizophrenia, unspecified type ([facility]    2.  Acute psychosis ([facility]        DISPOSITION  Disposition: as per consultant  Patient condition is stable           Elham Gimenez MD  11/09/20 1499
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
## 2020-11-09 NOTE — ED NOTES
Bed: -29  Expected date: 11/9/20  Expected time:   Means of arrival:   Comments:  Mikal Max RN  11/09/20 7387

## 2020-11-09 NOTE — ED NOTES
Called and left message for SON Medel in regards to admission to 7 floor.      Ruby Robison RN  11/09/20 1640

## 2020-11-09 NOTE — ED NOTES
Per ARMEN RN Veronica Oliver, patient has accepted to [facility] by Dr. Brandan Lai. Patient to go room #8474. Called admitting and notified Jason Ochoa.

## 2020-11-10 PROBLEM — F03.918 PSYCHOSIS IN ELDERLY WITH BEHAVIORAL DISTURBANCE: Status: ACTIVE | Noted: 2020-11-10

## 2020-11-10 LAB
EKG ATRIAL RATE: 59 BPM
EKG P AXIS: 76 DEGREES
EKG P-R INTERVAL: 156 MS
EKG Q-T INTERVAL: 434 MS
EKG QRS DURATION: 100 MS
EKG QTC CALCULATION (BAZETT): 429 MS
EKG R AXIS: 92 DEGREES
EKG T AXIS: 74 DEGREES
EKG VENTRICULAR RATE: 59 BPM

## 2020-11-10 PROCEDURE — 6370000000 HC RX 637 (ALT 250 FOR IP): Performed by: NURSE PRACTITIONER

## 2020-11-10 PROCEDURE — 99221 1ST HOSP IP/OBS SF/LOW 40: CPT | Performed by: NURSE PRACTITIONER

## 2020-11-10 PROCEDURE — 1240000000 HC EMOTIONAL WELLNESS R&B

## 2020-11-10 PROCEDURE — 93010 ELECTROCARDIOGRAM REPORT: CPT | Performed by: INTERNAL MEDICINE

## 2020-11-10 RX ORDER — ACETAMINOPHEN 325 MG/1
650 TABLET ORAL EVERY 4 HOURS PRN
Status: DISCONTINUED | OUTPATIENT
Start: 2020-11-10 | End: 2020-11-18 | Stop reason: HOSPADM

## 2020-11-10 RX ORDER — HALOPERIDOL 5 MG/ML
3 INJECTION INTRAMUSCULAR EVERY 6 HOURS PRN
Status: DISCONTINUED | OUTPATIENT
Start: 2020-11-10 | End: 2020-11-18 | Stop reason: HOSPADM

## 2020-11-10 RX ORDER — PALIPERIDONE 3 MG/1
3 TABLET, EXTENDED RELEASE ORAL DAILY
Status: DISCONTINUED | OUTPATIENT
Start: 2020-11-10 | End: 2020-11-11

## 2020-11-10 RX ORDER — M-VIT,TX,IRON,MINS/CALC/FOLIC 27MG-0.4MG
1 TABLET ORAL DAILY
Status: DISCONTINUED | OUTPATIENT
Start: 2020-11-10 | End: 2020-11-18 | Stop reason: HOSPADM

## 2020-11-10 RX ORDER — DONEPEZIL HYDROCHLORIDE 5 MG/1
5 TABLET, FILM COATED ORAL NIGHTLY
Status: DISCONTINUED | OUTPATIENT
Start: 2020-11-10 | End: 2020-11-18 | Stop reason: HOSPADM

## 2020-11-10 RX ORDER — VENLAFAXINE HYDROCHLORIDE 75 MG/1
75 CAPSULE, EXTENDED RELEASE ORAL
Status: DISCONTINUED | OUTPATIENT
Start: 2020-11-11 | End: 2020-11-18 | Stop reason: HOSPADM

## 2020-11-10 RX ORDER — PALIPERIDONE 6 MG/1
6 TABLET, EXTENDED RELEASE ORAL NIGHTLY
Status: DISCONTINUED | OUTPATIENT
Start: 2020-11-10 | End: 2020-11-11

## 2020-11-10 RX ORDER — TOPIRAMATE 100 MG/1
100 TABLET, FILM COATED ORAL 2 TIMES DAILY
Status: DISCONTINUED | OUTPATIENT
Start: 2020-11-10 | End: 2020-11-18 | Stop reason: HOSPADM

## 2020-11-10 RX ORDER — TRAZODONE HYDROCHLORIDE 50 MG/1
25 TABLET ORAL NIGHTLY PRN
Status: DISCONTINUED | OUTPATIENT
Start: 2020-11-10 | End: 2020-11-18 | Stop reason: HOSPADM

## 2020-11-10 RX ORDER — HALOPERIDOL 2 MG/1
3 TABLET ORAL EVERY 6 HOURS PRN
Status: DISCONTINUED | OUTPATIENT
Start: 2020-11-10 | End: 2020-11-18 | Stop reason: HOSPADM

## 2020-11-10 RX ORDER — MAGNESIUM HYDROXIDE/ALUMINUM HYDROXICE/SIMETHICONE 120; 1200; 1200 MG/30ML; MG/30ML; MG/30ML
30 SUSPENSION ORAL PRN
Status: DISCONTINUED | OUTPATIENT
Start: 2020-11-10 | End: 2020-11-18 | Stop reason: HOSPADM

## 2020-11-10 RX ORDER — MIRTAZAPINE 15 MG/1
15 TABLET, FILM COATED ORAL NIGHTLY
Status: DISCONTINUED | OUTPATIENT
Start: 2020-11-10 | End: 2020-11-18 | Stop reason: HOSPADM

## 2020-11-10 RX ADMIN — MULTIPLE VITAMINS W/ MINERALS TAB 1 TABLET: TAB at 15:41

## 2020-11-10 RX ADMIN — DONEPEZIL HYDROCHLORIDE 5 MG: 5 TABLET, FILM COATED ORAL at 20:40

## 2020-11-10 RX ADMIN — TOPIRAMATE 100 MG: 100 TABLET, FILM COATED ORAL at 15:01

## 2020-11-10 RX ADMIN — TOPIRAMATE 100 MG: 100 TABLET, FILM COATED ORAL at 20:40

## 2020-11-10 ASSESSMENT — PAIN SCALES - GENERAL
PAINLEVEL_OUTOF10: 0
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
## 2020-11-10 NOTE — CARE COORDINATION
Biopsychosocial Assessment Note    Social work met with patient to complete the biopsychosocial assessment and CSSR-S. Mental Status Exam: Pt was alert and orieinted x 4. She was soft spoken. She states she had experienced depression and si only once around 1993 and denies si since then. She was pleasant and cooperative to answer my questions. She displayed appropriate memory but showed no insight into why she is in hospital or nh. She denied any hi, halluc, or paranoia. Chief Complaint: Pt states that [facility] hasn't been giving her metformin and that they were lying about her not taking her meds. Pt doesn't feel she needs nh level of care and wants to return to live in the community. She has been at [facility] for 3-4 wks. She is unclear why she had to go to nh. She had been at [facility] prior to getting into [facility]. Patient Report:Pt wants to return to community not nh. Gender  [] Male [x] Female [] Transgender  [] Other    Sexual Orientation    [x] Heterosexual [] Homosexual [] Bisexual [] Other    Suicidal Ideation  [] Reports [x] Denies    Homicidal Ideation  [] Reports [x] Denies      Hallucinations/Delusions (Specify type)  [] Reports [x] Denies     Substance Use/Alcohol Use/Addiction  [] Reports [x] Denies     Trauma History  [] Reports [x] Denies     Collateral Contact (BENITO signed) Has guardian appointed by probate court  Name: Ford Camarillo   Relationship: guardian  Number: [phone]    Collateral Information:     Access to Weapons: none    Follow up provider: pt was seeing Marylou Ricci NP from [facility]. She would like to go to [facility] as it [facility] to [facility]. Plan for discharge (where they live can they return): pt would rather return to community and doesn't feel she needs a nh. States \"I don't belong there and I don't want to go back there\".

## 2020-11-10 NOTE — PROGRESS NOTES
Pt is fixated on Metformin. Pt is not diabetic. Pt is asking for Miralax. Explained to patient that I would have to ask the doctor before I can order it and that the doctor is not back until tomorrow.
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
## 2020-11-11 PROCEDURE — 6360000002 HC RX W HCPCS: Performed by: NURSE PRACTITIONER

## 2020-11-11 PROCEDURE — 99231 SBSQ HOSP IP/OBS SF/LOW 25: CPT | Performed by: NURSE PRACTITIONER

## 2020-11-11 PROCEDURE — 1240000000 HC EMOTIONAL WELLNESS R&B

## 2020-11-11 PROCEDURE — 6370000000 HC RX 637 (ALT 250 FOR IP): Performed by: NURSE PRACTITIONER

## 2020-11-11 RX ORDER — POLYETHYLENE GLYCOL 3350 17 G/17G
17 POWDER, FOR SOLUTION ORAL 2 TIMES DAILY
Status: DISCONTINUED | OUTPATIENT
Start: 2020-11-11 | End: 2020-11-18 | Stop reason: HOSPADM

## 2020-11-11 RX ORDER — HALOPERIDOL 5 MG/ML
5 INJECTION INTRAMUSCULAR 2 TIMES DAILY
Status: DISCONTINUED | OUTPATIENT
Start: 2020-11-11 | End: 2020-11-18 | Stop reason: HOSPADM

## 2020-11-11 RX ORDER — PALIPERIDONE 3 MG/1
3 TABLET, EXTENDED RELEASE ORAL 2 TIMES DAILY
Status: DISCONTINUED | OUTPATIENT
Start: 2020-11-11 | End: 2020-11-18 | Stop reason: HOSPADM

## 2020-11-11 RX ADMIN — POLYETHYLENE GLYCOL 3350 17 G: 17 POWDER, FOR SOLUTION ORAL at 10:24

## 2020-11-11 RX ADMIN — POLYETHYLENE GLYCOL 3350 17 G: 17 POWDER, FOR SOLUTION ORAL at 20:34

## 2020-11-11 RX ADMIN — TOPIRAMATE 100 MG: 100 TABLET, FILM COATED ORAL at 08:48

## 2020-11-11 RX ADMIN — TOPIRAMATE 100 MG: 100 TABLET, FILM COATED ORAL at 20:33

## 2020-11-11 RX ADMIN — DONEPEZIL HYDROCHLORIDE 5 MG: 5 TABLET, FILM COATED ORAL at 20:33

## 2020-11-11 RX ADMIN — VENLAFAXINE HYDROCHLORIDE 75 MG: 75 CAPSULE, EXTENDED RELEASE ORAL at 08:48

## 2020-11-11 RX ADMIN — HALOPERIDOL LACTATE 5 MG: 5 INJECTION, SOLUTION INTRAMUSCULAR at 20:35

## 2020-11-11 RX ADMIN — MULTIPLE VITAMINS W/ MINERALS TAB 1 TABLET: TAB at 08:48

## 2020-11-11 ASSESSMENT — PAIN SCALES - GENERAL: PAINLEVEL_OUTOF10: 0

## 2020-11-11 NOTE — PROGRESS NOTES
Pt refused Invega 3 mg this morning stating \"My mood is fine. I don't need to take that pill. \" pt took all others meds without question. Pt requesting Miralax.
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
## 2020-11-11 NOTE — PLAN OF CARE
Patient is pleasant and cooperative. Her affect is flat and she uses minimal eye contact during conversation. She denies suicidal ideations, homicidal ideations, and hallucinations. Patient has been isolative this shift. She refused select medication this evening (see eMAR for details). Patient has been in control of her behavior. Will continue to monitor and support.     Problem: Falls - Risk of:  Goal: Absence of physical injury  Description: Absence of physical injury  Outcome: Ongoing     Problem: Anger Management/Homicidal Ideation:  Goal: Able to display appropriate communication and problem solving  Description: Able to display appropriate communication and problem solving  Outcome: Ongoing     Problem: Anger Management/Homicidal Ideation:  Goal: Ability to verbalize frustrations and anger appropriately will improve  Description: Ability to verbalize frustrations and anger appropriately will improve  Outcome: Ongoing     Problem: Altered Mood, Psychotic Behavior:  Goal: Able to verbalize reality based thinking  Description: Able to verbalize reality based thinking  11/10/2020 2104 by Vivek Thompson RN  Outcome: Ongoing
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
## 2020-11-11 NOTE — PLAN OF CARE
Patient pleasant and cooperative. Denies SI, HI, and hallucinations . Patient affect flat sad, has poor eye contact. Patient refused her Invega this morning, appears paranoid and watchful. Isolative to room most of shift other than meals.  Will continue to observe and support  Problem: Falls - Risk of:  Goal: Will remain free from falls  Description: Will remain free from falls  Outcome: Ongoing     Problem: Anger Management/Homicidal Ideation:  Goal: Able to display appropriate communication and problem solving  Description: Able to display appropriate communication and problem solving  Outcome: Ongoing

## 2020-11-12 PROCEDURE — 6360000002 HC RX W HCPCS: Performed by: NURSE PRACTITIONER

## 2020-11-12 PROCEDURE — 6370000000 HC RX 637 (ALT 250 FOR IP): Performed by: NURSE PRACTITIONER

## 2020-11-12 PROCEDURE — 1240000000 HC EMOTIONAL WELLNESS R&B

## 2020-11-12 PROCEDURE — 6360000002 HC RX W HCPCS

## 2020-11-12 PROCEDURE — 6360000002 HC RX W HCPCS: Performed by: PSYCHIATRY & NEUROLOGY

## 2020-11-12 PROCEDURE — 99231 SBSQ HOSP IP/OBS SF/LOW 25: CPT | Performed by: NURSE PRACTITIONER

## 2020-11-12 RX ORDER — LORAZEPAM 2 MG/ML
1 INJECTION INTRAMUSCULAR ONCE
Status: COMPLETED | OUTPATIENT
Start: 2020-11-12 | End: 2020-11-12

## 2020-11-12 RX ORDER — DIPHENHYDRAMINE HYDROCHLORIDE 50 MG/ML
25 INJECTION INTRAMUSCULAR; INTRAVENOUS ONCE
Status: COMPLETED | OUTPATIENT
Start: 2020-11-12 | End: 2020-11-12

## 2020-11-12 RX ORDER — LORAZEPAM 2 MG/ML
INJECTION INTRAMUSCULAR
Status: COMPLETED
Start: 2020-11-12 | End: 2020-11-12

## 2020-11-12 RX ORDER — DIPHENHYDRAMINE HYDROCHLORIDE 50 MG/ML
INJECTION INTRAMUSCULAR; INTRAVENOUS
Status: COMPLETED
Start: 2020-11-12 | End: 2020-11-12

## 2020-11-12 RX ADMIN — MULTIPLE VITAMINS W/ MINERALS TAB 1 TABLET: TAB at 09:05

## 2020-11-12 RX ADMIN — DIPHENHYDRAMINE HYDROCHLORIDE 25 MG: 50 INJECTION, SOLUTION INTRAMUSCULAR; INTRAVENOUS at 18:47

## 2020-11-12 RX ADMIN — DONEPEZIL HYDROCHLORIDE 5 MG: 5 TABLET, FILM COATED ORAL at 21:09

## 2020-11-12 RX ADMIN — POLYETHYLENE GLYCOL 3350 17 G: 17 POWDER, FOR SOLUTION ORAL at 21:10

## 2020-11-12 RX ADMIN — LORAZEPAM 1 MG: 2 INJECTION INTRAMUSCULAR; INTRAVENOUS at 18:47

## 2020-11-12 RX ADMIN — TOPIRAMATE 100 MG: 100 TABLET, FILM COATED ORAL at 09:05

## 2020-11-12 RX ADMIN — VENLAFAXINE HYDROCHLORIDE 75 MG: 75 CAPSULE, EXTENDED RELEASE ORAL at 09:05

## 2020-11-12 RX ADMIN — HALOPERIDOL LACTATE 5 MG: 5 INJECTION, SOLUTION INTRAMUSCULAR at 09:18

## 2020-11-12 RX ADMIN — DIPHENHYDRAMINE HYDROCHLORIDE 25 MG: 50 INJECTION INTRAMUSCULAR; INTRAVENOUS at 18:47

## 2020-11-12 RX ADMIN — LORAZEPAM 1 MG: 2 INJECTION INTRAMUSCULAR at 18:47

## 2020-11-12 RX ADMIN — TOPIRAMATE 100 MG: 100 TABLET, FILM COATED ORAL at 21:09

## 2020-11-12 RX ADMIN — HALOPERIDOL LACTATE 3 MG: 5 INJECTION, SOLUTION INTRAMUSCULAR at 18:35

## 2020-11-12 RX ADMIN — POLYETHYLENE GLYCOL 3350 17 G: 17 POWDER, FOR SOLUTION ORAL at 09:05

## 2020-11-12 ASSESSMENT — PAIN SCALES - GENERAL
PAINLEVEL_OUTOF10: 0
PAINLEVEL_OUTOF10: 0

## 2020-11-12 NOTE — PLAN OF CARE
Patient has calmed down since receiving IM medications. Patient denies SI, HI, and hallucinations. Patient will become upset when talking about medications but is able to remain in control. Will continue to observed and support.    Problem: Falls - Risk of:  Goal: Absence of physical injury  Description: Absence of physical injury  Outcome: Ongoing     Problem: Anger Management/Homicidal Ideation:  Goal: Able to display appropriate communication and problem solving  Description: Able to display appropriate communication and problem solving  Outcome: Ongoing
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
## 2020-11-13 PROCEDURE — 6370000000 HC RX 637 (ALT 250 FOR IP): Performed by: NURSE PRACTITIONER

## 2020-11-13 PROCEDURE — 99231 SBSQ HOSP IP/OBS SF/LOW 25: CPT | Performed by: NURSE PRACTITIONER

## 2020-11-13 PROCEDURE — 1240000000 HC EMOTIONAL WELLNESS R&B

## 2020-11-13 RX ADMIN — PALIPERIDONE 3 MG: 3 TABLET, EXTENDED RELEASE ORAL at 21:03

## 2020-11-13 RX ADMIN — VENLAFAXINE HYDROCHLORIDE 75 MG: 75 CAPSULE, EXTENDED RELEASE ORAL at 08:53

## 2020-11-13 RX ADMIN — PALIPERIDONE 3 MG: 3 TABLET, EXTENDED RELEASE ORAL at 08:53

## 2020-11-13 RX ADMIN — TOPIRAMATE 100 MG: 100 TABLET, FILM COATED ORAL at 21:03

## 2020-11-13 RX ADMIN — POLYETHYLENE GLYCOL 3350 17 G: 17 POWDER, FOR SOLUTION ORAL at 08:53

## 2020-11-13 RX ADMIN — MULTIPLE VITAMINS W/ MINERALS TAB 1 TABLET: TAB at 08:53

## 2020-11-13 RX ADMIN — TOPIRAMATE 100 MG: 100 TABLET, FILM COATED ORAL at 08:53

## 2020-11-13 RX ADMIN — DONEPEZIL HYDROCHLORIDE 5 MG: 5 TABLET, FILM COATED ORAL at 21:03

## 2020-11-13 RX ADMIN — POLYETHYLENE GLYCOL 3350 17 G: 17 POWDER, FOR SOLUTION ORAL at 21:02

## 2020-11-13 ASSESSMENT — PAIN - FUNCTIONAL ASSESSMENT: PAIN_FUNCTIONAL_ASSESSMENT: RESPIRATORY RATE >10

## 2020-11-13 ASSESSMENT — PAIN SCALES - GENERAL
PAINLEVEL_OUTOF10: 0
PAINLEVEL_OUTOF10: 0

## 2020-11-13 NOTE — PROGRESS NOTES
Spoke with Dr. Evelina Dias about PRN medications previously given. Patient now sleeping, blood pressure 96/53. Dr. Evelina Dias stated okay not to given forced medication Haldol in place of PO invega d/t patient refusing.
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
## 2020-11-13 NOTE — PLAN OF CARE
Problem: Anger Management/Homicidal Ideation:  Goal: Able to display appropriate communication and problem solving  Description: Able to display appropriate communication and problem solving  Outcome: Ongoing     Problem: Anger Management/Homicidal Ideation:  Goal: Ability to verbalize frustrations and anger appropriately will improve  Description: Ability to verbalize frustrations and anger appropriately will improve  11/13/2020 0938 by Denver Gitelman, RN  Outcome: Ongoing  11/12/2020 2349 by Anoop Conn RN  Outcome: Ongoing   pt denies si/hi and hallucinations. Pt is irritable and easily agitated. Pt took invega PO but is still refusing remeron at HS. Pt is evasive with questioning. Pt does not attend groups. Pt encouraged to attend groups and participate.

## 2020-11-13 NOTE — PLAN OF CARE
Patient calm and cooperative. Affect flat and blunt. Denies SI/HI/AVH. Patient denies feelings of depression/anxiety and states, \"I am just tired and ready for bed. \" Patient compliant with selective medications. Isolative to room this shift. Will continue to monitor and provide support.     Problem: Anger Management/Homicidal Ideation:  Goal: Absence of angry outbursts  Description: Absence of angry outbursts  Outcome: Met This Shift     Problem: Anger Management/Homicidal Ideation:  Goal: Ability to verbalize frustrations and anger appropriately will improve  Description: Ability to verbalize frustrations and anger appropriately will improve  Outcome: Ongoing     Problem: Altered Mood, Psychotic Behavior:  Goal: Able to verbalize reality based thinking  Description: Able to verbalize reality based thinking  Outcome: Ongoing

## 2020-11-13 NOTE — PROGRESS NOTES
Patient resting in bed quietly at this time with eyes closed. Respirations are even and unlabored. No signs of discomfort or distress. No PRN medications given at this time. Safety needs met. Environmental rounds will be continued.

## 2020-11-14 PROCEDURE — 99231 SBSQ HOSP IP/OBS SF/LOW 25: CPT | Performed by: NURSE PRACTITIONER

## 2020-11-14 PROCEDURE — 1240000000 HC EMOTIONAL WELLNESS R&B

## 2020-11-14 PROCEDURE — 6370000000 HC RX 637 (ALT 250 FOR IP): Performed by: NURSE PRACTITIONER

## 2020-11-14 RX ADMIN — DONEPEZIL HYDROCHLORIDE 5 MG: 5 TABLET, FILM COATED ORAL at 20:12

## 2020-11-14 RX ADMIN — MULTIPLE VITAMINS W/ MINERALS TAB 1 TABLET: TAB at 08:43

## 2020-11-14 RX ADMIN — PALIPERIDONE 3 MG: 3 TABLET, EXTENDED RELEASE ORAL at 20:12

## 2020-11-14 RX ADMIN — PALIPERIDONE 3 MG: 3 TABLET, EXTENDED RELEASE ORAL at 08:43

## 2020-11-14 RX ADMIN — TOPIRAMATE 100 MG: 100 TABLET, FILM COATED ORAL at 08:43

## 2020-11-14 RX ADMIN — POLYETHYLENE GLYCOL 3350 17 G: 17 POWDER, FOR SOLUTION ORAL at 20:14

## 2020-11-14 RX ADMIN — VENLAFAXINE HYDROCHLORIDE 75 MG: 75 CAPSULE, EXTENDED RELEASE ORAL at 08:43

## 2020-11-14 RX ADMIN — POLYETHYLENE GLYCOL 3350 17 G: 17 POWDER, FOR SOLUTION ORAL at 08:50

## 2020-11-14 RX ADMIN — TOPIRAMATE 100 MG: 100 TABLET, FILM COATED ORAL at 20:12

## 2020-11-14 ASSESSMENT — PAIN SCALES - GENERAL
PAINLEVEL_OUTOF10: 0

## 2020-11-14 NOTE — PLAN OF CARE
Patient denies SI/HI/AVH. Patient is flat, sad, and irritable at times. Patient is evasive during conversation. Patient out on unit but isolative to self. Patient was irritable when discussing her medications. Patient states \"there is nothing wrong with me I do not need all of these medications. \" Will continue to monitor and provide emotional support and comfort to patient.     Problem: Falls - Risk of:  Goal: Will remain free from falls  Description: Will remain free from falls  Outcome: Met This Shift     Problem: Anger Management/Homicidal Ideation:  Goal: Absence of angry outbursts  Description: Absence of angry outbursts  Outcome: Ongoing     Problem: Falls - Risk of:  Goal: Absence of physical injury  Description: Absence of physical injury  Outcome: Met This Shift

## 2020-11-14 NOTE — PLAN OF CARE
Problem: Anger Management/Homicidal Ideation:  Goal: Ability to verbalize frustrations and anger appropriately will improve  Description: Ability to verbalize frustrations and anger appropriately will improve  Outcome: Ongoing     Problem: Anger Management/Homicidal Ideation:  Goal: Able to display appropriate communication and problem solving  Description: Able to display appropriate communication and problem solving  Outcome: Ongoing   pt denies si/hi and hallucinations. Pt isolates to her room and spends most of her day in bed. Pt is irritable and states she \"will not take shots. \" pt taking PO med's willingly. Pt mood improved but still easily agitated. Pt refused Remeron last HS. Pt does not attend groups. Pt encouraged to attend groups and participate.
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
## 2020-11-15 PROCEDURE — 99231 SBSQ HOSP IP/OBS SF/LOW 25: CPT | Performed by: NURSE PRACTITIONER

## 2020-11-15 PROCEDURE — 1240000000 HC EMOTIONAL WELLNESS R&B

## 2020-11-15 PROCEDURE — 6370000000 HC RX 637 (ALT 250 FOR IP): Performed by: NURSE PRACTITIONER

## 2020-11-15 RX ADMIN — POLYETHYLENE GLYCOL 3350 17 G: 17 POWDER, FOR SOLUTION ORAL at 09:31

## 2020-11-15 RX ADMIN — VENLAFAXINE HYDROCHLORIDE 75 MG: 75 CAPSULE, EXTENDED RELEASE ORAL at 09:31

## 2020-11-15 RX ADMIN — POLYETHYLENE GLYCOL 3350 17 G: 17 POWDER, FOR SOLUTION ORAL at 21:39

## 2020-11-15 RX ADMIN — TOPIRAMATE 100 MG: 100 TABLET, FILM COATED ORAL at 09:31

## 2020-11-15 RX ADMIN — PALIPERIDONE 3 MG: 3 TABLET, EXTENDED RELEASE ORAL at 09:31

## 2020-11-15 RX ADMIN — DONEPEZIL HYDROCHLORIDE 5 MG: 5 TABLET, FILM COATED ORAL at 21:38

## 2020-11-15 RX ADMIN — MULTIPLE VITAMINS W/ MINERALS TAB 1 TABLET: TAB at 09:31

## 2020-11-15 RX ADMIN — TOPIRAMATE 100 MG: 100 TABLET, FILM COATED ORAL at 21:38

## 2020-11-15 RX ADMIN — PALIPERIDONE 3 MG: 3 TABLET, EXTENDED RELEASE ORAL at 21:38

## 2020-11-15 ASSESSMENT — PAIN SCALES - GENERAL
PAINLEVEL_OUTOF10: 0
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
## 2020-11-15 NOTE — PROGRESS NOTES
Patient is resting in bed quietly with eyes closed. No apparent distress noted. Will continue to monitor.

## 2020-11-15 NOTE — PLAN OF CARE
Problem: Altered Mood, Psychotic Behavior:  Goal: Able to verbalize reality based thinking  Description: Able to verbalize reality based thinking  Outcome: Ongoing     Problem: Anger Management/Homicidal Ideation:  Goal: Absence of angry outbursts  Description: Absence of angry outbursts  Outcome: Ongoing   pt denies si/hi and hallucinations. Pt isolates to room most of day. Pt taking PO invega but refuses Remeron. Pt improved and brightened pt polite and says please and thank you. Pt makes good eye contact and is cooperative. Pt does not attend groups pt encouraged to attend groups and participate.

## 2020-11-16 PROCEDURE — 99232 SBSQ HOSP IP/OBS MODERATE 35: CPT | Performed by: NURSE PRACTITIONER

## 2020-11-16 PROCEDURE — 6370000000 HC RX 637 (ALT 250 FOR IP): Performed by: NURSE PRACTITIONER

## 2020-11-16 PROCEDURE — 1240000000 HC EMOTIONAL WELLNESS R&B

## 2020-11-16 RX ADMIN — MULTIPLE VITAMINS W/ MINERALS TAB 1 TABLET: TAB at 09:09

## 2020-11-16 RX ADMIN — VENLAFAXINE HYDROCHLORIDE 75 MG: 75 CAPSULE, EXTENDED RELEASE ORAL at 09:09

## 2020-11-16 RX ADMIN — POLYETHYLENE GLYCOL 3350 17 G: 17 POWDER, FOR SOLUTION ORAL at 09:09

## 2020-11-16 RX ADMIN — DONEPEZIL HYDROCHLORIDE 5 MG: 5 TABLET, FILM COATED ORAL at 20:33

## 2020-11-16 RX ADMIN — PALIPERIDONE 3 MG: 3 TABLET, EXTENDED RELEASE ORAL at 09:09

## 2020-11-16 RX ADMIN — POLYETHYLENE GLYCOL 3350 17 G: 17 POWDER, FOR SOLUTION ORAL at 20:32

## 2020-11-16 RX ADMIN — PALIPERIDONE 3 MG: 3 TABLET, EXTENDED RELEASE ORAL at 20:33

## 2020-11-16 RX ADMIN — TOPIRAMATE 100 MG: 100 TABLET, FILM COATED ORAL at 20:32

## 2020-11-16 RX ADMIN — TOPIRAMATE 100 MG: 100 TABLET, FILM COATED ORAL at 09:09

## 2020-11-16 ASSESSMENT — PAIN SCALES - GENERAL: PAINLEVEL_OUTOF10: 7

## 2020-11-16 ASSESSMENT — PAIN DESCRIPTION - LOCATION: LOCATION: HEAD

## 2020-11-16 ASSESSMENT — PAIN DESCRIPTION - PAIN TYPE: TYPE: ACUTE PAIN

## 2020-11-16 ASSESSMENT — PAIN DESCRIPTION - DESCRIPTORS: DESCRIPTORS: HEADACHE

## 2020-11-16 ASSESSMENT — PAIN - FUNCTIONAL ASSESSMENT: PAIN_FUNCTIONAL_ASSESSMENT: ACTIVITIES ARE NOT PREVENTED

## 2020-11-16 NOTE — PROGRESS NOTES
Attempted to refuse Invega PO at morning med pass. Informed patient that an injection of Haldol would have to be given per medication order if she refused the PO Invega. Patient then took the PO Invega after encouragement. Will continue to offer support.

## 2020-11-16 NOTE — CARE COORDINATION
SW Intern spoke with Alden Souza at [facility]. She said that Pt could return upon discharge. She further said that she believed the Significant Level Of Change completed.

## 2020-11-16 NOTE — PROGRESS NOTES
[place] to Situation  Attention:Normal: No  Attention: Distractible  Thought Processes: Circumstantial  Thought Content:Normal: No  Thought Content: Preoccupations  Hallucinations: None  Delusions: No  Delusions: Persecution  Memory:Normal: No  Memory: Poor Recent, Poor Remote  Insight and Judgment: No  Insight and Judgment: Poor Judgment, Poor Insight  Present Suicidal Ideation: No  Present Homicidal Ideation: No    Daily Assessment Last Entry:   Daily Sleep (WDL): Exceptions to WDL         Patient Currently in Pain: Yes  Daily Nutrition (WDL): Within Defined Limits  Appetite Change: Normal for patient  Barriers to Nutrition: Cognitive impairment  Level of Assistance: Independent/Self    Patient Monitoring:  Frequency of Checks: 4 times per hour, close    Psychiatric Symptoms:   Depression Symptoms  Depression Symptoms: Isolative, Loss of interest  Anxiety Symptoms  Anxiety Symptoms: Generalized  Marce Symptoms  Marce Symptoms: Poor judgment     Psychosis Symptoms  Delusion Type: No problems reported or observed.     Suicide Risk CSSR-S:  1) Within the past month, have you wished you were dead or wished you could go to sleep and not wake up? : No  2) Have you actually had any thoughts of killing yourself? : No  6) Have you ever done anything, started to do anything, or prepared to do anything to end your life?: No  Change in Result no Change in Plan of care no    EDUCATION:   Learner Progress Toward Treatment Goals: Reviewed results and recommendations of this team and Reviewed goals and plan of care    Method: Small group    Outcome: Verbalized understanding    PATIENT GOALS: None reported    PLAN/TREATMENT RECOMMENDATIONS UPDATE:  Encourage group attendance and participation and provide support    GOALS UPDATE:  Time frame for Short-Term Goals: 1-3 days      Braxton Sandifer, RN

## 2020-11-16 NOTE — PLAN OF CARE
Patient denies SI/HI/AVH, anxiety, and depression. Patient is pleasant, cooperative, and friendly with staff. Patient is isolative to her room. Patient refused Remeron but is taking all other prescribed medications. Will continue to monitor and provide emotional support and comfort.     Problem: Falls - Risk of:  Goal: Will remain free from falls  Description: Will remain free from falls  Outcome: Met This Shift     Problem: Anger Management/Homicidal Ideation:  Goal: Absence of angry outbursts  Description: Absence of angry outbursts  11/15/2020 2211 by Manfred Munoz RN  Outcome: Met This Shift     Problem: Altered Mood, Psychotic Behavior:  Goal: Able to verbalize reality based thinking  Description: Able to verbalize reality based thinking  Outcome: Met This Shift
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
## 2020-11-17 PROCEDURE — 99232 SBSQ HOSP IP/OBS MODERATE 35: CPT | Performed by: NURSE PRACTITIONER

## 2020-11-17 PROCEDURE — 1240000000 HC EMOTIONAL WELLNESS R&B

## 2020-11-17 PROCEDURE — 6370000000 HC RX 637 (ALT 250 FOR IP): Performed by: NURSE PRACTITIONER

## 2020-11-17 RX ADMIN — TOPIRAMATE 100 MG: 100 TABLET, FILM COATED ORAL at 08:55

## 2020-11-17 RX ADMIN — PALIPERIDONE 3 MG: 3 TABLET, EXTENDED RELEASE ORAL at 08:55

## 2020-11-17 RX ADMIN — VENLAFAXINE HYDROCHLORIDE 75 MG: 75 CAPSULE, EXTENDED RELEASE ORAL at 08:55

## 2020-11-17 RX ADMIN — POLYETHYLENE GLYCOL 3350 17 G: 17 POWDER, FOR SOLUTION ORAL at 08:56

## 2020-11-17 RX ADMIN — MULTIPLE VITAMINS W/ MINERALS TAB 1 TABLET: TAB at 08:55

## 2020-11-17 RX ADMIN — TOPIRAMATE 100 MG: 100 TABLET, FILM COATED ORAL at 21:15

## 2020-11-17 RX ADMIN — POLYETHYLENE GLYCOL 3350 17 G: 17 POWDER, FOR SOLUTION ORAL at 21:17

## 2020-11-17 RX ADMIN — PALIPERIDONE 3 MG: 3 TABLET, EXTENDED RELEASE ORAL at 21:15

## 2020-11-17 RX ADMIN — DONEPEZIL HYDROCHLORIDE 5 MG: 5 TABLET, FILM COATED ORAL at 21:15

## 2020-11-17 ASSESSMENT — PAIN SCALES - GENERAL
PAINLEVEL_OUTOF10: 0
PAINLEVEL_OUTOF10: 0

## 2020-11-17 NOTE — PLAN OF CARE
Pt denied SI/HI and AV hallucinations. Pt reports that she did not feel anxious or depressed. Pt stated that she did not like the invega because it makes her feel weird. She also reported that she did not want to go back to [place] and would rather be in an apartment. I explained the importance of invega and being at a place where she can get her medications. Upon interview pt was brightened, cooperative, and made good eye contact. Will continue to monitor and provide support.      Problem: Falls - Risk of:  Goal: Will remain free from falls  Description: Will remain free from falls  11/16/2020 2243 by Aubrie Garcia RN  Outcome: Met This Shift       Problem: Anger Management/Homicidal Ideation:  Goal: Able to display appropriate communication and problem solving  Description: Able to display appropriate communication and problem solving  11/16/2020 2243 by Aubrie Garcia RN  Outcome: Met This Shift    Problem: Altered Mood, Psychotic Behavior:  Goal: Able to verbalize reality based thinking  Description: Able to verbalize reality based thinking  11/16/2020 2243 by Aubrie Garcia RN  Outcome: Met This Shift
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
## 2020-11-17 NOTE — PROGRESS NOTES
Group Therapy Note    Patient attended goals group and stated daily goal as to participate in the program more. Group Therapy Note    Date: 11/17/2020  Start Time: 9:45  End Time: 10:15  Number of Participants: 10    Type of Group: Psychoeducation    Wellness Binder Information  Module Name: Coping Skills  Session Number: NA    Patient's Goal: To id positive coping skills to use on a daily basis. Notes: Attended group and was able to participate in discussion. Status After Intervention:  Improved    Participation Level:  Active Listener and Interactive    Participation Quality: Attentive      Speech:  hesitant      Thought Process/Content: Linear      Affective Functioning: Blunted      Mood: anxious and depressed      Level of consciousness:  Alert      Response to Learning: Progressing to goal      Endings: None Reported    Modes of Intervention: Education      Discipline Responsible: Psychoeducational Specialist      Signature:  LIZA Dyson

## 2020-11-17 NOTE — PLAN OF CARE
Problem: Falls - Risk of:  Goal: Absence of physical injury  Description: Absence of physical injury  Outcome: Met This Shift     Problem: Anger Management/Homicidal Ideation:  Goal: Ability to verbalize frustrations and anger appropriately will improve  Description: Ability to verbalize frustrations and anger appropriately will improve  Outcome: Met This Shift     Problem: Anger Management/Homicidal Ideation:  Goal: Absence of angry outbursts  Description: Absence of angry outbursts  Outcome: Met This Shift     Patient denies SI/HI/Hallucinations, anxiety or depression. Patient appears bright and friendly, pleasant and cooperative during conversation. Patient isolative and withdrawn to her room at times. Patient observed briefly out on the unit socializing and completing a puzzle with one of her peers. Patient currently in the day room, keeping to herself and drinking coffee. Patient taking prescribed medications, eating provided meals, and attending groups.

## 2020-11-18 VITALS
TEMPERATURE: 98.1 F | RESPIRATION RATE: 15 BRPM | HEIGHT: 65 IN | WEIGHT: 108.5 LBS | SYSTOLIC BLOOD PRESSURE: 143 MMHG | HEART RATE: 61 BPM | OXYGEN SATURATION: 98 % | BODY MASS INDEX: 18.08 KG/M2 | DIASTOLIC BLOOD PRESSURE: 68 MMHG

## 2020-11-18 PROCEDURE — 6370000000 HC RX 637 (ALT 250 FOR IP): Performed by: NURSE PRACTITIONER

## 2020-11-18 PROCEDURE — 99239 HOSP IP/OBS DSCHRG MGMT >30: CPT | Performed by: NURSE PRACTITIONER

## 2020-11-18 RX ADMIN — VENLAFAXINE HYDROCHLORIDE 75 MG: 75 CAPSULE, EXTENDED RELEASE ORAL at 08:49

## 2020-11-18 RX ADMIN — PALIPERIDONE 3 MG: 3 TABLET, EXTENDED RELEASE ORAL at 08:49

## 2020-11-18 RX ADMIN — TOPIRAMATE 100 MG: 100 TABLET, FILM COATED ORAL at 08:49

## 2020-11-18 RX ADMIN — POLYETHYLENE GLYCOL 3350 17 G: 17 POWDER, FOR SOLUTION ORAL at 08:49

## 2020-11-18 RX ADMIN — MULTIPLE VITAMINS W/ MINERALS TAB 1 TABLET: TAB at 08:49

## 2020-11-18 ASSESSMENT — PAIN SCALES - GENERAL: PAINLEVEL_OUTOF10: 0

## 2020-11-18 NOTE — PLAN OF CARE
Problem: Falls - Risk of:  Goal: Absence of physical injury  Description: Absence of physical injury  Outcome: Met This Shift     Problem: Anger Management/Homicidal Ideation:  Goal: Ability to verbalize frustrations and anger appropriately will improve  Description: Ability to verbalize frustrations and anger appropriately will improve  Outcome: Met This Shift     Problem: Anger Management/Homicidal Ideation:  Goal: Absence of angry outbursts  Description: Absence of angry outbursts  Outcome: Met This Shift     Problem: Pain:  Goal: Pain level will decrease  Description: Pain level will decrease  Outcome: Met This Shift     Patient denies SI/HI/Hallucinations, anxiety or depression. Patient appears bright and pleasant. Patient currently observed out on the unit, completing a puzzle. Patient calm and cooperative during conversation. Patient social with select peers. Patient has not had any angry outbursts so far this shift. Patient taking prescribed medications, eating provided meals, and attending select groups.

## 2020-11-18 NOTE — SUICIDE SAFETY PLAN
SAFETY PLAN    A suicide Safety Plan is a document that supports someone when they are having thoughts of suicide. Warning Signs that indicate a suicidal crisis may be developing: What (situations, thoughts, feelings, body sensations, behaviors, etc.) do you experience that lets you know you are beginning to think about suicide? 1. \"If a person talks about it or overdoses on medication\"    Internal Coping Strategies:  What things can I do (relaxation techniques, hobbies, physical activities, etc.) to take my mind off my problems without contacting another person? 1. Crocheting   2. Taking walks    People and social settings that provide distraction: Who can I call or where can I go to distract me? 1. Name: Hamzah Chawla  Phone: [phone]  2. Name: Adrián Mathias whom I can ask for help: Who can I call when I need help - for example, friends, family, clergy, someone else? 1. Name: Linda Zuleta or [facility] agencies I can contact during a crisis: Who can I call for help - for example, my doctor, my psychiatrist, my psychologist, a mental health provider, a suicide hotline? 3. Suicide Prevention Lifeline: 5-652-710-TALK (4161)      Making the environment safe: How can I make my environment (house/apartment/living space) safer? For example, can I remove guns, medications, and other items? 1. N/A per patient.

## 2020-11-18 NOTE — CARE COORDINATION
Per Team pt will discharged back  To [facility]   Phone call  To Mamie Tom @ [facility]) arrange  for 4pm   notified patient 's guardian
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
## 2020-11-18 NOTE — PROGRESS NOTES
Patient denies SI,HI and hallucinations . Patient has been isolative to room but is brightened . Patient was hesitant to take select evening medications. Patient refused remeron. Voices no questions or concerns at this time.  Will continue to monitor and observe

## 2020-11-18 NOTE — PROGRESS NOTES
2:45-3:15    Pt attended and participated in leisure group of [employer]. Pt was 1 out of 5 in attendance.

## 2020-11-28 LAB
SARS-COV-2: NOT DETECTED
SOURCE: NORMAL

## 2020-11-29 NOTE — CARE COORDINATION
Attempted to reach patient's Legal Guardian by phone. HIPAA compliant message left on the voicemail of patient's Legal Guardian, Kareem Montalvo. Guardianship documents in Media; 2/12/2020.
100% of the time

## 2020-12-12 LAB
SARS-COV-2: NOT DETECTED
SOURCE: NORMAL

## 2021-04-04 LAB
SARS-COV-2: NOT DETECTED
SOURCE: NORMAL

## 2021-06-09 ENCOUNTER — HOSPITAL ENCOUNTER (INPATIENT)
Age: 61
LOS: 6 days | Discharge: HOME | DRG: 750 | End: 2021-06-15
Attending: EMERGENCY MEDICINE | Admitting: PSYCHIATRY & NEUROLOGY
Payer: COMMERCIAL

## 2021-06-09 DIAGNOSIS — R45.851 SUICIDAL THOUGHTS: Primary | ICD-10-CM

## 2021-06-09 LAB
ACETAMINOPHEN LEVEL: <5 MCG/ML (ref 10–30)
ALBUMIN SERPL-MCNC: 4.5 G/DL (ref 3.5–5.2)
ALP BLD-CCNC: 89 U/L (ref 35–104)
ALT SERPL-CCNC: 13 U/L (ref 0–32)
AMORPHOUS: ABNORMAL
AMPHETAMINE SCREEN, URINE: NOT DETECTED
ANION GAP SERPL CALCULATED.3IONS-SCNC: 10 MMOL/L (ref 7–16)
AST SERPL-CCNC: 16 U/L (ref 0–31)
BACTERIA: ABNORMAL /HPF
BARBITURATE SCREEN URINE: NOT DETECTED
BASOPHILS ABSOLUTE: 0.06 E9/L (ref 0–0.2)
BASOPHILS RELATIVE PERCENT: 1.1 % (ref 0–2)
BENZODIAZEPINE SCREEN, URINE: NOT DETECTED
BILIRUB SERPL-MCNC: 0.5 MG/DL (ref 0–1.2)
BILIRUBIN URINE: NEGATIVE
BLOOD, URINE: ABNORMAL
BUN BLDV-MCNC: 7 MG/DL (ref 6–23)
CALCIUM SERPL-MCNC: 9.8 MG/DL (ref 8.6–10.2)
CANNABINOID SCREEN URINE: NOT DETECTED
CHLORIDE BLD-SCNC: 99 MMOL/L (ref 98–107)
CLARITY: CLEAR
CLUE CELLS: NORMAL
CO2: 26 MMOL/L (ref 22–29)
COCAINE METABOLITE SCREEN URINE: NOT DETECTED
COLOR: YELLOW
CREAT SERPL-MCNC: 0.7 MG/DL (ref 0.5–1)
EKG ATRIAL RATE: 78 BPM
EKG P AXIS: 63 DEGREES
EKG P-R INTERVAL: 152 MS
EKG Q-T INTERVAL: 376 MS
EKG QRS DURATION: 98 MS
EKG QTC CALCULATION (BAZETT): 428 MS
EKG R AXIS: 84 DEGREES
EKG T AXIS: 64 DEGREES
EKG VENTRICULAR RATE: 78 BPM
EOSINOPHILS ABSOLUTE: 0.04 E9/L (ref 0.05–0.5)
EOSINOPHILS RELATIVE PERCENT: 0.7 % (ref 0–6)
EPITHELIAL CELLS, UA: ABNORMAL /HPF
ETHANOL: <10 MG/DL (ref 0–0.08)
FENTANYL SCREEN, URINE: NOT DETECTED
GFR AFRICAN AMERICAN: >60
GFR NON-AFRICAN AMERICAN: >60 ML/MIN/1.73
GLUCOSE BLD-MCNC: 98 MG/DL (ref 74–99)
GLUCOSE URINE: NEGATIVE MG/DL
HCT VFR BLD CALC: 37.8 % (ref 34–48)
HEMOGLOBIN: 12.7 G/DL (ref 11.5–15.5)
IMMATURE GRANULOCYTES #: 0.01 E9/L
IMMATURE GRANULOCYTES %: 0.2 % (ref 0–5)
INFLUENZA A: NOT DETECTED
INFLUENZA B: NOT DETECTED
KETONES, URINE: NEGATIVE MG/DL
LEUKOCYTE ESTERASE, URINE: ABNORMAL
LYMPHOCYTES ABSOLUTE: 1.43 E9/L (ref 1.5–4)
LYMPHOCYTES RELATIVE PERCENT: 26.3 % (ref 20–42)
Lab: NORMAL
MCH RBC QN AUTO: 29.9 PG (ref 26–35)
MCHC RBC AUTO-ENTMCNC: 33.6 % (ref 32–34.5)
MCV RBC AUTO: 88.9 FL (ref 80–99.9)
METHADONE SCREEN, URINE: NOT DETECTED
MONOCYTES ABSOLUTE: 0.45 E9/L (ref 0.1–0.95)
MONOCYTES RELATIVE PERCENT: 8.3 % (ref 2–12)
NEUTROPHILS ABSOLUTE: 3.44 E9/L (ref 1.8–7.3)
NEUTROPHILS RELATIVE PERCENT: 63.4 % (ref 43–80)
NITRITE, URINE: NEGATIVE
OPIATE SCREEN URINE: NOT DETECTED
OXYCODONE URINE: NOT DETECTED
PDW BLD-RTO: 12.1 FL (ref 11.5–15)
PH UA: 7.5 (ref 5–9)
PHENCYCLIDINE SCREEN URINE: NOT DETECTED
PLATELET # BLD: 300 E9/L (ref 130–450)
PMV BLD AUTO: 9.8 FL (ref 7–12)
POTASSIUM SERPL-SCNC: 3.6 MMOL/L (ref 3.5–5)
PROTEIN UA: NEGATIVE MG/DL
RBC # BLD: 4.25 E12/L (ref 3.5–5.5)
RBC UA: ABNORMAL /HPF (ref 0–2)
SALICYLATE, SERUM: <0.3 MG/DL (ref 0–30)
SARS-COV-2 RNA, RT PCR: NOT DETECTED
SODIUM BLD-SCNC: 135 MMOL/L (ref 132–146)
SOURCE WET PREP: NORMAL
SPECIFIC GRAVITY UA: 1.01 (ref 1–1.03)
TOTAL PROTEIN: 7.2 G/DL (ref 6.4–8.3)
TRICHOMONAS PREP: NORMAL
TRICYCLIC ANTIDEPRESSANTS SCREEN SERUM: NEGATIVE NG/ML
UROBILINOGEN, URINE: 0.2 E.U./DL
WBC # BLD: 5.4 E9/L (ref 4.5–11.5)
WBC UA: ABNORMAL /HPF (ref 0–5)
YEAST WET PREP: NORMAL

## 2021-06-09 PROCEDURE — 99285 EMERGENCY DEPT VISIT HI MDM: CPT

## 2021-06-09 PROCEDURE — 80307 DRUG TEST PRSMV CHEM ANLYZR: CPT

## 2021-06-09 PROCEDURE — 85025 COMPLETE CBC W/AUTO DIFF WBC: CPT

## 2021-06-09 PROCEDURE — 93005 ELECTROCARDIOGRAM TRACING: CPT | Performed by: EMERGENCY MEDICINE

## 2021-06-09 PROCEDURE — 1240000000 HC EMOTIONAL WELLNESS R&B

## 2021-06-09 PROCEDURE — 93010 ELECTROCARDIOGRAM REPORT: CPT | Performed by: INTERNAL MEDICINE

## 2021-06-09 PROCEDURE — 80179 DRUG ASSAY SALICYLATE: CPT

## 2021-06-09 PROCEDURE — 80053 COMPREHEN METABOLIC PANEL: CPT

## 2021-06-09 PROCEDURE — 82077 ASSAY SPEC XCP UR&BREATH IA: CPT

## 2021-06-09 PROCEDURE — 80143 DRUG ASSAY ACETAMINOPHEN: CPT

## 2021-06-09 PROCEDURE — 81001 URINALYSIS AUTO W/SCOPE: CPT

## 2021-06-09 PROCEDURE — 87591 N.GONORRHOEAE DNA AMP PROB: CPT

## 2021-06-09 PROCEDURE — 87210 SMEAR WET MOUNT SALINE/INK: CPT

## 2021-06-09 PROCEDURE — 87491 CHLMYD TRACH DNA AMP PROBE: CPT

## 2021-06-09 PROCEDURE — 87636 SARSCOV2 & INF A&B AMP PRB: CPT

## 2021-06-09 RX ORDER — RIVASTIGMINE TARTRATE 3 MG/1
3 CAPSULE ORAL 2 TIMES DAILY
COMMUNITY

## 2021-06-09 RX ORDER — BENZTROPINE MESYLATE 0.5 MG/1
0.5 TABLET ORAL 2 TIMES DAILY
COMMUNITY

## 2021-06-09 RX ORDER — HALOPERIDOL 5 MG/ML
5 INJECTION INTRAMUSCULAR EVERY 6 HOURS PRN
Status: DISCONTINUED | OUTPATIENT
Start: 2021-06-09 | End: 2021-06-15 | Stop reason: HOSPADM

## 2021-06-09 RX ORDER — DIPHENHYDRAMINE HYDROCHLORIDE 50 MG/ML
50 INJECTION INTRAMUSCULAR; INTRAVENOUS EVERY 6 HOURS PRN
Status: DISCONTINUED | OUTPATIENT
Start: 2021-06-09 | End: 2021-06-15 | Stop reason: HOSPADM

## 2021-06-09 RX ORDER — TOPIRAMATE 100 MG/1
200 TABLET, FILM COATED ORAL ONCE
Status: DISCONTINUED | OUTPATIENT
Start: 2021-06-09 | End: 2021-06-10

## 2021-06-09 RX ORDER — ACETAMINOPHEN 325 MG/1
650 TABLET ORAL EVERY 4 HOURS PRN
Status: DISCONTINUED | OUTPATIENT
Start: 2021-06-09 | End: 2021-06-15 | Stop reason: HOSPADM

## 2021-06-09 RX ORDER — RISPERIDONE 1 MG/1
1 TABLET, FILM COATED ORAL 2 TIMES DAILY
Status: ON HOLD | COMMUNITY
End: 2021-06-15 | Stop reason: HOSPADM

## 2021-06-09 RX ORDER — MAGNESIUM HYDROXIDE/ALUMINUM HYDROXICE/SIMETHICONE 120; 1200; 1200 MG/30ML; MG/30ML; MG/30ML
30 SUSPENSION ORAL PRN
Status: DISCONTINUED | OUTPATIENT
Start: 2021-06-09 | End: 2021-06-15 | Stop reason: HOSPADM

## 2021-06-09 RX ORDER — LORAZEPAM 2 MG/ML
2 INJECTION INTRAMUSCULAR EVERY 6 HOURS PRN
Status: DISCONTINUED | OUTPATIENT
Start: 2021-06-09 | End: 2021-06-15 | Stop reason: HOSPADM

## 2021-06-09 ASSESSMENT — ENCOUNTER SYMPTOMS
ABDOMINAL PAIN: 1
BACK PAIN: 1
EYE REDNESS: 0
EYE DISCHARGE: 0
SHORTNESS OF BREATH: 0
SINUS PRESSURE: 0
COUGH: 0
NAUSEA: 0
SORE THROAT: 0
DIARRHEA: 0
EYE PAIN: 0
WHEEZING: 0
ABDOMINAL DISTENTION: 0
VOMITING: 0

## 2021-06-09 ASSESSMENT — LIFESTYLE VARIABLES: HISTORY_ALCOHOL_USE: NO

## 2021-06-09 ASSESSMENT — SLEEP AND FATIGUE QUESTIONNAIRES
DO YOU USE A SLEEP AID: NO
AVERAGE NUMBER OF SLEEP HOURS: 3
RESTFUL SLEEP: NO
DIFFICULTY STAYING ASLEEP: YES
DIFFICULTY ARISING: NO
DIFFICULTY FALLING ASLEEP: YES
DO YOU HAVE DIFFICULTY SLEEPING: YES
SLEEP PATTERN: DIFFICULTY FALLING ASLEEP;DISTURBED/INTERRUPTED SLEEP

## 2021-06-09 ASSESSMENT — PAIN SCALES - GENERAL: PAINLEVEL_OUTOF10: 0

## 2021-06-09 NOTE — ED NOTES
Emergency Department [facility] AN AFFILIATE OF [facility] Biopsychocial Assessment Note    Chief Complaint: sent in from [address]. staff states she was making suicidal comments. pt denying any SI/HI at this time. pt states she is unhappy with care at [address]:  UNCOOPERATIVE WITH STAFF, DIFFICULT TO REDIRECT, EASILY AGITATED, SHARES FAIR EYE CONTACT, HAS MOSTLY PRESSURED SPEECH, POOR COMMUNICATOR, DENIES HI AND DENIES SI NOW. SHE HAS POOR INSIGHT AND JUDGEMENT. Clinical Summary/History:   PT IS ANGRY AND IS A POOR HISTORIAN WITH ME.  SHE IS NOT COOPERATING WITH STAFF, EMOTIONALLY UPSET, CRYING AND YELLS OUT. SHE APPEARS TO BE INTERNALLY STIMULATED AND OVERLY ANXIOUS AT TIMES. SHE IS NOT PROVIDING USEFUL INFORMATION FOR ADMISSION. SHE WAS BROUGHT IN BY EMS FROM [facility] WITH BEHAVIOR DISTURBANCES AND FOR BEING NON MED COMPLIANT. Gender  [] Male [x] Female [] Transgender  [] Other    Sexual Orientation    [x] Heterosexual [] Homosexual [] Bisexual [] Other    Suicidal Behavioral: CSSR-S Complete. [x] Reports:    [] Past [] Present   [] Denies    Homicidal/ Violent Behavior  [] Reports:   [] Past [] Present   [x] Denies     Hallucinations/Delusions   [x] Reports:   [] Denies     Substance Use/Alcohol Use/Addiction: SBIRT Screen Complete. [] Reports:   [x] Denies     Trauma History  [] Reports:  [x] Denies     Collateral Information:   [phone] [phone], SPOKE WITH MARIE WHO REPORTS THAT PT \"HAD A [place]\", STARTED MAKING COMMENTS THAT SHE DIDN'T WANT TO BE ALIVE, STATED \"I WANT TO BE DEAD\". PER MARIE - SUICIDAL IDEATIONS IS NOT PT'S BASELINE. PER MARIE, PT HAS A HX OF NON MED COMPLIANCE. TODAY, PT'S BEHAVIOR WAS AGGRESSIVE AND UNCONTROLLED, WAS THROWING THINGS, POUNDING ON WINDOWS AND SCARING OTHER RESIDENTS. MARIE SAID THAT PT WILL NOT TAKE HER MEDICATIONS, BUT THAT SHE IS \"BOWL OBSESSED AND WILL TAKE MIRALAX DAILY\".     PT HAS BEEN TALKING TO HERSELF AND UNSEEN OTHERS THAT SHE HAS NAMED, WHICH IS HER BASELINE. SHE HAS NOT THREATENED TO HARM OTHERS.       Level of Care/Disposition Plan  [] Home:   [] Outpatient Provider:   [] Crisis Unit:   [x] Inpatient Psychiatric Unit:  [] Other:        LIZA Javier  06/09/21 3373

## 2021-06-09 NOTE — ED NOTES
SW placed phone call to patient guardian Jeanine Boss with [facility], obtained verbal consent to treat and pursue an inpatient admission for safety/stabilization.      Jenifer Darnell, MAYRA, LSW  06/09/21 1169

## 2021-06-09 NOTE — CARE COORDINATION
Client has legal guardian, Makayla Olivas at [address] [phone]    Informed SW same.    Electronically signed by Gregorio Anthony, [facility] on 6/9/2021 at 3:36 PM

## 2021-06-09 NOTE — ED NOTES
Bed: [facility]  Expected date: 6/9/21  Expected time:   Means of arrival: [facility] Department  Comments:  Hiwot Bridges RN  06/09/21 0876

## 2021-06-09 NOTE — ED NOTES
Left a message for Griselda South NP to review patient for admission.       Ramon Alba RN  06/09/21 2201

## 2021-06-09 NOTE — ED PROVIDER NOTES
Patient presents for a psychiatric evaluation. Patient comes from [facility] for concerns of suicidal ideation. Staff reports that patient has been more abusive towards staff recently and that she has been threatening to kill herself. Patient states that she is not happy with the care at [facility] and that no one cares about her. She denies any suicide ideations or homicidal thoughts. She states that she did say that yesterday due to the poor care but she is not said anything recently. Patient denies any depression. She does not have any plan for suicide. Patient does complain of a vaginal discharge but otherwise has no acute complaints. Patient denies any fevers, chest pain, shortness of breath, nausea, vomiting, numbness, tingling, or weakness. The history is provided by the patient and the EMS personnel. No  was used. Review of Systems   Constitutional: Negative for chills and fever. HENT: Negative for ear pain, sinus pressure and sore throat. Eyes: Negative for pain, discharge and redness. Respiratory: Negative for cough, shortness of breath and wheezing. Cardiovascular: Negative for chest pain. Gastrointestinal: Positive for abdominal pain (Baseline). Negative for abdominal distention, diarrhea, nausea and vomiting. Genitourinary: Positive for vaginal discharge. Negative for dysuria and frequency. Musculoskeletal: Positive for back pain (baseline). Negative for arthralgias. Skin: Negative for rash and wound. Neurological: Negative for weakness and headaches. Hematological: Negative for adenopathy. All other systems reviewed and are negative. Physical Exam  Vitals and nursing note reviewed. Exam conducted with a chaperone present. Constitutional:       General: She is not in acute distress. Appearance: She is well-developed. HENT:      Head: Normocephalic and atraumatic.    Eyes:      Conjunctiva/sclera: Conjunctivae normal. Cardiovascular:      Rate and Rhythm: Normal rate and regular rhythm. Pulses: Normal pulses. Heart sounds: Normal heart sounds. No murmur heard. Pulmonary:      Effort: Pulmonary effort is normal. No respiratory distress. Breath sounds: Normal breath sounds. No wheezing or rales. Abdominal:      General: Bowel sounds are normal.      Palpations: Abdomen is soft. Tenderness: There is no abdominal tenderness. There is no guarding or rebound. Genitourinary:     Pubic Area: No rash. Labia:         Right: No lesion. Left: No lesion. Cervix: Friability present. No discharge or cervical bleeding. Musculoskeletal:         General: No tenderness. Cervical back: Normal range of motion and neck supple. Skin:     General: Skin is warm and dry. Neurological:      Mental Status: She is alert and oriented to person, place, and time. Cranial Nerves: No cranial nerve deficit. Coordination: Coordination normal.          Procedures     MDM  Number of Diagnoses or Management Options  Suicidal thoughts  Diagnosis management comments: Patient presents after suicidal threats. Patient is complaining of abdominal pain and back pain that are chronic. She also states she has a vaginal discharge. Patient was medically cleared. GC and wet prep were ordered and are pending. CBC, CMP, Troponin, EKG, drug screens were all normal. Patient will be seen by social work and disposition will be decided by them. Patient was informed of her medical results.         Amount and/or Complexity of Data Reviewed  Clinical lab tests: reviewed  Tests in the medicine section of CPT®: reviewed                  --------------------------------------------- PAST HISTORY ---------------------------------------------  Past Medical History:  has a past medical history of Chronic idiopathic constipation, Depression, Diabetes mellitus ([address] Head injury, Hyperlipidemia, IBS (irritable bowel syndrome), Jaw pain, Migraines, neuralgic, Pain in gums, and Schizophrenia ([address] Past Surgical History:  has a past surgical history that includes cyst removal (Right, 2008); Toe Surgery (1999); and laparoscopy (1980). Social History:  reports that she has never smoked. She has never used smokeless tobacco. She reports that she does not drink alcohol and does not use drugs. Family History: family history includes Cancer in her mother; Heart Failure in her father. The patients home medications have been reviewed.     Allergies: Clozapine, Ibuprofen, and Carbamazepine and analogs    -------------------------------------------------- RESULTS -------------------------------------------------    LABS:  Results for orders placed or performed during the hospital encounter of 06/09/21   COVID-19 & Influenza Combo    Specimen: Nasopharyngeal Swab   Result Value Ref Range    SARS-CoV-2 RNA, RT PCR NOT DETECTED NOT DETECTED    INFLUENZA A NOT DETECTED NOT DETECTED    INFLUENZA B NOT DETECTED NOT DETECTED   C.trachomatis N.gonorrhoeae DNA    Specimen: Cervix   Result Value Ref Range    Source Cervix    Wet prep, genital    Specimen: Vaginal   Result Value Ref Range    Trichomonas Prep None Seen     Yeast, Wet Prep None Seen     Clue Cells, Wet Prep None Seen     Source Wet Prep VAGINAL    Comprehensive Metabolic Panel   Result Value Ref Range    Sodium 135 132 - 146 mmol/L    Potassium 3.6 3.5 - 5.0 mmol/L    Chloride 99 98 - 107 mmol/L    CO2 26 22 - 29 mmol/L    Anion Gap 10 7 - 16 mmol/L    Glucose 98 74 - 99 mg/dL    BUN 7 6 - 23 mg/dL    CREATININE 0.7 0.5 - 1.0 mg/dL    GFR Non-African American >60 >=60 mL/min/1.73    GFR African American >60     Calcium 9.8 8.6 - 10.2 mg/dL    Total Protein 7.2 6.4 - 8.3 g/dL    Albumin 4.5 3.5 - 5.2 g/dL    Total Bilirubin 0.5 0.0 - 1.2 mg/dL    Alkaline Phosphatase 89 35 - 104 U/L    ALT 13 0 - 32 U/L    AST 16 0 - 31 U/L   CBC Auto Differential   Result Value Ref Range    WBC 5.4 4.5 - 11.5 E9/L    RBC 4.25 3.50 - 5.50 E12/L    Hemoglobin 12.7 11.5 - 15.5 g/dL    Hematocrit 37.8 34.0 - 48.0 %    MCV 88.9 80.0 - 99.9 fL    MCH 29.9 26.0 - 35.0 pg    MCHC 33.6 32.0 - 34.5 %    RDW 12.1 11.5 - 15.0 fL    Platelets 028 966 - 144 E9/L    MPV 9.8 7.0 - 12.0 fL    Neutrophils % 63.4 43.0 - 80.0 %    Immature Granulocytes % 0.2 0.0 - 5.0 %    Lymphocytes % 26.3 20.0 - 42.0 %    Monocytes % 8.3 2.0 - 12.0 %    Eosinophils % 0.7 0.0 - 6.0 %    Basophils % 1.1 0.0 - 2.0 %    Neutrophils Absolute 3.44 1.80 - 7.30 E9/L    Immature Granulocytes # 0.01 E9/L    Lymphocytes Absolute 1.43 (L) 1.50 - 4.00 E9/L    Monocytes Absolute 0.45 0.10 - 0.95 E9/L    Eosinophils Absolute 0.04 (L) 0.05 - 0.50 E9/L    Basophils Absolute 0.06 0.00 - 0.20 E9/L   Serum Drug Screen   Result Value Ref Range    Ethanol Lvl <10 mg/dL    Acetaminophen Level <5.0 (L) 10.0 - 62.0 mcg/mL    Salicylate, Serum <1.4 0.0 - 30.0 mg/dL    TCA Scrn NEGATIVE Cutoff:300 ng/mL   Urinalysis   Result Value Ref Range    Color, UA Yellow Straw/Yellow    Clarity, UA Clear Clear    Glucose, Ur Negative Negative mg/dL    Bilirubin Urine Negative Negative    Ketones, Urine Negative Negative mg/dL    Specific Gravity, UA 1.010 1.005 - 1.030    Blood, Urine SMALL (A) Negative    pH, UA 7.5 5.0 - 9.0    Protein, UA Negative Negative mg/dL    Urobilinogen, Urine 0.2 <2.0 E.U./dL    Nitrite, Urine Negative Negative    Leukocyte Esterase, Urine TRACE (A) Negative   Urine Drug Screen   Result Value Ref Range    Amphetamine Screen, Urine NOT DETECTED Negative <1000 ng/mL    Barbiturate Screen, Ur NOT DETECTED Negative < 200 ng/mL    Benzodiazepine Screen, Urine NOT DETECTED Negative < 200 ng/mL    Cannabinoid Scrn, Ur NOT DETECTED Negative < 50ng/mL    Cocaine Metabolite Screen, Urine NOT DETECTED Negative < 300 ng/mL    Opiate Scrn, Ur NOT DETECTED Negative < 300ng/mL    PCP Screen, Urine NOT DETECTED Negative < 25 ng/mL    Methadone Screen, Urine NOT DETECTED Negative <300 ng/mL    Oxycodone Urine NOT DETECTED Negative <100 ng/mL    FENTANYL SCREEN, URINE NOT DETECTED Negative <1 ng/mL    Drug Screen Comment: see below    Microscopic Urinalysis   Result Value Ref Range    WBC, UA 0-1 0 - 5 /HPF    RBC, UA 0-1 0 - 2 /HPF    Epithelial Cells, UA FEW /HPF    Bacteria, UA MODERATE (A) None Seen /HPF    Amorphous, UA FEW    EKG 12 Lead   Result Value Ref Range    Ventricular Rate 78 BPM    Atrial Rate 78 BPM    P-R Interval 152 ms    QRS Duration 98 ms    Q-T Interval 376 ms    QTc Calculation (Bazett) 428 ms    P Axis 63 degrees    R Axis 84 degrees    T Axis 64 degrees       RADIOLOGY:  No orders to display         ------------------------- NURSING NOTES AND VITALS REVIEWED ---------------------------  Date / Time Roomed:  6/9/2021 11:32 AM  ED Bed Assignment:  [address] [place] [address]    The nursing notes within the ED encounter and vital signs as below have been reviewed. Patient Vitals for the past 24 hrs:   BP Temp Pulse Resp SpO2 Weight   06/09/21 1616 126/78 97.7 °F (36.5 °C) 66 12 100 % --   06/09/21 1155 (!) 146/104 98.2 °F (36.8 °C) 79 16 98 % 108 lb (49 kg)       Oxygen Saturation Interpretation: Normal    ------------------------------------------ PROGRESS NOTES ------------------------------------------    --------------------------------- ADDITIONAL PROVIDER NOTES ---------------------------------    This patient has remained hemodynamically stable during their ED course. Diagnosis:  1. Suicidal thoughts        Disposition:  Patient's disposition: Admit to mental health unit - medically cleared for admission  Patient's condition is stable.       Patient was seen and evaluated by both myself and Tasha [facility]   Resident  06/09/21 9662

## 2021-06-10 PROBLEM — F25.0 SCHIZOAFFECTIVE DISORDER, BIPOLAR TYPE (HCC): Status: ACTIVE | Noted: 2021-06-10

## 2021-06-10 PROBLEM — F23 ACUTE PSYCHOSIS (HCC): Status: RESOLVED | Noted: 2019-07-25 | Resolved: 2021-06-10

## 2021-06-10 PROBLEM — F29 PSYCHOSIS (HCC): Status: RESOLVED | Noted: 2019-07-25 | Resolved: 2021-06-10

## 2021-06-10 PROBLEM — F25.1 SCHIZOAFFECTIVE DISORDER, DEPRESSIVE TYPE (HCC): Status: RESOLVED | Noted: 2019-04-26 | Resolved: 2021-06-10

## 2021-06-10 PROBLEM — F32.3 MAJOR DEPRESSION WITH PSYCHOTIC FEATURES (HCC): Status: RESOLVED | Noted: 2019-08-30 | Resolved: 2021-06-10

## 2021-06-10 PROCEDURE — 99222 1ST HOSP IP/OBS MODERATE 55: CPT | Performed by: NURSE PRACTITIONER

## 2021-06-10 PROCEDURE — 1240000000 HC EMOTIONAL WELLNESS R&B

## 2021-06-10 PROCEDURE — 6370000000 HC RX 637 (ALT 250 FOR IP): Performed by: NURSE PRACTITIONER

## 2021-06-10 RX ORDER — VENLAFAXINE HYDROCHLORIDE 75 MG/1
75 CAPSULE, EXTENDED RELEASE ORAL
Status: DISCONTINUED | OUTPATIENT
Start: 2021-06-11 | End: 2021-06-15 | Stop reason: HOSPADM

## 2021-06-10 RX ORDER — MIRTAZAPINE 15 MG/1
15 TABLET, FILM COATED ORAL NIGHTLY
Status: DISCONTINUED | OUTPATIENT
Start: 2021-06-10 | End: 2021-06-15 | Stop reason: HOSPADM

## 2021-06-10 RX ORDER — POLYETHYLENE GLYCOL 3350 17 G/17G
17 POWDER, FOR SOLUTION ORAL 2 TIMES DAILY
Status: DISCONTINUED | OUTPATIENT
Start: 2021-06-10 | End: 2021-06-15 | Stop reason: HOSPADM

## 2021-06-10 RX ORDER — TOPIRAMATE 100 MG/1
100 TABLET, FILM COATED ORAL 2 TIMES DAILY
Status: DISCONTINUED | OUTPATIENT
Start: 2021-06-10 | End: 2021-06-15 | Stop reason: HOSPADM

## 2021-06-10 RX ORDER — BENZTROPINE MESYLATE 1 MG/1
0.5 TABLET ORAL 2 TIMES DAILY
Status: DISCONTINUED | OUTPATIENT
Start: 2021-06-10 | End: 2021-06-15 | Stop reason: HOSPADM

## 2021-06-10 RX ORDER — RIVASTIGMINE TARTRATE 3 MG/1
3 CAPSULE ORAL 2 TIMES DAILY
Status: DISCONTINUED | OUTPATIENT
Start: 2021-06-10 | End: 2021-06-15 | Stop reason: HOSPADM

## 2021-06-10 RX ORDER — RISPERIDONE 1 MG/1
1 TABLET, FILM COATED ORAL NIGHTLY
Status: DISCONTINUED | OUTPATIENT
Start: 2021-06-10 | End: 2021-06-15 | Stop reason: HOSPADM

## 2021-06-10 RX ADMIN — TOPIRAMATE 100 MG: 100 TABLET, FILM COATED ORAL at 11:35

## 2021-06-10 RX ADMIN — POLYETHYLENE GLYCOL 3350 17 G: 17 POWDER, FOR SOLUTION ORAL at 21:57

## 2021-06-10 RX ADMIN — RIVASTIGMINE TARTRATE 3 MG: 3 CAPSULE ORAL at 21:57

## 2021-06-10 RX ADMIN — BENZTROPINE MESYLATE 0.5 MG: 1 TABLET ORAL at 21:57

## 2021-06-10 RX ADMIN — TOPIRAMATE 100 MG: 100 TABLET, FILM COATED ORAL at 21:57

## 2021-06-10 RX ADMIN — MIRTAZAPINE 15 MG: 15 TABLET, FILM COATED ORAL at 21:57

## 2021-06-10 RX ADMIN — RISPERIDONE 1 MG: 1 TABLET, FILM COATED ORAL at 21:57

## 2021-06-10 RX ADMIN — MAGNESIUM HYDROXIDE 30 ML: 2400 SUSPENSION ORAL at 12:15

## 2021-06-10 RX ADMIN — BENZTROPINE MESYLATE 0.5 MG: 1 TABLET ORAL at 11:36

## 2021-06-10 ASSESSMENT — LIFESTYLE VARIABLES: HISTORY_ALCOHOL_USE: NO

## 2021-06-10 ASSESSMENT — SLEEP AND FATIGUE QUESTIONNAIRES
RESTFUL SLEEP: NO
SLEEP PATTERN: DIFFICULTY FALLING ASLEEP
DO YOU USE A SLEEP AID: NO
AVERAGE NUMBER OF SLEEP HOURS: 3
DIFFICULTY STAYING ASLEEP: YES
DIFFICULTY ARISING: NO
DO YOU HAVE DIFFICULTY SLEEPING: YES
DIFFICULTY FALLING ASLEEP: YES

## 2021-06-10 ASSESSMENT — PAIN SCALES - GENERAL: PAINLEVEL_OUTOF10: 0

## 2021-06-10 NOTE — PLAN OF CARE
Problem: Altered Mood, Depressive Behavior:  Goal: Able to verbalize acceptance of life and situations over which he or she has no control  Description: Able to verbalize acceptance of life and situations over which he or she has no control  Outcome: Ongoing     Problem: Altered Mood, Depressive Behavior:  Goal: Able to verbalize and/or display a decrease in depressive symptoms  Description: Able to verbalize and/or display a decrease in depressive symptoms  Outcome: Ongoing     Problem: Altered Mood, Depressive Behavior:  Goal: Participates in care planning  Description: Participates in care planning  Outcome: Ongoing

## 2021-06-10 NOTE — CARE COORDINATION
Biopsychosocial Assessment Note    Social work met with patient to complete the biopsychosocial assessment and CSSR-S. Mental Status Exam: Pt alert and oriented x 4. Pt presented as anxious throughout the assessment. Pt's thought process was disorganized, speech was clear. Chief Complaint: \"sent in from [address] [place] [address]. staff states she was making suicidal comments. pt denying any SI/HI at this time. pt states she is unhappy with care at [facility]\"    Patient Report: Pt's last admission to this psychiatric facility was 11/9/2020. Pt states that she is unhappy with nursing home placement, and would like to return to the community. Pt admits to making SI statements, but states she did not truly mean them. Pt denied suicide attempt history. Pt currently denying SI/ HI/ hallucinations/ delusions. Pt denied trauma history. Pt denied substance use. Gender  [] Male [x] Female [] Transgender  [] Other    Sexual Orientation    [x] Heterosexual [] Homosexual [] Bisexual [] Other    Suicidal Ideation  [] Past [] Present [x] Denies     Homicidal Ideation  [] Past [] Present [x] Denies     Hallucinations/Delusions (Specify type)  [] Reports [x] Denies     Substance Use/Alcohol Use/Addiction  [] Reports [x] Denies     Tobacco Use (within the last 6 months)  [] Reports [x] Denies     Trauma History  [] Reports [x] Denies     Collateral Contact (BENITO signed)  Name: Sridevi Carpenter  Relationship: Legal Guardian   Number: [phone]    Collateral Information: Tony spoke with pt's guardian, Lynda. [facility] states that this is this pt's baseline, eratic. [facility] states that [facility] tries not to bring this pt to the hospital, and can usually calm her down at the facility. However, yesterday, she allegedly was slamming doors and banging on walls. Pt's discharge plan remains to return to [facility] at discharge per [facility]. No access to weapons.        Access to Weapons per Collateral Contact: [] Reports [x] Denies       Follow up provider preference: [facility] for discharge  Location (where do they plan on discharging to?): [facility]    Transportation (who will pick them up at discharge?) Nursing home or [facility]     Medications (will they have money for copays at discharge?): Send to [facility]
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
## 2021-06-10 NOTE — PLAN OF CARE
Patient is alert and oriented x4 she is cooperative at this time. She denies any SI/HI does not report auditory or visual hallucinations. She has  flat and sad affect with short answer to questions. Patient complains of a headache and states she is not happy with decrease in Topamax dose of 100mg not her 200mg being given. Patient is refusing Exelon but taking other medications. She is not going to groups and is isolative on the unit not social with peers.       Problem: Altered Mood, Depressive Behavior:  Goal: Able to verbalize acceptance of life and situations over which he or she has no control  Description: Able to verbalize acceptance of life and situations over which he or she has no control  6/10/2021 1211 by Tita Rojas RN  Outcome: Ongoing     Problem: Altered Mood, Depressive Behavior:  Goal: Able to verbalize and/or display a decrease in depressive symptoms  Description: Able to verbalize and/or display a decrease in depressive symptoms  6/10/2021 1211 by Tita Rojas RN  Outcome: Ongoing     Problem: Altered Mood, Deterioration in Function:  Goal: Able to verbalize reality based thinking  Description: Able to verbalize reality based thinking  Outcome: Ongoing

## 2021-06-11 PROCEDURE — 99231 SBSQ HOSP IP/OBS SF/LOW 25: CPT | Performed by: NURSE PRACTITIONER

## 2021-06-11 PROCEDURE — 6370000000 HC RX 637 (ALT 250 FOR IP): Performed by: NURSE PRACTITIONER

## 2021-06-11 PROCEDURE — 1240000000 HC EMOTIONAL WELLNESS R&B

## 2021-06-11 RX ORDER — LORAZEPAM 1 MG/1
1 TABLET ORAL ONCE
Status: COMPLETED | OUTPATIENT
Start: 2021-06-11 | End: 2021-06-11

## 2021-06-11 RX ADMIN — LORAZEPAM 1 MG: 1 TABLET ORAL at 12:35

## 2021-06-11 RX ADMIN — MIRTAZAPINE 15 MG: 15 TABLET, FILM COATED ORAL at 20:45

## 2021-06-11 RX ADMIN — RIVASTIGMINE TARTRATE 3 MG: 3 CAPSULE ORAL at 20:45

## 2021-06-11 RX ADMIN — RISPERIDONE 1 MG: 1 TABLET, FILM COATED ORAL at 20:45

## 2021-06-11 RX ADMIN — POLYETHYLENE GLYCOL 3350 17 G: 17 POWDER, FOR SOLUTION ORAL at 20:45

## 2021-06-11 RX ADMIN — BENZTROPINE MESYLATE 0.5 MG: 1 TABLET ORAL at 08:35

## 2021-06-11 RX ADMIN — TOPIRAMATE 100 MG: 100 TABLET, FILM COATED ORAL at 08:35

## 2021-06-11 RX ADMIN — TOPIRAMATE 100 MG: 100 TABLET, FILM COATED ORAL at 20:45

## 2021-06-11 RX ADMIN — VENLAFAXINE HYDROCHLORIDE 75 MG: 75 CAPSULE, EXTENDED RELEASE ORAL at 08:35

## 2021-06-11 RX ADMIN — POLYETHYLENE GLYCOL 3350 17 G: 17 POWDER, FOR SOLUTION ORAL at 08:35

## 2021-06-11 RX ADMIN — RIVASTIGMINE TARTRATE 3 MG: 3 CAPSULE ORAL at 08:35

## 2021-06-11 RX ADMIN — BENZTROPINE MESYLATE 0.5 MG: 1 TABLET ORAL at 20:44

## 2021-06-11 ASSESSMENT — PAIN SCALES - GENERAL: PAINLEVEL_OUTOF10: 0

## 2021-06-11 NOTE — PLAN OF CARE
Problem: Altered Mood, Depressive Behavior:  Goal: Ability to disclose and discuss suicidal ideas will improve  Description: Ability to disclose and discuss suicidal ideas will improve  Outcome: Met This Shift     Problem: Altered Mood, Depressive Behavior:  Goal: Absence of self-harm  Description: Absence of self-harm  6/11/2021 0931 by Rochel Gitelman, RN  Outcome: Met This Shift  6/11/2021 0123 by Jennifer Box RN  Outcome: Ongoing     Problem: Altered Mood, Deterioration in Function:  Goal: Ability to perform activities of daily living will improve  Description: Ability to perform activities of daily living will improve  Outcome: Met This Shift     Problem: Altered Mood, Deterioration in Function:  Goal: Maintenance of adequate nutrition will improve  Description: Maintenance of adequate nutrition will improve  Outcome: Met This Shift     Problem: Altered Mood, Depressive Behavior:  Goal: Able to verbalize acceptance of life and situations over which he or she has no control  Description: Able to verbalize acceptance of life and situations over which he or she has no control  Outcome: Ongoing     Problem: Altered Mood, Depressive Behavior:  Goal: Able to verbalize and/or display a decrease in depressive symptoms  Description: Able to verbalize and/or display a decrease in depressive symptoms  Outcome: Ongoing

## 2021-06-11 NOTE — CARE COORDINATION
Pt behaviors remain in control. Pt will return to [address] [place] [address] at the time of discharge. Pt's legal guardian [facility] to be notified when pt is being discharged. Discharge date remains unknown.

## 2021-06-11 NOTE — GROUP NOTE
Group Therapy Note    Date: 6/11/2021    Group Start Time: 1120  Group End Time: 2329  Group Topic: Cognitive Skills    [facility]    MAYRA Colindres LSW        Group Therapy Note    Attendees: 8         Patient's Goal:  Pt will be able to adequately follow a guided meditation on the topic of \"mindfulness\"    Notes:  Pt made connections and participated during group    Status After Intervention:  Improved    Participation Level:  Active Listener    Participation Quality: Appropriate, Attentive and Supportive      Speech:  normal      Thought Process/Content: Logical      Affective Functioning: Congruent      Mood: depressed      Level of consciousness:  Alert and Oriented x4      Response to Learning: Able to verbalize current knowledge/experience      Endings: None Reported    Modes of Intervention: Socialization, Exploration, Clarifying, Problem-solving and Activity      Discipline Responsible: /Counselor      Signature:  MAYRA Colindres LSW
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
## 2021-06-11 NOTE — PROGRESS NOTES
Patient attended goals group and stated daily goal as to be more positive. Group Therapy Note    Date: 6/11/2021  Start Time: 9:45  End Time:  10:15  Number of Participants: 10    Type of Group: Psychoeducation    Wellness Binder Information  Module Name: Behavioral Activation  Session Number:  NA    Patient's Goal: To id positive ways to engage in tasks and activities to improve wellness recovery. Notes: Attended group and was able to participate. Status After Intervention:  Improved    Participation Level:  Active Listener and Interactive    Participation Quality: Attentive      Speech:  hesitant      Thought Process/Content: Linear      Affective Functioning: Flat      Mood: anxious and depressed      Level of consciousness:  Alert      Response to Learning: Progressing to goal      Endings: None Reported    Modes of Intervention: Education      Discipline Responsible: Psychoeducational Specialist      Signature:  LIZA Cedillo

## 2021-06-12 PROCEDURE — 6370000000 HC RX 637 (ALT 250 FOR IP): Performed by: NURSE PRACTITIONER

## 2021-06-12 PROCEDURE — 1240000000 HC EMOTIONAL WELLNESS R&B

## 2021-06-12 PROCEDURE — 99232 SBSQ HOSP IP/OBS MODERATE 35: CPT | Performed by: NURSE PRACTITIONER

## 2021-06-12 RX ADMIN — MIRTAZAPINE 15 MG: 15 TABLET, FILM COATED ORAL at 20:57

## 2021-06-12 RX ADMIN — BENZTROPINE MESYLATE 0.5 MG: 1 TABLET ORAL at 08:46

## 2021-06-12 RX ADMIN — TOPIRAMATE 100 MG: 100 TABLET, FILM COATED ORAL at 08:47

## 2021-06-12 RX ADMIN — VENLAFAXINE HYDROCHLORIDE 75 MG: 75 CAPSULE, EXTENDED RELEASE ORAL at 08:47

## 2021-06-12 RX ADMIN — RIVASTIGMINE TARTRATE 3 MG: 3 CAPSULE ORAL at 20:57

## 2021-06-12 RX ADMIN — BENZTROPINE MESYLATE 0.5 MG: 1 TABLET ORAL at 20:56

## 2021-06-12 RX ADMIN — POLYETHYLENE GLYCOL 3350 17 G: 17 POWDER, FOR SOLUTION ORAL at 08:47

## 2021-06-12 RX ADMIN — POLYETHYLENE GLYCOL 3350 17 G: 17 POWDER, FOR SOLUTION ORAL at 20:59

## 2021-06-12 RX ADMIN — RIVASTIGMINE TARTRATE 3 MG: 3 CAPSULE ORAL at 08:47

## 2021-06-12 RX ADMIN — TOPIRAMATE 100 MG: 100 TABLET, FILM COATED ORAL at 20:57

## 2021-06-12 RX ADMIN — RISPERIDONE 1 MG: 1 TABLET, FILM COATED ORAL at 20:56

## 2021-06-12 ASSESSMENT — PAIN SCALES - GENERAL: PAINLEVEL_OUTOF10: 0

## 2021-06-12 NOTE — BH NOTE
Pt denies SI HI and hallucinations. Voiced depression and anxiety 1/10. No voiced paranoia or delusions. Pt is in control of her emotions and behaviors this morning. Pt showered. Med compliant. Pt eating provided meals. No aggression. We will continue to provide support and comfort for the patient.

## 2021-06-12 NOTE — PLAN OF CARE
Problem: Altered Mood, Depressive Behavior:  Goal: Able to verbalize acceptance of life and situations over which he or she has no control  Description: Able to verbalize acceptance of life and situations over which he or she has no control  Outcome: Met This Shift     Problem: Altered Mood, Depressive Behavior:  Goal: Able to verbalize support systems  Description: Able to verbalize support systems  Outcome: Met This Shift     Problem: Altered Mood, Depressive Behavior:  Goal: Absence of self-harm  Description: Absence of self-harm  Outcome: Met This Shift     Problem: Altered Mood, Deterioration in Function:  Goal: Ability to perform activities of daily living will improve  Description: Ability to perform activities of daily living will improve  Outcome: Met This Shift     Problem: Altered Mood, Deterioration in Function:  Goal: Able to verbalize reality based thinking  Description: Able to verbalize reality based thinking  Outcome: Met This Shift     Problem: Altered Mood, Deterioration in Function:  Goal: Ability to tolerate increased activity will improve  Description: Ability to tolerate increased activity will improve  Outcome: Met This Shift

## 2021-06-12 NOTE — PROGRESS NOTES
Pt in bed resting with eyes closed. No prn's administered at this time. No signs or symptoms of discomfort or distress noted. Will continue to monitor and observe. Q 15 minute checks maintained per unit policy.

## 2021-06-12 NOTE — PROGRESS NOTES
Alcohol use: No     Alcohol/week: 0.0 standard drinks     Comment: rarely    Drug use: No    Sexual activity: Never   Other Topics Concern    Not on file   Social History Narrative    Not on file     Social Determinants of Health     Financial Resource Strain:     Difficulty of Paying Living Expenses:    Food Insecurity:     Worried About Running Out of Food in the Last Year:     [address] in the Last Year:    Transportation Needs:     Lack of Transportation (Medical):  Lack of Transportation (Non-Medical):    Physical Activity:     Days of Exercise per Week:     Minutes of Exercise per Session:    Stress:     Feeling of Stress :    Social Connections:     Frequency of Communication with Friends and Family:     Frequency of Social Gatherings with Friends and Family:     Attends Religion Services:     Active Member of Clubs or Organizations:     Attends Club or Organization Meetings:     Marital Status:    Intimate Partner Violence:     Fear of Current or Ex-Partner:     Emotionally Abused:     Physically Abused:     Sexually Abused:            ROS:  [x] All negative/unchanged except if checked.  Explain positive(checked items) below:  [] Constitutional  [] Eyes  [] Ear/Nose/Mouth/Throat  [] Respiratory  [] CV  [] GI  []   [] Musculoskeletal  [] Skin/Breast  [] Neurological  [] Endocrine  [] Heme/Lymph  [] Allergic/Immunologic    Explanation:     MEDICATIONS:    Current Facility-Administered Medications:     rivastigmine (EXELON) capsule 3 mg, 3 mg, Oral, BID, Dumont Bhaskar, APRN - CNP, 3 mg at 06/12/21 0847    venlafaxine (EFFEXOR XR) extended release capsule 75 mg, 75 mg, Oral, Daily with breakfast, Dumont [place], APRN - CNP, 75 mg at 06/12/21 0847    benztropine (COGENTIN) tablet 0.5 mg, 0.5 mg, Oral, BID, Dumont Bhaskar, APRN - CNP, 0.5 mg at 06/12/21 0846    mirtazapine (REMERON) tablet 15 mg, 15 mg, Oral, Nightly, Dumont Bhaskar, APRN - CNP, 15 mg at 06/11/21 2045   topiramate (TOPAMAX) tablet 100 mg, 100 mg, Oral, BID, Shayla Pesa, APRN - CNP, 100 mg at 06/12/21 0847    risperiDONE (RISPERDAL) tablet 1 mg, 1 mg, Oral, Nightly, Shayla Pesa, APRN - CNP, 1 mg at 06/11/21 2045    polyethylene glycol (GLYCOLAX) packet 17 g, 17 g, Oral, BID, Shayla Pesa, APRN - CNP, 17 g at 06/12/21 0847    acetaminophen (TYLENOL) tablet 650 mg, 650 mg, Oral, Q4H PRN, Shayla Pesa, APRN - CNP    magnesium hydroxide (MILK OF MAGNESIA) 400 MG/5ML suspension 30 mL, 30 mL, Oral, Daily PRN, Shayla Pesa, APRN - CNP, 30 mL at 06/10/21 1215    aluminum & magnesium hydroxide-simethicone (MAALOX) 200-200-20 MG/5ML suspension 30 mL, 30 mL, Oral, PRN, Shayla Pesa, APRN - CNP    diphenhydrAMINE (BENADRYL) injection 50 mg, 50 mg, Intramuscular, Q6H PRN, Shayla Pesa, APRN - CNP    LORazepam (ATIVAN) injection 2 mg, 2 mg, Intramuscular, Q6H PRN, Shayla Pesa, APRN - CNP    haloperidol lactate (HALDOL) injection 5 mg, 5 mg, Intramuscular, Q6H PRN, Shayla Pesa, APRN - CNP      Examination:  /79   Pulse 98   Temp 97.4 °F (36.3 °C) (Temporal)   Resp 14   Wt 108 lb (49 kg)   LMP 08/25/2011 (Exact Date)   SpO2 100%   BMI 17.97 kg/m²   Gait - steady  Medication side effects(SE): None reported    Mental Status Examination:    Level of consciousness:  within normal limits   Appearance:  fair grooming and fair hygiene  Behavior/Motor:  no abnormalities noted  Attitude toward examiner:  cooperative  Speech:  normal rate and normal volume   Mood: euthymic  Affect:  mood congruent  Thought processes:  goal directed   Thought content: The patient is devoid of suicidal or homicidal ideation intent or plan. Devoid of auditory or visual hallucinations or other perceptual disturbances, there are no overt or covert signs of psychosis or paranoia. There are no neurovegetative signs of depression.   Cognition:  oriented to person, place, and time   Concentration poor  Insight poor Judgement poor     ASSESSMENT:   Patient symptoms are:  [] Well controlled  [] Improving  [] Worsening  [x] No change      Diagnosis:   Principal Problem:    Schizoaffective disorder, bipolar type ([facility] [address]  Active Problems:    Dementia, vascular, with depression ([facility] [address]  Resolved Problems:    * No resolved hospital problems. *      LABS:    Recent Labs     06/09/21  1208   WBC 5.4   HGB 12.7        Recent Labs     06/09/21  1208      K 3.6   CL 99   CO2 26   BUN 7   CREATININE 0.7   GLUCOSE 98     Recent Labs     06/09/21  1208   BILITOT 0.5   ALKPHOS 89   AST 16   ALT 13     Lab Results   Component Value Date    LABAMPH NOT DETECTED 06/09/2021    LABAMPH NOT DETECTED 09/16/2011    BARBSCNU NOT DETECTED 06/09/2021    LABBENZ NOT DETECTED 06/09/2021    LABBENZ NOT DETECTED 09/16/2011    CANNAB NOT DETECTED  09/16/2011    LABMETH NOT DETECTED 06/09/2021    OPIATESCREENURINE NOT DETECTED 06/09/2021    PHENCYCLIDINESCREENURINE NOT DETECTED 06/09/2021    PPXUR NEG 09/19/2017    ETOH <10 06/09/2021     Lab Results   Component Value Date    TSH 1.800 10/05/2020     No results found for: LITHIUM  No results found for: VALPROATE, CBMZ        Treatment Plan:  The patient's diagnosis, treatment plan, medication management were formulated after patient was seen directly by the attending physician and myself and all relevant documentation was reviewed. Reviewed current Medications with the patient. Risk, benefit, side effects, possible outcomes of the medication and alternatives discussed with the patient and the patient demonstrated understanding. The patient was also educated that the outcome of treatment will depend on the medication compliance as directed by the prescribers along with regular follow-up, compliance with the labs and other work-up, as clinically indicated.       Cogentin 0.5 mg twice daily  Risperdal 1 mg nightly  Rivastigmine 3 mg twice daily  Topamax 100 mg twice daily for mood stabilization  Effexor XR 75 mg daily for depression and anxiety          Collateral information: Followed by social work  CD evaluation  Encourage patient to attend group and other milieu activities. Discharge planning discussed with the patient and treatment team.    PSYCHOTHERAPY/COUNSELING:  [x] Therapeutic interview  [x] Supportive  [] CBT  [] Ongoing  [] Other    [x] Patient continues to need, on a daily basis, active treatment furnished directly by or requiring the supervision of inpatient psychiatric personnel      Anticipated Length of stay: 3 to 5 days based on stability     NOTE: This report was transcribed using voice recognition software. Every effort was made to ensure accuracy; however, inadvertent computerized transcription errors may be present.     Electronically signed by IFEANYI Hannah CNP on 1/26/6655 at 11:45 AM

## 2021-06-12 NOTE — PLAN OF CARE
Problem: Altered Mood, Depressive Behavior:  Goal: Ability to disclose and discuss suicidal ideas will improve  Description: Ability to disclose and discuss suicidal ideas will improve  5/78/8352 [id number] by Florinda Robins RN  Outcome: Met This Shift  6/11/2021 0931 by Gali Patton RN  Outcome: Met This Shift  Goal: Absence of self-harm  Description: Absence of self-harm  8/85/0084 [id number] by Florinda Robins RN  Outcome: Met This Shift  6/11/2021 0931 by Gali Patton RN  Outcome: Met This Shift     Problem: Altered Mood, Deterioration in Function:  Goal: Maintenance of adequate nutrition will improve  Description: Maintenance of adequate nutrition will improve  6/11/2021 0931 by Gali Patton RN  Outcome: Met This Shift     Problem: Altered Mood, Depressive Behavior:  Goal: Able to verbalize acceptance of life and situations over which he or she has no control  Description: Able to verbalize acceptance of life and situations over which he or she has no control  4/47/2324 [id number] by Florinda Robins RN  Outcome: Ongoing  6/11/2021 0931 by Gali Patton RN  Outcome: Ongoing  Goal: Able to verbalize and/or display a decrease in depressive symptoms  Description: Able to verbalize and/or display a decrease in depressive symptoms  1/79/0919 5339 by Florinda Robins RN  Outcome: Ongoing  6/11/2021 0931 by Gali Patton RN  Outcome: Ongoing     Problem: Altered Mood, Deterioration in Function:  Goal: Ability to perform activities of daily living will improve  Description: Ability to perform activities of daily living will improve  6/79/8004 [id number] by Florinda Robins RN  Outcome: Ongoing  6/11/2021 0931 by Gali Patton RN  Outcome: Met This Shift  Patient laying in bed with eyes closed. Patient denies SI,HI and hallucinations. Patient denies depression and anxiety but appears to be flat and sad. Patient is blunted with responses to assessment questions. Patient voices no questions or concerns this evening . Offered patient a snack and she declined at this time .  Will continue to monitor and observe
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
## 2021-06-13 PROCEDURE — 99231 SBSQ HOSP IP/OBS SF/LOW 25: CPT | Performed by: NURSE PRACTITIONER

## 2021-06-13 PROCEDURE — 1240000000 HC EMOTIONAL WELLNESS R&B

## 2021-06-13 PROCEDURE — 6370000000 HC RX 637 (ALT 250 FOR IP): Performed by: NURSE PRACTITIONER

## 2021-06-13 RX ADMIN — MIRTAZAPINE 15 MG: 15 TABLET, FILM COATED ORAL at 20:55

## 2021-06-13 RX ADMIN — RISPERIDONE 1 MG: 1 TABLET, FILM COATED ORAL at 20:55

## 2021-06-13 RX ADMIN — POLYETHYLENE GLYCOL 3350 17 G: 17 POWDER, FOR SOLUTION ORAL at 09:29

## 2021-06-13 RX ADMIN — TOPIRAMATE 100 MG: 100 TABLET, FILM COATED ORAL at 20:55

## 2021-06-13 RX ADMIN — RIVASTIGMINE TARTRATE 3 MG: 3 CAPSULE ORAL at 09:29

## 2021-06-13 RX ADMIN — POLYETHYLENE GLYCOL 3350 17 G: 17 POWDER, FOR SOLUTION ORAL at 20:57

## 2021-06-13 RX ADMIN — BENZTROPINE MESYLATE 0.5 MG: 1 TABLET ORAL at 09:29

## 2021-06-13 RX ADMIN — BENZTROPINE MESYLATE 0.5 MG: 1 TABLET ORAL at 20:55

## 2021-06-13 RX ADMIN — TOPIRAMATE 100 MG: 100 TABLET, FILM COATED ORAL at 09:29

## 2021-06-13 RX ADMIN — MAGNESIUM HYDROXIDE 30 ML: 2400 SUSPENSION ORAL at 12:24

## 2021-06-13 RX ADMIN — RIVASTIGMINE TARTRATE 3 MG: 3 CAPSULE ORAL at 20:55

## 2021-06-13 RX ADMIN — VENLAFAXINE HYDROCHLORIDE 75 MG: 75 CAPSULE, EXTENDED RELEASE ORAL at 09:29

## 2021-06-13 ASSESSMENT — PAIN SCALES - GENERAL: PAINLEVEL_OUTOF10: 0

## 2021-06-13 NOTE — PLAN OF CARE
Problem: Altered Mood, Depressive Behavior:  Goal: Able to verbalize acceptance of life and situations over which he or she has no control  Description: Able to verbalize acceptance of life and situations over which he or she has no control  Outcome: Met This Shift     Problem: Altered Mood, Depressive Behavior:  Goal: Able to verbalize and/or display a decrease in depressive symptoms  Description: Able to verbalize and/or display a decrease in depressive symptoms  Outcome: Met This Shift     Problem: Altered Mood, Depressive Behavior:  Goal: Absence of self-harm  Description: Absence of self-harm  Outcome: Met This Shift     Problem: Altered Mood, Deterioration in Function:  Goal: Ability to perform activities of daily living will improve  Description: Ability to perform activities of daily living will improve  Outcome: Met This Shift     Problem: Altered Mood, Deterioration in Function:  Goal: Maintenance of adequate nutrition will improve  Description: Maintenance of adequate nutrition will improve  Outcome: Met This Shift

## 2021-06-13 NOTE — PROGRESS NOTES
 Vaping Use: Never used   Substance and Sexual Activity    Alcohol use: No     Alcohol/week: 0.0 standard drinks     Comment: rarely    Drug use: No    Sexual activity: Never   Other Topics Concern    Not on file   Social History Narrative    Not on file     Social Determinants of Health     Financial Resource Strain:     Difficulty of Paying Living Expenses:    Food Insecurity:     Worried About Running Out of Food in the Last Year:     [address] in the Last Year:    Transportation Needs:     Lack of Transportation (Medical):  Lack of Transportation (Non-Medical):    Physical Activity:     Days of Exercise per Week:     Minutes of Exercise per Session:    Stress:     Feeling of Stress :    Social Connections:     Frequency of Communication with Friends and Family:     Frequency of Social Gatherings with Friends and Family:     Attends L[employer] day Services:     Active Member of Clubs or Organizations:     Attends Club or Organization Meetings:     Marital Status:    Intimate Partner Violence:     Fear of Current or Ex-Partner:     Emotionally Abused:     Physically Abused:     Sexually Abused:            ROS:  [x] All negative/unchanged except if checked.  Explain positive(checked items) below:  [] Constitutional  [] Eyes  [] Ear/Nose/Mouth/Throat  [] Respiratory  [] CV  [] GI  []   [] Musculoskeletal  [] Skin/Breast  [] Neurological  [] Endocrine  [] Heme/Lymph  [] Allergic/Immunologic    Explanation:     MEDICATIONS:    Current Facility-Administered Medications:     rivastigmine (EXELON) capsule 3 mg, 3 mg, Oral, BID, Felisalaureen Cameron, APRN - CNP, 3 mg at 06/13/21 0929    venlafaxine (EFFEXOR XR) extended release capsule 75 mg, 75 mg, Oral, Daily with breakfast, Felisazhane Cameron, APRN - CNP, 75 mg at 06/13/21 0929    benztropine (COGENTIN) tablet 0.5 mg, 0.5 mg, Oral, BID, Felisa Skains, APRN - CNP, 0.5 mg at 06/13/21 0929    mirtazapine (REMERON) tablet 15 mg, 15 mg, Oral, Nightly, IFEANYI Lynn CNP, 15 mg at 06/12/21 2057    topiramate (TOPAMAX) tablet 100 mg, 100 mg, Oral, BID, IFEANYI Lynn CNP, 100 mg at 06/13/21 9359    risperiDONE (RISPERDAL) tablet 1 mg, 1 mg, Oral, Nightly, IFEANYI Lynn CNP, 1 mg at 06/12/21 2056    polyethylene glycol (GLYCOLAX) packet 17 g, 17 g, Oral, BID, IFEANYI Lynn CNP, 17 g at 06/13/21 6062    acetaminophen (TYLENOL) tablet 650 mg, 650 mg, Oral, Q4H PRN, IFEANYI Lynn CNP    magnesium hydroxide (MILK OF MAGNESIA) 400 MG/5ML suspension 30 mL, 30 mL, Oral, Daily PRN, IFEANYI Lynn CNP, 30 mL at 06/10/21 1215    aluminum & magnesium hydroxide-simethicone (MAALOX) 200-200-20 MG/5ML suspension 30 mL, 30 mL, Oral, PRN, IFEANYI Lynn CNP    diphenhydrAMINE (BENADRYL) injection 50 mg, 50 mg, Intramuscular, Q6H PRN, IFEANYI Lynn CNP    LORazepam (ATIVAN) injection 2 mg, 2 mg, Intramuscular, Q6H PRN, IFEANYI Lynn CNP    haloperidol lactate (HALDOL) injection 5 mg, 5 mg, Intramuscular, Q6H PRN, IFEANYI Lynn CNP      Examination:  /75   Pulse 95   Temp 98.2 °F (36.8 °C) (Temporal)   Resp 16   Wt 108 lb (49 kg)   LMP 08/25/2011 (Exact Date)   SpO2 97%   BMI 17.97 kg/m²   Gait - steady  Medication side effects(SE): None reported    Mental Status Examination:    Level of consciousness:  within normal limits   Appearance:  fair grooming and fair hygiene  Behavior/Motor:  no abnormalities noted  Attitude toward examiner:  cooperative  Speech:  normal rate and normal volume   Mood: euthymic  Affect:  mood congruent  Thought processes:  goal directed   Thought content: The patient is devoid of suicidal or homicidal ideation intent or plan. Devoid of auditory or visual hallucinations or other perceptual disturbances, there are no overt or covert signs of psychosis or paranoia. There are no neurovegetative signs of depression.   Cognition:  oriented to person, place, and time   Concentration poor  Insight poor   Judgement poor     ASSESSMENT:   Patient symptoms are:  [] Well controlled  [] Improving  [] Worsening  [x] No change      Diagnosis:   Principal Problem:    Schizoaffective disorder, bipolar type ([facility] [address]  Active Problems:    Dementia, vascular, with depression ([facility] [address]  Resolved Problems:    * No resolved hospital problems. *      LABS:    No results for input(s): WBC, HGB, PLT in the last 72 hours. No results for input(s): NA, K, CL, CO2, BUN, CREATININE, GLUCOSE in the last 72 hours. No results for input(s): BILITOT, ALKPHOS, AST, ALT in the last 72 hours. Lab Results   Component Value Date    LABAMPH NOT DETECTED 06/09/2021    LABAMPH NOT DETECTED 09/16/2011    BARBSCNU NOT DETECTED 06/09/2021    LABBENZ NOT DETECTED 06/09/2021    LABBENZ NOT DETECTED 09/16/2011    CANNAB NOT DETECTED  09/16/2011    LABMETH NOT DETECTED 06/09/2021    OPIATESCREENURINE NOT DETECTED 06/09/2021    PHENCYCLIDINESCREENURINE NOT DETECTED 06/09/2021    PPXUR NEG 09/19/2017    ETOH <10 06/09/2021     Lab Results   Component Value Date    TSH 1.800 10/05/2020     No results found for: LITHIUM  No results found for: VALPROATE, CBMZ        Treatment Plan:  The patient's diagnosis, treatment plan, medication management were formulated after patient was seen directly by the attending physician and myself and all relevant documentation was reviewed. Reviewed current Medications with the patient. Risk, benefit, side effects, possible outcomes of the medication and alternatives discussed with the patient and the patient demonstrated understanding. The patient was also educated that the outcome of treatment will depend on the medication compliance as directed by the prescribers along with regular follow-up, compliance with the labs and other work-up, as clinically indicated.       Cogentin 0.5 mg twice daily  Risperdal 1 mg nightly  Rivastigmine 3 mg twice daily  Topamax 100 mg twice daily for mood stabilization  Effexor XR 75 mg daily for depression and anxiety          Collateral information: Followed by social work  CD evaluation  Encourage patient to attend group and other milieu activities. Discharge planning discussed with the patient and treatment team.    PSYCHOTHERAPY/COUNSELING:  [x] Therapeutic interview  [x] Supportive  [] CBT  [] Ongoing  [] Other    [x] Patient continues to need, on a daily basis, active treatment furnished directly by or requiring the supervision of inpatient psychiatric personnel      Anticipated Length of stay: 3 to 5 days based on stability     NOTE: This report was transcribed using voice recognition software. Every effort was made to ensure accuracy; however, inadvertent computerized transcription errors may be present.     Electronically signed by IFEANYI Baig CNP on 0/99/4923 at 11:40 AM

## 2021-06-13 NOTE — PROGRESS NOTES
Patient denies SI,HI and hallucinations. Patient denies depression or anxiety. Patient showered this evening.  Patient denies any pain at this time , voices no questions or concerns will continue to monitor and observe

## 2021-06-14 LAB
C TRACH DNA GENITAL QL NAA+PROBE: NEGATIVE
N. GONORRHOEAE DNA: NEGATIVE
SOURCE: NORMAL

## 2021-06-14 PROCEDURE — 1240000000 HC EMOTIONAL WELLNESS R&B

## 2021-06-14 PROCEDURE — 6370000000 HC RX 637 (ALT 250 FOR IP): Performed by: NURSE PRACTITIONER

## 2021-06-14 PROCEDURE — 99231 SBSQ HOSP IP/OBS SF/LOW 25: CPT | Performed by: NURSE PRACTITIONER

## 2021-06-14 RX ADMIN — POLYETHYLENE GLYCOL 3350 17 G: 17 POWDER, FOR SOLUTION ORAL at 08:42

## 2021-06-14 RX ADMIN — BENZTROPINE MESYLATE 0.5 MG: 1 TABLET ORAL at 08:42

## 2021-06-14 RX ADMIN — RIVASTIGMINE TARTRATE 3 MG: 3 CAPSULE ORAL at 20:44

## 2021-06-14 RX ADMIN — VENLAFAXINE HYDROCHLORIDE 75 MG: 75 CAPSULE, EXTENDED RELEASE ORAL at 08:41

## 2021-06-14 RX ADMIN — TOPIRAMATE 100 MG: 100 TABLET, FILM COATED ORAL at 08:41

## 2021-06-14 RX ADMIN — BENZTROPINE MESYLATE 0.5 MG: 1 TABLET ORAL at 20:43

## 2021-06-14 RX ADMIN — RISPERIDONE 1 MG: 1 TABLET, FILM COATED ORAL at 20:43

## 2021-06-14 RX ADMIN — RIVASTIGMINE TARTRATE 3 MG: 3 CAPSULE ORAL at 10:14

## 2021-06-14 RX ADMIN — POLYETHYLENE GLYCOL 3350 17 G: 17 POWDER, FOR SOLUTION ORAL at 20:42

## 2021-06-14 RX ADMIN — MIRTAZAPINE 15 MG: 15 TABLET, FILM COATED ORAL at 20:43

## 2021-06-14 RX ADMIN — TOPIRAMATE 100 MG: 100 TABLET, FILM COATED ORAL at 20:43

## 2021-06-14 ASSESSMENT — PAIN SCALES - GENERAL
PAINLEVEL_OUTOF10: 0
PAINLEVEL_OUTOF10: 0

## 2021-06-14 NOTE — PLAN OF CARE
Problem: Altered Mood, Depressive Behavior:  Goal: Ability to disclose and discuss suicidal ideas will improve  Description: Ability to disclose and discuss suicidal ideas will improve  Outcome: Met This Shift     Problem: Altered Mood, Depressive Behavior:  Goal: Absence of self-harm  Description: Absence of self-harm  Outcome: Met This Shift     Problem: Altered Mood, Deterioration in Function:  Goal: Ability to perform activities of daily living will improve  Description: Ability to perform activities of daily living will improve  Outcome: Met This Shift     Problem: Altered Mood, Deterioration in Function:  Goal: Maintenance of adequate nutrition will improve  Description: Maintenance of adequate nutrition will improve  Outcome: Met This Shift     Problem: Altered Mood, Depressive Behavior:  Goal: Able to verbalize acceptance of life and situations over which he or she has no control  Description: Able to verbalize acceptance of life and situations over which he or she has no control  Outcome: Ongoing     Patient denies SI/HI/Hallucinations, anxiety or depression. Patient appears flat and sad, but brightens during conversation. Patient isolative and withdrawn, keeps to herself when out on the unit. Patient has poor insight into hospitalization. Patient upset that she will be returning to the nursing home at discharge. Patient becomes irritable when discussing her guardian. Patient mostly pleasant and cooperative. Patient taking prescribed medications, eating provided meals, and attending groups.

## 2021-06-14 NOTE — CARE COORDINATION
Tony spoke with Vilma at [facility], to notify her of this pt's discharge tomorrow. Vilma states that this is ok. Tony spoke with pt's guardian, Angel Ureña. Angel Ureña states that she has no concerns with this discharge plan. Tony will call Angel Ureña when discharge plan is finalized.

## 2021-06-14 NOTE — PROGRESS NOTES
Patient attended goals group and stated daily goal as to get well to get out of here. Group Therapy Note    Date: 6/14/2021  Start Time:9:30  End Time: 10:00  Number of Participants: 11    Type of Group: Psychoeducation    Wellness Binder Information  Module Name: Building new habits  Session Number:  NA    Patient's Goal: To id positive ways to build new habits. Notes: Attended group and was able to participate. Status After Intervention:  Improved    Participation Level:  Active Listener    Participation Quality: Attentive      Speech:  hesitant      Thought Process/Content: Linear      Affective Functioning: Flat      Mood: depressed      Level of consciousness:  Alert      Response to Learning: Progressing to goal      Endings: None Reported    Modes of Intervention: Education      Discipline Responsible: Psychoeducational Specialist      Signature:  LIZA Gorman
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
## 2021-06-15 VITALS
HEART RATE: 70 BPM | SYSTOLIC BLOOD PRESSURE: 138 MMHG | DIASTOLIC BLOOD PRESSURE: 65 MMHG | TEMPERATURE: 98.8 F | BODY MASS INDEX: 17.97 KG/M2 | RESPIRATION RATE: 15 BRPM | OXYGEN SATURATION: 100 % | WEIGHT: 108 LBS

## 2021-06-15 PROCEDURE — 99239 HOSP IP/OBS DSCHRG MGMT >30: CPT | Performed by: NURSE PRACTITIONER

## 2021-06-15 PROCEDURE — 6370000000 HC RX 637 (ALT 250 FOR IP): Performed by: NURSE PRACTITIONER

## 2021-06-15 RX ORDER — RISPERIDONE 1 MG/1
1 TABLET, FILM COATED ORAL NIGHTLY
Qty: 30 TABLET | Refills: 0
Start: 2021-06-15 | End: 2021-07-15

## 2021-06-15 RX ORDER — TOPIRAMATE 100 MG/1
100 TABLET, FILM COATED ORAL 2 TIMES DAILY
Qty: 60 TABLET | Refills: 0
Start: 2021-06-15 | End: 2021-07-15

## 2021-06-15 RX ADMIN — BENZTROPINE MESYLATE 0.5 MG: 1 TABLET ORAL at 08:49

## 2021-06-15 RX ADMIN — RIVASTIGMINE TARTRATE 3 MG: 3 CAPSULE ORAL at 08:49

## 2021-06-15 RX ADMIN — TOPIRAMATE 100 MG: 100 TABLET, FILM COATED ORAL at 08:48

## 2021-06-15 RX ADMIN — MAGNESIUM HYDROXIDE 30 ML: 2400 SUSPENSION ORAL at 00:54

## 2021-06-15 RX ADMIN — POLYETHYLENE GLYCOL 3350 17 G: 17 POWDER, FOR SOLUTION ORAL at 08:49

## 2021-06-15 RX ADMIN — VENLAFAXINE HYDROCHLORIDE 75 MG: 75 CAPSULE, EXTENDED RELEASE ORAL at 08:48

## 2021-06-15 ASSESSMENT — PAIN - FUNCTIONAL ASSESSMENT: PAIN_FUNCTIONAL_ASSESSMENT: RESPIRATORY RATE >10

## 2021-06-15 ASSESSMENT — PAIN SCALES - GENERAL: PAINLEVEL_OUTOF10: 0

## 2021-06-15 NOTE — CARE COORDINATION
In order to ensure appropriate transition and discharge planning is in place, the following documents have been transmitted to [facility] at [facility], as the new outpatient provider:     · The d/c diagnosis was transmitted to the next care provider  · The reason for hospitalization was transmitted to the next care provider  · The d/c medications (dosage and indication) were transmitted to the next care provider   · The continuing care plan was transmitted to the next care provider

## 2021-06-15 NOTE — PROGRESS NOTES
Nurse to nurse called to [address] [place] [address] at [place]. Patient information disclosed, all questions answered. AVS faxed to [phone].
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
## 2021-06-15 NOTE — PLAN OF CARE
Problem: Altered Mood, Depressive Behavior:  Goal: Ability to disclose and discuss suicidal ideas will improve  Description: Ability to disclose and discuss suicidal ideas will improve  Outcome: Met This Shift     Problem: Altered Mood, Depressive Behavior:  Goal: Absence of self-harm  Description: Absence of self-harm  Outcome: Met This Shift     Problem: Altered Mood, Deterioration in Function:  Goal: Ability to perform activities of daily living will improve  Description: Ability to perform activities of daily living will improve  Outcome: Met This Shift     Problem: Altered Mood, Deterioration in Function:  Goal: Maintenance of adequate nutrition will improve  Description: Maintenance of adequate nutrition will improve  Outcome: Met This Shift     Problem: Altered Mood, Depressive Behavior:  Goal: Able to verbalize acceptance of life and situations over which he or she has no control  Description: Able to verbalize acceptance of life and situations over which he or she has no control  Outcome: Ongoing     Problem: Altered Mood, Depressive Behavior:  Goal: Able to verbalize and/or display a decrease in depressive symptoms  Description: Able to verbalize and/or display a decrease in depressive symptoms  Outcome: Ongoing

## 2021-06-15 NOTE — CARE COORDINATION
BARBARA contacted [facility] to update her that the pt is being DC today and [facility] is agreeable with this DC plan. BARBARA contacted [address] and updated them that the pt is returning today and will call back with the ETA.     [facility]BARA [facility] and the ETA for the pt is 2:30 PM.     BARBARA called [address] and informed them the pt will be leaving the location around 2:30PM.

## 2021-06-15 NOTE — PROGRESS NOTES
Patient denies SI/HI/Hallucinations, anxiety or depression. Patient isolative and withdrawn to her room, only observed out on the unit for meals. Patient has remained in control and not had any angry outbursts so far this shift. Patient taking prescribed medications, eating provided meals, and attending groups without issue.

## 2021-06-15 NOTE — PLAN OF CARE
Patient pleasant, calm, cooperative. Denies SI/HI/AVH and depression/anxiety. Patient has c/o feeling tired and has been observed isolative to room, sleeping this shift. Affect flat and sad but brightens with conversation. Patient is compliant with medications. No behavioral issues noted. Will continue to monitor and provide support.     Problem: Altered Mood, Depressive Behavior:  Goal: Ability to disclose and discuss suicidal ideas will improve  Description: Ability to disclose and discuss suicidal ideas will improve  6/14/2021 2109 by Vernon Castro RN  Outcome: Met This Shift     Problem: Altered Mood, Depressive Behavior:  Goal: Absence of self-harm  Description: Absence of self-harm  6/14/2021 2109 by Vernon Castro RN  Outcome: Met This Shift     Problem: Altered Mood, Deterioration in Function:  Goal: Able to verbalize reality based thinking  Description: Able to verbalize reality based thinking  Outcome: Met This Shift     Problem: Altered Mood, Depressive Behavior:  Goal: Able to verbalize acceptance of life and situations over which he or she has no control  Description: Able to verbalize acceptance of life and situations over which he or she has no control  6/14/2021 2109 by Vernon Castro RN  Outcome: Ongoing

## 2021-06-15 NOTE — PROGRESS NOTES
[facility]  Discharge Note    Pt discharged with followings belongings:   Vision - Corrective Lenses: Glasses  Clothing: Sweater, Pants, Jacket / coat, Footwear (1 green sweater, 1 pr black boots, 1 pr brown pants with strings, 1 black jacket.)   Valuables sent home with patient. Patient education on aftercare instructions: Yes  Information faxed to ALEKSANDR ORTEGA by Christal Gimenez RN.  Nurse to nurse called to [place].    Status EXAM upon discharge:  Status and Exam  Normal: No  Facial Expression: Flat  Affect: Congruent  Level of Consciousness: Alert  Mood:Normal: No  Mood: Anxious, Sad  Motor Activity:Normal: No  Motor Activity: Decreased  Interview Behavior: Cooperative  Preception: [place] to Person, [place] to Time, [place] to Place  Attention:Normal: No  Attention: Distractible  Thought Processes: Other(See comment) (Impaired)  Thought Content:Normal: No  Thought Content: Preoccupations  Hallucinations: None  Delusions: No  Delusions: Persecution  Memory:Normal: No  Memory: Poor Recent  Insight and Judgment: No  Insight and Judgment: Poor Judgment, Poor Insight  Present Suicidal Ideation: No  Present Homicidal Ideation: No      Metabolic Screening:    Lab Results   Component Value Date    LABA1C 5.3 10/06/2020       Lab Results   Component Value Date    CHOL 156 10/06/2020    CHOL 172 07/25/2019    CHOL 190 07/26/2018    CHOL 201 06/14/2017    CHOL 212 04/16/2015     Lab Results   Component Value Date    TRIG 31 10/06/2020    TRIG 90 07/25/2019    TRIG 74 07/26/2018    TRIG 59 06/14/2017    TRIG 49 04/16/2015     Lab Results   Component Value Date    HDL 81 10/06/2020    HDL 69 07/25/2019    HDL 71 (H) 07/26/2018    HDL 76 (A) 06/14/2017    HDL 99 (A) 04/16/2015     No components found for: [facility] [place]  Lab Results   Component Value Date    LABVLDL 6 10/06/2020    LABVLDL 18 07/25/2019       George Brown RN

## 2021-06-15 NOTE — DISCHARGE INSTR - COC
Continuity of Care Form    Patient Name: Michelle Green   :  1960  MRN:  [id number]    Admit date:  2021  Discharge date:  6/15/21    Code Status Order: Full Code   Advance Directives:   [facility] Documentation       Date/Time Healthcare Directive Type of Healthcare Directive Copy in [address] Agent's Name Healthcare Agent's Phone Number    [phone]  Yes, patient has an advance directive for healthcare treatment  --  --  [facility]  --            Admitting Physician:  Reji Gonzalez MD  PCP: Roderick Duenas MD    Discharging Nurse: [facility] Unit/Room#: 4657/5272-I  Discharging Unit Phone Number: [phone]    Emergency Contact:   Extended Emergency Contact Information  Primary Emergency Contact: [address] [facility], [address] [facility] Phone: [phone]  Mobile Phone: [phone]  Relation: Legal Guardian  Preferred language: English   needed?  No  Secondary Emergency Contact: [address] Phone: [phone]  Mobile Phone: [phone]  Relation: Domestic Partner    Past Surgical History:  Past Surgical History:   Procedure Laterality Date    CYST REMOVAL Right     behind knee by Dr. Sri Malloy    by Dr. Lin Hamilton       Immunization History:   Immunization History   Administered Date(s) Administered    Influenza Virus Vaccine 2012    Influenza Whole 2015    Influenza, Quadv, IM, PF (6 mo and older Fluzone, Flulaval, Fluarix, and 3 yrs and older Afluria) 09/15/2016, 2017       Active Problems:  Patient Active Problem List   Diagnosis Code    Chest pain R07.9    Hyponatremia E87.1    Trigeminal neuralgia syndrome G50.0    Chronic nonintractable headache R51.9, G89.29    Chronic constipation K59.09    Head injury S09.90XA    Jaw pain R68.84    Pain in gums K06.8    PTSD (post-traumatic stress disorder) F43.10 Other headache syndrome G44.89    Cervical pain (neck) M54.2    Depression with suicidal ideation F32.9, R45.851    Severe protein-calorie malnutrition (HCC) E43    Pelvic pain R10.2    Dementia, vascular, with depression ([facility] F01.50, F32.9    H/O irritable bowel syndrome Z87.19    Schizoaffective disorder, bipolar type ([facility] F25.0       Isolation/Infection:   Isolation            No Isolation          Patient Infection Status       Infection Onset Added Last Indicated Last Indicated By Review Planned Expiration Resolved Resolved By    None active    Resolved    COVID-19 Rule Out 21 COVID-19 & Influenza Combo (Ordered)   21 Rule-Out Test Resulted    COVID-19 12/15/20 12/16/20 12/15/20 Covid-19 Ambulatory   20     COVID-19 Rule Out 20 COVID-19 (Ordered)   20 Rule-Out Test Resulted    COVID-19 Rule Out 10/29/20 10/29/20 10/29/20 Covid-19 Ambulatory (Ordered)   10/30/20 Rule-Out Test Resulted    COVID-19 Rule Out 10/05/20 10/05/20 10/05/20 COVID-19 (Ordered)   10/05/20 Rule-Out Test Resulted    COVID-19 Rule Out 20 COVID-19 (Ordered)   20             Nurse Assessment:  Last Vital Signs: /65   Pulse 70   Temp 98.8 °F (37.1 °C) (Temporal)   Resp 15   Wt 108 lb (49 kg)   LMP 2011 (Exact Date)   SpO2 100%   BMI 17.97 kg/m²     Last documented pain score (0-10 scale): Pain Level: 0  Last Weight:   Wt Readings from Last 1 Encounters:   21 108 lb (49 kg)     Mental Status:  oriented and alert    IV Access:  - None    Nursing Mobility/ADLs:  Walking   Independent  Transfer  Independent  Bathing  Independent  Dressing  Independent  Toileting  Independent  Feeding  Independent  Med Admin  Assisted  Med Delivery   whole    Wound Care Documentation and Therapy:  Wound 19 (Active)   Number of days: 655        Elimination:  Continence:    Bowel: Yes  Bladder: Yes  Urinary Catheter: None Colostomy/Ileostomy/Ileal Conduit: No       Date of Last BM: 6/14/21  No intake or output data in the 24 hours ending 06/15/21 1116  No intake/output data recorded. Safety Concerns:     None    Impairments/Disabilities:      None    Nutrition Therapy:  Current Nutrition Therapy:   - Oral Diet:  General    Routes of Feeding: Oral  Liquids: No Restrictions  Daily Fluid Restriction: no  Last Modified Barium Swallow with Video (Video Swallowing Test): not done    Treatments at the Time of Hospital Discharge:   Respiratory Treatments: N/A  Oxygen Therapy:  is not on home oxygen therapy. Ventilator:    - No ventilator support    Rehab Therapies: N/A  Weight Bearing Status/Restrictions: No weight bearing restirctions  Other Medical Equipment (for information only, NOT a DME order):  N/A  Other Treatments: N/A    Patient's personal belongings (please select all that are sent with patient):  Patient's belongings gathered and returned to patient    RN SIGNATURE:  Electronically signed by George Brown RN on 6/15/21 at 11:18 AM EDT    CASE MANAGEMENT/SOCIAL WORK SECTION    Inpatient Status Date: ***    Readmission Risk Assessment Score:  Readmission Risk              Risk of Unplanned Readmission:  13           Discharging to Facility/ Agency   Name:   Address:  Phone:  Fax:    Dialysis Facility (if applicable)   Name:  Address:  Dialysis Schedule:  Phone:  Fax:    / signature: {Esignature:[id number]}    PHYSICIAN SECTION    Prognosis: Good    Condition at Discharge: Stable    Rehab Potential (if transferring to Rehab): Good    Recommended Labs or Other Treatments After Discharge:     Physician Certification: I certify the above information and transfer of Sherif Ash  is necessary for the continuing treatment of the diagnosis listed and that she requires [facility] for greater 30 days.      Update Admission H&P: No change in H&P    PHYSICIAN SIGNATURE:  Electronically signed by IFEANYI Real - CNP on 6/15/21 at 11:29 AM EDT

## 2021-06-15 NOTE — PROGRESS NOTES
Group Therapy Note  Patient attended goals group and stated daily goal as to get better. Group Therapy Note    Date: 6/15/2021  Start Time: 9:45  End Time:  10:15  Number of Participants: 12    Type of Group: Psychoeducation    Wellness Binder Information  Module Name: Self-Care  Session Number:  NA    Patient's Goal:  To id positive ways to take care of oneself. Notes: Attended group and was able to participate. Status After Intervention:  Improved    Participation Level:  Active Listener    Participation Quality: Attentive      Speech:  hesitant      Thought Process/Content: Linear      Affective Functioning: Flat      Mood: depressed      Level of consciousness:  Alert      Response to Learning: Progressing to goal      Endings: None Reported    Modes of Intervention: Education      Discipline Responsible: Psychoeducational Specialist      Signature:  LIZA Kyle

## 2021-06-15 NOTE — PROGRESS NOTES
Patient resting in bed quietly at this time with eyes closed. Respirations are even and unlabored. No signs of discomfort or distress. Safety needs met. Environmental rounds will be continued.

## 2021-08-12 ENCOUNTER — HOSPITAL ENCOUNTER (INPATIENT)
Dept: HOSPITAL 83 - 3N | Age: 61
LOS: 11 days | Discharge: NURSING, REHAB OR LONG-TERM CARE | DRG: 750 | End: 2021-08-23
Attending: PSYCHIATRY & NEUROLOGY | Admitting: PSYCHIATRY & NEUROLOGY
Payer: COMMERCIAL

## 2021-08-12 VITALS — BODY MASS INDEX: 21.66 KG/M2 | WEIGHT: 130 LBS | HEIGHT: 65 IN

## 2021-08-12 DIAGNOSIS — F41.1: ICD-10-CM

## 2021-08-12 DIAGNOSIS — F33.1: ICD-10-CM

## 2021-08-12 DIAGNOSIS — E55.9: ICD-10-CM

## 2021-08-12 DIAGNOSIS — Z88.6: ICD-10-CM

## 2021-08-12 DIAGNOSIS — E11.9: ICD-10-CM

## 2021-08-12 DIAGNOSIS — G44.89: ICD-10-CM

## 2021-08-12 DIAGNOSIS — M54.9: ICD-10-CM

## 2021-08-12 DIAGNOSIS — K59.09: ICD-10-CM

## 2021-08-12 DIAGNOSIS — Z88.8: ICD-10-CM

## 2021-08-12 DIAGNOSIS — F25.9: Primary | ICD-10-CM

## 2021-08-12 DIAGNOSIS — G43.909: ICD-10-CM

## 2021-08-12 DIAGNOSIS — M47.816: ICD-10-CM

## 2021-08-12 DIAGNOSIS — E78.2: ICD-10-CM

## 2021-08-12 DIAGNOSIS — Z20.822: ICD-10-CM

## 2021-08-12 DIAGNOSIS — G89.29: ICD-10-CM

## 2021-08-13 VITALS — DIASTOLIC BLOOD PRESSURE: 69 MMHG

## 2021-08-13 LAB
EPI CELLS #/AREA URNS HPF: (no result) /[HPF]
PH UR STRIP: 7 [PH] (ref 4.5–8)
RBC #/AREA URNS HPF: (no result) RBC/HPF (ref 0–2)
SP GR UR: <= 1.005 (ref 1–1.03)
UROBILINOGEN UR STRIP-MCNC: 0.2 E.U./DL (ref 0–1)

## 2021-08-14 VITALS — DIASTOLIC BLOOD PRESSURE: 61 MMHG

## 2021-08-14 VITALS — DIASTOLIC BLOOD PRESSURE: 76 MMHG

## 2021-08-14 LAB
25(OH)D3 SERPL-MCNC: 24.2 NG/ML (ref 30–100)
ALBUMIN SERPL-MCNC: 3.6 GM/DL (ref 3.1–4.5)
ALP SERPL-CCNC: 90 U/L (ref 45–117)
ALT SERPL W P-5'-P-CCNC: 25 U/L (ref 12–78)
AST SERPL-CCNC: 18 IU/L (ref 3–35)
BASOPHILS # BLD AUTO: 0.1 10*3/UL (ref 0–0.1)
BASOPHILS NFR BLD AUTO: 1.2 % (ref 0–1)
BUN SERPL-MCNC: 16 MG/DL (ref 7–24)
CHLORIDE SERPL-SCNC: 102 MMOL/L (ref 98–107)
CHOLEST SERPL-MCNC: 220 MG/DL (ref ?–200)
CREAT SERPL-MCNC: 0.64 MG/DL (ref 0.55–1.02)
EOSINOPHIL # BLD AUTO: 0.1 10*3/UL (ref 0–0.4)
EOSINOPHIL # BLD AUTO: 1.7 % (ref 1–4)
ERYTHROCYTE [DISTWIDTH] IN BLOOD BY AUTOMATED COUNT: 12.3 % (ref 0–14.5)
HCT VFR BLD AUTO: 36.2 % (ref 37–47)
LDLC SERPL DIRECT ASSAY-MCNC: 136 MG/DL (ref 9–159)
LYMPHOCYTES # BLD AUTO: 1.2 10*3/UL (ref 1.3–4.4)
LYMPHOCYTES NFR BLD AUTO: 29.4 % (ref 27–41)
MCH RBC QN AUTO: 30.2 PG (ref 27–31)
MCHC RBC AUTO-ENTMCNC: 32.9 G/DL (ref 33–37)
MCV RBC AUTO: 91.9 FL (ref 81–99)
MONOCYTES # BLD AUTO: 0.3 10*3/UL (ref 0.1–1)
MONOCYTES NFR BLD MANUAL: 7.6 % (ref 3–9)
NEUT #: 2.5 10*3/UL (ref 2.3–7.9)
NEUT %: 59.9 % (ref 47–73)
NRBC BLD QL AUTO: 0 10*3/UL (ref 0–0)
PLATELET # BLD AUTO: 299 10*3/UL (ref 130–400)
PMV BLD AUTO: 9.5 FL (ref 9.6–12.3)
POTASSIUM SERPL-SCNC: 4.2 MMOL/L (ref 3.5–5.1)
PROT SERPL-MCNC: 7.1 GM/DL (ref 6.4–8.2)
RBC # BLD AUTO: 3.94 10*6/UL (ref 4.1–5.1)
SODIUM SERPL-SCNC: 135 MMOL/L (ref 136–145)
TRIGL SERPL-MCNC: 68 MG/DL (ref ?–150)
TSH SERPL DL<=0.005 MIU/L-ACNC: 1.78 UIU/ML (ref 0.36–4.75)
VITAMIN B12: 612 PG/ML (ref 247–911)
WBC NRBC COR # BLD AUTO: 4.2 10*3/UL (ref 4.8–10.8)

## 2021-08-15 VITALS — SYSTOLIC BLOOD PRESSURE: 128 MMHG | DIASTOLIC BLOOD PRESSURE: 80 MMHG

## 2021-08-15 VITALS — DIASTOLIC BLOOD PRESSURE: 60 MMHG

## 2021-08-16 VITALS — DIASTOLIC BLOOD PRESSURE: 60 MMHG | SYSTOLIC BLOOD PRESSURE: 128 MMHG

## 2021-08-16 VITALS — DIASTOLIC BLOOD PRESSURE: 67 MMHG

## 2021-08-17 VITALS — SYSTOLIC BLOOD PRESSURE: 114 MMHG | DIASTOLIC BLOOD PRESSURE: 78 MMHG

## 2021-08-17 VITALS — DIASTOLIC BLOOD PRESSURE: 72 MMHG

## 2021-08-18 VITALS — DIASTOLIC BLOOD PRESSURE: 72 MMHG | SYSTOLIC BLOOD PRESSURE: 122 MMHG

## 2021-08-18 VITALS — DIASTOLIC BLOOD PRESSURE: 75 MMHG

## 2021-08-19 VITALS — DIASTOLIC BLOOD PRESSURE: 60 MMHG | SYSTOLIC BLOOD PRESSURE: 126 MMHG

## 2021-08-19 VITALS — DIASTOLIC BLOOD PRESSURE: 66 MMHG

## 2021-08-20 VITALS — DIASTOLIC BLOOD PRESSURE: 66 MMHG | SYSTOLIC BLOOD PRESSURE: 138 MMHG

## 2021-08-20 VITALS — DIASTOLIC BLOOD PRESSURE: 64 MMHG

## 2021-08-21 VITALS — SYSTOLIC BLOOD PRESSURE: 136 MMHG | DIASTOLIC BLOOD PRESSURE: 55 MMHG

## 2021-08-21 VITALS — DIASTOLIC BLOOD PRESSURE: 69 MMHG

## 2021-08-22 VITALS — DIASTOLIC BLOOD PRESSURE: 72 MMHG | SYSTOLIC BLOOD PRESSURE: 130 MMHG

## 2021-08-22 VITALS — SYSTOLIC BLOOD PRESSURE: 130 MMHG | DIASTOLIC BLOOD PRESSURE: 68 MMHG

## 2021-08-23 VITALS — DIASTOLIC BLOOD PRESSURE: 64 MMHG | SYSTOLIC BLOOD PRESSURE: 119 MMHG

## 2021-11-09 ENCOUNTER — HOSPITAL ENCOUNTER (INPATIENT)
Dept: HOSPITAL 83 - 3N | Age: 61
LOS: 8 days | Discharge: NURSING, REHAB OR LONG-TERM CARE | DRG: 750 | End: 2021-11-17
Attending: PSYCHIATRY & NEUROLOGY | Admitting: PSYCHIATRY & NEUROLOGY
Payer: COMMERCIAL

## 2021-11-09 VITALS — WEIGHT: 137 LBS | BODY MASS INDEX: 23.39 KG/M2 | HEIGHT: 63.98 IN

## 2021-11-09 VITALS — DIASTOLIC BLOOD PRESSURE: 85 MMHG

## 2021-11-09 DIAGNOSIS — Z88.8: ICD-10-CM

## 2021-11-09 DIAGNOSIS — K59.09: ICD-10-CM

## 2021-11-09 DIAGNOSIS — F41.1: ICD-10-CM

## 2021-11-09 DIAGNOSIS — Z88.6: ICD-10-CM

## 2021-11-09 DIAGNOSIS — G44.209: ICD-10-CM

## 2021-11-09 DIAGNOSIS — F25.0: Primary | ICD-10-CM

## 2021-11-09 DIAGNOSIS — Z20.822: ICD-10-CM

## 2021-11-09 DIAGNOSIS — F51.04: ICD-10-CM

## 2021-11-09 DIAGNOSIS — E11.9: ICD-10-CM

## 2021-11-09 DIAGNOSIS — F51.01: ICD-10-CM

## 2021-11-09 DIAGNOSIS — F33.1: ICD-10-CM

## 2021-11-09 LAB
PH UR STRIP: 7.5 [PH] (ref 4.5–8)
RBC #/AREA URNS HPF: (no result) RBC/HPF (ref 0–2)
SP GR UR: <= 1.005 (ref 1–1.03)
UROBILINOGEN UR STRIP-MCNC: 0.2 E.U./DL (ref 0–1)
WBC #/AREA URNS HPF: (no result) WBC/HPF (ref 0–5)

## 2021-11-10 VITALS — SYSTOLIC BLOOD PRESSURE: 123 MMHG | DIASTOLIC BLOOD PRESSURE: 74 MMHG

## 2021-11-10 VITALS — SYSTOLIC BLOOD PRESSURE: 154 MMHG | DIASTOLIC BLOOD PRESSURE: 82 MMHG

## 2021-11-10 LAB
25(OH)D3 SERPL-MCNC: 35 NG/ML (ref 30–100)
ALBUMIN SERPL-MCNC: 4.1 GM/DL (ref 3.1–4.5)
ALP SERPL-CCNC: 90 U/L (ref 45–117)
ALT SERPL W P-5'-P-CCNC: 29 U/L (ref 12–78)
AST SERPL-CCNC: 19 IU/L (ref 3–35)
BASOPHILS # BLD AUTO: 0 10*3/UL (ref 0–0.1)
BASOPHILS NFR BLD AUTO: 1.1 % (ref 0–1)
BUN SERPL-MCNC: 4 MG/DL (ref 7–24)
CHLORIDE SERPL-SCNC: 103 MMOL/L (ref 98–107)
CHOLEST SERPL-MCNC: 205 MG/DL (ref ?–200)
CREAT SERPL-MCNC: 0.59 MG/DL (ref 0.55–1.02)
EOSINOPHIL # BLD AUTO: 0.1 10*3/UL (ref 0–0.4)
EOSINOPHIL # BLD AUTO: 1.6 % (ref 1–4)
ERYTHROCYTE [DISTWIDTH] IN BLOOD BY AUTOMATED COUNT: 11.9 % (ref 0–14.5)
HCT VFR BLD AUTO: 40.6 % (ref 37–47)
LDLC SERPL DIRECT ASSAY-MCNC: 112 MG/DL (ref 9–159)
LYMPHOCYTES # BLD AUTO: 1.4 10*3/UL (ref 1.3–4.4)
LYMPHOCYTES NFR BLD AUTO: 35.6 % (ref 27–41)
MCH RBC QN AUTO: 29.8 PG (ref 27–31)
MCHC RBC AUTO-ENTMCNC: 33.7 G/DL (ref 33–37)
MCV RBC AUTO: 88.3 FL (ref 81–99)
MONOCYTES # BLD AUTO: 0.3 10*3/UL (ref 0.1–1)
MONOCYTES NFR BLD MANUAL: 7.1 % (ref 3–9)
NEUT #: 2.1 10*3/UL (ref 2.3–7.9)
NEUT %: 54.3 % (ref 47–73)
NRBC BLD QL AUTO: 0 % (ref 0–0)
PLATELET # BLD AUTO: 327 10*3/UL (ref 130–400)
PMV BLD AUTO: 9.6 FL (ref 9.6–12.3)
POTASSIUM SERPL-SCNC: 3.4 MMOL/L (ref 3.5–5.1)
PROT SERPL-MCNC: 7.6 GM/DL (ref 6.4–8.2)
RBC # BLD AUTO: 4.6 10*6/UL (ref 4.1–5.1)
SODIUM SERPL-SCNC: 137 MMOL/L (ref 136–145)
TRIGL SERPL-MCNC: 77 MG/DL (ref ?–150)
TSH SERPL DL<=0.005 MIU/L-ACNC: 1.61 UIU/ML (ref 0.36–4.75)
WBC NRBC COR # BLD AUTO: 3.8 10*3/UL (ref 4.8–10.8)

## 2021-11-11 VITALS — SYSTOLIC BLOOD PRESSURE: 139 MMHG | DIASTOLIC BLOOD PRESSURE: 90 MMHG

## 2021-11-11 VITALS — DIASTOLIC BLOOD PRESSURE: 73 MMHG

## 2021-11-12 VITALS — DIASTOLIC BLOOD PRESSURE: 79 MMHG

## 2021-11-12 VITALS — DIASTOLIC BLOOD PRESSURE: 75 MMHG

## 2021-11-13 VITALS — SYSTOLIC BLOOD PRESSURE: 155 MMHG | DIASTOLIC BLOOD PRESSURE: 73 MMHG

## 2021-11-13 VITALS — DIASTOLIC BLOOD PRESSURE: 77 MMHG

## 2021-11-14 VITALS — DIASTOLIC BLOOD PRESSURE: 74 MMHG

## 2021-11-14 VITALS — DIASTOLIC BLOOD PRESSURE: 65 MMHG

## 2021-11-15 VITALS — DIASTOLIC BLOOD PRESSURE: 71 MMHG | SYSTOLIC BLOOD PRESSURE: 124 MMHG

## 2021-11-15 VITALS — DIASTOLIC BLOOD PRESSURE: 80 MMHG

## 2021-11-16 VITALS — DIASTOLIC BLOOD PRESSURE: 65 MMHG | SYSTOLIC BLOOD PRESSURE: 128 MMHG

## 2021-11-16 VITALS — DIASTOLIC BLOOD PRESSURE: 66 MMHG

## 2021-11-17 VITALS — DIASTOLIC BLOOD PRESSURE: 72 MMHG

## 2022-03-07 ENCOUNTER — HOSPITAL ENCOUNTER (INPATIENT)
Dept: HOSPITAL 83 - ED | Age: 62
LOS: 9 days | Discharge: NURSING, REHAB OR LONG-TERM CARE | DRG: 750 | End: 2022-03-16
Attending: PSYCHIATRY & NEUROLOGY | Admitting: PSYCHIATRY & NEUROLOGY
Payer: COMMERCIAL

## 2022-03-07 VITALS — BODY MASS INDEX: 20.38 KG/M2 | HEIGHT: 62.99 IN | WEIGHT: 115 LBS

## 2022-03-07 VITALS — SYSTOLIC BLOOD PRESSURE: 145 MMHG | DIASTOLIC BLOOD PRESSURE: 56 MMHG

## 2022-03-07 VITALS — DIASTOLIC BLOOD PRESSURE: 71 MMHG | SYSTOLIC BLOOD PRESSURE: 130 MMHG

## 2022-03-07 VITALS — DIASTOLIC BLOOD PRESSURE: 79 MMHG | SYSTOLIC BLOOD PRESSURE: 131 MMHG

## 2022-03-07 DIAGNOSIS — Z20.822: ICD-10-CM

## 2022-03-07 DIAGNOSIS — K58.9: ICD-10-CM

## 2022-03-07 DIAGNOSIS — E78.5: ICD-10-CM

## 2022-03-07 DIAGNOSIS — G43.909: ICD-10-CM

## 2022-03-07 DIAGNOSIS — F41.9: ICD-10-CM

## 2022-03-07 DIAGNOSIS — E80.6: ICD-10-CM

## 2022-03-07 DIAGNOSIS — Z80.3: ICD-10-CM

## 2022-03-07 DIAGNOSIS — Z88.6: ICD-10-CM

## 2022-03-07 DIAGNOSIS — F42.9: ICD-10-CM

## 2022-03-07 DIAGNOSIS — F25.0: Primary | ICD-10-CM

## 2022-03-07 DIAGNOSIS — K59.04: ICD-10-CM

## 2022-03-07 DIAGNOSIS — R03.0: ICD-10-CM

## 2022-03-07 DIAGNOSIS — F50.9: ICD-10-CM

## 2022-03-07 DIAGNOSIS — E87.1: ICD-10-CM

## 2022-03-07 DIAGNOSIS — F33.2: ICD-10-CM

## 2022-03-07 DIAGNOSIS — Z82.49: ICD-10-CM

## 2022-03-07 DIAGNOSIS — Z88.8: ICD-10-CM

## 2022-03-07 LAB
ALP SERPL-CCNC: 87 U/L (ref 45–117)
ALT SERPL W P-5'-P-CCNC: 25 U/L (ref 12–78)
AMPHETAMINES UR QL SCN: < 1000
APAP SERPL-MCNC: < 5 UG/ML (ref 10–30)
AST SERPL-CCNC: 20 IU/L (ref 3–35)
BACTERIA #/AREA URNS HPF: (no result) /[HPF]
BARBITURATES UR QL SCN: < 200
BASOPHILS # BLD AUTO: 0 10*3/UL (ref 0–0.1)
BASOPHILS NFR BLD AUTO: 1.1 % (ref 0–1)
BENZODIAZ UR QL SCN: < 200
BUN SERPL-MCNC: 3 MG/DL (ref 7–24)
BZE UR QL SCN: < 300
CANNABINOIDS UR QL SCN: < 50
CHLORIDE SERPL-SCNC: 100 MMOL/L (ref 98–107)
CREAT SERPL-MCNC: 0.6 MG/DL (ref 0.55–1.02)
EOSINOPHIL # BLD AUTO: 0 10*3/UL (ref 0–0.4)
EOSINOPHIL # BLD AUTO: 1.1 % (ref 1–4)
EPI CELLS #/AREA URNS HPF: (no result) /[HPF]
ERYTHROCYTE [DISTWIDTH] IN BLOOD BY AUTOMATED COUNT: 12.2 % (ref 0–14.5)
ETHANOL SERPL-MCNC: < 3 MG/DL (ref ?–3)
HCT VFR BLD AUTO: 39.5 % (ref 37–47)
LYMPHOCYTES # BLD AUTO: 1.4 10*3/UL (ref 1.3–4.4)
LYMPHOCYTES NFR BLD AUTO: 37.3 % (ref 27–41)
MCH RBC QN AUTO: 30.1 PG (ref 27–31)
MCHC RBC AUTO-ENTMCNC: 34.2 G/DL (ref 33–37)
MCV RBC AUTO: 88.2 FL (ref 81–99)
METHADONE UR QL SCN: < 300
MONOCYTES # BLD AUTO: 0.2 10*3/UL (ref 0.1–1)
MONOCYTES NFR BLD MANUAL: 5.1 % (ref 3–9)
NEUT #: 2.1 10*3/UL (ref 2.3–7.9)
NEUT %: 55.4 % (ref 47–73)
NRBC BLD QL AUTO: 0 % (ref 0–0)
OPIATES UR QL SCN: < 300
PCP UR QL SCN: <  25
PH UR STRIP: 5.5 [PH] (ref 4.5–8)
PLATELET # BLD AUTO: 305 10*3/UL (ref 130–400)
PMV BLD AUTO: 9.4 FL (ref 9.6–12.3)
POTASSIUM SERPL-SCNC: 3.6 MMOL/L (ref 3.5–5.1)
PROT SERPL-MCNC: 7.2 GM/DL (ref 6.4–8.2)
RBC # BLD AUTO: 4.48 10*6/UL (ref 4.1–5.1)
SODIUM SERPL-SCNC: 134 MMOL/L (ref 136–145)
SP GR UR: 1.01 (ref 1–1.03)
UROBILINOGEN UR STRIP-MCNC: 0.2 E.U./DL (ref 0–1)
WBC NRBC COR # BLD AUTO: 3.7 10*3/UL (ref 4.8–10.8)

## 2022-03-08 VITALS — DIASTOLIC BLOOD PRESSURE: 92 MMHG | SYSTOLIC BLOOD PRESSURE: 123 MMHG

## 2022-03-08 LAB
ALP SERPL-CCNC: 95 U/L (ref 45–117)
ALT SERPL W P-5'-P-CCNC: 24 U/L (ref 12–78)
AST SERPL-CCNC: 17 IU/L (ref 3–35)
BUN SERPL-MCNC: 5 MG/DL (ref 7–24)
CHLORIDE SERPL-SCNC: 101 MMOL/L (ref 98–107)
CHOLEST SERPL-MCNC: 219 MG/DL (ref ?–200)
CREAT SERPL-MCNC: 0.65 MG/DL (ref 0.55–1.02)
LDLC SERPL DIRECT ASSAY-MCNC: 130 MG/DL (ref 9–159)
POTASSIUM SERPL-SCNC: 3.4 MMOL/L (ref 3.5–5.1)
PROT SERPL-MCNC: 8 GM/DL (ref 6.4–8.2)
SODIUM SERPL-SCNC: 135 MMOL/L (ref 136–145)
TRIGL SERPL-MCNC: 68 MG/DL (ref ?–150)

## 2022-03-09 VITALS — SYSTOLIC BLOOD PRESSURE: 128 MMHG | DIASTOLIC BLOOD PRESSURE: 77 MMHG

## 2022-03-09 VITALS — DIASTOLIC BLOOD PRESSURE: 77 MMHG | SYSTOLIC BLOOD PRESSURE: 128 MMHG

## 2022-03-09 VITALS — SYSTOLIC BLOOD PRESSURE: 144 MMHG | DIASTOLIC BLOOD PRESSURE: 76 MMHG

## 2022-03-10 VITALS — DIASTOLIC BLOOD PRESSURE: 76 MMHG

## 2022-03-10 VITALS — DIASTOLIC BLOOD PRESSURE: 65 MMHG | SYSTOLIC BLOOD PRESSURE: 124 MMHG

## 2022-03-11 VITALS — SYSTOLIC BLOOD PRESSURE: 136 MMHG | DIASTOLIC BLOOD PRESSURE: 71 MMHG

## 2022-03-11 VITALS — SYSTOLIC BLOOD PRESSURE: 104 MMHG | DIASTOLIC BLOOD PRESSURE: 60 MMHG

## 2022-03-12 VITALS — SYSTOLIC BLOOD PRESSURE: 115 MMHG | DIASTOLIC BLOOD PRESSURE: 70 MMHG

## 2022-03-12 VITALS — DIASTOLIC BLOOD PRESSURE: 75 MMHG

## 2022-03-13 VITALS — DIASTOLIC BLOOD PRESSURE: 60 MMHG | SYSTOLIC BLOOD PRESSURE: 111 MMHG

## 2022-03-13 VITALS — DIASTOLIC BLOOD PRESSURE: 72 MMHG

## 2022-03-14 VITALS — DIASTOLIC BLOOD PRESSURE: 70 MMHG

## 2022-03-14 VITALS — DIASTOLIC BLOOD PRESSURE: 52 MMHG

## 2022-03-15 VITALS — DIASTOLIC BLOOD PRESSURE: 60 MMHG

## 2022-03-15 VITALS — DIASTOLIC BLOOD PRESSURE: 89 MMHG

## 2022-03-16 VITALS — DIASTOLIC BLOOD PRESSURE: 88 MMHG
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
## 2022-08-18 NOTE — CARE COORDINATION
Team meeting    Discharge Planing    Sw informed pt that she had reached out to [facility] , but the number was not correct  Patient was able to give a correct number;  [phone]    Patient was concern about vaginal pain  Referred for consult  Discussed pt not eating , will do calorie count  With daily weights  Discussed ordering injections  abilify or arostle  , next injection due 05/10/19 done

## 2023-02-03 ENCOUNTER — HOSPITAL ENCOUNTER (INPATIENT)
Age: 63
LOS: 7 days | Discharge: NURSING, REHAB OR LONG-TERM CARE | End: 2023-02-10
Attending: EMERGENCY MEDICINE | Admitting: INTERNAL MEDICINE
Payer: MEDICAID

## 2023-02-03 ENCOUNTER — APPOINTMENT (OUTPATIENT)
Dept: GENERAL RADIOLOGY | Age: 63
End: 2023-02-03
Payer: MEDICAID

## 2023-02-03 ENCOUNTER — APPOINTMENT (OUTPATIENT)
Dept: CT IMAGING | Age: 63
End: 2023-02-03
Payer: MEDICAID

## 2023-02-03 DIAGNOSIS — R41.82 ALTERED MENTAL STATUS, UNSPECIFIED ALTERED MENTAL STATUS TYPE: Primary | ICD-10-CM

## 2023-02-03 LAB
ALBUMIN SERPL-MCNC: 3.9 G/DL (ref 3.5–5.2)
ALP BLD-CCNC: 108 U/L (ref 35–104)
ALT SERPL-CCNC: 81 U/L (ref 0–32)
ANION GAP SERPL CALCULATED.3IONS-SCNC: 10 MMOL/L (ref 7–16)
AST SERPL-CCNC: 60 U/L (ref 0–31)
ATYPICAL LYMPHOCYTE RELATIVE PERCENT: 0.9 % (ref 0–4)
BASOPHILS ABSOLUTE: 0 E9/L (ref 0–0.2)
BASOPHILS RELATIVE PERCENT: 0 % (ref 0–2)
BILIRUB SERPL-MCNC: <0.2 MG/DL (ref 0–1.2)
BILIRUBIN URINE: NEGATIVE
BLOOD, URINE: NEGATIVE
BUN BLDV-MCNC: 7 MG/DL (ref 6–23)
BURR CELLS: ABNORMAL
CALCIUM SERPL-MCNC: 11.4 MG/DL (ref 8.6–10.2)
CHLORIDE BLD-SCNC: 95 MMOL/L (ref 98–107)
CHP ED QC CHECK: NORMAL
CLARITY: CLEAR
CO2: 28 MMOL/L (ref 22–29)
COLOR: YELLOW
CREAT SERPL-MCNC: 0.7 MG/DL (ref 0.5–1)
EOSINOPHILS ABSOLUTE: 0 E9/L (ref 0.05–0.5)
EOSINOPHILS RELATIVE PERCENT: 0 % (ref 0–6)
GFR SERPL CREATININE-BSD FRML MDRD: >60 ML/MIN/1.73
GLUCOSE BLD-MCNC: 135 MG/DL (ref 74–99)
GLUCOSE BLD-MCNC: 152 MG/DL
GLUCOSE URINE: NEGATIVE MG/DL
HCT VFR BLD CALC: 33.5 % (ref 34–48)
HEMOGLOBIN: 11.6 G/DL (ref 11.5–15.5)
KETONES, URINE: NEGATIVE MG/DL
LACTIC ACID, SEPSIS: 1.3 MMOL/L (ref 0.5–1.9)
LACTIC ACID, SEPSIS: 1.5 MMOL/L (ref 0.5–1.9)
LEUKOCYTE ESTERASE, URINE: NEGATIVE
LYMPHOCYTES ABSOLUTE: 0.38 E9/L (ref 1.5–4)
LYMPHOCYTES RELATIVE PERCENT: 9.5 % (ref 20–42)
MCH RBC QN AUTO: 31 PG (ref 26–35)
MCHC RBC AUTO-ENTMCNC: 34.6 % (ref 32–34.5)
MCV RBC AUTO: 89.6 FL (ref 80–99.9)
METER GLUCOSE: 152 MG/DL (ref 74–99)
METER GLUCOSE: 99 MG/DL (ref 74–99)
MONOCYTES ABSOLUTE: 0.04 E9/L (ref 0.1–0.95)
MONOCYTES RELATIVE PERCENT: 0.8 % (ref 2–12)
NEUTROPHILS ABSOLUTE: 3.38 E9/L (ref 1.8–7.3)
NEUTROPHILS RELATIVE PERCENT: 88.8 % (ref 43–80)
NITRITE, URINE: NEGATIVE
NUCLEATED RED BLOOD CELLS: 0 /100 WBC
PDW BLD-RTO: 13.2 FL (ref 11.5–15)
PH UA: 6.5 (ref 5–9)
PLATELET # BLD: 200 E9/L (ref 130–450)
PMV BLD AUTO: 10.9 FL (ref 7–12)
POIKILOCYTES: ABNORMAL
POTASSIUM SERPL-SCNC: 3.1 MMOL/L (ref 3.5–5)
PROTEIN UA: NEGATIVE MG/DL
RBC # BLD: 3.74 E12/L (ref 3.5–5.5)
SARS-COV-2, NAAT: NOT DETECTED
SODIUM BLD-SCNC: 133 MMOL/L (ref 132–146)
SPECIFIC GRAVITY UA: 1.01 (ref 1–1.03)
TOTAL PROTEIN: 6.3 G/DL (ref 6.4–8.3)
UROBILINOGEN, URINE: 0.2 E.U./DL
WBC # BLD: 3.8 E9/L (ref 4.5–11.5)

## 2023-02-03 PROCEDURE — 93005 ELECTROCARDIOGRAM TRACING: CPT | Performed by: EMERGENCY MEDICINE

## 2023-02-03 PROCEDURE — 82533 TOTAL CORTISOL: CPT

## 2023-02-03 PROCEDURE — 36415 COLL VENOUS BLD VENIPUNCTURE: CPT

## 2023-02-03 PROCEDURE — 85025 COMPLETE CBC W/AUTO DIFF WBC: CPT

## 2023-02-03 PROCEDURE — 1200000000 HC SEMI PRIVATE

## 2023-02-03 PROCEDURE — 80048 BASIC METABOLIC PNL TOTAL CA: CPT

## 2023-02-03 PROCEDURE — 70450 CT HEAD/BRAIN W/O DYE: CPT

## 2023-02-03 PROCEDURE — 87040 BLOOD CULTURE FOR BACTERIA: CPT

## 2023-02-03 PROCEDURE — 82962 GLUCOSE BLOOD TEST: CPT

## 2023-02-03 PROCEDURE — 87635 SARS-COV-2 COVID-19 AMP PRB: CPT

## 2023-02-03 PROCEDURE — 84145 PROCALCITONIN (PCT): CPT

## 2023-02-03 PROCEDURE — 81003 URINALYSIS AUTO W/O SCOPE: CPT

## 2023-02-03 PROCEDURE — 83735 ASSAY OF MAGNESIUM: CPT

## 2023-02-03 PROCEDURE — 87077 CULTURE AEROBIC IDENTIFY: CPT

## 2023-02-03 PROCEDURE — 80307 DRUG TEST PRSMV CHEM ANLYZR: CPT

## 2023-02-03 PROCEDURE — 99285 EMERGENCY DEPT VISIT HI MDM: CPT

## 2023-02-03 PROCEDURE — 84439 ASSAY OF FREE THYROXINE: CPT

## 2023-02-03 PROCEDURE — 83605 ASSAY OF LACTIC ACID: CPT

## 2023-02-03 PROCEDURE — 80053 COMPREHEN METABOLIC PANEL: CPT

## 2023-02-03 PROCEDURE — 84443 ASSAY THYROID STIM HORMONE: CPT

## 2023-02-03 PROCEDURE — 80076 HEPATIC FUNCTION PANEL: CPT

## 2023-02-03 PROCEDURE — 71045 X-RAY EXAM CHEST 1 VIEW: CPT

## 2023-02-03 PROCEDURE — 87186 SC STD MICRODIL/AGAR DIL: CPT

## 2023-02-03 RX ORDER — SENNA PLUS 8.6 MG/1
1 TABLET ORAL DAILY PRN
Status: DISCONTINUED | OUTPATIENT
Start: 2023-02-03 | End: 2023-02-10 | Stop reason: HOSPADM

## 2023-02-03 RX ORDER — ENOXAPARIN SODIUM 100 MG/ML
30 INJECTION SUBCUTANEOUS DAILY
Status: DISCONTINUED | OUTPATIENT
Start: 2023-02-04 | End: 2023-02-06 | Stop reason: DRUGHIGH

## 2023-02-03 RX ORDER — POTASSIUM CHLORIDE 20 MEQ/1
40 TABLET, EXTENDED RELEASE ORAL PRN
Status: DISCONTINUED | OUTPATIENT
Start: 2023-02-03 | End: 2023-02-10 | Stop reason: HOSPADM

## 2023-02-03 RX ORDER — MAGNESIUM SULFATE IN WATER 40 MG/ML
2000 INJECTION, SOLUTION INTRAVENOUS PRN
Status: DISCONTINUED | OUTPATIENT
Start: 2023-02-03 | End: 2023-02-10 | Stop reason: HOSPADM

## 2023-02-03 RX ORDER — ACETAMINOPHEN 325 MG/1
650 TABLET ORAL EVERY 6 HOURS PRN
Status: DISCONTINUED | OUTPATIENT
Start: 2023-02-03 | End: 2023-02-10 | Stop reason: HOSPADM

## 2023-02-03 RX ORDER — RIVASTIGMINE TARTRATE 3 MG/1
3 CAPSULE ORAL 2 TIMES DAILY
Status: DISCONTINUED | OUTPATIENT
Start: 2023-02-03 | End: 2023-02-10 | Stop reason: HOSPADM

## 2023-02-03 RX ORDER — SODIUM CHLORIDE 0.9 % (FLUSH) 0.9 %
5-40 SYRINGE (ML) INJECTION PRN
Status: DISCONTINUED | OUTPATIENT
Start: 2023-02-03 | End: 2023-02-10 | Stop reason: HOSPADM

## 2023-02-03 RX ORDER — BENZTROPINE MESYLATE 0.5 MG/1
0.5 TABLET ORAL 2 TIMES DAILY
Status: DISCONTINUED | OUTPATIENT
Start: 2023-02-03 | End: 2023-02-10 | Stop reason: HOSPADM

## 2023-02-03 RX ORDER — SODIUM CHLORIDE 9 MG/ML
INJECTION, SOLUTION INTRAVENOUS PRN
Status: DISCONTINUED | OUTPATIENT
Start: 2023-02-03 | End: 2023-02-10 | Stop reason: HOSPADM

## 2023-02-03 RX ORDER — ACETAMINOPHEN 650 MG/1
650 SUPPOSITORY RECTAL EVERY 6 HOURS PRN
Status: DISCONTINUED | OUTPATIENT
Start: 2023-02-03 | End: 2023-02-10 | Stop reason: HOSPADM

## 2023-02-03 RX ORDER — SODIUM CHLORIDE 0.9 % (FLUSH) 0.9 %
5-40 SYRINGE (ML) INJECTION EVERY 12 HOURS SCHEDULED
Status: DISCONTINUED | OUTPATIENT
Start: 2023-02-03 | End: 2023-02-10 | Stop reason: HOSPADM

## 2023-02-03 RX ORDER — MIRTAZAPINE 15 MG/1
30 TABLET, FILM COATED ORAL NIGHTLY
Status: DISCONTINUED | OUTPATIENT
Start: 2023-02-03 | End: 2023-02-10 | Stop reason: HOSPADM

## 2023-02-03 RX ORDER — POTASSIUM CHLORIDE 7.45 MG/ML
10 INJECTION INTRAVENOUS PRN
Status: DISCONTINUED | OUTPATIENT
Start: 2023-02-03 | End: 2023-02-10 | Stop reason: HOSPADM

## 2023-02-03 RX ORDER — VENLAFAXINE HYDROCHLORIDE 75 MG/1
75 CAPSULE, EXTENDED RELEASE ORAL
Status: DISCONTINUED | OUTPATIENT
Start: 2023-02-04 | End: 2023-02-10 | Stop reason: HOSPADM

## 2023-02-03 ASSESSMENT — LIFESTYLE VARIABLES
HOW MANY STANDARD DRINKS CONTAINING ALCOHOL DO YOU HAVE ON A TYPICAL DAY: PATIENT DOES NOT DRINK
HOW OFTEN DO YOU HAVE A DRINK CONTAINING ALCOHOL: NEVER

## 2023-02-04 PROBLEM — T68.XXXA HYPOTHERMIA: Status: ACTIVE | Noted: 2023-02-04

## 2023-02-04 PROBLEM — R00.1 BRADYCARDIA: Status: ACTIVE | Noted: 2023-02-04

## 2023-02-04 LAB
ADENOVIRUS BY PCR: NOT DETECTED
ALBUMIN SERPL-MCNC: 3.7 G/DL (ref 3.5–5.2)
ALP BLD-CCNC: 103 U/L (ref 35–104)
ALT SERPL-CCNC: 84 U/L (ref 0–32)
AMMONIA: 38.2 UMOL/L (ref 11–51)
AMPHETAMINE SCREEN, URINE: NOT DETECTED
ANION GAP SERPL CALCULATED.3IONS-SCNC: 5 MMOL/L (ref 7–16)
ANION GAP SERPL CALCULATED.3IONS-SCNC: 8 MMOL/L (ref 7–16)
AST SERPL-CCNC: 60 U/L (ref 0–31)
B.E.: -0.3 MMOL/L (ref -3–3)
B.E.: -1.7 MMOL/L (ref -3–3)
BARBITURATE SCREEN URINE: NOT DETECTED
BASOPHILIC STIPPLING: ABNORMAL
BASOPHILS ABSOLUTE: 0.01 E9/L (ref 0–0.2)
BASOPHILS RELATIVE PERCENT: 0.4 % (ref 0–2)
BENZODIAZEPINE SCREEN, URINE: NOT DETECTED
BILIRUB SERPL-MCNC: <0.2 MG/DL (ref 0–1.2)
BILIRUBIN DIRECT: <0.2 MG/DL (ref 0–0.3)
BILIRUBIN, INDIRECT: ABNORMAL MG/DL (ref 0–1)
BORDETELLA PARAPERTUSSIS BY PCR: NOT DETECTED
BORDETELLA PERTUSSIS BY PCR: NOT DETECTED
BUN BLDV-MCNC: 7 MG/DL (ref 6–23)
BUN BLDV-MCNC: 7 MG/DL (ref 6–23)
BURR CELLS: ABNORMAL
CALCIUM SERPL-MCNC: 10 MG/DL (ref 8.6–10.2)
CALCIUM SERPL-MCNC: 11.5 MG/DL (ref 8.6–10.2)
CANNABINOID SCREEN URINE: NOT DETECTED
CHLAMYDOPHILIA PNEUMONIAE BY PCR: NOT DETECTED
CHLORIDE BLD-SCNC: 104 MMOL/L (ref 98–107)
CHLORIDE BLD-SCNC: 97 MMOL/L (ref 98–107)
CO2: 23 MMOL/L (ref 22–29)
CO2: 28 MMOL/L (ref 22–29)
COCAINE METABOLITE SCREEN URINE: NOT DETECTED
COHB: 0.4 % (ref 0–1.5)
COHB: 0.6 % (ref 0–1.5)
CORONAVIRUS 229E BY PCR: NOT DETECTED
CORONAVIRUS HKU1 BY PCR: NOT DETECTED
CORONAVIRUS NL63 BY PCR: NOT DETECTED
CORONAVIRUS OC43 BY PCR: NOT DETECTED
CORTISOL TOTAL: 6.88 MCG/DL (ref 2.68–18.4)
CREAT SERPL-MCNC: 0.7 MG/DL (ref 0.5–1)
CREAT SERPL-MCNC: 0.8 MG/DL (ref 0.5–1)
CRITICAL: ABNORMAL
CRITICAL: ABNORMAL
DATE ANALYZED: ABNORMAL
DATE ANALYZED: ABNORMAL
DATE OF COLLECTION: ABNORMAL
DATE OF COLLECTION: ABNORMAL
EOSINOPHILS ABSOLUTE: 0.01 E9/L (ref 0.05–0.5)
EOSINOPHILS RELATIVE PERCENT: 0.4 % (ref 0–6)
FENTANYL SCREEN, URINE: NOT DETECTED
FIO2: 21 %
GFR SERPL CREATININE-BSD FRML MDRD: >60 ML/MIN/1.73
GFR SERPL CREATININE-BSD FRML MDRD: >60 ML/MIN/1.73
GLUCOSE BLD-MCNC: 102 MG/DL (ref 74–99)
GLUCOSE BLD-MCNC: 99 MG/DL (ref 74–99)
HCO3: 23.2 MMOL/L (ref 22–26)
HCO3: 26.1 MMOL/L (ref 22–26)
HCT VFR BLD CALC: 32.7 % (ref 34–48)
HEMOGLOBIN: 11.2 G/DL (ref 11.5–15.5)
HHB: 3.1 % (ref 0–5)
HHB: 4.6 % (ref 0–5)
HUMAN METAPNEUMOVIRUS BY PCR: NOT DETECTED
HUMAN RHINOVIRUS/ENTEROVIRUS BY PCR: NOT DETECTED
IMMATURE GRANULOCYTES #: 0.02 E9/L
IMMATURE GRANULOCYTES %: 0.7 % (ref 0–5)
INFLUENZA A BY PCR: NOT DETECTED
INFLUENZA B BY PCR: NOT DETECTED
LAB: ABNORMAL
LAB: ABNORMAL
LACTIC ACID: 0.6 MMOL/L (ref 0.5–2.2)
LACTIC ACID: 1 MMOL/L (ref 0.5–2.2)
LYMPHOCYTES ABSOLUTE: 0.38 E9/L (ref 1.5–4)
LYMPHOCYTES RELATIVE PERCENT: 14.2 % (ref 20–42)
Lab: ABNORMAL
Lab: ABNORMAL
Lab: NORMAL
MAGNESIUM: 2 MG/DL (ref 1.6–2.6)
MCH RBC QN AUTO: 30.6 PG (ref 26–35)
MCHC RBC AUTO-ENTMCNC: 34.3 % (ref 32–34.5)
MCV RBC AUTO: 89.3 FL (ref 80–99.9)
METER GLUCOSE: 109 MG/DL (ref 74–99)
METER GLUCOSE: 130 MG/DL (ref 74–99)
METER GLUCOSE: 98 MG/DL (ref 74–99)
METHADONE SCREEN, URINE: NOT DETECTED
METHB: 0.3 % (ref 0–1.5)
METHB: 0.3 % (ref 0–1.5)
MODE: ABNORMAL
MODE: ABNORMAL
MONOCYTES ABSOLUTE: 0.16 E9/L (ref 0.1–0.95)
MONOCYTES RELATIVE PERCENT: 6 % (ref 2–12)
MYCOPLASMA PNEUMONIAE BY PCR: NOT DETECTED
NEUTROPHILS ABSOLUTE: 2.09 E9/L (ref 1.8–7.3)
NEUTROPHILS RELATIVE PERCENT: 78.3 % (ref 43–80)
O2 CONTENT: 13.8 ML/DL
O2 CONTENT: 15.1 ML/DL
O2 SATURATION: 95.4 % (ref 92–98.5)
O2 SATURATION: 96.9 % (ref 92–98.5)
O2HB: 94.5 % (ref 94–97)
O2HB: 96.2 % (ref 94–97)
OPERATOR ID: 7294
OPERATOR ID: ABNORMAL
OPIATE SCREEN URINE: NOT DETECTED
OXYCODONE URINE: NOT DETECTED
PARAINFLUENZA VIRUS 1 BY PCR: NOT DETECTED
PARAINFLUENZA VIRUS 2 BY PCR: NOT DETECTED
PARAINFLUENZA VIRUS 3 BY PCR: NOT DETECTED
PARAINFLUENZA VIRUS 4 BY PCR: NOT DETECTED
PATIENT TEMP: 37 C
PATIENT TEMP: 37 C
PCO2: 40 MMHG (ref 35–45)
PCO2: 50.6 MMHG (ref 35–45)
PDW BLD-RTO: 13.2 FL (ref 11.5–15)
PEEP/CPAP: 6 CMH2O
PFO2: 4.21 MMHG/%
PH BLOOD GAS: 7.33 (ref 7.35–7.45)
PH BLOOD GAS: 7.38 (ref 7.35–7.45)
PHENCYCLIDINE SCREEN URINE: NOT DETECTED
PIP: 12 CMH2O
PLATELET # BLD: 176 E9/L (ref 130–450)
PMV BLD AUTO: 10.6 FL (ref 7–12)
PO2: 80 MMHG (ref 75–100)
PO2: 88.5 MMHG (ref 75–100)
POIKILOCYTES: ABNORMAL
POTASSIUM REFLEX MAGNESIUM: 2.9 MMOL/L (ref 3.5–5)
POTASSIUM REFLEX MAGNESIUM: 3.6 MMOL/L (ref 3.5–5)
PROCALCITONIN: 0.07 NG/ML (ref 0–0.08)
PROCALCITONIN: 0.08 NG/ML (ref 0–0.08)
RBC # BLD: 3.66 E12/L (ref 3.5–5.5)
RESPIRATORY SYNCYTIAL VIRUS BY PCR: NOT DETECTED
SARS-COV-2, PCR: NOT DETECTED
SODIUM BLD-SCNC: 132 MMOL/L (ref 132–146)
SODIUM BLD-SCNC: 133 MMOL/L (ref 132–146)
SOURCE, BLOOD GAS: ABNORMAL
SOURCE, BLOOD GAS: ABNORMAL
T4 FREE: 1.19 NG/DL (ref 0.93–1.7)
THB: 10.1 G/DL (ref 11.5–16.5)
THB: 11.3 G/DL (ref 11.5–16.5)
TIME ANALYZED: 50
TIME ANALYZED: 818
TOTAL PROTEIN: 6.1 G/DL (ref 6.4–8.3)
TROPONIN, HIGH SENSITIVITY: 13 NG/L (ref 0–9)
TROPONIN, HIGH SENSITIVITY: 16 NG/L (ref 0–9)
TSH SERPL DL<=0.05 MIU/L-ACNC: 4.21 UIU/ML (ref 0.27–4.2)
WBC # BLD: 2.7 E9/L (ref 4.5–11.5)

## 2023-02-04 PROCEDURE — 84484 ASSAY OF TROPONIN QUANT: CPT

## 2023-02-04 PROCEDURE — 84145 PROCALCITONIN (PCT): CPT

## 2023-02-04 PROCEDURE — 0202U NFCT DS 22 TRGT SARS-COV-2: CPT

## 2023-02-04 PROCEDURE — 6360000002 HC RX W HCPCS: Performed by: STUDENT IN AN ORGANIZED HEALTH CARE EDUCATION/TRAINING PROGRAM

## 2023-02-04 PROCEDURE — 2580000003 HC RX 258: Performed by: STUDENT IN AN ORGANIZED HEALTH CARE EDUCATION/TRAINING PROGRAM

## 2023-02-04 PROCEDURE — 83605 ASSAY OF LACTIC ACID: CPT

## 2023-02-04 PROCEDURE — 94660 CPAP INITIATION&MGMT: CPT

## 2023-02-04 PROCEDURE — 82140 ASSAY OF AMMONIA: CPT

## 2023-02-04 PROCEDURE — 36600 WITHDRAWAL OF ARTERIAL BLOOD: CPT

## 2023-02-04 PROCEDURE — 6360000002 HC RX W HCPCS: Performed by: NURSE PRACTITIONER

## 2023-02-04 PROCEDURE — 82805 BLOOD GASES W/O2 SATURATION: CPT

## 2023-02-04 PROCEDURE — 82962 GLUCOSE BLOOD TEST: CPT

## 2023-02-04 PROCEDURE — 2580000003 HC RX 258: Performed by: NURSE PRACTITIONER

## 2023-02-04 PROCEDURE — 80048 BASIC METABOLIC PNL TOTAL CA: CPT

## 2023-02-04 PROCEDURE — 2060000000 HC ICU INTERMEDIATE R&B

## 2023-02-04 PROCEDURE — 36415 COLL VENOUS BLD VENIPUNCTURE: CPT

## 2023-02-04 RX ORDER — ACETAMINOPHEN 325 MG/1
650 TABLET ORAL EVERY 4 HOURS PRN
COMMUNITY

## 2023-02-04 RX ORDER — SODIUM CHLORIDE 9 MG/ML
INJECTION, SOLUTION INTRAVENOUS CONTINUOUS
Status: DISCONTINUED | OUTPATIENT
Start: 2023-02-04 | End: 2023-02-10 | Stop reason: HOSPADM

## 2023-02-04 RX ORDER — 0.9 % SODIUM CHLORIDE 0.9 %
1000 INTRAVENOUS SOLUTION INTRAVENOUS ONCE
Status: COMPLETED | OUTPATIENT
Start: 2023-02-04 | End: 2023-02-04

## 2023-02-04 RX ORDER — RISPERIDONE 1 MG/1
1 TABLET ORAL DAILY
Status: ON HOLD | COMMUNITY
End: 2023-02-10 | Stop reason: HOSPADM

## 2023-02-04 RX ORDER — DEXTROSE, SODIUM CHLORIDE, SODIUM LACTATE, POTASSIUM CHLORIDE, AND CALCIUM CHLORIDE 5; .6; .31; .03; .02 G/100ML; G/100ML; G/100ML; G/100ML; G/100ML
INJECTION, SOLUTION INTRAVENOUS CONTINUOUS
Status: DISCONTINUED | OUTPATIENT
Start: 2023-02-04 | End: 2023-02-10 | Stop reason: HOSPADM

## 2023-02-04 RX ORDER — SODIUM CHLORIDE 9 MG/ML
INJECTION, SOLUTION INTRAVENOUS CONTINUOUS PRN
Status: COMPLETED | OUTPATIENT
Start: 2023-02-04 | End: 2023-02-04

## 2023-02-04 RX ORDER — TRIHEXYPHENIDYL HYDROCHLORIDE 2 MG/1
2 TABLET ORAL 2 TIMES DAILY
Status: ON HOLD | COMMUNITY
End: 2023-02-10 | Stop reason: HOSPADM

## 2023-02-04 RX ORDER — POTASSIUM CHLORIDE 7.45 MG/ML
10 INJECTION INTRAVENOUS
Status: COMPLETED | OUTPATIENT
Start: 2023-02-04 | End: 2023-02-04

## 2023-02-04 RX ADMIN — POTASSIUM CHLORIDE 10 MEQ: 7.46 INJECTION, SOLUTION INTRAVENOUS at 03:07

## 2023-02-04 RX ADMIN — PIPERACILLIN AND TAZOBACTAM 3375 MG: 3; .375 INJECTION, POWDER, FOR SOLUTION INTRAVENOUS at 01:11

## 2023-02-04 RX ADMIN — ENOXAPARIN SODIUM 30 MG: 100 INJECTION SUBCUTANEOUS at 10:59

## 2023-02-04 RX ADMIN — VANCOMYCIN HYDROCHLORIDE 1250 MG: 10 INJECTION, POWDER, LYOPHILIZED, FOR SOLUTION INTRAVENOUS at 01:33

## 2023-02-04 RX ADMIN — HYDROCORTISONE SODIUM SUCCINATE 100 MG: 100 INJECTION, POWDER, FOR SOLUTION INTRAMUSCULAR; INTRAVENOUS at 08:51

## 2023-02-04 RX ADMIN — POTASSIUM CHLORIDE 10 MEQ: 7.46 INJECTION, SOLUTION INTRAVENOUS at 02:06

## 2023-02-04 RX ADMIN — POTASSIUM CHLORIDE 10 MEQ: 7.46 INJECTION, SOLUTION INTRAVENOUS at 04:10

## 2023-02-04 RX ADMIN — POTASSIUM CHLORIDE 10 MEQ: 7.46 INJECTION, SOLUTION INTRAVENOUS at 01:06

## 2023-02-04 RX ADMIN — SODIUM CHLORIDE, SODIUM LACTATE, POTASSIUM CHLORIDE, CALCIUM CHLORIDE AND DEXTROSE MONOHYDRATE: 5; 600; 310; 30; 20 INJECTION, SOLUTION INTRAVENOUS at 03:00

## 2023-02-04 RX ADMIN — SODIUM CHLORIDE: 9 INJECTION, SOLUTION INTRAVENOUS at 08:11

## 2023-02-04 RX ADMIN — SODIUM CHLORIDE 1000 ML: 9 INJECTION, SOLUTION INTRAVENOUS at 01:30

## 2023-02-04 RX ADMIN — PIPERACILLIN AND TAZOBACTAM 3375 MG: 3; .375 INJECTION, POWDER, FOR SOLUTION INTRAVENOUS at 10:59

## 2023-02-04 RX ADMIN — SODIUM CHLORIDE 1500 ML: 9 INJECTION, SOLUTION INTRAVENOUS at 08:28

## 2023-02-04 RX ADMIN — SODIUM CHLORIDE 1000 ML: 9 INJECTION, SOLUTION INTRAVENOUS at 01:35

## 2023-02-04 RX ADMIN — SODIUM CHLORIDE, PRESERVATIVE FREE 10 ML: 5 INJECTION INTRAVENOUS at 20:03

## 2023-02-04 RX ADMIN — SODIUM CHLORIDE, PRESERVATIVE FREE 10 ML: 5 INJECTION INTRAVENOUS at 16:09

## 2023-02-04 RX ADMIN — SODIUM CHLORIDE, PRESERVATIVE FREE 10 ML: 5 INJECTION INTRAVENOUS at 02:59

## 2023-02-04 ASSESSMENT — PAIN SCALES - GENERAL
PAINLEVEL_OUTOF10: 0
PAINLEVEL_OUTOF10: 0

## 2023-02-04 NOTE — PROGRESS NOTES
Pharmacy Consultation Note  (Antibiotic Dosing and Monitoring)    Initial consult date: 02/04/2023  Consulting physician/provider: Uriel Cox  Drug: Vancomycin  Indication: Sepsis of Unknown Etiology    Age/  Gender Height Weight IBW  Allergy Information   62 y.o./female @FLOW(11:first:1)@ @FLOW[14:FIRST:1@     Ideal body weight: 59.3 kg (130 lb 11.7 oz)  Adjusted ideal body weight: 62 kg (136 lb 9.4 oz)   Clozapine, Ibuprofen, and Carbamazepine and analogs      Renal Function:  Recent Labs     02/03/23 1959 02/03/23  2355   BUN 7 7   CREATININE 0.7 0.7       Intake/Output Summary (Last 24 hours) at 2/4/2023 0135  Last data filed at 2/3/2023 2057  Gross per 24 hour   Intake --   Output 600 ml   Net -600 ml       Vancomycin Monitoring:  Trough:  No results for input(s): VANCOTROUGH in the last 72 hours. Random:  No results for input(s): VANCORANDOM in the last 72 hours. No results for input(s):  Lavender in the last 72 hours. Historical Cultures:  Organism   Date Value Ref Range Status   02/07/2019 Corynebacterium species (A)  Final     No results for input(s): BC in the last 72 hours. Vancomycin Administration Times:  Recent vancomycin administrations                     vancomycin (VANCOCIN) 1,250 mg in sodium chloride 0.9 % 250 mL IVPB (mg) 1,250 mg New Bag 02/04/23 0133                    Assessment:  Patient is a 58 y.o. female who has been initiated on vancomycin  Estimated Creatinine Clearance: 78 mL/min (based on SCr of 0.7 mg/dL). To dose vancomycin, pharmacy will be utilizing [employer] calculation software for goal AUC/-600 mg/L-hr    Plan:   Will continue vancomycin 1000 mg IV every 12 hours  Will check vancomycin levels when appropriate  Will continue to monitor renal function   Pharmacy to follow      Shorty Hinkle, [address] [place] [address] 2/4/2023 1:35 AM

## 2023-02-04 NOTE — CARE COORDINATION
Social work / Discharge Planning:           Patient is from [facility]. RRT this morning with transfer to [address] [place] [address]. ID consulted. PT/OT evaluations ordered. Will need to check bed hold status on Monday. BRIGITTE and transport form initiated. Legal Guardian is  [facility].   Electronically signed by LIZA Elizondo on 2/4/2023 at 9:48 AM

## 2023-02-04 NOTE — PROGRESS NOTES
Spoke with IFEANIY Guardado regarding patient's vitals, she stated to call an RRT to have patient evaluated. RRT called-patient evaluated by Dr. Dima Brito orders.

## 2023-02-04 NOTE — PROGRESS NOTES
Called IFEANYI Pena regarding patient's temperature of 88.8 orally and rectally, order for latosha smyth obtained.

## 2023-02-04 NOTE — SIGNIFICANT EVENT
[facility] RRT/Code Blue Note    Subjective:    Called to bedside for hypotension. piperacillin-tazobactam  3,375 mg IntraVENous Q8H    vancomycin  1,000 mg IntraVENous Q12H    hydrocortisone sodium succinate PF  100 mg IntraVENous Once    benztropine  0.5 mg Oral BID    mirtazapine  30 mg Oral Nightly    rivastigmine  3 mg Oral BID    venlafaxine  75 mg Oral Daily with breakfast    sodium chloride flush  5-40 mL IntraVENous 2 times per day    enoxaparin  30 mg SubCUTAneous Daily     perflutren lipid microspheres, 1.5 mL, ONCE PRN  sodium chloride, , Continuous PRN  sodium chloride flush, 5-40 mL, PRN  sodium chloride, , PRN  acetaminophen, 650 mg, Q6H PRN   Or  acetaminophen, 650 mg, Q6H PRN  potassium chloride, 40 mEq, PRN   Or  potassium alternative oral replacement, 40 mEq, PRN   Or  potassium chloride, 10 mEq, PRN  magnesium sulfate, 2,000 mg, PRN  senna, 1 tablet, Daily PRN         Objective:    BP (!) 81/46   Pulse 76   Temp 99 °F (37.2 °C)   Resp 16   Ht 5' 6\" (1.676 m)   Wt 145 lb 6 oz (65.9 kg)   LMP 08/25/2011 (Exact Date)   SpO2 94%   BMI 23.46 kg/m²   Patient was catatonic, non verbal, not following commands, though reflexes normal, normal plantar reflexes, blinking eyelids, NISA (Pupils reactive), CTA b/l, S1, S2 normal, non tender abd, no edema       Recent Labs     02/03/23 1959 02/03/23 2013 02/03/23  2355     --  133   K 3.1*  --  2.9*   CL 95*  --  97*   CO2 28  --  28   BUN 7  --  7   CREATININE 0.7  --  0.7   GLUCOSE 135* 152 99   CALCIUM 11.4*  --  11.5*       Recent Labs     02/03/23 1959 02/03/23  2355   WBC 3.8* 2.7*   RBC 3.74 3.66   HGB 11.6 11.2*   HCT 33.5* 32.7*   MCV 89.6 89.3   MCH 31.0 30.6   MCHC 34.6* 34.3   RDW 13.2 13.2    176   MPV 10.9 10.6        I/O last 3 completed shifts: In: 4930.7 [I.V.:4242.5; IV Piggyback:688.2]  Out: 600 [Urine:600]  No intake/output data recorded. Assessment:    Principal Problem:     AMS (altered mental status)  Active Problems:    Hypothermia    Bradycardia  Resolved Problems:    * No resolved hospital problems. *      Plan:  1. Hypotension - r/o sepsis - cxay - left sided infiltrates, s/p vanc and zosyn, ordered 2L fluid bolus, no leukocytosis, though neutrophillia on admission f/u procal, trops, abg, lactic acid. 2. AMS - multiple psych meds - including invega - hypoglcemia, catatonia, hypotension, hypothermia - could be from psych meds, consider consulting psych for titration, Ct head neg. Upgraded to intermediate care d/t concern of hypotension. Critical care time 50 minutes not including procedures.     Electronically signed by Alisha Montez MD on 2/4/2023 at 8:30 AM

## 2023-02-04 NOTE — PROGRESS NOTES
Efe Clay, IFEANYI regarding patient's vitals, left VM. Dylan Maldonado called back, updated on vitals and labs-see order for BMP redraw. Will continue to monitor patient's vitals.

## 2023-02-04 NOTE — ED PROVIDER NOTES
[facility] SURG/TELE  EMERGENCY DEPARTMENT ENCOUNTER        Pt Name: Ginny Crandall  MRN: [id number]  [place] 1960  Date of evaluation: 2/3/2023  Provider: Abner Andersen DO  PCP: Kenzie Jasmine DO  Note Started: 7:37 PM EST 2/3/23    CHIEF COMPLAINT       Chief Complaint   Patient presents with    Altered Mental Status       HISTORY OF PRESENT ILLNESS: 1 or more Elements        Limitations to history : Altered Mental Status    Any Kennedy is a 58 y.o. female who presents for reported altered mental status at the nursing home. Patient has history of dementia and schizoaffective disorder. Over the past 2 days patient has been appearing very confused at the nursing home, change in mental status. EMS stated that nursing home is concerned that there is a possibility that new medication is causing her change in mental status. Review of her MAR shows that patient started Irma Plan on 1/31/2023 and paliperidone on 1/26/2023. Patient is awake but confused, following commands with no focal deficits. Unknown if there has been any recent fevers, nausea or vomiting, cough, dysuria, complaints of chest pain or abdominal pain. There has been no reported fall or trauma. Nursing Notes were all reviewed and agreed with or any disagreements were addressed in the HPI. REVIEW OF EXTERNAL NOTE :       Review of MAR from nursing facility    REVIEW OF SYSTEMS :      Positives and Pertinent negatives as per HPI.      SURGICAL HISTORY     Past Surgical History:   Procedure Laterality Date    CYST REMOVAL Right 2008    behind knee by Dr. Dillan [place]    by Dr. Ronda Barone       Current Discharge Medication List        CONTINUE these medications which have NOT CHANGED    Details   topiramate (TOPAMAX) 100 MG tablet Take 1 tablet by mouth 2 times daily  Qty: 60 tablet, Refills: 0      risperiDONE (RISPERDAL) 1 MG tablet Take 1 tablet by mouth nightly  Qty: 30 tablet, Refills: 0      benztropine (COGENTIN) 0.5 MG tablet Take 0.5 mg by mouth 2 times daily      rivastigmine (EXELON) 3 MG capsule Take 3 mg by mouth 2 times daily      mirtazapine (REMERON) 15 MG tablet Take 1 tablet by mouth nightly  Qty: 30 tablet, Refills: 3      Multiple Vitamins-Minerals (THERAPEUTIC MULTIVITAMIN-MINERALS) tablet Take 1 tablet by mouth daily      polyethylene glycol (MIRALAX) 17 g PACK packet Take 17 g by mouth 2 times daily      estradiol (ESTRACE) 0.1 MG/GM vaginal cream Place 2 g vaginally See Admin Instructions 2 time (s) per week      magnesium hydroxide (MILK OF MAGNESIA) 400 MG/5ML suspension Take 30 mLs by mouth daily as needed for Constipation  Qty: 180 mL, Refills: 0      venlafaxine (EFFEXOR XR) 75 MG extended release capsule Take 75 mg by mouth daily (with breakfast)             ALLERGIES     Clozapine, Ibuprofen, and Carbamazepine and analogs    FAMILYHISTORY       Family History   Problem Relation Age of Onset    Cancer Mother     Heart Failure Father         SOCIAL HISTORY       Social History     Tobacco Use    Smoking status: Never    Smokeless tobacco: Never   Vaping Use    Vaping Use: Never used   Substance Use Topics    Alcohol use: No     Alcohol/week: 0.0 standard drinks     Comment: rarely    Drug use: No       SCREENINGS        Bennett Coma Scale  Eye Opening: Spontaneous  Best Verbal Response: Confused  Best Motor Response: Obeys commands  Bennett Coma Scale Score: 14                CIWA Assessment  BP: 127/66  Heart Rate: (!) 49           PHYSICAL EXAM  1 or more Elements     ED Triage Vitals   BP Temp Temp Source Heart Rate Resp SpO2 Height Weight   02/03/23 1906 02/03/23 1901 02/03/23 1901 02/03/23 1901 02/03/23 1901 02/03/23 1901 02/03/23 1906 02/03/23 1906   125/78 98 °F (36.7 °C) Oral 60 12 99 % 5' 6\" (1.676 m) 110 lb (49.9 kg)             Constitutional/General: Awake and alert, confused  Head: Normocephalic and atraumatic  Eyes: PERRL, EOMI, conjunctiva normal, sclera non icteric  ENT:  Oropharynx clear, handling secretions, no trismus, no asymmetry of the posterior oropharynx or uvular edema  Neck: Supple, full ROM, no stridor, no meningeal signs  Respiratory: Lungs clear to auscultation bilaterally, no wheezes, rales, or rhonchi. Not in respiratory distress  Cardiovascular:  Regular rate. Regular rhythm. No murmurs, no gallops, no rubs. 2+ distal pulses. Equal extremity pulses. GI:  Abdomen Soft, Non tender, Non distended. +BS. No rebound, guarding, or rigidity. No pulsatile masses. Musculoskeletal: Moves all extremities x 4. Warm and well perfused, no clubbing, no cyanosis, no edema. Capillary refill <3 seconds  Integument: skin warm and dry. No rashes.    Neurologic: GCS 14, no focal deficits, symmetric strength 5/5 in the upper and lower extremities bilaterally              DIAGNOSTIC RESULTS   LABS:    Labs Reviewed   CBC WITH AUTO DIFFERENTIAL - Abnormal; Notable for the following components:       Result Value    WBC 3.8 (*)     Hematocrit 33.5 (*)     MCHC 34.6 (*)     Neutrophils % 88.8 (*)     Lymphocytes % 9.5 (*)     Monocytes % 0.8 (*)     Lymphocytes Absolute 0.38 (*)     Monocytes Absolute 0.04 (*)     Eosinophils Absolute 0.00 (*)     All other components within normal limits   COMPREHENSIVE METABOLIC PANEL - Abnormal; Notable for the following components:    Potassium 3.1 (*)     Chloride 95 (*)     Glucose 135 (*)     Calcium 11.4 (*)     Total Protein 6.3 (*)     Alkaline Phosphatase 108 (*)     ALT 81 (*)     AST 60 (*)     All other components within normal limits   POCT GLUCOSE - Abnormal; Notable for the following components:    Meter Glucose 152 (*)     All other components within normal limits   POCT GLUCOSE - Normal   COVID-19, RAPID   CULTURE, BLOOD 1   CULTURE, BLOOD 2   LACTATE, SEPSIS   LACTATE, SEPSIS   URINALYSIS       As interpreted by me, the above displayed labs are abnormal. All other labs obtained during this visit were within normal range or not returned as of this dictation. EKG: This EKG is signed and interpreted by me. Rate: 55  Rhythm: Sinus bradycardia  Interpretation: Normal axis, first-degree AV block, QTc 497, no ST-T changes. No findings suggestive of acute ischemia or injury  Comparison: stable as compared to patient's most recent EKG      RADIOLOGY:   Non-plain film images such as CT, Ultrasound and MRI are read by the radiologist. Plain radiographic images are visualized and preliminarily interpreted by the ED Provider with the findings documented in the ED Course. Interpretation per the Radiologist below, if available at the time of this note:    CT Head W/O Contrast   Final Result   No acute intracranial abnormality. XR CHEST PORTABLE   Final Result   Mild CHF. Left basilar pneumonia is less likely. No results found. No results found. PROCEDURES   Unless otherwise noted below, none       CRITICAL CARE TIME (.cct)   None    PAST MEDICAL HISTORY/Chronic Conditions Affecting Care      has a past medical history of Chronic idiopathic constipation, Depression (May 1993), Diabetes mellitus [facility]), Head injury (June 1993), Hyperlipidemia, IBS (irritable bowel syndrome), Jaw pain, Migraines, neuralgic, Pain in gums, and Schizophrenia ([facility] [address]      EMERGENCY DEPARTMENT COURSE    Vitals:    Vitals:    02/03/23 1901 02/03/23 1906 02/03/23 2145   BP:  125/78 127/66   Pulse: 60 56 (!) 49   Resp: 12 16 17   Temp: 98 °F (36.7 °C) 98 °F (36.7 °C) 98.2 °F (36.8 °C)   TempSrc: Oral  Axillary   SpO2: 99% 99% 98%   Weight:  110 lb (49.9 kg)    Height:  5' 6\" (1.676 m)        Patient was given the following medications:  Medications - No data to display          Medical Decision Making/Differential Diagnosis:    CC/HPI Summary, Social Determinants of health, Records Reviewed, DDx, testing done/not done, ED Course, Reassessment, disposition considerations/shared decision making with patient, consults, disposition:             Medical Decision Making  51-year-old female with history of schizoaffective disorder and dementia, residing in a nursing home, presenting with altered mental status over the past 2 days. There has been no reported falls or trauma. Patient arrives awake and alert but confused, no focal deficits. Patient was recently started on Caplyta and paliperidone. Would have concern for medications causing this change in mental status. Would also have suspicion for infection such as UTI, metabolic abnormalities, lower suspicion for acute stroke or intracranial abnormality. She is hemodynamically stable. EKG shows no findings suggestive of acute ischemia or injury, QTc is reported as prolonged but her T wave is shorter than the midway point between R to R, but would watch closely with her antipsychotic medication. CT head shows no acute abnormality. Chest x-ray shows a mild CHF. Metabolic panel is within acceptable limits, slight decrease in potassium at 3.1. No leukocytosis or anemia. Lactic acid 1.3. Urinalysis shows no signs of infection. Patient remained confused but otherwise neurologically intact as well as hemodynamically stable during remainder of her ED course. She was accepted for admission. Problems Addressed:  Altered mental status, unspecified altered mental status type: acute illness or injury    Amount and/or Complexity of Data Reviewed  External Data Reviewed: notes. Labs: ordered. Decision-making details documented in ED Course. Radiology: ordered. Decision-making details documented in ED Course. ECG/medicine tests: ordered and independent interpretation performed. Decision-making details documented in ED Course. Risk  Decision regarding hospitalization. EKG is ordered to have documentation of patient's current rhythm, and to rule out any obvious acute cardiac illnesses such as ACS.   Additionally, QT interval may be of use in decision making regarding any medications administered here in the ED. Patient is placed on cardiac monitor and continuous pulse ox for monitoring. POCT glucose ordered to rule out acute hyperglycemia or hypoglycemia as a cause of symptoms. CBC is ordered to evaluate for any signs of infection or inflammation by obtaining a WBC count, or any signs of acute anemia by interpreting hemoglobin. CMP was ordered to evaluate for any electrolyte imbalances, kidney function, or any elevations in anion gap. Urinalysis ordered to evaluate for UTI as the possible cause of symptoms, and may also evaluate hematuria as well. Lactic acid levels were ordered to evaluate for signs of ischemia or decrease perfusion to organ systems. Viral swabs are ordered to evaluate possible viral etiology of symptoms. Blood cultures are ordered to evaluate, rule out and, if present, treat bacteremia with antibiotics narrowed down to the found organism. CT head without contrast was ordered to evaluate for acute or chronic intracranial hemorrhage or masses. Chest x-ray is ordered to evaluate for any possible signs of pneumonia, pleural effusions, cardiomegaly, pneumothorax, atelectasis, rib or sternal abnormalities including fractures. CONSULTS: (Who and What was discussed)  IP CONSULT TO INTERNAL MEDICINE  IP CONSULT TO SOCIAL WORK  Will admit patient      I am the Primary Clinician of Record. FINAL IMPRESSION      1. Altered mental status, unspecified altered mental status type          DISPOSITION/PLAN     DISPOSITION Admitted 02/03/2023 09:39:32 PM      PATIENT REFERRED TO:  No follow-up provider specified.     DISCHARGE MEDICATIONS:  Current Discharge Medication List          DISCONTINUED MEDICATIONS:  Current Discharge Medication List                 (Please note that portions of this note were completed with a voice recognition program.  Efforts were made to edit the dictations but occasionally words are mis-transcribed.)    Serenity Sloares DO (electronically signed)            Serenity Solares DO  02/03/23 9534
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
## 2023-02-04 NOTE — DISCHARGE INSTR - COC
Continuity of Care Form    Patient Name: Lynda Lu   :  1960  MRN:  [id number]    Admit date:  2/3/2023  Discharge date:  2/10/23    Code Status Order: Full Code   Advance Directives:     Admitting Physician:  Josh Hannah MD  PCP: Sherri Ryan DO    Discharging Nurse: [facility] Unit/Room#: 3645/2260-V  Discharging Unit Phone Number: [phone]      Emergency Contact:   Extended Emergency Contact Information  Primary Emergency Contact: [address] Phone: [phone]  Mobile Phone: [phone]  Relation: Legal Guardian  Preferred language: English   needed? No  Secondary Emergency Contact: Giselle Sheikh [address] Phone: [phone]  Mobile Phone: [phone]  Relation: Domestic Partner    Past Surgical History:  Past Surgical History:   Procedure Laterality Date    CYST REMOVAL Right 2008    behind knee by Dr. Mandel Record    by Dr. Ky Moran       Immunization History:   Immunization History   Administered Date(s) Administered    Influenza Virus Vaccine 2012    Influenza Whole 2015    Influenza, FLUARIX, FLULAVAL, [place]ney [place] (age 10 mo+) AND AFLURIA, (age 1 y+), PF, 0.5mL 09/15/2016, 2017       Active Problems:  Patient Active Problem List   Diagnosis Code    Chest pain R07.9    Hyponatremia E87.1    Trigeminal neuralgia syndrome G50.0    Chronic nonintractable headache R51.9, G89.29    Chronic constipation K59.09    Head injury S09.90XA    Jaw pain R68.84    Pain in gums K06.8    PTSD (post-traumatic stress disorder) F43.10    Other headache syndrome G44.89    Cervical pain (neck) M54.2    Depression with suicidal ideation F32. A, R45.851    Severe protein-calorie malnutrition ([address] E43    Pelvic pain R10.2    Dementia, vascular, with depression F01.53    H/O irritable bowel syndrome Z87.19    Schizoaffective disorder, bipolar type ([address] F25.0    AMS (altered mental status) R41.82    Hypothermia T68. XXXA    Bradycardia R00.1       Isolation/Infection:   Isolation            Droplet Plus          Patient Infection Status       Infection Onset Added Last Indicated Last Indicated By Review Planned Expiration Resolved Resolved By    None active    Resolved    COVID-19 (Rule Out) 23 Respiratory Panel, Molecular, with COVID-19 (Restricted: peds pts or suitable admitted adults) (Ordered)   23 Rule-Out Test Resulted    COVID-19 (Rule Out) 23 COVID-19, Rapid (Ordered)   23 Rule-Out Test Resulted    COVID-19 (Rule Out) 21 COVID-19 & Influenza Combo (Ordered)   21 Rule-Out Test Resulted    COVID-19 12/15/20 12/16/20 12/15/20 Covid-19 Ambulatory   20     COVID-19 (Rule Out) 20 COVID-19 (Ordered)   20 Rule-Out Test Resulted    COVID-19 (Rule Out) 10/29/20 10/29/20 10/29/20 Covid-19 Ambulatory (Ordered)   10/30/20 Rule-Out Test Resulted    COVID-19 (Rule Out) 10/05/20 10/05/20 10/05/20 COVID-19 (Ordered)   10/05/20 Rule-Out Test Resulted    COVID-19 (Rule Out) 20 COVID-19 (Ordered)   20             Nurse Assessment:  Last Vital Signs: BP 97/77   Pulse 92   Temp 97.6 °F (36.4 °C) (Oral)   Resp 18   Ht 5' 6\" (1.676 m)   Wt 145 lb 6 oz (65.9 kg)   LMP 2011 (Exact Date)   SpO2 95%   BMI 23.46 kg/m²     Last documented pain score (0-10 scale): Pain Level: 0  Last Weight:   Wt Readings from Last 1 Encounters:   23 145 lb 6 oz (65.9 kg)     Mental Status:  oriented, alert, and disoriented at times    IV Access:  - None    Nursing Mobility/ADLs:  Walking   Assisted  Transfer  Assisted  Bathing  Assisted  Dressing  Assisted  Toileting  Assisted  Feeding  Independent needs set up  Med Admin  Assisted  Med Delivery   whole and with water sometimes applesauce    Wound Care Documentation and Therapy:  Wound 19 (Active) Number of days: 1254        Elimination:  Continence: Bowel: Yes  Bladder: Yes  Urinary Catheter: None   Colostomy/Ileostomy/Ileal Conduit: No       Date of Last BM: 2/10/23    Intake/Output Summary (Last 24 hours) at 2/4/2023 0949  Last data filed at 2/4/2023 8779  Gross per 24 hour   Intake 4930.71 ml   Output 600 ml   Net 4330.71 ml     I/O last 3 completed shifts: In: 4930.7 [I.V.:4242.5; IV Piggyback:688.2]  Out: 600 [Urine:600]    Safety Concerns: At Risk for Falls    Impairments/Disabilities:      Speech and speech is delayed at times    Nutrition Therapy:  Current Nutrition Therapy:   - Oral Diet:  General    Routes of Feeding: Oral  Liquids: Thin Liquids  Daily Fluid Restriction: no  Last Modified Barium Swallow with Video (Video Swallowing Test): not done    Treatments at the Time of Hospital Discharge:   Respiratory Treatments:  Oxygen Therapy:  is not on home oxygen therapy.   Ventilator:    - No ventilator support    Rehab Therapies: Physical Therapy and Occupational Therapy  Weight Bearing Status/Restrictions: No weight bearing restrictions  Other Medical Equipment (for information only, NOT a DME order):  bedside commode and hospital bed  Other Treatments:     Patient's personal belongings (please select all that are sent with patient):  None,      RN SIGNATURE:  Electronically signed by Rosemary Livingston RN on 2/10/23 at 10:46 AM EST    CASE MANAGEMENT/SOCIAL WORK SECTION    Inpatient Status Date: ***    Readmission Risk Assessment Score:  Readmission Risk              Risk of Unplanned Readmission:  15           Discharging to Facility/ Agency   Name: [address] [place] [address], [facility] [address]  Fax:[phone]    Dialysis Facility (if applicable)   Name:  Address:  Dialysis Schedule:  Phone:  Fax:    / signature: Electronically signed by LIZA Swann on 2/4/23 at 10:05 AM EST    PHYSICIAN SECTION    Prognosis: Fair    Condition at Discharge: Stable    Rehab Potential (if transferring to Rehab): Fair    Recommended Labs or Other Treatments After Discharge: CBC and BMP in 3 days    Physician Certification: I certify the above information and transfer of Talon Arechiga  is necessary for the continuing treatment of the diagnosis listed and that she requires Intermediate Nursing Care for greater 30 days.      Update Admission H&P: No change in H&P    PHYSICIAN SIGNATURE:  Pati Rogers MD

## 2023-02-04 NOTE — PROGRESS NOTES
-Consulted to dose vancomycin for sepsis of unknown etiology.  -Patient received vancomycin 1250 mg IV x 1 on 2/4 (0133). -Will initiate vancomycin 1250 mg IV q24h to begin now with projected AUC/WU of 445.  -Will monitor renal function and steady state vancomycin level when appropriate.

## 2023-02-04 NOTE — PROGRESS NOTES
Around 800am RRT called due to patient low blood pressure and unresponsiveness. Message left with guardian, legal guardian did return call to floor staff.
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
## 2023-02-04 NOTE — SIGNIFICANT EVENT
Rapid response called for hypothermia and hypotension. Patient originally presented to the hospital for metabolic encephalopathy from nursing facility. During my encounter, patient's blood pressure was 83 systolic and a temperature of 88 °F.  Reviewing labs, patient did not have any remarkable findings for these vital sign abnormalities. Head CT negative during admission. Urinalysis negative for UTI. Blood cultures are pending. No signs of meningitis. Patient did not receive any narcotics. Patient was responding to painful and verbal stimuli.  2 L bolus given during the rapid with successful response. Patient blood pressure MAP increased to 73. Procalcitonin, cortisol, VBG, TSH, and T4 ordered. Vancomycin and Zosyn started. Infectious is consulted for hypothermia and concerns for sepsis of unknown etiology.

## 2023-02-05 LAB
ACINETOBACTER CALCOAC BAUMANNII COMPLEX BY PCR: NOT DETECTED
ANION GAP SERPL CALCULATED.3IONS-SCNC: 10 MMOL/L (ref 7–16)
BACTEROIDES FRAGILIS BY PCR: NOT DETECTED
BOTTLE TYPE: ABNORMAL
BUN BLDV-MCNC: 8 MG/DL (ref 6–23)
CALCIUM SERPL-MCNC: 10.1 MG/DL (ref 8.6–10.2)
CANDIDA ALBICANS BY PCR: NOT DETECTED
CANDIDA AURIS BY PCR: NOT DETECTED
CANDIDA GLABRATA BY PCR: NOT DETECTED
CANDIDA KRUSEI BY PCR: NOT DETECTED
CANDIDA PARAPSILOSIS BY PCR: NOT DETECTED
CANDIDA TROPICALIS BY PCR: NOT DETECTED
CHLORIDE BLD-SCNC: 106 MMOL/L (ref 98–107)
CO2: 22 MMOL/L (ref 22–29)
CREAT SERPL-MCNC: 0.6 MG/DL (ref 0.5–1)
CRYPTOCOCCUS NEOFORMANS/GATTII BY PCR: NOT DETECTED
EKG ATRIAL RATE: 55 BPM
EKG P AXIS: 68 DEGREES
EKG P-R INTERVAL: 226 MS
EKG Q-T INTERVAL: 520 MS
EKG QRS DURATION: 116 MS
EKG QTC CALCULATION (BAZETT): 497 MS
EKG R AXIS: 86 DEGREES
EKG T AXIS: 48 DEGREES
EKG VENTRICULAR RATE: 55 BPM
ENTEROBACTER CLOACAE COMPLEX BY PCR: NOT DETECTED
ENTEROBACTERALES BY PCR: NOT DETECTED
ENTEROCOCCUS FAECALIS BY PCR: NOT DETECTED
ENTEROCOCCUS FAECIUM BY PCR: NOT DETECTED
ESCHERICHIA COLI BY PCR: NOT DETECTED
GFR SERPL CREATININE-BSD FRML MDRD: >60 ML/MIN/1.73
GLUCOSE BLD-MCNC: 84 MG/DL (ref 74–99)
HAEMOPHILUS INFLUENZAE BY PCR: NOT DETECTED
HCT VFR BLD CALC: 30.2 % (ref 34–48)
HEMOGLOBIN: 10.2 G/DL (ref 11.5–15.5)
KLEBSIELLA AEROGENES BY PCR: NOT DETECTED
KLEBSIELLA OXYTOCA BY PCR: NOT DETECTED
KLEBSIELLA PNEUMONIAE GROUP BY PCR: NOT DETECTED
LISTERIA MONOCYTOGENES BY PCR: NOT DETECTED
MCH RBC QN AUTO: 30.6 PG (ref 26–35)
MCHC RBC AUTO-ENTMCNC: 33.8 % (ref 32–34.5)
MCV RBC AUTO: 90.7 FL (ref 80–99.9)
NEISSERIA MENINGITIDIS BY PCR: NOT DETECTED
ORDER NUMBER: ABNORMAL
PDW BLD-RTO: 14 FL (ref 11.5–15)
PLATELET # BLD: 155 E9/L (ref 130–450)
PMV BLD AUTO: 11.2 FL (ref 7–12)
POTASSIUM SERPL-SCNC: 3.5 MMOL/L (ref 3.5–5)
PROTEUS SPECIES BY PCR: NOT DETECTED
PSEUDOMONAS AERUGINOSA BY PCR: NOT DETECTED
RBC # BLD: 3.33 E12/L (ref 3.5–5.5)
SALMONELLA SPECIES BY PCR: NOT DETECTED
SERRATIA MARCESCENS BY PCR: NOT DETECTED
SODIUM BLD-SCNC: 138 MMOL/L (ref 132–146)
SOURCE OF BLOOD CULTURE: ABNORMAL
STAPHYLOCOCCUS AUREUS BY PCR: NOT DETECTED
STAPHYLOCOCCUS EPIDERMIDIS BY PCR: NOT DETECTED
STAPHYLOCOCCUS LUGDUNENSIS BY PCR: NOT DETECTED
STAPHYLOCOCCUS SPECIES BY PCR: DETECTED
STENOTROPHOMONAS MALTOPHILIA BY PCR: NOT DETECTED
STREPTOCOCCUS AGALACTIAE BY PCR: NOT DETECTED
STREPTOCOCCUS PNEUMONIAE BY PCR: NOT DETECTED
STREPTOCOCCUS PYOGENES  BY PCR: NOT DETECTED
STREPTOCOCCUS SPECIES BY PCR: NOT DETECTED
WBC # BLD: 4.6 E9/L (ref 4.5–11.5)

## 2023-02-05 PROCEDURE — 2580000003 HC RX 258: Performed by: NURSE PRACTITIONER

## 2023-02-05 PROCEDURE — 94660 CPAP INITIATION&MGMT: CPT

## 2023-02-05 PROCEDURE — 85027 COMPLETE CBC AUTOMATED: CPT

## 2023-02-05 PROCEDURE — 6370000000 HC RX 637 (ALT 250 FOR IP): Performed by: NURSE PRACTITIONER

## 2023-02-05 PROCEDURE — 36415 COLL VENOUS BLD VENIPUNCTURE: CPT

## 2023-02-05 PROCEDURE — 2060000000 HC ICU INTERMEDIATE R&B

## 2023-02-05 PROCEDURE — 6360000002 HC RX W HCPCS: Performed by: NURSE PRACTITIONER

## 2023-02-05 PROCEDURE — 80048 BASIC METABOLIC PNL TOTAL CA: CPT

## 2023-02-05 RX ADMIN — BENZTROPINE MESYLATE 0.5 MG: 0.5 TABLET ORAL at 21:50

## 2023-02-05 RX ADMIN — ENOXAPARIN SODIUM 30 MG: 100 INJECTION SUBCUTANEOUS at 08:09

## 2023-02-05 RX ADMIN — ACETAMINOPHEN 650 MG: 325 TABLET ORAL at 21:50

## 2023-02-05 RX ADMIN — SODIUM CHLORIDE, PRESERVATIVE FREE 10 ML: 5 INJECTION INTRAVENOUS at 21:49

## 2023-02-05 RX ADMIN — MIRTAZAPINE 30 MG: 15 TABLET, FILM COATED ORAL at 21:50

## 2023-02-05 ASSESSMENT — PAIN SCALES - GENERAL: PAINLEVEL_OUTOF10: 0

## 2023-02-05 NOTE — PROGRESS NOTES
[address] [place] [address] [facility]  YVONNE  Progress Note    SUBJECTIVE:  Chief Complaint   Patient presents with    Altered Mental Status     Patient is tolerating medications. No reported adverse drug reactions. She is laying in bed,  at bedside  She is speaking but very difficult to understand  No fevers     Review of systems:  As stated above in the chief complaint, otherwise negative. Medications:  Scheduled Meds:   [Held by provider] benztropine  0.5 mg Oral BID    [Held by provider] mirtazapine  30 mg Oral Nightly    [Held by provider] rivastigmine  3 mg Oral BID    [Held by provider] venlafaxine  75 mg Oral Daily with breakfast    sodium chloride flush  5-40 mL IntraVENous 2 times per day    enoxaparin  30 mg SubCUTAneous Daily     Continuous Infusions:   dextrose 5% in lactated ringers Stopped (23)    sodium chloride 999 mL/hr at 23 08    sodium chloride       PRN Meds:perflutren lipid microspheres, sodium chloride flush, sodium chloride, acetaminophen **OR** acetaminophen, potassium chloride **OR** potassium alternative oral replacement **OR** potassium chloride, magnesium sulfate, senna    OBJECTIVE:  /76   Pulse 68   Temp 98 °F (36.7 °C) (Oral)   Resp 20   Ht 5' 6\" (1.676 m)   Wt 143 lb 6.4 oz (65 kg)   LMP 2011 (Exact Date)   SpO2 97%   BMI 23.15 kg/m²   Temp  Av °F (36.7 °C)  Min: 97.9 °F (36.6 °C)  Max: 98.2 °F (36.8 °C)  Constitutional: The patient is awake, alert in bed. MILES at bedside. In no distress. Speech is incomprehensible. Skin: Warm and dry. No rashes were noted. HEENT: Round and reactive pupils. Dry mucous membranes. No ulcerations or thrush. Neck: Supple to movements. Chest: No use of accessory muscles to breathe. Symmetrical expansion. No wheezing, crackles or rhonchi. Cardiovascular: S1 and S2 are rhythmic and regular. No murmurs appreciated. Abdomen: Positive bowel sounds to auscultation. Benign to palpation. Extremities: No clubbing, no cyanosis, no edema. Lines: peripheral    Laboratory and Tests Review:  Lab Results   Component Value Date    WBC 2.7 (L) 02/03/2023    WBC 3.8 (L) 02/03/2023    WBC 5.4 06/09/2021    HGB 11.2 (L) 02/03/2023    HCT 32.7 (L) 02/03/2023    MCV 89.3 02/03/2023     02/03/2023     Lab Results   Component Value Date    NEUTROABS 2.09 02/03/2023    NEUTROABS 3.38 02/03/2023    NEUTROABS 3.44 06/09/2021     No results found for: CRP  Lab Results   Component Value Date    ALT 84 (H) 02/03/2023    AST 60 (H) 02/03/2023    ALKPHOS 103 02/03/2023    BILITOT <0.2 02/03/2023     Lab Results   Component Value Date/Time     02/04/2023 08:15 AM    K 3.6 02/04/2023 08:15 AM     02/04/2023 08:15 AM    CO2 23 02/04/2023 08:15 AM    BUN 7 02/04/2023 08:15 AM    CREATININE 0.8 02/04/2023 08:15 AM    CREATININE 0.7 02/03/2023 11:55 PM    CREATININE 0.7 02/03/2023 07:59 PM    GFRAA >60 06/09/2021 12:08 PM    LABGLOM >60 02/04/2023 08:15 AM    GLUCOSE 102 02/04/2023 08:15 AM    GLUCOSE NEGATIVE 07/26/2018 03:25 PM    PROT 6.1 02/03/2023 11:55 PM    LABALBU 3.7 02/03/2023 11:55 PM    LABALBU 4.4 07/26/2018 07:54 AM    CALCIUM 10.0 02/04/2023 08:15 AM    BILITOT <0.2 02/03/2023 11:55 PM    BILITOT NEGATIVE 07/26/2018 03:25 PM    ALKPHOS 103 02/03/2023 11:55 PM    AST 60 02/03/2023 11:55 PM    ALT 84 02/03/2023 11:55 PM     No results found for: CRP  No results found for: Leilani Patton  Radiology:      Microbiology:   Blood Culture 2/3/23: GPC in clusters   BCID: Staphylococcus species   RVP: negative     Recent Labs     02/03/23  2355 02/04/23  1236   PROCAL 0.07 0.08       ASSESSMENT:  Encephalopathy, pinpoint pupils, hypothermia:   Dehydration:   R./o sepsis: no sign of pneumonia, no wounds. No diarrhea. No abdominal or flank tenderness. UA is clean.    All her presentation is likely related to medications (she is on multiple neuro/psych meds)  Staphylococcus species bacteremia - likely contaminant, positive after 24 hours     PLAN:  Continue to follow off antibiotics at this time, supportive care   Monitor labs  Will continue to follow     IFEANYI Perez CNP  11:16 AM  2/5/2023     Pt seen and examined. Above discussed agree with advanced practice nurse. Labs, cultures, and radiographs reviewed. Face to Face encounter occurred. Changes made as necessary. She looks better than yesterday. Her words are understandable today. Well hydrated. No fever overnight. Denied any complaints. Staph species in one out of 8 bottles is likely contamination. Watch her off antibiotics.      Jaylan Jo MD

## 2023-02-05 NOTE — PROGRESS NOTES
Tried to contact legal guardian to complete MRI checklist, no answer at this time. I spoke with Emerald Mchugh who lives in [place] and is uncertain of many answers. Significant other Triny Reveles phone number is unavailable. Brother [facility] legal guardian will be available after 8:30 am tomorrow to complete checklist, MRI notified.

## 2023-02-05 NOTE — PROGRESS NOTES
[facility]                                Progress note    Subjective: The patient is more alert today but still very confused. Now with some tremors on assessment       Objective:    /76   Pulse 68   Temp 98 °F (36.7 °C) (Oral)   Resp 20   Ht 5' 6\" (1.676 m)   Wt 143 lb 6.4 oz (65 kg)   LMP 08/25/2011 (Exact Date)   SpO2 97%   BMI 23.15 kg/m²     In: 226.1 [I.V.:175.6]  Out: 1300   In: 226.1   Out: 1300 [Urine:1300]    General appearance: Awake and alert but confused  HEENT: AT/NC, MMM  Neck: FROM, supple  Lungs: Clear to auscultation  CV: RRR, no MRGs  Vasc: Radial pulses 2+  Abdomen: Soft, non-tender; no masses or HSM  Extremities: No peripheral edema or digital cyanosis,  Skin: no rash, lesions or ulcers  Psych: Alert and oriented to person,  tremors   Neuro: Alert and not interactive     Recent Labs     02/03/23 1959 02/03/23 2355   WBC 3.8* 2.7*   HGB 11.6 11.2*   HCT 33.5* 32.7*    176       Recent Labs     02/03/23 1959 02/03/23 2355 02/04/23  0815    133 132   K 3.1* 2.9* 3.6   CL 95* 97* 104   CO2 28 28 23   BUN 7 7 7   CREATININE 0.7 0.7 0.8   CALCIUM 11.4* 11.5* 10.0       Assessment:    Principal Problem:    AMS (altered mental status)  Active Problems:    Hypothermia    Bradycardia  Resolved Problems:    * No resolved hospital problems.  *      Plan:  Patient is a 44-year-old female with an extensive psych hx who is admitted to [facility] for  AMS/metabolic encephalopathy  -Monitor labs  -procal 0.07  -Blood cultures pending  -UA negative  -Urine drug screen negative  -Viral panel was negative  -Ammonia level normal  -ABGs patient found to be mildly hypercapnic at 50.6-lactulose 20 p.o. 3 times daily  -Obtain MRI brain, rule out any acute or chronic pathology, would also consider lumbar puncture  -Hold psych drugs as she has multiple main drugs that have been added most recently prior to her acute change in mentation  -Consult psych to assist with management of her current antipsychotics  -IV vanc and Zosyn for now-we will discontinue as there appears to be no septic etiology of her encephalopathy       2/5/23:  -IV Vanco and Zosyn discontinued per ID  -MRI brain, so far unable to complete screening questionnaire   -Psych eval completed, some psych medications which were held since admission, now  resumed by psychiatry  -AMS attributed to infectious etiology by psychiatry  -BP is much better today  -+ BC likely contaminant-staph species GPC in clusters  -Check procalcitonin in a.m. and CBC      Code status: Full  Consultants: ID and Psych      DVT Prophylaxis Lovenox   PT/OT  Discharge planning     IFEANYI Harley CNP  11:41 AM  2/5/2023     Above note edited to reflect my thoughts     I personally saw, examined and provided care for the patient. Radiographs, labs and medication list were reviewed by me independently. The case was discussed in detail and plans for care were established. Review of SUMANTH Harley   , documentation was conducted and revisions were made as appropriate directly by me. I agree with the above documented exam, problem list, and plan of care.      Stacey Xie MD  5:56 PM  2/5/2023
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
## 2023-02-06 ENCOUNTER — APPOINTMENT (OUTPATIENT)
Dept: MRI IMAGING | Age: 63
End: 2023-02-06
Payer: MEDICAID

## 2023-02-06 LAB
ANION GAP SERPL CALCULATED.3IONS-SCNC: 6 MMOL/L (ref 7–16)
ANION GAP SERPL CALCULATED.3IONS-SCNC: 8 MMOL/L (ref 7–16)
BASOPHILS ABSOLUTE: 0.02 E9/L (ref 0–0.2)
BASOPHILS RELATIVE PERCENT: 0.5 % (ref 0–2)
BUN BLDV-MCNC: 7 MG/DL (ref 6–23)
BUN BLDV-MCNC: 8 MG/DL (ref 6–23)
CALCIUM SERPL-MCNC: 9.6 MG/DL (ref 8.6–10.2)
CALCIUM SERPL-MCNC: 9.9 MG/DL (ref 8.6–10.2)
CHLORIDE BLD-SCNC: 103 MMOL/L (ref 98–107)
CHLORIDE BLD-SCNC: 105 MMOL/L (ref 98–107)
CO2: 25 MMOL/L (ref 22–29)
CO2: 27 MMOL/L (ref 22–29)
CREAT SERPL-MCNC: 0.6 MG/DL (ref 0.5–1)
CREAT SERPL-MCNC: 0.6 MG/DL (ref 0.5–1)
EOSINOPHILS ABSOLUTE: 0.05 E9/L (ref 0.05–0.5)
EOSINOPHILS RELATIVE PERCENT: 1.2 % (ref 0–6)
GFR SERPL CREATININE-BSD FRML MDRD: >60 ML/MIN/1.73
GFR SERPL CREATININE-BSD FRML MDRD: >60 ML/MIN/1.73
GLUCOSE BLD-MCNC: 109 MG/DL (ref 74–99)
GLUCOSE BLD-MCNC: 113 MG/DL (ref 74–99)
HCT VFR BLD CALC: 28.2 % (ref 34–48)
HEMOGLOBIN: 9.7 G/DL (ref 11.5–15.5)
IMMATURE GRANULOCYTES #: 0.03 E9/L
IMMATURE GRANULOCYTES %: 0.7 % (ref 0–5)
LACTIC ACID: 1 MMOL/L (ref 0.5–2.2)
LV EF: 50 %
LVEF MODALITY: NORMAL
LYMPHOCYTES ABSOLUTE: 0.92 E9/L (ref 1.5–4)
LYMPHOCYTES RELATIVE PERCENT: 21.7 % (ref 20–42)
MCH RBC QN AUTO: 31 PG (ref 26–35)
MCHC RBC AUTO-ENTMCNC: 34.4 % (ref 32–34.5)
MCV RBC AUTO: 90.1 FL (ref 80–99.9)
MONOCYTES ABSOLUTE: 0.26 E9/L (ref 0.1–0.95)
MONOCYTES RELATIVE PERCENT: 6.1 % (ref 2–12)
NEUTROPHILS ABSOLUTE: 2.95 E9/L (ref 1.8–7.3)
NEUTROPHILS RELATIVE PERCENT: 69.8 % (ref 43–80)
PDW BLD-RTO: 13.9 FL (ref 11.5–15)
PLATELET # BLD: 132 E9/L (ref 130–450)
PMV BLD AUTO: 10.7 FL (ref 7–12)
POTASSIUM SERPL-SCNC: 3.2 MMOL/L (ref 3.5–5)
POTASSIUM SERPL-SCNC: 3.5 MMOL/L (ref 3.5–5)
PROCALCITONIN: 0.03 NG/ML (ref 0–0.08)
RBC # BLD: 3.13 E12/L (ref 3.5–5.5)
SODIUM BLD-SCNC: 136 MMOL/L (ref 132–146)
SODIUM BLD-SCNC: 138 MMOL/L (ref 132–146)
WBC # BLD: 4.2 E9/L (ref 4.5–11.5)

## 2023-02-06 PROCEDURE — 94660 CPAP INITIATION&MGMT: CPT

## 2023-02-06 PROCEDURE — 84145 PROCALCITONIN (PCT): CPT

## 2023-02-06 PROCEDURE — 2580000003 HC RX 258: Performed by: STUDENT IN AN ORGANIZED HEALTH CARE EDUCATION/TRAINING PROGRAM

## 2023-02-06 PROCEDURE — 80048 BASIC METABOLIC PNL TOTAL CA: CPT

## 2023-02-06 PROCEDURE — 6360000002 HC RX W HCPCS: Performed by: NURSE PRACTITIONER

## 2023-02-06 PROCEDURE — 6370000000 HC RX 637 (ALT 250 FOR IP): Performed by: NURSE PRACTITIONER

## 2023-02-06 PROCEDURE — 2580000003 HC RX 258: Performed by: NURSE PRACTITIONER

## 2023-02-06 PROCEDURE — 93306 TTE W/DOPPLER COMPLETE: CPT

## 2023-02-06 PROCEDURE — 36415 COLL VENOUS BLD VENIPUNCTURE: CPT

## 2023-02-06 PROCEDURE — 99232 SBSQ HOSP IP/OBS MODERATE 35: CPT

## 2023-02-06 PROCEDURE — 51798 US URINE CAPACITY MEASURE: CPT

## 2023-02-06 PROCEDURE — 85025 COMPLETE CBC W/AUTO DIFF WBC: CPT

## 2023-02-06 PROCEDURE — 83605 ASSAY OF LACTIC ACID: CPT

## 2023-02-06 PROCEDURE — 2060000000 HC ICU INTERMEDIATE R&B

## 2023-02-06 RX ORDER — ENOXAPARIN SODIUM 100 MG/ML
40 INJECTION SUBCUTANEOUS DAILY
Status: DISCONTINUED | OUTPATIENT
Start: 2023-02-06 | End: 2023-02-10 | Stop reason: HOSPADM

## 2023-02-06 RX ADMIN — VENLAFAXINE HYDROCHLORIDE 75 MG: 75 CAPSULE, EXTENDED RELEASE ORAL at 08:13

## 2023-02-06 RX ADMIN — SODIUM CHLORIDE, SODIUM LACTATE, POTASSIUM CHLORIDE, CALCIUM CHLORIDE AND DEXTROSE MONOHYDRATE: 5; 600; 310; 30; 20 INJECTION, SOLUTION INTRAVENOUS at 10:48

## 2023-02-06 RX ADMIN — POTASSIUM BICARBONATE 40 MEQ: 782 TABLET, EFFERVESCENT ORAL at 08:13

## 2023-02-06 RX ADMIN — BENZTROPINE MESYLATE 0.5 MG: 0.5 TABLET ORAL at 08:13

## 2023-02-06 RX ADMIN — ENOXAPARIN SODIUM 40 MG: 100 INJECTION SUBCUTANEOUS at 08:13

## 2023-02-06 RX ADMIN — SODIUM CHLORIDE, PRESERVATIVE FREE 10 ML: 5 INJECTION INTRAVENOUS at 20:07

## 2023-02-06 RX ADMIN — SODIUM CHLORIDE, SODIUM LACTATE, POTASSIUM CHLORIDE, CALCIUM CHLORIDE AND DEXTROSE MONOHYDRATE: 5; 600; 310; 30; 20 INJECTION, SOLUTION INTRAVENOUS at 02:33

## 2023-02-06 ASSESSMENT — PAIN SCALES - GENERAL: PAINLEVEL_OUTOF10: 0

## 2023-02-06 NOTE — PROGRESS NOTES
Occupational Therapy  Date:2/6/2023  Patient Name: Silvia Sánchez  Room: 3075/5670-V     Occupational Therapy (OT) order received, patient's medical record reviewed, and OT evaluation attempted this date; OT evaluation held, per nursing. OT evaluation to be re-attempted at later time/date, as able/appropriate. Anita Noonan, OTR/L  License Number: [id number]

## 2023-02-06 NOTE — PROGRESS NOTES
[facility]                                Progress note    Subjective:      Mildly hypothermic today, 92.6  -Much more alert and responsive however 2 hours after receiving psychiatric medications this morning, she once again became lethargic according to staff    Objective:    BP (!) 132/92   Pulse 52   Temp (!) 92.9 °F (33.8 °C) (Axillary)   Resp 16   Ht 5' 6\" (1.676 m)   Wt 144 lb 4.8 oz (65.5 kg)   LMP 08/25/2011 (Exact Date)   SpO2 98%   BMI 23.29 kg/m²     In: 1150.9 [P.O.:180; I.V.:970.9]  Out: 1200   In: 1150.9   Out: 1200 [Urine:1200]    General appearance: Awake and alert but confused  HEENT: AT/NC, MMM  Neck: FROM, supple  Lungs: Clear to auscultation  CV: RRR, no MRGs  Vasc: Radial pulses 2+  Abdomen: Soft, non-tender; no masses or HSM  Extremities: No peripheral edema or digital cyanosis,  Skin: no rash, lesions or ulcers  Not able to answer too many questions to assess psych neuro status     Recent Labs     02/03/23  2355 02/05/23  1315 02/06/23  0230   WBC 2.7* 4.6 4.2*   HGB 11.2* 10.2* 9.7*   HCT 32.7* 30.2* 28.2*    155 132         Recent Labs     02/04/23  0815 02/05/23  1315 02/06/23  0230    138 138   K 3.6 3.5 3.2*    106 105   CO2 23 22 25   BUN 7 8 8   CREATININE 0.8 0.6 0.6   CALCIUM 10.0 10.1 9.9         Assessment:    Principal Problem:    AMS (altered mental status)  Active Problems:    Hypothermia    Bradycardia  Resolved Problems:    * No resolved hospital problems.  *      Plan:  Patient is a 60-year-old female with an extensive psych hx who is admitted to [facility] for  AMS/metabolic encephalopathy  -Monitor labs  -procal 0.07  -Blood cultures pending  -UA negative  -Urine drug screen negative  -Viral panel was negative  -Ammonia level normal  -ABGs patient found to be mildly hypercapnic at 50.6-lactulose 20 p.o. 3 times daily  -Obtain MRI brain, rule out any acute or chronic pathology, would also consider lumbar puncture  -Hold psych drugs as she has multiple main drugs that have been added most recently prior to her acute change in mentation  -Consult psych to assist with management of her current antipsychotics  -IV vanc and Zosyn for now-we will discontinue as there appears to be no septic etiology of her encephalopathy       2/5/23:  -IV Vanco and Zosyn discontinued per ID  -MRI brain, so far unable to complete screening questionnaire   -Psych eval completed, some psych medications which were held since admission, now  resumed by psychiatry  -AMS attributed to infectious etiology by psychiatry  -BP is much better today  -+ BC likely contaminant-staph species GPC in clusters  -Check procalcitonin in a.m. and CBC    2/6/23  -BPs remain stable, now again bradycardic especially at nighttime.  -Mildly hypothermic this morning at 92.6, bear hugger applied following psych meds given again this morning however held after discussion with psychiatry  -K+ 3.2, replaced > only able to get partial dose, recheck BMP this evening  -Case discussed with psychiatry NP-hold psychiatric medications for now  -Continue to monitor mentation  -ID has signed off and do not believe that this is a septic etiology of altered mentation  -Check CBC in a.m. along with procalcitonin    Code status: Full  Consultants: ID and Psych      DVT Prophylaxis Lovenox   PT/OT  Discharge planning     Regino Sandoval MD

## 2023-02-06 NOTE — PROGRESS NOTES
Left message for Schuyler Espinosa, SON regarding patient unable to complete MRI due to inability to lay flat

## 2023-02-06 NOTE — PROGRESS NOTES
This patient is on medication that requires renal, weight, and/or indication dose adjustment. Date Body Weight IBW  Adjusted BW SCr  CrCl Dialysis status   2/6/2023 144 lb 4.8 oz (65.5 kg) Ideal body weight: 59.3 kg (130 lb 11.7 oz)  Adjusted ideal body weight: 61.8 kg (136 lb 2.6 oz) Serum creatinine: 0.6 mg/dL 02/06/23 0230  Estimated creatinine clearance: 91 mL/min N/a       Pharmacy has dose-adjusted the following medication(s):    Date Previous Order Adjusted Order   2/6/2023 Enoxaparin 30 mg daily Enoxaparin 40 mg daily       These changes were made per protocol according to the [address] for Pharmacists. *Please note this dose may need readjusted if patient's condition changes. Please contact pharmacy with any questions regarding these changes.     TC bianchi [facility]  2/6/2023  6:28 AM

## 2023-02-06 NOTE — PROGRESS NOTES
Physical Therapy  Facility/Department: [address] INTERMEDIATE 1      Name: Coleen Barriga  : 1960  MRN: [id number]  Date of Service: 2023    Attempted to see pt for PT evaluation. Hold this AM per nursing.    Brandon Wright PT [id number]

## 2023-02-06 NOTE — PROGRESS NOTES
Notified Matthew Joiner in MRI that patient not stable at this time to leave the unit for MRI. Will have RN communicate if it can be done at a later time. JAMAL Johnson notified.

## 2023-02-06 NOTE — PROGRESS NOTES
PSYCHIATRY ATTENDING NOTE    CC: Unable to verbalize chief complaint. S: Patient seen at the bedside for follow-up for altered mental status. Initial consultation completed 2/5/2023 by IFEANYI Mcmanus. Patient is lying in bed with eyes closed. She does respond to tactile stimulation. She opens her eyes briefly but is unable to engage in conversation and quickly falls back to sleep. Spoke with nursing who reports that patient's temperature dropped this morning to 92.6. A warning blanket was placed on the patient. Nursing reports that patient was alert and oriented after this and took her medications without difficulty. She then became altered and her heart rate began to drop into the 30s. Nursing notified primary who recommend discontinuing psychotropic medications. VITALS:  BP (!) 132/92   Pulse 52   Temp (!) 92.9 °F (33.8 °C) (Axillary)   Resp 16   Ht 5' 6\" (1.676 m)   Wt 144 lb 4.8 oz (65.5 kg)   LMP 08/25/2011 (Exact Date)   SpO2 98%   BMI 23.29 kg/m²     MSE: 79-year-old white female. Patient is confused. She opens her eyes briefly during assessment but is unable to engage in conversation and quickly falls back to sleep. Normal psychomotor activity, no tremors noted. Pupils noted to be constricted. Gait not assessed. Mood mood and affect unable to assess. Speech mumbled. Thought process, thought content, orientation, concentration, recent, remote memory unable to assess. Fund of knowledge unable to assess. Language use unable to assess. Insight and judgment unable to assess. LABS:   No results displayed because visit has over 200 results.             MEDICATIONS: Current Facility-Administered Medications: enoxaparin (LOVENOX) injection 40 mg, 40 mg, SubCUTAneous, Daily  dextrose 5 % in lactated ringers infusion, , IntraVENous, Continuous  perflutren lipid microspheres (DEFINITY) injection 1.5 mL, 1.5 mL, IntraVENous, ONCE PRN  0.9 % sodium chloride infusion, , IntraVENous, Continuous  [Held by provider] benztropine (COGENTIN) tablet 0.5 mg, 0.5 mg, Oral, BID  [Held by provider] mirtazapine (REMERON) tablet 30 mg, 30 mg, Oral, Nightly  [Held by provider] rivastigmine (EXELON) capsule 3 mg, 3 mg, Oral, BID  [Held by provider] venlafaxine (EFFEXOR XR) extended release capsule 75 mg, 75 mg, Oral, Daily with breakfast  sodium chloride flush 0.9 % injection 5-40 mL, 5-40 mL, IntraVENous, 2 times per day  sodium chloride flush 0.9 % injection 5-40 mL, 5-40 mL, IntraVENous, PRN  0.9 % sodium chloride infusion, , IntraVENous, PRN  acetaminophen (TYLENOL) tablet 650 mg, 650 mg, Oral, Q6H PRN **OR** acetaminophen (TYLENOL) suppository 650 mg, 650 mg, Rectal, Q6H PRN  potassium chloride (KLOR-CON M) extended release tablet 40 mEq, 40 mEq, Oral, PRN **OR** potassium bicarb-citric acid (EFFER-K) effervescent tablet 40 mEq, 40 mEq, Oral, PRN **OR** potassium chloride 10 mEq/100 mL IVPB (Peripheral Line), 10 mEq, IntraVENous, PRN  magnesium sulfate 2000 mg in 50 mL IVPB premix, 2,000 mg, IntraVENous, PRN  senna (SENOKOT) tablet 8.6 mg, 1 tablet, Oral, Daily PRN    ASSESSMENT:  Delirium, unclear etiology     History of schizoaffective disorder, bipolar type    PLAN: Case discussed with Dr. Danella Lombard and Kylie December, APRN. Per chart review, patient has had recent medication changes with her psychotropics. She presented with polypharmacy. I do not feel that her current state of delirium is related to her psychotropic medications. However, will hold remaining psychotropics to see if patient's status improves. MRI ordered the patient was not stable enough today. We will await results. Psychiatry will continue to follow and make medication changes as needed. Nursing updated. Thank you. NOTE:  This report was transcribed using voice recognition software. Every effort was made to ensure accuracy; however, inadvertent computerized transcription errors may be present. Electronically signed by IFEANYI Jack CNP on 2/6/2023 at 1:02 PM

## 2023-02-07 LAB
ANION GAP SERPL CALCULATED.3IONS-SCNC: 7 MMOL/L (ref 7–16)
BUN BLDV-MCNC: 6 MG/DL (ref 6–23)
CALCIUM SERPL-MCNC: 9.4 MG/DL (ref 8.6–10.2)
CHLORIDE BLD-SCNC: 100 MMOL/L (ref 98–107)
CO2: 26 MMOL/L (ref 22–29)
CREAT SERPL-MCNC: 0.7 MG/DL (ref 0.5–1)
GFR SERPL CREATININE-BSD FRML MDRD: >60 ML/MIN/1.73
GLUCOSE BLD-MCNC: 77 MG/DL (ref 74–99)
HCT VFR BLD CALC: 29.1 % (ref 34–48)
HEMOGLOBIN: 9.9 G/DL (ref 11.5–15.5)
MCH RBC QN AUTO: 30.4 PG (ref 26–35)
MCHC RBC AUTO-ENTMCNC: 34 % (ref 32–34.5)
MCV RBC AUTO: 89.3 FL (ref 80–99.9)
PDW BLD-RTO: 13.7 FL (ref 11.5–15)
PLATELET # BLD: 137 E9/L (ref 130–450)
PMV BLD AUTO: 11 FL (ref 7–12)
POTASSIUM SERPL-SCNC: 3.5 MMOL/L (ref 3.5–5)
RBC # BLD: 3.26 E12/L (ref 3.5–5.5)
SODIUM BLD-SCNC: 133 MMOL/L (ref 132–146)
WBC # BLD: 5.5 E9/L (ref 4.5–11.5)

## 2023-02-07 PROCEDURE — 85027 COMPLETE CBC AUTOMATED: CPT

## 2023-02-07 PROCEDURE — 94660 CPAP INITIATION&MGMT: CPT

## 2023-02-07 PROCEDURE — 36415 COLL VENOUS BLD VENIPUNCTURE: CPT

## 2023-02-07 PROCEDURE — 99232 SBSQ HOSP IP/OBS MODERATE 35: CPT

## 2023-02-07 PROCEDURE — 80048 BASIC METABOLIC PNL TOTAL CA: CPT

## 2023-02-07 PROCEDURE — 2060000000 HC ICU INTERMEDIATE R&B

## 2023-02-07 PROCEDURE — 97161 PT EVAL LOW COMPLEX 20 MIN: CPT

## 2023-02-07 PROCEDURE — 6360000002 HC RX W HCPCS: Performed by: NURSE PRACTITIONER

## 2023-02-07 PROCEDURE — 97165 OT EVAL LOW COMPLEX 30 MIN: CPT

## 2023-02-07 PROCEDURE — 2580000003 HC RX 258: Performed by: STUDENT IN AN ORGANIZED HEALTH CARE EDUCATION/TRAINING PROGRAM

## 2023-02-07 RX ADMIN — SODIUM CHLORIDE, SODIUM LACTATE, POTASSIUM CHLORIDE, CALCIUM CHLORIDE AND DEXTROSE MONOHYDRATE: 5; 600; 310; 30; 20 INJECTION, SOLUTION INTRAVENOUS at 16:43

## 2023-02-07 RX ADMIN — ENOXAPARIN SODIUM 40 MG: 100 INJECTION SUBCUTANEOUS at 09:16

## 2023-02-07 ASSESSMENT — PAIN SCALES - GENERAL
PAINLEVEL_OUTOF10: 0

## 2023-02-07 NOTE — PROGRESS NOTES
Date: 2/6/2023    Time: 9:15 PM    Patient Placed On BIPAP/CPAP/ Non-Invasive Ventilation? No    If no must comment. Comments: Patient is refusing the Bipap 12/6 with room air. She is agitated tonight and does not want to wear the mask. Machine is on standby if needed. Patient is breathing comfortably on room air and sating 97%.          Manuel Diaz, ОЛЬГАP

## 2023-02-07 NOTE — PROGRESS NOTES
[facility]                                Progress note    Subjective:    Still intermittently hypothermic  More responsive to questions    Objective:    BP (!) 172/90   Pulse 59   Temp (!) 96.5 °F (35.8 °C) (Rectal)   Resp 20   Ht 5' 6\" (1.676 m)   Wt 139 lb 12.8 oz (63.4 kg)   LMP 08/25/2011 (Exact Date)   SpO2 96%   BMI 22.56 kg/m²     In: 1599 [P.O.:1200; I.V.:399]  Out: 2750   In: 1599   Out: 2750 [Urine:2750]    General appearance: Awake questions today  HEENT: AT/NC, MMM  Neck: FROM, supple  Lungs: Clear to auscultation  CV: RRR, no MRGs  Vasc: Radial pulses 2+  Abdomen: Soft, non-tender; no masses or HSM  Extremities: No peripheral edema or digital cyanosis,  Skin: no rash, lesions or ulcers  Not able to answer too many questions to assess psych neuro status     Recent Labs     02/05/23  1315 02/06/23  0230 02/07/23  0341   WBC 4.6 4.2* 5.5   HGB 10.2* 9.7* 9.9*   HCT 30.2* 28.2* 29.1*    132 137       Recent Labs     02/06/23  0230 02/06/23  1146 02/07/23  0341    136 133   K 3.2* 3.5 3.5    103 100   CO2 25 27 26   BUN 8 7 6   CREATININE 0.6 0.6 0.7   CALCIUM 9.9 9.6 9.4       Assessment:    Principal Problem:    AMS (altered mental status)  Active Problems:    Hypothermia    Bradycardia  Resolved Problems:    * No resolved hospital problems.  *      Plan:  Patient is a 59-year-old female with an extensive psych hx who is admitted to [facility] for  AMS/metabolic encephalopathy  -Monitor labs  -procal 0.07  -Blood cultures pending  -UA negative  -Urine drug screen negative  -Viral panel was negative  -Ammonia level normal  -ABGs patient found to be mildly hypercapnic at 50.6-lactulose 20 p.o. 3 times daily  -Obtain MRI brain, rule out any acute or chronic pathology, would also consider lumbar puncture  -Hold psych drugs as she has multiple main drugs that have been added most recently prior to her acute change in mentation  -Consult psych to assist with management of her current antipsychotics  -IV vanc and Zosyn for now-we will discontinue as there appears to be no septic etiology of her encephalopathy       2/5/23:  -IV Vanco and Zosyn discontinued per ID  -MRI brain, so far unable to complete screening questionnaire   -Psych eval completed, some psych medications which were held since admission, now  resumed by psychiatry  -AMS attributed to infectious etiology by psychiatry  -BP is much better today  -+ BC likely contaminant-staph species GPC in clusters  -Check procalcitonin in a.m. and CBC    2/6/23  -BPs remain stable, now again bradycardic especially at nighttime.  -Mildly hypothermic this morning at 92.6, bear hugger applied following psych meds given again this morning however held after discussion with psychiatry  -K+ 3.2, replaced > only able to get partial dose, recheck BMP this evening  -Case discussed with psychiatry NP-hold psychiatric medications for now  -Continue to monitor mentation  -ID has signed off and do not believe that this is a septic etiology of altered mentation  -Check CBC in a.m. along with procalcitonin    2/7/2023  Overall appears much more alert today and able to answer questions  Sitter at bedside however  No antibiotics per infectious disease  Current psych medications on hold  Echocardiogram completed with ejection fraction 50%, moderate mitral regurg  Physical therapy evaluation 8/24  Will require rehab at discharge    Code status: Full  Consultants: ID and Psych      DVT Prophylaxis Lovenox   PT/OT  Discharge planning     Peyton Valiente MD

## 2023-02-07 NOTE — CARE COORDINATION
Social Work/Discharge Planning:  Patient has a sitter. Called liaison Sharyn with [facility] and confirmed patient will need to be sitter free for twenty-four hours. Notified charge nurse. Will continue to follow.   Electronically signed by LIZA Brooks on 2/7/2023 at 9:39 AM

## 2023-02-07 NOTE — PROGRESS NOTES
Occupational Therapy  OCCUPATIONAL THERAPY INITIAL EVALUATION  [facility] [address]    Date: 2023     Patient Name: Harpal Benson  MRN: [id number]  : 1960  Room: [address] [place] [address]    Evaluating OT: Katheryn Del Valle. Antonino Peter, OTR/DESTINY Johnson [id number]    Referring Provider: IFEANYI Rivera CNP  Specific Provider Orders/Date: \"OT eval and treat\" - 2/3/2023    Diagnosis: Altered mental status, unspecified altered mental status type [R41.82]  AMS (altered mental status) [R41.82]      Pertinent Medical History: schizophrenia, DM, h/o head injury, depression, IBS     Precautions: fall risk, warming blanket, bed alarm    Assessment of Current Deficits:    [x] Functional mobility   [x]ADLs  [x] Strength               [x]Cognition   [x] Functional transfers   [x] IADLs         [x] Safety Awareness   [x]Endurance   [] Fine Coordination              [x] Balance      [] Vision/perception   [x]Sensation    []Gross Motor Coordination  [] ROM  [] Delirium                   [] Motor Control     OT PLAN OF CARE   OT POC is based on physician orders, patient diagnosis, and results of clinical assessment.   Frequency/Duration 2-5 days/week for 2 weeks PRN   Specific OT Treatment Interventions to Include:   * Instruction/training on adapted ADL techniques and AE recommendations to increase functional independence within precautions       * Training on energy conservation strategies, correct breathing pattern and techniques to improve independence/tolerance for self-care routine  * Functional transfer/mobility training/DME recommendations for increased independence, safety, and fall prevention  * Patient/Family education to increase follow through with safety techniques and functional independence  * Recommendation of environmental modifications for increased safety with functional transfers/mobility and ADLs  * Cognitive retraining/development of therapeutic activities to improve problem solving, judgement, memory, and attention for increased safety/participation in ADL/IADL tasks  * Therapeutic exercise to improve motor endurance, ROM, and functional strength for ADLs/functional transfers  * Therapeutic activities to facilitate/challenge dynamic balance, stand tolerance for increased safety and independence with ADLs  * Neuro-muscular re-education: facilitation of righting/equilibrium reactions, midline orientation, scapular stability/mobility, normalization of muscle tone, and facilitation of volitional active controled movement  * Positioning to improve skin integrity, interaction with environment and functional independence    Recommended Adaptive Equipment: TBD     Home Living: Patient admitted from [facility]. Prior Level of Function (PLOF): Patient unable to report her PLOF with regard to ADLs and functional transfers/mobility. No family/caregiver present to provide this information. Pain Level: No complaints of pain; patient reported that her entire body felt \"numb\". Cognition: Patient alert and oriented to person and grossly to place; confusion demonstrated. Fair command follow demonstrated. Slowed processing and decreased affect noted. Encouragement needed to maximize patient's willingness to participate in [address] [place] [address] activities. Memory: Impaired  Sequencing: Impaired  Problem Solving: Impaired  Judgement/Safety: Impaired    Functional Assessment:  AM-PAC Daily Activity Raw Score: 9/24   Initial Eval Status  Date: 2/7/2023 Treatment Status  Date:  Short Term Goals = Long Term Goals   Feeding Mod A  SBA   Grooming Mod A  Setup (seated)   UB Dressing Max A  Min A    LB Dressing Dependent  Mod A - with use of AE, as needed/appropriate   Bathing Dependent  Mod A - with use of AE/DME, as needed/appropriate   Toileting Dependent  Patient with richard catheter currently.   Mod A   Bed Mobility  Supine-to-Sit: Max A  Sit-to-Supine: Max A   Min A in order to maximize patient's independence/participation with ADLs, re-positioning, and other functional tasks. Functional Transfers Patient declined to attempt sit-to-stand. Min A   Functional Mobility Not assessed. Mod A with functional mobility (with device, as needed/appropriate) in order to maximize independence with ADLs/IADLs and other functional tasks. Balance Sitting: Fair- (at EOB)  Fair+ dynamic standing balance during completion of ADLs/IADLs and other functional tasks. Activity Tolerance Limited  Patient will demonstrate Good understanding and consistent implementation of energy conservation techniques and work simplification techniques into ADL/IADL routines. Visual/  Perceptual WFL     N/A   B UE Strength B Shoulders: 3-/5  Distal B UEs: 3+/5  Patient will demonstrate 4/5 B UE strength in order to maximize independence with ADLs/IADLs and functional transfers. Additional Long-Term Goal: Patient will increase functional independence to PLOF in order to allow patient to live in least restrictive environment. ROM: Additional Information:    R UE  WFL grossly    L UE WFL grossly      Hearing: WFL grossly  Sensation: Patient reported experiencing numbness throughout her body. Tone: WFL  Edema: No    Comments: RN approved patient's participation in EOB/OOB activities. Upon arrival, patient supine in bed. At end of session, patient supine in bed with call light and phone within reach, bed alarm activated, staff member present, and all lines and tubes intact. Patient would benefit from continued skilled OT to increase safety and independence with completion of ADL/IADL tasks for functional independence and quality of life. Rehab Potential: Good for established goals. Patient / Family Goal: No goal stated/indicated. Patient and/or family were instructed on functional diagnosis, prognosis/goals, and OT plan of care. Demonstrated Poor understanding.     Eval Complexity: Low    Time In: 1340  Time Out: 1355  Total Treatment Time: 0 minutes      Minutes Units   OT Eval Low 90673 15 1   OT Eval Medium 25413     OT Eval High 76412     OT Re-Eval [id number]     Therapeutic Ex 90257     Therapeutic Activities 10571     ADL/Self Care 39607     Orthotic Management 22139     Neuro Re-Ed 13219     Non-Billable Time N/A ---     Evaluation time includes thorough review of current medical information, gathering information on past medical history/social history and prior level of function, completion of standardized testing/informal observation of tasks, assessment of data, and education on plan of care and goals. Anita Livingston, OTR/L  License Number: [id number]

## 2023-02-07 NOTE — PROGRESS NOTES
Physical Therapy  Facility/Department: [address] INTERMEDIATE 1  Physical Therapy Initial Assessment    Name: Elsa Javier  : 1960  MRN: [id number]  Date of Service: 2023      Patient Diagnosis(es): The encounter diagnosis was Altered mental status, unspecified altered mental status type. Past Medical History:  has a past medical history of Chronic idiopathic constipation, Depression, Diabetes mellitus ([facility] [address] Head injury, Hyperlipidemia, IBS (irritable bowel syndrome), Jaw pain, Migraines, neuralgic, Pain in gums, and Schizophrenia ([facility] [address] Past Surgical History:  has a past surgical history that includes cyst removal (Right, ); Toe Surgery (); and laparoscopy (). Evaluating Therapist: Malina Corrales PT    Room #:  4428/7639-Q  Diagnosis:  Altered mental status, unspecified altered mental status type [R41.82]  AMS (altered mental status) [R41.82]  PMHx/PSHx:  DM, depression, Schizophrenia  Precautions:  falls, alarm    Social:  Pt admitted from [facility]. Unable to report prior level of function   Initial Evaluation  Date: 23 Treatment      Short Term/ Long Term   Goals   Was pt agreeable to Eval/treatment? yes     Does pt have pain? No c/o pain c/o feeling numb all over     Bed Mobility  Rolling: max assist  Supine to sit: max assist  Sit to supine: max assist  Scooting: max assist  Min assist   Transfers NT, pt declined  Mod assist   Ambulation    NT  5 feet with ww with mod assist   Stair Negotiation  Ascended and descended  NT   N/A   LE strength    Grossly 3/5    4/5   balance      Sitting balance CGA     AM-PAC Raw score               8/24         Pt is alert and grossly oriented to person, place, month and year but confusion noted  LE ROM: WFL  Sensation: c/o numbness all over  Edema: none  Endurance: poor     ASSESSMENT:    Pt displays functional ability as noted in the objective portion of this evaluation. Comments:  CGA for sitting balance edge of bed.  Episodes of tremors noted in UE's and LE's. Pt taking short shallow breaths and blinking rapidly while seated edge of bed. Denies dizziness    Pt's/ family goals   1. Pt unable to state    Conditions Requiring Skilled Therapeutic Intervention:    [x]Decreased strength     []Decreased ROM  [x]Decreased functional mobility  [x]Decreased balance   [x]Decreased endurance   []Decreased posture  []Decreased sensation  []Decreased coordination   []Decreased vision  []Decreased safety awareness   []Increased pain       Patient and or family understand(s) diagnosis, prognosis, and plan of care. no  Prognosis is fair for reaching above PT goals    PHYSICAL THERAPY PLAN OF CARE:    PT POC is established based on physician order and patient diagnosis     Referring provider/PT Order: IFEANYI Fall CNP/ PT eval and treat      Current Treatment Recommendations:     [x] Strengthening to improve independence with functional mobility   [] ROM to improve independence with functional mobility   [x] Balance Training to improve static/dynamic balance and to reduce fall risk  [x] Endurance Training to improve activity tolerance during functional mobility   [x] Transfer Training to improve safety and independence with all functional transfers   [x] Gait Training to improve gait mechanics, endurance and assess need for appropriate assistive device  [] Stair Training in preparation for safe discharge home and/or into the community   [] Positioning to prevent skin breakdown and contractures  [x] Safety and Education Training   [x] Patient/Caregiver Education   [] HEP  [] Other     PT long term treatment goals are located in above grid    Frequency of treatments: 2-5x/week x 5 days.     Time in  122  Time out  138      Evaluation Time includes thorough review of current medical information, gathering information on past medical history/social history and prior level of function, completion of standardized testing/informal observation of tasks, assessment of data and education on plan of care and goals.       CPT codes:  [x] Low Complexity PT evaluation 37737  [] Moderate Complexity PT evaluation 48177  [] High Complexity PT evaluation 25301  [] PT Re-evaluation 52814  [] Gait training 33672 minutes  [] Manual therapy 24654 minutes  [] Therapeutic activities 84583 minutes  [] Therapeutic exercises 70998 minutes  [] Neuromuscular reeducation 11851 minutes     [facility] PT [id number]

## 2023-02-07 NOTE — PROGRESS NOTES
Brought patient to MRI. Patient is unable to lay flat and breathe comfortably. Also, patient is extremely confused and unable to follow instructions. While waiting for transport, patient was crying and yelling. Notified RN and sent back.

## 2023-02-07 NOTE — PROGRESS NOTES
PSYCHIATRY ATTENDING NOTE    CC: \"I am upset. \"    S: Patient seen at the bedside for follow-up for altered mental status. Initial consultation completed 2/5/2023 by IFEANYI Sweeney. Patient is lying in bed with eyes closed. Her heart rate is in the 30s. She is still requiring warming blanket for hypothermia. Bladder scan completed yesterday for 815. Grant was placed. Sitter at bedside due to patient becoming agitated throughout the night and pulling Grant and IV out. Both were replaced. Upon assessment today, patient is alert and oriented to person, place, and time. She is disoriented to situation. She is unable to tell me where she has been living. She tells me that she is on psychotropic medications due to being constipated. Her pupils remain pinpoint. Patient denies anxiety or depression but currently feels helpless because she \"cannot do anything. \"  Per nursing, patient did not sleep throughout the night. She is tired today. She is not suicidal, homicidal, psychotic, or manic. There is no indication for inpatient psychiatric admission. VITALS:  BP (!) 168/89   Pulse 52   Temp (!) 95.5 °F (35.3 °C) (Rectal)   Resp 16   Ht 5' 6\" (1.676 m)   Wt 139 lb 12.8 oz (63.4 kg)   LMP 08/25/2011 (Exact Date)   SpO2 98%   BMI 22.56 kg/m²     MSE: 19-year-old white female. Patient remains confused. Agitated psychomotor activity. Tremors are noted. Strength decreased. Eye contact fair, pupils pinpoint. Gait not assessed. Mood \"I am upset. \"  Affect blunted. Thought process disorganized. Thought content devoid of suicidal or homicidal ideation, intent, or plan. No hallucinations, delusions, or paranoia. Concentration intact. Orientation, recent, remote memory impaired. Fund of knowledge fair. Language use fair. Insight and judgment fair. LABS:   No results displayed because visit has over 200 results.             MEDICATIONS: Current Facility-Administered Medications: enoxaparin (LOVENOX) injection 40 mg, 40 mg, SubCUTAneous, Daily  dextrose 5 % in lactated ringers infusion, , IntraVENous, Continuous  perflutren lipid microspheres (DEFINITY) injection 1.5 mL, 1.5 mL, IntraVENous, ONCE PRN  0.9 % sodium chloride infusion, , IntraVENous, Continuous  [Held by provider] benztropine (COGENTIN) tablet 0.5 mg, 0.5 mg, Oral, BID  [Held by provider] mirtazapine (REMERON) tablet 30 mg, 30 mg, Oral, Nightly  [Held by provider] rivastigmine (EXELON) capsule 3 mg, 3 mg, Oral, BID  [Held by provider] venlafaxine (EFFEXOR XR) extended release capsule 75 mg, 75 mg, Oral, Daily with breakfast  sodium chloride flush 0.9 % injection 5-40 mL, 5-40 mL, IntraVENous, 2 times per day  sodium chloride flush 0.9 % injection 5-40 mL, 5-40 mL, IntraVENous, PRN  0.9 % sodium chloride infusion, , IntraVENous, PRN  acetaminophen (TYLENOL) tablet 650 mg, 650 mg, Oral, Q6H PRN **OR** acetaminophen (TYLENOL) suppository 650 mg, 650 mg, Rectal, Q6H PRN  potassium chloride (KLOR-CON M) extended release tablet 40 mEq, 40 mEq, Oral, PRN **OR** potassium bicarb-citric acid (EFFER-K) effervescent tablet 40 mEq, 40 mEq, Oral, PRN **OR** potassium chloride 10 mEq/100 mL IVPB (Peripheral Line), 10 mEq, IntraVENous, PRN  magnesium sulfate 2000 mg in 50 mL IVPB premix, 2,000 mg, IntraVENous, PRN  senna (SENOKOT) tablet 8.6 mg, 1 tablet, Oral, Daily PRN    ASSESSMENT:  Delirium, unclear etiology     History of schizoaffective disorder, bipolar type    PLAN: Per chart review, patient has had recent medication changes with her psychotropics. She presented with polypharmacy. Her heart rate and temperature continued to be decreased. She remains confused. She is less lethargic. Psychiatry does not feel this is related to her psychotropic medications. However, psychotropic medications have been held as of yesterday. MRI ordered, the patient was not stable to complete. Will await results.   Psychiatry will continue to follow and make medication changes as needed. Nursing updated. Thank you. NOTE:  This report was transcribed using voice recognition software. Every effort was made to ensure accuracy; however, inadvertent computerized transcription errors may be present.                           Electronically signed by IFEANYI Solis CNP on 2/7/2023 at 11:53 AM

## 2023-02-08 LAB
ANION GAP SERPL CALCULATED.3IONS-SCNC: 8 MMOL/L (ref 7–16)
BUN BLDV-MCNC: 5 MG/DL (ref 6–23)
CALCIUM SERPL-MCNC: 9 MG/DL (ref 8.6–10.2)
CHLORIDE BLD-SCNC: 101 MMOL/L (ref 98–107)
CO2: 27 MMOL/L (ref 22–29)
CREAT SERPL-MCNC: 0.7 MG/DL (ref 0.5–1)
GFR SERPL CREATININE-BSD FRML MDRD: >60 ML/MIN/1.73
GLUCOSE BLD-MCNC: 102 MG/DL (ref 74–99)
HCT VFR BLD CALC: 28.5 % (ref 34–48)
HEMOGLOBIN: 10 G/DL (ref 11.5–15.5)
MCH RBC QN AUTO: 31.1 PG (ref 26–35)
MCHC RBC AUTO-ENTMCNC: 35.1 % (ref 32–34.5)
MCV RBC AUTO: 88.5 FL (ref 80–99.9)
PDW BLD-RTO: 13.4 FL (ref 11.5–15)
PLATELET # BLD: 134 E9/L (ref 130–450)
PMV BLD AUTO: 11.4 FL (ref 7–12)
POTASSIUM SERPL-SCNC: 3.5 MMOL/L (ref 3.5–5)
RBC # BLD: 3.22 E12/L (ref 3.5–5.5)
SODIUM BLD-SCNC: 136 MMOL/L (ref 132–146)
WBC # BLD: 5.7 E9/L (ref 4.5–11.5)

## 2023-02-08 PROCEDURE — 6370000000 HC RX 637 (ALT 250 FOR IP): Performed by: NURSE PRACTITIONER

## 2023-02-08 PROCEDURE — 6370000000 HC RX 637 (ALT 250 FOR IP)

## 2023-02-08 PROCEDURE — 36415 COLL VENOUS BLD VENIPUNCTURE: CPT

## 2023-02-08 PROCEDURE — 80048 BASIC METABOLIC PNL TOTAL CA: CPT

## 2023-02-08 PROCEDURE — 99231 SBSQ HOSP IP/OBS SF/LOW 25: CPT

## 2023-02-08 PROCEDURE — 94660 CPAP INITIATION&MGMT: CPT

## 2023-02-08 PROCEDURE — 97530 THERAPEUTIC ACTIVITIES: CPT

## 2023-02-08 PROCEDURE — 6360000002 HC RX W HCPCS: Performed by: NURSE PRACTITIONER

## 2023-02-08 PROCEDURE — 2580000003 HC RX 258: Performed by: NURSE PRACTITIONER

## 2023-02-08 PROCEDURE — 2580000003 HC RX 258: Performed by: STUDENT IN AN ORGANIZED HEALTH CARE EDUCATION/TRAINING PROGRAM

## 2023-02-08 PROCEDURE — 2060000000 HC ICU INTERMEDIATE R&B

## 2023-02-08 PROCEDURE — 85027 COMPLETE CBC AUTOMATED: CPT

## 2023-02-08 RX ORDER — LORAZEPAM 2 MG/ML
0.5 INJECTION INTRAMUSCULAR ONCE
Status: COMPLETED | OUTPATIENT
Start: 2023-02-08 | End: 2023-02-09

## 2023-02-08 RX ORDER — LANOLIN ALCOHOL/MO/W.PET/CERES
3 CREAM (GRAM) TOPICAL NIGHTLY
Status: DISCONTINUED | OUTPATIENT
Start: 2023-02-08 | End: 2023-02-10 | Stop reason: HOSPADM

## 2023-02-08 RX ORDER — HYDRALAZINE HYDROCHLORIDE 20 MG/ML
10 INJECTION INTRAMUSCULAR; INTRAVENOUS EVERY 6 HOURS PRN
Status: DISCONTINUED | OUTPATIENT
Start: 2023-02-08 | End: 2023-02-10 | Stop reason: HOSPADM

## 2023-02-08 RX ADMIN — SODIUM CHLORIDE, SODIUM LACTATE, POTASSIUM CHLORIDE, CALCIUM CHLORIDE AND DEXTROSE MONOHYDRATE: 5; 600; 310; 30; 20 INJECTION, SOLUTION INTRAVENOUS at 21:05

## 2023-02-08 RX ADMIN — SODIUM CHLORIDE, SODIUM LACTATE, POTASSIUM CHLORIDE, CALCIUM CHLORIDE AND DEXTROSE MONOHYDRATE: 5; 600; 310; 30; 20 INJECTION, SOLUTION INTRAVENOUS at 12:28

## 2023-02-08 RX ADMIN — Medication 3 MG: at 19:55

## 2023-02-08 RX ADMIN — HYDRALAZINE HYDROCHLORIDE 10 MG: 20 INJECTION INTRAMUSCULAR; INTRAVENOUS at 20:54

## 2023-02-08 RX ADMIN — SODIUM CHLORIDE, SODIUM LACTATE, POTASSIUM CHLORIDE, CALCIUM CHLORIDE AND DEXTROSE MONOHYDRATE: 5; 600; 310; 30; 20 INJECTION, SOLUTION INTRAVENOUS at 03:44

## 2023-02-08 RX ADMIN — ENOXAPARIN SODIUM 40 MG: 100 INJECTION SUBCUTANEOUS at 08:18

## 2023-02-08 RX ADMIN — BENZTROPINE MESYLATE 0.5 MG: 0.5 TABLET ORAL at 19:55

## 2023-02-08 RX ADMIN — SODIUM CHLORIDE, PRESERVATIVE FREE 10 ML: 5 INJECTION INTRAVENOUS at 19:57

## 2023-02-08 ASSESSMENT — PAIN SCALES - GENERAL
PAINLEVEL_OUTOF10: 0
PAINLEVEL_OUTOF10: 0

## 2023-02-08 NOTE — PLAN OF CARE
Problem: Discharge Planning  Goal: Discharge to home or other facility with appropriate resources  2/8/2023 1813 by Melissa Chen RN  Outcome: Progressing     Problem: Pain  Goal: Verbalizes/displays adequate comfort level or baseline comfort level  2/8/2023 1813 by Melissa Chen RN  Outcome: Progressing     Problem: Skin/Tissue Integrity  Goal: Absence of new skin breakdown  Description: 1. Monitor for areas of redness and/or skin breakdown  2. Assess vascular access sites hourly  3. Every 4-6 hours minimum:  Change oxygen saturation probe site  4. Every 4-6 hours:  If on nasal continuous positive airway pressure, respiratory therapy assess nares and determine need for appliance change or resting period.   2/8/2023 1813 by Melissa Chen RN  Outcome: Progressing

## 2023-02-08 NOTE — PROGRESS NOTES
[facility]                                Progress note    Subjective:    Continues to be more responsive    Sitter remains at bedside  Patient is not agitated on my evaluation, her mentation has improved significantly    Objective:    BP (!) 151/99   Pulse 95   Temp 99.9 °F (37.7 °C) (Axillary)   Resp 18   Ht 5' 6\" (1.676 m)   Wt 146 lb 11.2 oz (66.5 kg)   LMP 08/25/2011 (Exact Date)   SpO2 96%   BMI 23.68 kg/m²     In: 1628.6 [P.O.:1020; I.V.:608.6]  Out: 3150   In: 1628.6   Out: 3150 [Urine:3150]    General appearance: Awake questions today  HEENT: AT/NC, MMM  Neck: FROM, supple  Lungs: Clear to auscultation  CV: RRR, no MRGs  Vasc: Radial pulses 2+  Abdomen: Soft, non-tender; no masses or HSM  Extremities: No peripheral edema or digital cyanosis,  Skin: no rash, lesions or ulcers  Not able to answer too many questions to assess psych neuro status     Recent Labs     02/06/23  0230 02/07/23  0341 02/08/23  0130   WBC 4.2* 5.5 5.7   HGB 9.7* 9.9* 10.0*   HCT 28.2* 29.1* 28.5*    137 134         Recent Labs     02/06/23  1146 02/07/23  0341 02/08/23  0130    133 136   K 3.5 3.5 3.5    100 101   CO2 27 26 27   BUN 7 6 5*   CREATININE 0.6 0.7 0.7   CALCIUM 9.6 9.4 9.0         Assessment:    Principal Problem:    AMS (altered mental status)  Active Problems:    Hypothermia    Bradycardia  Resolved Problems:    * No resolved hospital problems.  *      Plan:  Patient is a 70-year-old female with an extensive psych hx who is admitted to [facility] for  AMS/metabolic encephalopathy  -Monitor labs  -procal 0.07  -Blood cultures pending  -UA negative  -Urine drug screen negative  -Viral panel was negative  -Ammonia level normal  -ABGs patient found to be mildly hypercapnic at 50.6-lactulose 20 p.o. 3 times daily  -Obtain MRI brain, rule out any acute or chronic pathology, would also consider lumbar puncture  -Hold psych drugs as she has multiple main drugs that have been added most recently prior to her acute change in mentation  -Consult psych to assist with management of her current antipsychotics  -IV vanc and Zosyn for now-we will discontinue as there appears to be no septic etiology of her encephalopathy       2/5/23:  -IV Vanco and Zosyn discontinued per ID  -MRI brain, so far unable to complete screening questionnaire   -Psych eval completed, some psych medications which were held since admission, now  resumed by psychiatry  -AMS attributed to infectious etiology by psychiatry  -BP is much better today  -+ BC likely contaminant-staph species GPC in clusters  -Check procalcitonin in a.m. and CBC    2/6/23  -BPs remain stable, now again bradycardic especially at nighttime.  -Mildly hypothermic this morning at 92.6, bear hugger applied following psych meds given again this morning however held after discussion with psychiatry  -K+ 3.2, replaced > only able to get partial dose, recheck BMP this evening  -Case discussed with psychiatry NP-hold psychiatric medications for now  -Continue to monitor mentation  -ID has signed off and do not believe that this is a septic etiology of altered mentation  -Check CBC in a.m. along with procalcitonin    2/7/2023  Overall appears much more alert today and able to answer questions  Sitter at bedside however  No antibiotics per infectious disease  Current psych medications on hold  Echocardiogram completed with ejection fraction 50%, moderate mitral regurg  Physical therapy evaluation 8/24  Will require rehab at discharge    2/8/23:  -MRI brain still pending completion   -Psych meds still held   -Continue IVF  -Monitor BP's, moments of hypertension noted   -Patient by reflexes/adjust dosages of psych medications, she can be discharged from medicine standpoint  -Off antibiotics    Code status: Full  Consultants: ID signed off , Psych      DVT Prophylaxis Lovenox   PT/OT  Discharge planning    Mir Byrd MD

## 2023-02-08 NOTE — PROGRESS NOTES
PSYCHIATRY ATTENDING NOTE    CC: \"I am upset. I feel so bad for you. \"    S: Patient seen at the bedside for follow-up for altered mental status. Initial consultation completed 2/5/2023 by IFEANYI Ricks. Patient is lying in bed awake. She is oriented to person and place. She is no longer requiring warming blankets. Her pupils remain constricted. Sitter remains at bedside due to agitation and attempting to pull at lines. Patient becomes tearful during conversation. Per nursing, patient did not sleep throughout the night. She is not eating or drinking well. She is tired today. She is not suicidal, homicidal, psychotic, or manic. There is no indication for inpatient psychiatric admission. VITALS:  BP (!) 151/99   Pulse 95   Temp 98 °F (36.7 °C) (Oral)   Resp 18   Ht 5' 6\" (1.676 m)   Wt 146 lb 11.2 oz (66.5 kg)   LMP 08/25/2011 (Exact Date)   SpO2 96%   BMI 23.68 kg/m²     MSE: 72-year-old white female. Patient remains confused. Agitated psychomotor activity. Tremors are noted. Strength decreased. Eye contact fair, pupils constricted. Gait not assessed. Mood \"I am upset. \"  Affect blunted, tearful. Thought process disorganized. Thought content devoid of suicidal or homicidal ideation, intent, or plan. No hallucinations, delusions, or paranoia. Concentration intact. Orientation, recent, remote memory impaired. Fund of knowledge fair. Language use fair. Insight and judgment impaired. LABS:   No results displayed because visit has over 200 results.             MEDICATIONS: Current Facility-Administered Medications: enoxaparin (LOVENOX) injection 40 mg, 40 mg, SubCUTAneous, Daily  dextrose 5 % in lactated ringers infusion, , IntraVENous, Continuous  perflutren lipid microspheres (DEFINITY) injection 1.5 mL, 1.5 mL, IntraVENous, ONCE PRN  0.9 % sodium chloride infusion, , IntraVENous, Continuous  [Held by provider] benztropine (COGENTIN) tablet 0.5 mg, 0.5 mg, Oral, BID  [Held by provider] mirtazapine (REMERON) tablet 30 mg, 30 mg, Oral, Nightly  [Held by provider] rivastigmine (EXELON) capsule 3 mg, 3 mg, Oral, BID  [Held by provider] venlafaxine (EFFEXOR XR) extended release capsule 75 mg, 75 mg, Oral, Daily with breakfast  sodium chloride flush 0.9 % injection 5-40 mL, 5-40 mL, IntraVENous, 2 times per day  sodium chloride flush 0.9 % injection 5-40 mL, 5-40 mL, IntraVENous, PRN  0.9 % sodium chloride infusion, , IntraVENous, PRN  acetaminophen (TYLENOL) tablet 650 mg, 650 mg, Oral, Q6H PRN **OR** acetaminophen (TYLENOL) suppository 650 mg, 650 mg, Rectal, Q6H PRN  potassium chloride (KLOR-CON M) extended release tablet 40 mEq, 40 mEq, Oral, PRN **OR** potassium bicarb-citric acid (EFFER-K) effervescent tablet 40 mEq, 40 mEq, Oral, PRN **OR** potassium chloride 10 mEq/100 mL IVPB (Peripheral Line), 10 mEq, IntraVENous, PRN  magnesium sulfate 2000 mg in 50 mL IVPB premix, 2,000 mg, IntraVENous, PRN  senna (SENOKOT) tablet 8.6 mg, 1 tablet, Oral, Daily PRN    ASSESSMENT:  Delirium, unclear etiology     History of schizoaffective disorder, bipolar type    PLAN: Per chart review, patient has had recent medication changes with her psychotropics. She presented with polypharmacy. She remains confused. She is not eating or sleeping well. Her pupils are still constricted. She continues to have tremors. Vital signs more stable today. She is not requiring warming blanket today. MRI was ordered but has not been completed yet. Will restart Cogentin 0.5 mg twice daily for tremors. Continue to hold other psychotropics at this time. Will also start melatonin 3 mg nightly to reset sleep-wake cycle as patient is not sleeping at night. Psychiatry will continue to follow and make adjustments to medication as needed. No indication for inpatient psychiatric admission. Thank you. NOTE:  This report was transcribed using voice recognition software.   Every effort was made to ensure accuracy; however, inadvertent computerized transcription errors may be present.                           Electronically signed by IFEANYI Steiner CNP on 2/8/2023 at 12:06 PM

## 2023-02-08 NOTE — PROGRESS NOTES
Physical Therapy  Facility/Department: [address] INTERMEDIATE 1  Daily Treatment Note  NAME: Lynda Lu  : 1960  MRN: [id number]    Date of Service: 2023    Patient Diagnosis(es): The encounter diagnosis was Altered mental status, unspecified altered mental status type. Evaluating Therapist: Rafael Trevino PT     Room #:  3207/4517-U  Diagnosis:  Altered mental status, unspecified altered mental status type [R41.82]  AMS (altered mental status) [R41.82]  PMHx/PSHx:  DM, depression, Schizophrenia  Precautions:  falls, alarm     Social:  Pt admitted from [facility]. Unable to report prior level of function    Initial Evaluation  Date: 23 Treatment      Short Term/ Long Term   Goals   Was pt agreeable to Eval/treatment? yes yes      Does pt have pain? No c/o pain c/o feeling numb all over  none reported. Pt reported her feet felt numb     Bed Mobility  Rolling: max assist  Supine to sit: max assist  Sit to supine: max assist  Scooting: max assist  rolling:  NT  Supine to sit:  SBA  Sit to supine:  Max A  Scooting to EOB:  SBA Min assist   Transfers NT, pt declined Sit to stand:  Varied between Mod to Min A  Stand to sit: Max A  Stand pivot:  NT Mod assist   Ambulation    NT  3 side steps toward HOB without device Mod A 5 feet with ww with mod assist   Stair Negotiation  Ascended and descended  NT    N/A   LE strength    Grossly 3/5     4/5   balance      Sitting balance CGA  sitting EOB:  SBA  Standing without device Mod A     AM-PAC Raw score                      Patient education  Pt educated on PT objectives during treatment session, safety during mobility. Patient response to education:   Pt verbalized understanding Pt demonstrated skill Pt requires further education in this area       yes     ASSESSMENT:    Comments:  Pt found and left in bed with call light in reach, bed alarm on and sitter present.       Treatment:  Patient practiced and was instructed in the following treatment:   Functional mobility performed as documented above. Pt with confusion during treatment session. Pt reported feeling dizzy once sitting EOB. Pt with posterior lean when standing and taking side steps and required Mod A for balance. When cueing pt to get back into bed, pt stood up again. Pt assisted to sitting EOB with Max A and then assisted back into bed with Max A for safety. PLAN:    Patient is making fair progress towards established goals. Will continue with current POC.      Time in  1110  Time out  1124    Total Treatment Time  14 minutes     CPT codes:    [x] Therapeutic activities 24112 14 minutes  [] Therapeutic exercises 55325  minutes      Kristine Kim, [phone]

## 2023-02-08 NOTE — PROGRESS NOTES
Messaged Malia Kang NP regarding MRI order. Per MRI, patient will not be able to do scan until tomorrow.  NP notified

## 2023-02-08 NOTE — PLAN OF CARE
Problem: Discharge Planning  Goal: Discharge to home or other facility with appropriate resources  2/8/2023 0528 by Sheron Rendon RN  Outcome: Progressing  2/7/2023 1652 by Campbell Champagne RN  Outcome: Progressing     Problem: Pain  Goal: Verbalizes/displays adequate comfort level or baseline comfort level  2/8/2023 0528 by Sheron Rendon RN  Outcome: Progressing  2/7/2023 1652 by Campbell Champagne RN  Outcome: Progressing     Problem: Skin/Tissue Integrity  Goal: Absence of new skin breakdown  Description: 1. Monitor for areas of redness and/or skin breakdown  2. Assess vascular access sites hourly  3. Every 4-6 hours minimum:  Change oxygen saturation probe site  4. Every 4-6 hours:  If on nasal continuous positive airway pressure, respiratory therapy assess nares and determine need for appliance change or resting period.   2/8/2023 0528 by Sheron Rendon RN  Outcome: Progressing  2/7/2023 1652 by Campbell Champagne RN  Outcome: Progressing     Problem: Safety - Adult  Goal: Free from fall injury  2/8/2023 0528 by Sheron Rendon RN  Outcome: Progressing  2/7/2023 1652 by Campbell Champagne RN  Outcome: Progressing     Problem: ABCDS Injury Assessment  Goal: Absence of physical injury  2/8/2023 0528 by Sheron Rendon RN  Outcome: Progressing  2/7/2023 1652 by Campbell Champagne RN  Outcome: Progressing     Problem: Chronic Conditions and Co-morbidities  Goal: Patient's chronic conditions and co-morbidity symptoms are monitored and maintained or improved  2/8/2023 0528 by Sheron Rendon RN  Outcome: Progressing  2/7/2023 1652 by Campbell Champagne RN  Outcome: Progressing

## 2023-02-08 NOTE — CARE COORDINATION
Social Work/Discharge Planning:  Chart reviewed. Patient admitted from [facility] for return to facility at discharge. Sitter remains in her room. She will need to be sitter free for twenty-four hours to return to [facility]. No pre-cert and no covid needed. Will continue to follow.   Electronically signed by LIZA Degroot on 2/8/2023 at 10:01 AM

## 2023-02-09 ENCOUNTER — APPOINTMENT (OUTPATIENT)
Dept: MRI IMAGING | Age: 63
End: 2023-02-09
Payer: MEDICAID

## 2023-02-09 LAB
BLOOD CULTURE, ROUTINE: NORMAL
CULTURE, BLOOD 2: NORMAL

## 2023-02-09 PROCEDURE — 94660 CPAP INITIATION&MGMT: CPT

## 2023-02-09 PROCEDURE — 97535 SELF CARE MNGMENT TRAINING: CPT

## 2023-02-09 PROCEDURE — 6370000000 HC RX 637 (ALT 250 FOR IP): Performed by: INTERNAL MEDICINE

## 2023-02-09 PROCEDURE — 6360000002 HC RX W HCPCS: Performed by: NURSE PRACTITIONER

## 2023-02-09 PROCEDURE — 2580000003 HC RX 258: Performed by: NURSE PRACTITIONER

## 2023-02-09 PROCEDURE — 2580000003 HC RX 258: Performed by: STUDENT IN AN ORGANIZED HEALTH CARE EDUCATION/TRAINING PROGRAM

## 2023-02-09 PROCEDURE — 70551 MRI BRAIN STEM W/O DYE: CPT

## 2023-02-09 PROCEDURE — 2060000000 HC ICU INTERMEDIATE R&B

## 2023-02-09 PROCEDURE — 6370000000 HC RX 637 (ALT 250 FOR IP)

## 2023-02-09 PROCEDURE — 6370000000 HC RX 637 (ALT 250 FOR IP): Performed by: NURSE PRACTITIONER

## 2023-02-09 RX ORDER — LISINOPRIL 5 MG/1
5 TABLET ORAL DAILY
Status: DISCONTINUED | OUTPATIENT
Start: 2023-02-09 | End: 2023-02-10 | Stop reason: HOSPADM

## 2023-02-09 RX ADMIN — ENOXAPARIN SODIUM 40 MG: 100 INJECTION SUBCUTANEOUS at 07:45

## 2023-02-09 RX ADMIN — Medication 3 MG: at 19:48

## 2023-02-09 RX ADMIN — LISINOPRIL 5 MG: 5 TABLET ORAL at 16:00

## 2023-02-09 RX ADMIN — SODIUM CHLORIDE, SODIUM LACTATE, POTASSIUM CHLORIDE, CALCIUM CHLORIDE AND DEXTROSE MONOHYDRATE: 5; 600; 310; 30; 20 INJECTION, SOLUTION INTRAVENOUS at 07:44

## 2023-02-09 RX ADMIN — BENZTROPINE MESYLATE 0.5 MG: 0.5 TABLET ORAL at 19:48

## 2023-02-09 RX ADMIN — BENZTROPINE MESYLATE 0.5 MG: 0.5 TABLET ORAL at 07:45

## 2023-02-09 RX ADMIN — SODIUM CHLORIDE, PRESERVATIVE FREE 10 ML: 5 INJECTION INTRAVENOUS at 19:48

## 2023-02-09 RX ADMIN — LORAZEPAM 0.5 MG: 2 INJECTION INTRAMUSCULAR; INTRAVENOUS at 11:54

## 2023-02-09 ASSESSMENT — PAIN SCALES - GENERAL
PAINLEVEL_OUTOF10: 0

## 2023-02-09 NOTE — PROGRESS NOTES
[facility]                                Progress note    Subjective:    Continues to be more responsive  Much improved clinically today, able to answer questions appropriately  Normothermic now    Objective:    BP (!) 150/83   Pulse 60   Temp 97.4 °F (36.3 °C) (Oral)   Resp 18   Ht 5' 6\" (1.676 m)   Wt 135 lb 8 oz (61.5 kg)   LMP 08/25/2011 (Exact Date)   SpO2 97%   BMI 21.87 kg/m²     In: 0   Out: 7300   In: 0   Out: 7300 [Urine:7300]    General appearance: Awake questions today  HEENT: AT/NC, MMM  Neck: FROM, supple  Lungs: Clear to auscultation  CV: RRR, no MRGs  Vasc: Radial pulses 2+  Abdomen: Soft, non-tender; no masses or HSM  Extremities: No peripheral edema or digital cyanosis,  Skin: no rash, lesions or ulcers  Not able to answer too many questions to assess psych neuro status     Recent Labs     02/07/23  0341 02/08/23  0130   WBC 5.5 5.7   HGB 9.9* 10.0*   HCT 29.1* 28.5*    134       Recent Labs     02/07/23  0341 02/08/23  0130    136   K 3.5 3.5    101   CO2 26 27   BUN 6 5*   CREATININE 0.7 0.7   CALCIUM 9.4 9.0       Assessment:    Principal Problem:    AMS (altered mental status)  Active Problems:    Hypothermia    Bradycardia  Resolved Problems:    * No resolved hospital problems.  *      Plan:  Patient is a 70-year-old female with an extensive psych hx who is admitted to [facility] for  AMS/metabolic encephalopathy  -Monitor labs  -procal 0.07  -Blood cultures pending  -UA negative  -Urine drug screen negative  -Viral panel was negative  -Ammonia level normal  -ABGs patient found to be mildly hypercapnic at 50.6-lactulose 20 p.o. 3 times daily  -Obtain MRI brain, rule out any acute or chronic pathology, would also consider lumbar puncture  -Hold psych drugs as she has multiple main drugs that have been added most recently prior to her acute change in mentation  -Consult psych to assist with management of her current antipsychotics  -IV vanc and Zosyn for now-we will discontinue as there appears to be no septic etiology of her encephalopathy       2/5/23:  -IV Vanco and Zosyn discontinued per ID  -MRI brain, so far unable to complete screening questionnaire   -Psych eval completed, some psych medications which were held since admission, now  resumed by psychiatry  -AMS attributed to infectious etiology by psychiatry  -BP is much better today  -+ BC likely contaminant-staph species GPC in clusters  -Check procalcitonin in a.m. and CBC    2/6/23  -BPs remain stable, now again bradycardic especially at nighttime.  -Mildly hypothermic this morning at 92.6, bear hugger applied following psych meds given again this morning however held after discussion with psychiatry  -K+ 3.2, replaced > only able to get partial dose, recheck BMP this evening  -Case discussed with psychiatry NP-hold psychiatric medications for now  -Continue to monitor mentation  -ID has signed off and do not believe that this is a septic etiology of altered mentation  -Check CBC in a.m. along with procalcitonin    2/7/2023  Overall appears much more alert today and able to answer questions  Sitter at bedside however  No antibiotics per infectious disease  Current psych medications on hold  Echocardiogram completed with ejection fraction 50%, moderate mitral regurg  Physical therapy evaluation 8/24  Will require rehab at discharge    2/8/23:  -MRI brain still pending completion   -Psych meds still held   -Continue IVF  -Monitor BP's, moments of hypertension noted   -Patient by reflexes/adjust dosages of psych medications, she can be discharged from medicine standpoint  -Off antibiotics    2/9/2023  Status is markedly improved today, able to answer questions appropriately  Await psych to resume some of her psych meds as needed  From medicine standpoint she is okay for discharge back to facility that she came from  MRI brain completed no convincing acute intracranial abnormality or infarct  Blood pressure slightly elevated still-adjust BP meds  Oxygen saturations within normal limits on room air    Code status: Full  Consultants: ID signed off , Psych      DVT Prophylaxis Lovenox   PT/OT  Discharge Elier Corona MD

## 2023-02-09 NOTE — PLAN OF CARE
Problem: Discharge Planning  Goal: Discharge to home or other facility with appropriate resources  2/9/2023 1611 by Franchesca Valero RN  Outcome: Progressing     Problem: Pain  Goal: Verbalizes/displays adequate comfort level or baseline comfort level  2/9/2023 1611 by Franchesca Valero RN  Outcome: Progressing     Problem: Skin/Tissue Integrity  Goal: Absence of new skin breakdown  Description: 1. Monitor for areas of redness and/or skin breakdown  2. Assess vascular access sites hourly  3. Every 4-6 hours minimum:  Change oxygen saturation probe site  4. Every 4-6 hours:  If on nasal continuous positive airway pressure, respiratory therapy assess nares and determine need for appliance change or resting period.   2/9/2023 1611 by Franchesca Valero RN  Outcome: Progressing     Problem: Safety - Adult  Goal: Free from fall injury  2/9/2023 1611 by Franchesca Valero RN  Outcome: Progressing     Problem: ABCDS Injury Assessment  Goal: Absence of physical injury  2/9/2023 1611 by Franchesca Valero RN  Outcome: Progressing     Problem: Chronic Conditions and Co-morbidities  Goal: Patient's chronic conditions and co-morbidity symptoms are monitored and maintained or improved  2/9/2023 1611 by Franchesca Valero RN  Outcome: Progressing

## 2023-02-09 NOTE — CARE COORDINATION
Social Work/Discharge Planning:  Patient sitter removed this morning. Patient Guardian plans for return to [facility] at discharge. No pre-cert needed. Patient will need a negative covid result on day of discharge. Patient to have an MRI. Will continue to follow.   Electronically signed by LIZA Guo on 2/9/2023 at 8:19 AM

## 2023-02-09 NOTE — PROGRESS NOTES
Called Dr. Christos Zamudio NP regarding patients recent elevated blood pressures. Awaiting callback.

## 2023-02-09 NOTE — PROGRESS NOTES
Occupational Therapy  OT BEDSIDE TREATMENT NOTE      Date:2023  Patient Name: Darryle Foots  MRN: [id number]  : 1960  Room: [address] [place], [place] [address]     Evaluating OT: Christiano Zelaya, OTR/L - [id number]     Referring Provider: IFEANYI Lee CNP  Specific Provider Orders/Date: \"OT eval and treat\" - 2/3/2023     Diagnosis: Altered mental status, unspecified altered mental status type [R41.82]  AMS (altered mental status) [R41.82]      Pertinent Medical History: schizophrenia, DM, h/o head injury, depression, IBS      Precautions: fall risk     Assessment of Current Deficits:    [x] Functional mobility             [x]ADLs           [x] Strength                  [x]Cognition   [x] Functional transfers           [x] IADLs         [x] Safety Awareness   [x]Endurance   [] Fine Coordination              [x] Balance      [] Vision/perception   [x]Sensation     []Gross Motor Coordination  [] ROM           [] Delirium                   [] Motor Control      OT PLAN OF CARE   OT POC is based on physician orders, patient diagnosis, and results of clinical assessment.   Frequency/Duration 2-5 days/week for 2 weeks PRN   Specific OT Treatment Interventions to Include:   * Instruction/training on adapted ADL techniques and AE recommendations to increase functional independence within precautions       * Training on energy conservation strategies, correct breathing pattern and techniques to improve independence/tolerance for self-care routine  * Functional transfer/mobility training/DME recommendations for increased independence, safety, and fall prevention  * Patient/Family education to increase follow through with safety techniques and functional independence  * Recommendation of environmental modifications for increased safety with functional transfers/mobility and ADLs  * Cognitive retraining/development of therapeutic activities to improve problem solving, judgement, memory, and attention for increased safety/participation in ADL/IADL tasks  * Therapeutic exercise to improve motor endurance, ROM, and functional strength for ADLs/functional transfers  * Therapeutic activities to facilitate/challenge dynamic balance, stand tolerance for increased safety and independence with ADLs  * Neuro-muscular re-education: facilitation of righting/equilibrium reactions, midline orientation, scapular stability/mobility, normalization of muscle tone, and facilitation of volitional active controled movement  * Positioning to improve skin integrity, interaction with environment and functional independence     Recommended Adaptive Equipment: continue to assess       Home Living: Patient admitted from [facility] SNF/[facility]. Prior Level of Function (PLOF): Patient unable to report her PLOF with regard to ADLs and functional transfers/mobility. No family/caregiver present to provide this information. Pain Level: pt did not report pain. States she feels weak. Cognition: Pt awake and alert. Limited safety judgement and awareness. Cues for safety. Functional Assessment:                  AM-PAC Daily Activity Raw Score: 17/24    Initial Eval Status  Date: 2/7/2023 Treatment Status  Date: 2/9/23  Short Term Goals = Long Term Goals   Feeding Mod A Setup   SBA   Grooming Mod A  CGA for balance while standing at the sink. Setup (seated)   UB Dressing Max A Min A     Min A    LB Dressing Dependent Min A     Mod A - with use of AE, as needed/appropriate   Bathing Dependent   Mod A - with use of AE/DME, as needed/appropriate   Toileting Dependent  Patient with richard catheter currently. SBA  Pt able to complete toilet hygiene while seated on the toilet. Nursing notified of pt holding urine. Urinated a lot while seated on the toilet during this session. May benefit from scheduled trips to the bathroom as she is not using call light to go to the bathroom.      Mod A   Bed Mobility  Supine-to-Sit: Max A  Sit-to-Supine: Max A  SBA supine <> sit     Min A in order to maximize patient's independence/participation with ADLs, re-positioning, and other functional tasks. Functional Transfers Patient declined to attempt sit-to-stand. CGA from bed and toilet surfaces. Min A   Functional Mobility Not assessed. Min A using w/w to walk to and from bathroom. Assist to guide walker around environmental barriers in the room. Mod A with functional mobility (with device, as needed/appropriate) in order to maximize independence with ADLs/IADLs and other functional tasks. Balance Sitting: Fair- (at EOB) Stand balance during ADL CGA. Fair+ dynamic standing balance during completion of ADLs/IADLs and other functional tasks. Activity Tolerance Limited Limited   Fatigue with activity. Patient will demonstrate Good understanding and consistent implementation of energy conservation techniques and work simplification techniques into ADL/IADL routines. Visual/  Perceptual WFL      N/A   B UE Strength B Shoulders: 3-/5  Distal B UEs: 3+/5   Patient will demonstrate 4/5 B UE strength in order to maximize independence with ADLs/IADLs and functional transfers. Comments:  pt laying in the bed. Agreeable to therapy. Completed AdL activity while in the bathroom. She returned to the bed to eat her lunch. Bed alarm activated. Education/treatment:  ADL retraining with facilitation of movement to increase self care skills. Therapeutic activity to address balance and endurance for ADL and transfers. Pt education of daily orientation and walker safety. Pt has made  progress towards set goals.        Time In: 10:35  Time Out: 11:00      Min Units   Therapeutic Ex 13551     Therapeutic Activities 16447 5    ADL/Self Care 94106 20 2   Orthotic Management 66086     Neuro Re-Ed 00259     Non-Billable Time     TOTAL TIMED TREATMENT 25 [facility] [place]/L [address]

## 2023-02-09 NOTE — PLAN OF CARE
Problem: Discharge Planning  Goal: Discharge to home or other facility with appropriate resources  2/9/2023 0440 by Fidel Pruitt RN  Outcome: Progressing  2/8/2023 1813 by Kiah Engel RN  Outcome: Progressing     Problem: Pain  Goal: Verbalizes/displays adequate comfort level or baseline comfort level  2/9/2023 0440 by Fidel Pruitt RN  Outcome: Progressing  2/8/2023 1813 by Kiah Engel RN  Outcome: Progressing     Problem: Skin/Tissue Integrity  Goal: Absence of new skin breakdown  Description: 1. Monitor for areas of redness and/or skin breakdown  2. Assess vascular access sites hourly  3. Every 4-6 hours minimum:  Change oxygen saturation probe site  4. Every 4-6 hours:  If on nasal continuous positive airway pressure, respiratory therapy assess nares and determine need for appliance change or resting period.   2/9/2023 0440 by Fidel Pruitt RN  Outcome: Progressing  2/8/2023 1813 by Kiah Engel RN  Outcome: Progressing     Problem: Safety - Adult  Goal: Free from fall injury  Outcome: Progressing     Problem: ABCDS Injury Assessment  Goal: Absence of physical injury  Outcome: Progressing     Problem: Chronic Conditions and Co-morbidities  Goal: Patient's chronic conditions and co-morbidity symptoms are monitored and maintained or improved  Outcome: Progressing

## 2023-02-09 NOTE — PROGRESS NOTES
Attempted follow up. Patient in MRI. BP's remain elevated. Spoke with nursing who reports improvement in mental status. Patient sat at the side of bed this morning and ate breakfast. She asked to brush her teeth and walked to the bathroom. Tremors improved with Cogentin. Continue to hold psychotropics. Sitter has been discontinued. I will follow up tomorrow. Please call with any psychiatric needs. Thank you.       Electronically signed by IFEANYI Paredes CNP on 2/9/2023 at 12:20 PM

## 2023-02-10 VITALS
TEMPERATURE: 98 F | SYSTOLIC BLOOD PRESSURE: 158 MMHG | OXYGEN SATURATION: 96 % | WEIGHT: 141 LBS | DIASTOLIC BLOOD PRESSURE: 90 MMHG | HEART RATE: 64 BPM | RESPIRATION RATE: 18 BRPM | BODY MASS INDEX: 22.66 KG/M2 | HEIGHT: 66 IN

## 2023-02-10 LAB — SARS-COV-2, NAAT: NOT DETECTED

## 2023-02-10 PROCEDURE — 94660 CPAP INITIATION&MGMT: CPT

## 2023-02-10 PROCEDURE — 87635 SARS-COV-2 COVID-19 AMP PRB: CPT

## 2023-02-10 PROCEDURE — 97535 SELF CARE MNGMENT TRAINING: CPT

## 2023-02-10 PROCEDURE — 2580000003 HC RX 258: Performed by: STUDENT IN AN ORGANIZED HEALTH CARE EDUCATION/TRAINING PROGRAM

## 2023-02-10 PROCEDURE — 6360000002 HC RX W HCPCS: Performed by: NURSE PRACTITIONER

## 2023-02-10 PROCEDURE — 6370000000 HC RX 637 (ALT 250 FOR IP): Performed by: INTERNAL MEDICINE

## 2023-02-10 PROCEDURE — 6370000000 HC RX 637 (ALT 250 FOR IP): Performed by: NURSE PRACTITIONER

## 2023-02-10 RX ORDER — LISINOPRIL 5 MG/1
5 TABLET ORAL DAILY
Qty: 30 TABLET | Refills: 3 | Status: SHIPPED | OUTPATIENT
Start: 2023-02-11

## 2023-02-10 RX ORDER — RIVASTIGMINE TARTRATE 3 MG/1
3 CAPSULE ORAL 2 TIMES DAILY
Qty: 60 CAPSULE | Refills: 3 | Status: SHIPPED | OUTPATIENT
Start: 2023-02-10

## 2023-02-10 RX ORDER — LANOLIN ALCOHOL/MO/W.PET/CERES
3 CREAM (GRAM) TOPICAL NIGHTLY
Qty: 30 TABLET | Refills: 3 | Status: SHIPPED | OUTPATIENT
Start: 2023-02-10

## 2023-02-10 RX ADMIN — SENNOSIDES 8.6 MG: 8.6 TABLET, COATED ORAL at 10:03

## 2023-02-10 RX ADMIN — BENZTROPINE MESYLATE 0.5 MG: 0.5 TABLET ORAL at 09:23

## 2023-02-10 RX ADMIN — LISINOPRIL 5 MG: 5 TABLET ORAL at 09:23

## 2023-02-10 RX ADMIN — SODIUM CHLORIDE, SODIUM LACTATE, POTASSIUM CHLORIDE, CALCIUM CHLORIDE AND DEXTROSE MONOHYDRATE: 5; 600; 310; 30; 20 INJECTION, SOLUTION INTRAVENOUS at 06:33

## 2023-02-10 RX ADMIN — ENOXAPARIN SODIUM 40 MG: 100 INJECTION SUBCUTANEOUS at 09:23

## 2023-02-10 ASSESSMENT — PAIN SCALES - GENERAL: PAINLEVEL_OUTOF10: 0

## 2023-02-10 NOTE — PLAN OF CARE
Problem: Discharge Planning  Goal: Discharge to home or other facility with appropriate resources  2/10/2023 1039 by Brooke Ignacio RN  Outcome: Completed     Problem: Pain  Goal: Verbalizes/displays adequate comfort level or baseline comfort level  2/10/2023 1039 by Brooke Ignacio RN  Outcome: Completed     Problem: Skin/Tissue Integrity  Goal: Absence of new skin breakdown  Description: 1. Monitor for areas of redness and/or skin breakdown  2. Assess vascular access sites hourly  3. Every 4-6 hours minimum:  Change oxygen saturation probe site  4. Every 4-6 hours:  If on nasal continuous positive airway pressure, respiratory therapy assess nares and determine need for appliance change or resting period.   2/10/2023 1039 by Brooke Ignacio RN  Outcome: Completed     Problem: Safety - Adult  Goal: Free from fall injury  2/10/2023 1039 by Brooke Ignacio RN  Outcome: Completed     Problem: ABCDS Injury Assessment  Goal: Absence of physical injury  2/10/2023 1039 by Brooke Ignacio RN  Outcome: Completed

## 2023-02-10 NOTE — PLAN OF CARE
Problem: Discharge Planning  Goal: Discharge to home or other facility with appropriate resources  2/10/2023 0342 by Marie Carmichael RN  Outcome: Progressing  2/9/2023 1611 by Campbell Champagne RN  Outcome: Progressing     Problem: Pain  Goal: Verbalizes/displays adequate comfort level or baseline comfort level  2/10/2023 0342 by Marie Carmichael RN  Outcome: Progressing  2/9/2023 1611 by Campbell Champagne RN  Outcome: Progressing     Problem: Skin/Tissue Integrity  Goal: Absence of new skin breakdown  Description: 1. Monitor for areas of redness and/or skin breakdown  2. Assess vascular access sites hourly  3. Every 4-6 hours minimum:  Change oxygen saturation probe site  4. Every 4-6 hours:  If on nasal continuous positive airway pressure, respiratory therapy assess nares and determine need for appliance change or resting period.   2/10/2023 0342 by Marie Carmichael RN  Outcome: Progressing  2/9/2023 1611 by Campbell Champagne RN  Outcome: Progressing     Problem: Safety - Adult  Goal: Free from fall injury  2/10/2023 0342 by Marie Carmichael RN  Outcome: Progressing  2/9/2023 1611 by Campbell Champagne RN  Outcome: Progressing     Problem: ABCDS Injury Assessment  Goal: Absence of physical injury  2/10/2023 0342 by Marie Carmichael RN  Outcome: Progressing  2/9/2023 1611 by Campbell Champagne RN  Outcome: Progressing     Problem: Chronic Conditions and Co-morbidities  Goal: Patient's chronic conditions and co-morbidity symptoms are monitored and maintained or improved  2/10/2023 0342 by Marie Carmichael RN  Outcome: Progressing  2/9/2023 1611 by Campbell Champagne RN  Outcome: Progressing

## 2023-02-10 NOTE — PROGRESS NOTES
Occupational Therapy  OT BEDSIDE TREATMENT NOTE      Date:2/10/2023  Patient Name: Chicho Cao  MRN: [id number]  : 1960  Room: [address] [place] [address]     Evaluating OT: CARL Adames [id number]     Referring Provider: IFEANYI Coles CNP  Specific Provider Orders/Date: \"OT eval and treat\" - 2/3/2023     Diagnosis: Altered mental status, unspecified altered mental status type [R41.82]  AMS (altered mental status) [R41.82]      Pertinent Medical History: schizophrenia, DM, h/o head injury, depression, IBS      Precautions: fall risk     Assessment of Current Deficits:    [x] Functional mobility             [x]ADLs           [x] Strength                  [x]Cognition   [x] Functional transfers           [x] IADLs         [x] Safety Awareness   [x]Endurance   [] Fine Coordination              [x] Balance      [] Vision/perception   [x]Sensation     []Gross Motor Coordination  [] ROM           [] Delirium                   [] Motor Control      OT PLAN OF CARE   OT POC is based on physician orders, patient diagnosis, and results of clinical assessment.   Frequency/Duration 2-5 days/week for 2 weeks PRN   Specific OT Treatment Interventions to Include:   * Instruction/training on adapted ADL techniques and AE recommendations to increase functional independence within precautions       * Training on energy conservation strategies, correct breathing pattern and techniques to improve independence/tolerance for self-care routine  * Functional transfer/mobility training/DME recommendations for increased independence, safety, and fall prevention  * Patient/Family education to increase follow through with safety techniques and functional independence  * Recommendation of environmental modifications for increased safety with functional transfers/mobility and ADLs  * Cognitive retraining/development of therapeutic activities to improve problem solving, judgement, memory, and attention for increased safety/participation in ADL/IADL tasks  * Therapeutic exercise to improve motor endurance, ROM, and functional strength for ADLs/functional transfers  * Therapeutic activities to facilitate/challenge dynamic balance, stand tolerance for increased safety and independence with ADLs  * Neuro-muscular re-education: facilitation of righting/equilibrium reactions, midline orientation, scapular stability/mobility, normalization of muscle tone, and facilitation of volitional active controled movement  * Positioning to improve skin integrity, interaction with environment and functional independence     Recommended Adaptive Equipment: continue to assess       Home Living: Patient admitted from [facility] SN[facility]. Prior Level of Function (PLOF): Patient unable to report her PLOF with regard to ADLs and functional transfers/mobility. No family/caregiver present to provide this information. Pain Level: pt did not report pain. Cognition: Pt awake and alert. Limited safety judgement and awareness. Cues for safety. Functional Assessment:                  AM-PAC Daily Activity Raw Score: 17/24    Initial Eval Status  Date: 2/7/2023 Treatment Status  Date: 2/10/23  Short Term Goals = Long Term Goals   Feeding Mod A Setup   SBA   Grooming Mod A  CGA for balance while standing at the sink. Setup (seated)   UB Dressing Max A Min A     Min A    LB Dressing Dependent Min A     Mod A - with use of AE, as needed/appropriate   Bathing Dependent   Mod A - with use of AE/DME, as needed/appropriate   Toileting Dependent  Patient with richard catheter currently. SBA     Mod A   Bed Mobility  Supine-to-Sit: Max A  Sit-to-Supine: Max A  Supervision supine <> sit     Min A in order to maximize patient's independence/participation with ADLs, re-positioning, and other functional tasks. Functional Transfers Patient declined to attempt sit-to-stand. SBA from bed and toilet surfaces. Min A   Functional Mobility Not assessed.  CGA without device to and from bathroom. Mod A with functional mobility (with device, as needed/appropriate) in order to maximize independence with ADLs/IADLs and other functional tasks. Balance Sitting: Fair- (at EOB)  Fair+ dynamic standing balance during completion of ADLs/IADLs and other functional tasks. Activity Tolerance Limited Limited    Patient will demonstrate Good understanding and consistent implementation of energy conservation techniques and work simplification techniques into ADL/IADL routines. Visual/  Perceptual WFL      N/A   B UE Strength B Shoulders: 3-/5  Distal B UEs: 3+/5   Patient will demonstrate 4/5 B UE strength in order to maximize independence with ADLs/IADLs and functional transfers. Comments:  Pt getting out of the bed to use BSC. Encouraged movement and pt completed mobility to and from bathroom to use toilet. Cues for safety during transfers and mobility. Pt declined to sit in the chair. States she just lays in her bed at home. Bed alarm activated. Education/treatment:  ADL retraining with facilitation of movement to increase self care skills. Therapeutic activity to address balance and endurance for ADL and transfers. Pt education of daily orientation. Pt has made  progress towards set goals.        Time In: 10:45  Time Out: 11:00      Min Units   Therapeutic Ex 42186     Therapeutic Activities 68595     ADL/Self Care 64701 15 1   Orthotic Management 78654     Neuro Re-Ed 86284     Non-Billable Time     TOTAL TIMED TREATMENT 15 [address] [facility] [place]/ [address]

## 2023-02-10 NOTE — CARE COORDINATION
Social Work/Discharge Planning:  Anticipate patient will discharge today. Called [facility] and arranged ambulance transport for 2:30pm. Notified patient, her Chapincito Benitez (ph: [phone]) via voicemail, charge nurse, and liaison Sharyn with [facility] of anticipated discharge time. Electronically signed by LIZA Delgado on 2/10/2023 at 8:04 AM    Addendum:  [facility] unable to pick patient until an hour or two. Lencho Guzman with [facility] states they are able to  patient now for transport. Called [facility] and cancelled . Provided update to RN.   Electronically signed by LIZA Delgado on 2/10/2023 at 2:25 PM

## 2023-02-11 LAB
ORGANISM: ABNORMAL
ORGANISM: ABNORMAL

## 2023-02-16 NOTE — DISCHARGE SUMMARY
[facility]   Discharge summary   Patient ID:  Dylan Brewer  [id number]  58 y.o.  1960    Admit date: 2/3/2023    Discharge date and time: 2/10/23    Admission Diagnoses:   Patient Active Problem List   Diagnosis    Chest pain    Hyponatremia    Trigeminal neuralgia syndrome    Chronic nonintractable headache    Chronic constipation    Head injury    Jaw pain    Pain in gums    PTSD (post-traumatic stress disorder)    Other headache syndrome    Cervical pain (neck)    Depression with suicidal ideation    Severe protein-calorie malnutrition (HCC)    Pelvic pain    Dementia, vascular, with depression    H/O irritable bowel syndrome    Schizoaffective disorder, bipolar type (HCC)    AMS (altered mental status)    Hypothermia    Bradycardia       Discharge Diagnoses: AMS    Consults: ID and psychiatry    Procedures: none    Hospital Course:     Patient is a 45-year-old female with an extensive psych hx who is admitted to [facility] for  AMS/metabolic encephalopathy  -Monitor labs  -procal 0.07  -Blood cultures pending  -UA negative  -Urine drug screen negative  -Viral panel was negative  -Ammonia level normal  -ABGs patient found to be mildly hypercapnic at 50.6-lactulose 20 p.o. 3 times daily  -Obtain MRI brain, rule out any acute or chronic pathology, would also consider lumbar puncture  -Hold psych drugs as she has multiple main drugs that have been added most recently prior to her acute change in mentation  -Consult psych to assist with management of her current antipsychotics  -IV vanc and Zosyn for now-we will discontinue as there appears to be no septic etiology of her encephalopathy        2/5/23:  -IV Vanco and Zosyn discontinued per ID  -MRI brain, so far unable to complete screening questionnaire   -Psych eval completed, some psych medications which were held since admission, now  resumed by psychiatry  -AMS attributed to infectious etiology by psychiatry  -BP is much better today  -+ BC likely contaminant-staph species GPC in clusters  -Check procalcitonin in a.m. and CBC     2/6/23  -BPs remain stable, now again bradycardic especially at nighttime.  -Mildly hypothermic this morning at 92.6, bear hugger applied following psych meds given again this morning however held after discussion with psychiatry  -K+ 3.2, replaced > only able to get partial dose, recheck BMP this evening  -Case discussed with psychiatry NP-hold psychiatric medications for now  -Continue to monitor mentation  -ID has signed off and do not believe that this is a septic etiology of altered mentation  -Check CBC in a.m. along with procalcitonin     2/7/2023  Overall appears much more alert today and able to answer questions  Sitter at bedside however  No antibiotics per infectious disease  Current psych medications on hold  Echocardiogram completed with ejection fraction 50%, moderate mitral regurg  Physical therapy evaluation 8/24  Will require rehab at discharge     2/8/23:  -MRI brain still pending completion   -Psych meds still held   -Continue IVF  -Monitor BP's, moments of hypertension noted   -Patient by reflexes/adjust dosages of psych medications, she can be discharged from medicine standpoint  -Off antibiotics     2/9/2023  Status is markedly improved today, able to answer questions appropriately  Await psych to resume some of her psych meds as needed  From medicine standpoint she is okay for discharge back to facility that she came from  MRI brain completed no convincing acute intracranial abnormality or infarct  Blood pressure slightly elevated still-adjust BP meds  Oxygen saturations within normal limits on room air     2/10/23  -Return back to [facility] on discharge   -multiple psych meds discontinued as this contributed to her AMS on admission.   -started on BP medication for tighter BP control     No results for input(s): WBC, HGB, HCT, PLT in the last 72 hours.     No results for input(s): NA, K, CL, CO2, BUN, CREATININE, GLU, CALCIUM in the last 72 hours. CT Head W/O Contrast    Result Date: 2/3/2023  EXAMINATION: CT OF THE HEAD WITHOUT CONTRAST  2/3/2023 7:58 pm TECHNIQUE: CT of the head was performed without the administration of intravenous contrast. Automated exposure control, iterative reconstruction, and/or weight based adjustment of the mA/kV was utilized to reduce the radiation dose to as low as reasonably achievable. COMPARISON: CT head without contrast, 10/05/2020. HISTORY: ORDERING SYSTEM PROVIDED HISTORY: AMS TECHNOLOGIST PROVIDED HISTORY: Reason for exam:->AMS Has a \"code stroke\" or \"stroke alert\" been called? ->No Decision Support Exception - unselect if not a suspected or confirmed emergency medical condition->Emergency Medical Condition (MA) FINDINGS: BRAIN/VENTRICLES: No mass effect, edema or hemorrhage is seen. An enlarged perivascular space is seen the in the inferior left basal ganglia. The remainder of the brain is notable for mild volume loss and chronic microvascular ischemic changes. No hydrocephalus or extra-axial fluid is seen. ORBITS: The visualized portion of the orbits demonstrate no acute abnormality. SINUSES: The visualized paranasal sinuses and mastoid air cells demonstrate no acute abnormality. SOFT TISSUES/SKULL:  No acute abnormality of the visualized skull or soft tissues. No acute intracranial abnormality. XR CHEST PORTABLE    Result Date: 2/3/2023  EXAMINATION: ONE XRAY VIEW OF THE CHEST 2/3/2023 7:36 pm COMPARISON: October 5, 2020 HISTORY: ORDERING SYSTEM PROVIDED HISTORY: AMS TECHNOLOGIST PROVIDED HISTORY: Reason for exam:->AMS FINDINGS: There is cardiomegaly. There is minimal atelectasis/infiltrates and pleural effusions in the lung bases. The remainder of the lungs are clear. Mild CHF. Left basilar pneumonia is less likely. Discharge Exam:    HEENT: NCAT,  PERRLA, No JVD  Heart:  RRR, no murmurs, gallops, or rubs.   Lungs:  CTA bilaterally, no wheeze, rales or rhonchi  Abd: bowel sounds present, nontender, nondistended, no masses  Extrem:  No clubbing, cyanosis, or edema    Disposition: [facility]     Patient Condition at Discharge: Stable    Patient Instructions:      Medication List        START taking these medications      lisinopril 5 MG tablet  Commonly known as: PRINIVIL;ZESTRIL  Take 1 tablet by mouth daily     melatonin 3 MG Tabs tablet  Take 1 tablet by mouth nightly            CONTINUE taking these medications      acetaminophen 325 MG tablet  Commonly known as: TYLENOL     benztropine 0.5 MG tablet  Commonly known as: COGENTIN     Invega Sustenna 234 MG/1.5ML Albertina IM injection  Generic drug: paliperidone palmitate ER     magnesium hydroxide 400 MG/5ML suspension  Commonly known as: Milk of Magnesia  Take 30 mLs by mouth daily as needed for Constipation     polyethylene glycol 17 g Pack packet  Commonly known as: MIRALAX     rivastigmine 3 MG capsule  Commonly known as: EXELON  Take 1 capsule by mouth 2 times daily     therapeutic multivitamin-minerals tablet     topiramate 100 MG tablet  Commonly known as: TOPAMAX  Take 1 tablet by mouth 2 times daily     vitamin D 125 MCG (5000 UT) Caps capsule  Commonly known as: CHOLECALCIFEROL            STOP taking these medications      lactulose 20 g packet  Commonly known as: CEPHULAC     Lumateperone Tosylate 42 MG Caps     mirtazapine 15 MG tablet  Commonly known as: REMERON     RisperDAL 1 MG tablet  Generic drug: risperiDONE     risperiDONE 1 MG tablet  Commonly known as: RISPERDAL     sertraline 50 MG tablet  Commonly known as: ZOLOFT     trihexyphenidyl 2 MG tablet  Commonly known as: ARTANE               Where to Get Your Medications        These medications were sent to [place] [id number] -  [phone] - F [phone]  [address] [place] [id number]      Phone: [phone]   lisinopril 5 MG tablet  melatonin 3 MG Tabs tablet       You can get these  medications from any pharmacy    Bring a paper prescription for each of these medications  rivastigmine 3 MG capsule       Activity: activity as tolerated  Diet: regular diet    Pt has been advised to: Follow-up with Brian Benítez DO in 1 week.   Follow-up with consultants as recommended by them    Note that over 30 minutes was spent in preparing discharge papers, discussing discharge with patient, medication review, etc.    Signed:  Kelsey Kumari MD  2/10/23  2:30 PM

## 2023-04-30 ENCOUNTER — APPOINTMENT (OUTPATIENT)
Dept: CT IMAGING | Age: 63
End: 2023-04-30
Payer: MEDICAID

## 2023-04-30 ENCOUNTER — APPOINTMENT (OUTPATIENT)
Dept: GENERAL RADIOLOGY | Age: 63
End: 2023-04-30
Payer: MEDICAID

## 2023-04-30 ENCOUNTER — HOSPITAL ENCOUNTER (EMERGENCY)
Age: 63
Discharge: HOME | End: 2023-05-01
Attending: EMERGENCY MEDICINE
Payer: MEDICAID

## 2023-04-30 DIAGNOSIS — R41.82 ALTERED MENTAL STATUS, UNSPECIFIED ALTERED MENTAL STATUS TYPE: ICD-10-CM

## 2023-04-30 DIAGNOSIS — N30.00 ACUTE CYSTITIS WITHOUT HEMATURIA: Primary | ICD-10-CM

## 2023-04-30 LAB
ACANTHOCYTES: ABNORMAL
ALBUMIN SERPL-MCNC: 4.1 G/DL (ref 3.5–5.2)
ALP SERPL-CCNC: 119 U/L (ref 35–104)
ALT SERPL-CCNC: 62 U/L (ref 0–32)
ANION GAP SERPL CALCULATED.3IONS-SCNC: 10 MMOL/L (ref 7–16)
ANISOCYTOSIS: ABNORMAL
AST SERPL-CCNC: 67 U/L (ref 0–31)
BACTERIA URNS QL MICRO: ABNORMAL /HPF
BASOPHILIC STIPPLING: ABNORMAL
BASOPHILS # BLD: 0.01 E9/L (ref 0–0.2)
BASOPHILS NFR BLD: 0.3 % (ref 0–2)
BILIRUB SERPL-MCNC: 0.2 MG/DL (ref 0–1.2)
BILIRUB UR QL STRIP: NEGATIVE
BUN SERPL-MCNC: 18 MG/DL (ref 6–23)
CALCIUM SERPL-MCNC: 11 MG/DL (ref 8.6–10.2)
CHLORIDE SERPL-SCNC: 94 MMOL/L (ref 98–107)
CLARITY UR: CLEAR
CO2 SERPL-SCNC: 31 MMOL/L (ref 22–29)
COLOR UR: YELLOW
CREAT SERPL-MCNC: 1 MG/DL (ref 0.5–1)
EOSINOPHIL # BLD: 0.03 E9/L (ref 0.05–0.5)
EOSINOPHIL NFR BLD: 0.9 % (ref 0–6)
ERYTHROCYTE [DISTWIDTH] IN BLOOD BY AUTOMATED COUNT: 15.3 FL (ref 11.5–15)
GLUCOSE SERPL-MCNC: 125 MG/DL (ref 74–99)
GLUCOSE UR STRIP-MCNC: NEGATIVE MG/DL
HCT VFR BLD AUTO: 36.5 % (ref 34–48)
HGB BLD-MCNC: 11.7 G/DL (ref 11.5–15.5)
HGB UR QL STRIP: NEGATIVE
IMM GRANULOCYTES # BLD: 0.01 E9/L
IMM GRANULOCYTES NFR BLD: 0.3 % (ref 0–5)
KETONES UR STRIP-MCNC: ABNORMAL MG/DL
LACTATE BLDV-SCNC: 1 MMOL/L (ref 0.5–2.2)
LEUKOCYTE ESTERASE UR QL STRIP: ABNORMAL
LIPASE: 56 U/L (ref 13–60)
LYMPHOCYTES # BLD: 0.58 E9/L (ref 1.5–4)
LYMPHOCYTES NFR BLD: 17.6 % (ref 20–42)
MCH RBC QN AUTO: 30.1 PG (ref 26–35)
MCHC RBC AUTO-ENTMCNC: 32.1 % (ref 32–34.5)
MCV RBC AUTO: 93.8 FL (ref 80–99.9)
MONOCYTES # BLD: 0.16 E9/L (ref 0.1–0.95)
MONOCYTES NFR BLD: 4.9 % (ref 2–12)
NEUTROPHILS # BLD: 2.5 E9/L (ref 1.8–7.3)
NEUTS SEG NFR BLD: 76 % (ref 43–80)
NITRITE UR QL STRIP: POSITIVE
OVALOCYTES: ABNORMAL
PH UR STRIP: 5.5 [PH] (ref 5–9)
PLATELET # BLD AUTO: 141 E9/L (ref 130–450)
PMV BLD AUTO: 10.6 FL (ref 7–12)
POIKILOCYTES: ABNORMAL
POLYCHROMASIA: ABNORMAL
POTASSIUM SERPL-SCNC: 4.5 MMOL/L (ref 3.5–5)
PROT SERPL-MCNC: 7 G/DL (ref 6.4–8.3)
PROT UR STRIP-MCNC: NEGATIVE MG/DL
RBC # BLD AUTO: 3.89 E12/L (ref 3.5–5.5)
RBC #/AREA URNS HPF: ABNORMAL /HPF (ref 0–2)
SODIUM SERPL-SCNC: 135 MMOL/L (ref 132–146)
SP GR UR STRIP: 1.02 (ref 1–1.03)
TARGET CELLS: ABNORMAL
TROPONIN, HIGH SENSITIVITY: 13 NG/L (ref 0–9)
TROPONIN, HIGH SENSITIVITY: 14 NG/L (ref 0–9)
UROBILINOGEN UR STRIP-ACNC: 0.2 E.U./DL
WBC # BLD: 3.3 E9/L (ref 4.5–11.5)
WBC #/AREA URNS HPF: >20 /HPF (ref 0–5)

## 2023-04-30 PROCEDURE — 71045 X-RAY EXAM CHEST 1 VIEW: CPT

## 2023-04-30 PROCEDURE — 2580000003 HC RX 258

## 2023-04-30 PROCEDURE — 36415 COLL VENOUS BLD VENIPUNCTURE: CPT

## 2023-04-30 PROCEDURE — 83690 ASSAY OF LIPASE: CPT

## 2023-04-30 PROCEDURE — 87088 URINE BACTERIA CULTURE: CPT

## 2023-04-30 PROCEDURE — 84484 ASSAY OF TROPONIN QUANT: CPT

## 2023-04-30 PROCEDURE — 87040 BLOOD CULTURE FOR BACTERIA: CPT

## 2023-04-30 PROCEDURE — 93005 ELECTROCARDIOGRAM TRACING: CPT

## 2023-04-30 PROCEDURE — 6360000002 HC RX W HCPCS

## 2023-04-30 PROCEDURE — 87150 DNA/RNA AMPLIFIED PROBE: CPT

## 2023-04-30 PROCEDURE — 87186 SC STD MICRODIL/AGAR DIL: CPT

## 2023-04-30 PROCEDURE — 99285 EMERGENCY DEPT VISIT HI MDM: CPT

## 2023-04-30 PROCEDURE — 96374 THER/PROPH/DIAG INJ IV PUSH: CPT

## 2023-04-30 PROCEDURE — 85025 COMPLETE CBC W/AUTO DIFF WBC: CPT

## 2023-04-30 PROCEDURE — 70450 CT HEAD/BRAIN W/O DYE: CPT

## 2023-04-30 PROCEDURE — 83605 ASSAY OF LACTIC ACID: CPT

## 2023-04-30 PROCEDURE — 81001 URINALYSIS AUTO W/SCOPE: CPT

## 2023-04-30 PROCEDURE — 80053 COMPREHEN METABOLIC PANEL: CPT

## 2023-04-30 RX ORDER — CEFDINIR 300 MG/1
300 CAPSULE ORAL 2 TIMES DAILY
Qty: 14 CAPSULE | Refills: 0 | Status: SHIPPED | OUTPATIENT
Start: 2023-04-30 | End: 2023-05-07

## 2023-04-30 RX ADMIN — WATER 1000 MG: 1 INJECTION INTRAMUSCULAR; INTRAVENOUS; SUBCUTANEOUS at 23:00

## 2023-04-30 ASSESSMENT — PAIN - FUNCTIONAL ASSESSMENT: PAIN_FUNCTIONAL_ASSESSMENT: NONE - DENIES PAIN

## 2023-05-01 VITALS
HEART RATE: 48 BPM | OXYGEN SATURATION: 100 % | DIASTOLIC BLOOD PRESSURE: 71 MMHG | SYSTOLIC BLOOD PRESSURE: 131 MMHG | BODY MASS INDEX: 22.66 KG/M2 | WEIGHT: 141 LBS | HEIGHT: 66 IN | RESPIRATION RATE: 16 BRPM | TEMPERATURE: 97.2 F

## 2023-05-01 LAB
EKG ATRIAL RATE: 60 BPM
EKG P AXIS: 56 DEGREES
EKG P-R INTERVAL: 196 MS
EKG Q-T INTERVAL: 470 MS
EKG QRS DURATION: 102 MS
EKG QTC CALCULATION (BAZETT): 470 MS
EKG R AXIS: 84 DEGREES
EKG T AXIS: 58 DEGREES
EKG VENTRICULAR RATE: 60 BPM

## 2023-05-01 PROCEDURE — 93010 ELECTROCARDIOGRAM REPORT: CPT | Performed by: INTERNAL MEDICINE

## 2023-05-01 NOTE — ED PROVIDER NOTES
[address]        Pt Name: Jimena Araya  MRN: [id number]  [place] 1960  Date of evaluation: 4/30/2023  Provider: Monie Mccoy DO  PCP: Celeste Fung DO  Note Started: 10:26 PM EDT 4/30/23    CHIEF COMPLAINT       Chief Complaint   Patient presents with    Altered Mental Status     Per facility has increased AMS, has history of schizo and dementia. Report not eating and drinking as much as she usually does. HISTORY OF PRESENT ILLNESS: 1 or more Elements   History From: EMS    Limitations to history : Altered Mental Status    Any Jackson is a 58 y.o. female who presents to the emergency department with chief complaint of altered mental status. Per nursing home facility and EMS patient has been more altered over the past several days and has not been eating and drinking for the past 4 to 5 days. Patient does have a history of schizophrenia and dementia but is not at her baseline at this time. Patient also complaining of abdominal pain but otherwise is not able to provide any history or review of systems due to her altered mental status. Patient is hemodynamically stable and resting comfortably upon initial evaluation. Nursing Notes were all reviewed and agreed with or any disagreements were addressed in the HPI. REVIEW OF SYSTEMS :      Positives and Pertinent negatives as per HPI.      SURGICAL HISTORY     Past Surgical History:   Procedure Laterality Date    CYST REMOVAL Right 2008    behind knee by Dr. Gualberto Grajeda    by Dr. Haddad Asp       Discharge Medication List as of 4/30/2023 11:58 PM        CONTINUE these medications which have NOT CHANGED    Details   lisinopril (PRINIVIL;ZESTRIL) 5 MG tablet Take 1 tablet by mouth daily, Disp-30 tablet, R-3Normal      melatonin 3 MG TABS tablet Take 1 tablet by mouth nightly, Disp-30

## 2023-05-01 NOTE — ED NOTES
Patient left via [facility] to [facility]. Discharge instructions reviewed , including diagnosis, medications, follow up appointments, home care, and also when to call 911. All discharge instructions questions answered.      IV removed    Pt left ED ambulance to facility       Lc Bonilla RN  05/01/23 0045

## 2023-05-02 LAB
A BAUMANNII DNA BLD POS QL NAA+NON-PROBE: NOT DETECTED
BOTTLE TYPE: ABNORMAL
C ALBICANS DNA BLD POS QL NAA+NON-PROBE: NOT DETECTED
C AURIS DNA BLD POS QL NAA+PROBE: NOT DETECTED
C GLABRATA DNA BLD POS QL NAA+NON-PROBE: NOT DETECTED
C KRUSEI DNA BLD POS QL NAA+NON-PROBE: NOT DETECTED
C PARAP DNA BLD POS QL NAA+NON-PROBE: NOT DETECTED
C TROPICLS DNA BLD POS QL NAA+NON-PROBE: NOT DETECTED
CRYPTOCOCCUS NEOFORMANS/GATTII BY PCR: NOT DETECTED
E CLOAC COMP DNA BLD POS NAA+NON-PROBE: NOT DETECTED
E COLI DNA BLD POS QL NAA+NON-PROBE: NOT DETECTED
E FAECALIS DNA BLD POS QL NAA+PROBE: NOT DETECTED
E FAECIUM DNA BLD POS QL NAA+PROBE: NOT DETECTED
ENTEROBACT DNA BLD POS QL NAA+NON-PROBE: NOT DETECTED
ENTEROCOC DNA BLD POS QL NAA+NON-PROBE: NOT DETECTED
GN BLD CULTURE PNL BLD POS NAA+PROBE: NOT DETECTED
K OXYTOCA DNA BLD POS QL NAA+NON-PROBE: NOT DETECTED
K PNEUMON DNA SPEC QL NAA+PROBE: NOT DETECTED
K. AEROGENES DNA SPEC QL NAA+PROBE: NOT DETECTED
L MONOCYTOG DNA BLD POS QL NAA+NON-PROBE: NOT DETECTED
N MEN DNA BLD POS QL NAA+NON-PROBE: NOT DETECTED
ORDER NUMBER: ABNORMAL
P AERUGINOSA DNA BLD POS NAA+NON-PROBE: NOT DETECTED
PROTEUS SP DNA BLD POS QL NAA+NON-PROBE: NOT DETECTED
S AUREUS DNA BLD POS QL NAA+NON-PROBE: NOT DETECTED
S AUREUS+CONS DNA BLD POS NAA+NON-PROBE: DETECTED
S EPIDERMIDIS DNA BLD POS QL NAA+PROBE: NOT DETECTED
S LUGDUNENSIS DNA BLD POS QL NAA+PROBE: NOT DETECTED
S MALTOPH DNA BLD POS QL NAA+PROBE: NOT DETECTED
S MARCESCENS DNA BLD POS NAA+NON-PROBE: NOT DETECTED
S PNEUM DNA BLD POS QL NAA+NON-PROBE: NOT DETECTED
S PYO DNA BLD POS QL NAA+NON-PROBE: NOT DETECTED
SALMONELLA DNA BLD POS QL NAA+PROBE: NOT DETECTED
SOURCE OF BLOOD CULTURE: ABNORMAL
STREPTOCOCCUS AGALACTIAE BY PCR: NOT DETECTED
STREPTOCOCCUS DNA BLD POS NAA+NON-PROBE: NOT DETECTED

## 2023-05-03 LAB
BACTERIA UR CULT: ABNORMAL
ORGANISM: ABNORMAL

## 2023-05-06 LAB
BACTERIA BLD CULT ORG #2: ABNORMAL
BACTERIA BLD CULT: NORMAL
ORGANISM: ABNORMAL

## 2023-08-12 NOTE — CARE COORDINATION
"Subjective     Jonathon Jon is a 30 y.o. male who presents with Diarrhea            2 days multiple episodes of watery diarrhea without blood.  Symptoms are starting to improve.  No nausea/vomiting.  No abdominal pain.  No fever.  Tolerating fluids with normal urine output.  No recent foreign travel/camping/antibiotic use.  No obvious food trigger.  No known exposures.  No other aggravating or alleviating factors.        Review of Systems   Constitutional:  Negative for malaise/fatigue and weight loss.   Eyes:  Negative for discharge and redness.   Gastrointestinal:  Negative for nausea and vomiting.   Musculoskeletal:  Negative for joint pain and myalgias.   Skin:  Negative for itching and rash.              Objective     /82   Pulse 93   Temp 37.3 °C (99.1 °F) (Temporal)   Resp 16   Ht 1.854 m (6' 1\")   Wt (!) 159 kg (350 lb)   SpO2 98%   BMI 46.18 kg/m²      Physical Exam  Constitutional:       General: He is not in acute distress.     Appearance: He is well-developed.   HENT:      Head: Normocephalic and atraumatic.      Mouth/Throat:      Mouth: Mucous membranes are moist.   Eyes:      Conjunctiva/sclera: Conjunctivae normal.   Cardiovascular:      Rate and Rhythm: Normal rate and regular rhythm.      Heart sounds: Normal heart sounds. No murmur heard.  Pulmonary:      Effort: Pulmonary effort is normal.      Breath sounds: Normal breath sounds. No wheezing.   Abdominal:      General: Bowel sounds are normal.      Palpations: Abdomen is soft.      Tenderness: There is no abdominal tenderness.   Skin:     General: Skin is warm and dry.      Findings: No rash.   Neurological:      Mental Status: He is alert.                             Assessment & Plan        1. Diarrhea, unspecified type    Differential diagnosis, natural history, supportive care, and indications for immediate follow-up were discussed.     Likely self-limited viral illness that is already improving.  Imodium as " Social Work/Discharge Planning:  Called patient Laurence [facility] (ph: [phone]) and left a message to confirm discharge plan. Called [facility] and left a message to confirm what is needed for discharge. Will continue to follow. Electronically signed by LIZA Hernandez on 2/6/2023 at 10:17 AM    Addendum:  Everardo Jin with [facility] confirmed no pre-cert and no covid needed at discharge. Will continue to follow. Electronically signed by LIZA Hernandez on 2/6/2023 at 10:44 AM    Addendum:  Patient Laurence [facility] returned call and confirmed plan for patient to return to [facility] at discharge.   Electronically signed by LIZA Hernandez on 2/6/2023 at 1:09 PM needed.    Follow-up as needed.

## 2025-01-08 ENCOUNTER — APPOINTMENT (OUTPATIENT)
Dept: GENERAL RADIOLOGY | Age: 65
End: 2025-01-08
Payer: MEDICAID

## 2025-01-08 ENCOUNTER — APPOINTMENT (OUTPATIENT)
Dept: CT IMAGING | Age: 65
End: 2025-01-08
Payer: MEDICAID

## 2025-01-08 ENCOUNTER — HOSPITAL ENCOUNTER (OUTPATIENT)
Age: 65
Setting detail: OBSERVATION
Discharge: NURSING, REHAB OR LONG-TERM CARE | End: 2025-01-09
Attending: EMERGENCY MEDICINE | Admitting: INTERNAL MEDICINE
Payer: MEDICAID

## 2025-01-08 DIAGNOSIS — T68.XXXA HYPOTHERMIA, INITIAL ENCOUNTER: Primary | ICD-10-CM

## 2025-01-08 DIAGNOSIS — J18.9 PNEUMONIA DUE TO INFECTIOUS ORGANISM, UNSPECIFIED LATERALITY, UNSPECIFIED PART OF LUNG: ICD-10-CM

## 2025-01-08 LAB
ALBUMIN SERPL-MCNC: 3.9 G/DL (ref 3.5–5.2)
ALP SERPL-CCNC: 114 U/L (ref 35–104)
ALT SERPL-CCNC: 43 U/L (ref 0–32)
AMMONIA PLAS-SCNC: 15 UMOL/L (ref 11–51)
AMPHET UR QL SCN: NEGATIVE
ANION GAP SERPL CALCULATED.3IONS-SCNC: 8 MMOL/L (ref 7–16)
APAP SERPL-MCNC: <5 UG/ML (ref 10–30)
AST SERPL-CCNC: 40 U/L (ref 0–31)
BARBITURATES UR QL SCN: NEGATIVE
BASOPHILS # BLD: 0 K/UL (ref 0–0.2)
BASOPHILS NFR BLD: 0 % (ref 0–2)
BENZODIAZ UR QL: NEGATIVE
BILIRUB SERPL-MCNC: 0.3 MG/DL (ref 0–1.2)
BILIRUB UR QL STRIP: NEGATIVE
BUN SERPL-MCNC: 18 MG/DL (ref 6–23)
BUPRENORPHINE UR QL: NEGATIVE
CALCIUM SERPL-MCNC: 10.4 MG/DL (ref 8.6–10.2)
CANNABINOIDS UR QL SCN: NEGATIVE
CHLORIDE SERPL-SCNC: 103 MMOL/L (ref 98–107)
CLARITY UR: ABNORMAL
CO2 SERPL-SCNC: 31 MMOL/L (ref 22–29)
COCAINE UR QL SCN: NEGATIVE
COLOR UR: YELLOW
CREAT SERPL-MCNC: 0.7 MG/DL (ref 0.5–1)
EOSINOPHIL # BLD: 0 K/UL (ref 0.05–0.5)
EOSINOPHILS RELATIVE PERCENT: 0 % (ref 0–6)
ERYTHROCYTE [DISTWIDTH] IN BLOOD BY AUTOMATED COUNT: 15.9 % (ref 11.5–15)
ETHANOLAMINE SERPL-MCNC: <10 MG/DL (ref 0–0.08)
FENTANYL UR QL: NEGATIVE
GFR, ESTIMATED: >90 ML/MIN/1.73M2
GLUCOSE BLD-MCNC: 92 MG/DL (ref 74–99)
GLUCOSE SERPL-MCNC: 89 MG/DL (ref 74–99)
GLUCOSE UR STRIP-MCNC: NEGATIVE MG/DL
HCT VFR BLD AUTO: 32.8 % (ref 34–48)
HGB BLD-MCNC: 10.5 G/DL (ref 11.5–15.5)
HGB UR QL STRIP.AUTO: NEGATIVE
INFLUENZA A BY PCR: NOT DETECTED
INFLUENZA B BY PCR: NOT DETECTED
KETONES UR STRIP-MCNC: NEGATIVE MG/DL
LACTATE BLDV-SCNC: 0.6 MMOL/L (ref 0.5–2.2)
LEUKOCYTE ESTERASE UR QL STRIP: NEGATIVE
LYMPHOCYTES NFR BLD: 0.46 K/UL (ref 1.5–4)
LYMPHOCYTES RELATIVE PERCENT: 6 % (ref 20–42)
MAGNESIUM SERPL-MCNC: 1.7 MG/DL (ref 1.6–2.6)
MCH RBC QN AUTO: 30.9 PG (ref 26–35)
MCHC RBC AUTO-ENTMCNC: 32 G/DL (ref 32–34.5)
MCV RBC AUTO: 96.5 FL (ref 80–99.9)
METHADONE UR QL: NEGATIVE
MONOCYTES NFR BLD: 0.2 K/UL (ref 0.1–0.95)
MONOCYTES NFR BLD: 3 % (ref 2–12)
MYELOCYTES ABSOLUTE COUNT: 0.07 K/UL
MYELOCYTES: 1 %
NEUTROPHILS NFR BLD: 91 % (ref 43–80)
NEUTS SEG NFR BLD: 6.97 K/UL (ref 1.8–7.3)
NITRITE UR QL STRIP: NEGATIVE
NUCLEATED RED BLOOD CELLS: 2 PER 100 WBC
OPIATES UR QL SCN: NEGATIVE
OXYCODONE UR QL SCN: NEGATIVE
PCP UR QL SCN: NEGATIVE
PH UR STRIP: 7 [PH] (ref 5–9)
PLATELET # BLD AUTO: 92 K/UL (ref 130–450)
PLATELET CONFIRMATION: NORMAL
PMV BLD AUTO: 12.1 FL (ref 7–12)
POTASSIUM SERPL-SCNC: 4.6 MMOL/L (ref 3.5–5)
PROT SERPL-MCNC: 7 G/DL (ref 6.4–8.3)
PROT UR STRIP-MCNC: NEGATIVE MG/DL
RBC # BLD AUTO: 3.4 M/UL (ref 3.5–5.5)
RBC # BLD: ABNORMAL 10*6/UL
RBC # BLD: ABNORMAL 10*6/UL
RBC #/AREA URNS HPF: ABNORMAL /HPF
SALICYLATES SERPL-MCNC: 0.6 MG/DL (ref 0–30)
SARS-COV-2 RDRP RESP QL NAA+PROBE: NOT DETECTED
SODIUM SERPL-SCNC: 142 MMOL/L (ref 132–146)
SP GR UR STRIP: 1.01 (ref 1–1.03)
SPECIMEN DESCRIPTION: NORMAL
T4 FREE SERPL-MCNC: 1.2 NG/DL (ref 0.9–1.7)
TEST INFORMATION: NORMAL
TOXIC TRICYCLIC SC,BLOOD: NEGATIVE
TROPONIN I SERPL HS-MCNC: 22 NG/L (ref 0–9)
TROPONIN I SERPL HS-MCNC: 23 NG/L (ref 0–9)
TSH SERPL DL<=0.05 MIU/L-ACNC: 5.88 UIU/ML (ref 0.27–4.2)
UROBILINOGEN UR STRIP-ACNC: 0.2 EU/DL (ref 0–1)
WBC #/AREA URNS HPF: ABNORMAL /HPF
WBC OTHER # BLD: 7.7 K/UL (ref 4.5–11.5)

## 2025-01-08 PROCEDURE — 87040 BLOOD CULTURE FOR BACTERIA: CPT

## 2025-01-08 PROCEDURE — 71045 X-RAY EXAM CHEST 1 VIEW: CPT

## 2025-01-08 PROCEDURE — 81001 URINALYSIS AUTO W/SCOPE: CPT

## 2025-01-08 PROCEDURE — 80179 DRUG ASSAY SALICYLATE: CPT

## 2025-01-08 PROCEDURE — 87635 SARS-COV-2 COVID-19 AMP PRB: CPT

## 2025-01-08 PROCEDURE — 99285 EMERGENCY DEPT VISIT HI MDM: CPT

## 2025-01-08 PROCEDURE — 83605 ASSAY OF LACTIC ACID: CPT

## 2025-01-08 PROCEDURE — 80143 DRUG ASSAY ACETAMINOPHEN: CPT

## 2025-01-08 PROCEDURE — 85025 COMPLETE CBC W/AUTO DIFF WBC: CPT

## 2025-01-08 PROCEDURE — 6370000000 HC RX 637 (ALT 250 FOR IP)

## 2025-01-08 PROCEDURE — 70450 CT HEAD/BRAIN W/O DYE: CPT

## 2025-01-08 PROCEDURE — 84443 ASSAY THYROID STIM HORMONE: CPT

## 2025-01-08 PROCEDURE — 82962 GLUCOSE BLOOD TEST: CPT

## 2025-01-08 PROCEDURE — 2580000003 HC RX 258

## 2025-01-08 PROCEDURE — 93005 ELECTROCARDIOGRAM TRACING: CPT | Performed by: EMERGENCY MEDICINE

## 2025-01-08 PROCEDURE — 80307 DRUG TEST PRSMV CHEM ANLYZR: CPT

## 2025-01-08 PROCEDURE — 87899 AGENT NOS ASSAY W/OPTIC: CPT

## 2025-01-08 PROCEDURE — G0378 HOSPITAL OBSERVATION PER HR: HCPCS

## 2025-01-08 PROCEDURE — 87502 INFLUENZA DNA AMP PROBE: CPT

## 2025-01-08 PROCEDURE — 80053 COMPREHEN METABOLIC PANEL: CPT

## 2025-01-08 PROCEDURE — 2500000003 HC RX 250 WO HCPCS

## 2025-01-08 PROCEDURE — 84484 ASSAY OF TROPONIN QUANT: CPT

## 2025-01-08 PROCEDURE — 6360000002 HC RX W HCPCS

## 2025-01-08 PROCEDURE — G0480 DRUG TEST DEF 1-7 CLASSES: HCPCS

## 2025-01-08 PROCEDURE — 83735 ASSAY OF MAGNESIUM: CPT

## 2025-01-08 PROCEDURE — 96375 TX/PRO/DX INJ NEW DRUG ADDON: CPT

## 2025-01-08 PROCEDURE — 87449 NOS EACH ORGANISM AG IA: CPT

## 2025-01-08 PROCEDURE — 84439 ASSAY OF FREE THYROXINE: CPT

## 2025-01-08 PROCEDURE — 82140 ASSAY OF AMMONIA: CPT

## 2025-01-08 RX ORDER — ACETAMINOPHEN 325 MG/1
650 TABLET ORAL EVERY 4 HOURS PRN
Status: DISCONTINUED | OUTPATIENT
Start: 2025-01-08 | End: 2025-01-09 | Stop reason: HOSPADM

## 2025-01-08 RX ORDER — RIVASTIGMINE TARTRATE 3 MG/1
3 CAPSULE ORAL 2 TIMES DAILY
Status: DISCONTINUED | OUTPATIENT
Start: 2025-01-08 | End: 2025-01-09 | Stop reason: HOSPADM

## 2025-01-08 RX ORDER — DOCUSATE SODIUM 100 MG/1
100 CAPSULE, LIQUID FILLED ORAL DAILY PRN
COMMUNITY

## 2025-01-08 RX ORDER — TOPIRAMATE 100 MG/1
100 TABLET, FILM COATED ORAL 2 TIMES DAILY
Status: DISCONTINUED | OUTPATIENT
Start: 2025-01-08 | End: 2025-01-09 | Stop reason: HOSPADM

## 2025-01-08 RX ORDER — MIRTAZAPINE 15 MG/1
7.5 TABLET, FILM COATED ORAL NIGHTLY
Status: DISCONTINUED | OUTPATIENT
Start: 2025-01-08 | End: 2025-01-09 | Stop reason: HOSPADM

## 2025-01-08 RX ORDER — BENZTROPINE MESYLATE 0.5 MG/1
0.5 TABLET ORAL 2 TIMES DAILY
Status: DISCONTINUED | OUTPATIENT
Start: 2025-01-08 | End: 2025-01-09 | Stop reason: HOSPADM

## 2025-01-08 RX ORDER — SODIUM CHLORIDE 0.9 % (FLUSH) 0.9 %
10 SYRINGE (ML) INJECTION EVERY 12 HOURS SCHEDULED
Status: DISCONTINUED | OUTPATIENT
Start: 2025-01-08 | End: 2025-01-09 | Stop reason: HOSPADM

## 2025-01-08 RX ORDER — SENNOSIDES A AND B 8.6 MG/1
1 TABLET, FILM COATED ORAL DAILY PRN
Status: DISCONTINUED | OUTPATIENT
Start: 2025-01-08 | End: 2025-01-09 | Stop reason: HOSPADM

## 2025-01-08 RX ORDER — LISINOPRIL 5 MG/1
5 TABLET ORAL DAILY
Status: DISCONTINUED | OUTPATIENT
Start: 2025-01-09 | End: 2025-01-09 | Stop reason: HOSPADM

## 2025-01-08 RX ORDER — BISACODYL 10 MG
10 SUPPOSITORY, RECTAL RECTAL DAILY PRN
COMMUNITY

## 2025-01-08 RX ORDER — ENOXAPARIN SODIUM 100 MG/ML
40 INJECTION SUBCUTANEOUS DAILY
Status: DISCONTINUED | OUTPATIENT
Start: 2025-01-09 | End: 2025-01-09 | Stop reason: HOSPADM

## 2025-01-08 RX ORDER — POTASSIUM CHLORIDE 7.45 MG/ML
10 INJECTION INTRAVENOUS PRN
Status: DISCONTINUED | OUTPATIENT
Start: 2025-01-08 | End: 2025-01-08 | Stop reason: RX

## 2025-01-08 RX ORDER — SODIUM PHOSPHATE, DIBASIC AND SODIUM PHOSPHATE, MONOBASIC 7; 19 G/230ML; G/230ML
1 ENEMA RECTAL PRN
COMMUNITY

## 2025-01-08 RX ORDER — ATORVASTATIN CALCIUM 40 MG/1
40 TABLET, FILM COATED ORAL NIGHTLY
COMMUNITY

## 2025-01-08 RX ORDER — ONDANSETRON 4 MG/1
4 TABLET, ORALLY DISINTEGRATING ORAL EVERY 8 HOURS PRN
Status: DISCONTINUED | OUTPATIENT
Start: 2025-01-08 | End: 2025-01-09 | Stop reason: HOSPADM

## 2025-01-08 RX ORDER — SODIUM CHLORIDE 9 MG/ML
INJECTION, SOLUTION INTRAVENOUS PRN
Status: DISCONTINUED | OUTPATIENT
Start: 2025-01-08 | End: 2025-01-09 | Stop reason: HOSPADM

## 2025-01-08 RX ORDER — VITAMIN B COMPLEX
5000 TABLET ORAL DAILY
Status: DISCONTINUED | OUTPATIENT
Start: 2025-01-09 | End: 2025-01-09 | Stop reason: HOSPADM

## 2025-01-08 RX ORDER — SODIUM CHLORIDE 0.9 % (FLUSH) 0.9 %
10 SYRINGE (ML) INJECTION PRN
Status: DISCONTINUED | OUTPATIENT
Start: 2025-01-08 | End: 2025-01-09 | Stop reason: HOSPADM

## 2025-01-08 RX ORDER — MIRTAZAPINE 15 MG/1
7.5 TABLET, FILM COATED ORAL NIGHTLY
COMMUNITY

## 2025-01-08 RX ORDER — SERTRALINE HYDROCHLORIDE 100 MG/1
100 TABLET, FILM COATED ORAL DAILY
COMMUNITY

## 2025-01-08 RX ORDER — M-VIT,TX,IRON,MINS/CALC/FOLIC 27MG-0.4MG
1 TABLET ORAL DAILY
Status: DISCONTINUED | OUTPATIENT
Start: 2025-01-09 | End: 2025-01-09 | Stop reason: HOSPADM

## 2025-01-08 RX ORDER — POTASSIUM CHLORIDE 1500 MG/1
40 TABLET, EXTENDED RELEASE ORAL PRN
Status: DISCONTINUED | OUTPATIENT
Start: 2025-01-08 | End: 2025-01-09 | Stop reason: HOSPADM

## 2025-01-08 RX ORDER — LACTULOSE 10 G/15ML
20 SOLUTION ORAL DAILY
COMMUNITY

## 2025-01-08 RX ORDER — ONDANSETRON 2 MG/ML
4 INJECTION INTRAMUSCULAR; INTRAVENOUS EVERY 6 HOURS PRN
Status: DISCONTINUED | OUTPATIENT
Start: 2025-01-08 | End: 2025-01-09 | Stop reason: HOSPADM

## 2025-01-08 RX ORDER — POLYETHYLENE GLYCOL 3350 17 G/17G
17 POWDER, FOR SOLUTION ORAL 2 TIMES DAILY
Status: DISCONTINUED | OUTPATIENT
Start: 2025-01-08 | End: 2025-01-09 | Stop reason: HOSPADM

## 2025-01-08 RX ORDER — ATORVASTATIN CALCIUM 40 MG/1
40 TABLET, FILM COATED ORAL NIGHTLY
Status: DISCONTINUED | OUTPATIENT
Start: 2025-01-08 | End: 2025-01-09 | Stop reason: HOSPADM

## 2025-01-08 RX ORDER — LACTULOSE 10 G/15ML
20 SOLUTION ORAL DAILY
Status: DISCONTINUED | OUTPATIENT
Start: 2025-01-09 | End: 2025-01-09 | Stop reason: HOSPADM

## 2025-01-08 RX ORDER — SERTRALINE HYDROCHLORIDE 100 MG/1
100 TABLET, FILM COATED ORAL DAILY
Status: DISCONTINUED | OUTPATIENT
Start: 2025-01-09 | End: 2025-01-09 | Stop reason: HOSPADM

## 2025-01-08 RX ADMIN — BENZTROPINE MESYLATE 0.5 MG: 0.5 TABLET ORAL at 23:37

## 2025-01-08 RX ADMIN — TOPIRAMATE 100 MG: 100 TABLET, FILM COATED ORAL at 23:38

## 2025-01-08 RX ADMIN — Medication 3 MG: at 23:37

## 2025-01-08 RX ADMIN — RIVASTIGMINE TARTRATE 3 MG: 3 CAPSULE ORAL at 23:38

## 2025-01-08 RX ADMIN — MIRTAZAPINE 7.5 MG: 15 TABLET, FILM COATED ORAL at 23:37

## 2025-01-08 RX ADMIN — SODIUM CHLORIDE, PRESERVATIVE FREE 10 ML: 5 INJECTION INTRAVENOUS at 23:38

## 2025-01-08 RX ADMIN — DOXYCYCLINE 100 MG: 100 INJECTION, POWDER, LYOPHILIZED, FOR SOLUTION INTRAVENOUS at 23:46

## 2025-01-08 RX ADMIN — POLYETHYLENE GLYCOL 3350 17 G: 17 POWDER, FOR SOLUTION ORAL at 23:37

## 2025-01-08 RX ADMIN — WATER 2000 MG: 1 INJECTION INTRAMUSCULAR; INTRAVENOUS; SUBCUTANEOUS at 23:45

## 2025-01-08 RX ADMIN — ATORVASTATIN CALCIUM 40 MG: 40 TABLET, FILM COATED ORAL at 23:37

## 2025-01-08 ASSESSMENT — LIFESTYLE VARIABLES
HOW OFTEN DO YOU HAVE A DRINK CONTAINING ALCOHOL: NEVER
HOW MANY STANDARD DRINKS CONTAINING ALCOHOL DO YOU HAVE ON A TYPICAL DAY: PATIENT DOES NOT DRINK
HOW OFTEN DO YOU HAVE A DRINK CONTAINING ALCOHOL: NEVER
HOW MANY STANDARD DRINKS CONTAINING ALCOHOL DO YOU HAVE ON A TYPICAL DAY: PATIENT DOES NOT DRINK

## 2025-01-08 ASSESSMENT — PAIN SCALES - GENERAL: PAINLEVEL_OUTOF10: 0

## 2025-01-08 ASSESSMENT — PAIN - FUNCTIONAL ASSESSMENT: PAIN_FUNCTIONAL_ASSESSMENT: 0-10

## 2025-01-08 NOTE — ED PROVIDER NOTES
[facility] EMERGENCY DEPARTMENT  EMERGENCY DEPARTMENT ENCOUNTER        Pt Name: Any Arreola  MRN: [id number]  Birthdate 1960  Date of evaluation: 1/8/2025  Provider: Medina Nicholas DO  PCP: Kyrie Jay DO  Note Started: 5:18 PM EST 1/8/25    CHIEF COMPLAINT       Chief Complaint   Patient presents with    \"confused, pale, shaking, dry to the touch\" per facility       HISTORY OF PRESENT ILLNESS: 1 or more Elements   History From: Patient    Limitations to history : None    Any Arreola is a 64 y.o. female who presents with concern for bloody nose.  She says that her nose been bleeding throughout the day, mainly out of her right nostril, no injuries or trauma, no headaches or vision changes, no chest pain or difficulty breathing.  No nausea, no other associated complaints and she is on anticoagulation.  Patient is resting comfortably baseline without acute complaints.  Has not been having infectious etiology, denies cough, congestion, nausea, vomiting, diarrhea.    I was able to discuss with nursing facility, they note that she has been acting like her normal baseline without complaints today.      EXTERNAL NOTE REVIEW:      On chart review admitted for altered mental status on 2/3/2023    REVIEW OF SYSTEMS :      Positives and Pertinent negatives as per HPI.     SURGICAL HISTORY     Past Surgical History:   Procedure Laterality Date    CYST REMOVAL Right 2008    behind knee by Dr. Sepulveda    LAPAROSCOPY  1980    by Dr. Zacarias    TOE SURGERY  1999       CURRENTMEDICATIONS       Previous Medications    ACETAMINOPHEN (TYLENOL) 325 MG TABLET    Take 650 mg by mouth every 4 hours as needed for Pain    BENZTROPINE (COGENTIN) 0.5 MG TABLET    Take 0.5 mg by mouth 2 times daily    LISINOPRIL (PRINIVIL;ZESTRIL) 5 MG TABLET    Take 1 tablet by mouth daily    MAGNESIUM HYDROXIDE (MILK OF MAGNESIA) 400 MG/5ML SUSPENSION    Take 30 mLs by mouth daily as needed for

## 2025-01-09 ENCOUNTER — APPOINTMENT (OUTPATIENT)
Dept: GENERAL RADIOLOGY | Age: 65
End: 2025-01-09
Payer: MEDICAID

## 2025-01-09 VITALS
DIASTOLIC BLOOD PRESSURE: 80 MMHG | BODY MASS INDEX: 20.24 KG/M2 | HEART RATE: 59 BPM | TEMPERATURE: 97.9 F | OXYGEN SATURATION: 97 % | RESPIRATION RATE: 16 BRPM | SYSTOLIC BLOOD PRESSURE: 132 MMHG | HEIGHT: 65 IN | WEIGHT: 121.5 LBS

## 2025-01-09 PROBLEM — T68.XXXA HYPOTHERMIA, INITIAL ENCOUNTER: Status: RESOLVED | Noted: 2025-01-08 | Resolved: 2025-01-09

## 2025-01-09 LAB
ALBUMIN SERPL-MCNC: 3.5 G/DL (ref 3.5–5.2)
ALP SERPL-CCNC: 109 U/L (ref 35–104)
ALT SERPL-CCNC: 34 U/L (ref 0–32)
ANION GAP SERPL CALCULATED.3IONS-SCNC: 9 MMOL/L (ref 7–16)
AST SERPL-CCNC: 31 U/L (ref 0–31)
B PARAP IS1001 DNA NPH QL NAA+NON-PROBE: NOT DETECTED
B PERT DNA SPEC QL NAA+PROBE: NOT DETECTED
BASOPHILS # BLD: 0.01 K/UL (ref 0–0.2)
BASOPHILS NFR BLD: 0 % (ref 0–2)
BILIRUB SERPL-MCNC: 0.3 MG/DL (ref 0–1.2)
BUN SERPL-MCNC: 15 MG/DL (ref 6–23)
C PNEUM DNA NPH QL NAA+NON-PROBE: NOT DETECTED
CALCIUM SERPL-MCNC: 9.9 MG/DL (ref 8.6–10.2)
CHLORIDE SERPL-SCNC: 104 MMOL/L (ref 98–107)
CO2 SERPL-SCNC: 29 MMOL/L (ref 22–29)
CORTIS SERPL-MCNC: 8.2 UG/DL (ref 2.7–18.4)
CREAT SERPL-MCNC: 0.7 MG/DL (ref 0.5–1)
EKG ATRIAL RATE: 73 BPM
EKG P AXIS: 71 DEGREES
EKG P-R INTERVAL: 178 MS
EKG Q-T INTERVAL: 402 MS
EKG QRS DURATION: 100 MS
EKG QTC CALCULATION (BAZETT): 442 MS
EKG R AXIS: 94 DEGREES
EKG T AXIS: 34 DEGREES
EKG VENTRICULAR RATE: 73 BPM
EOSINOPHIL # BLD: 0.07 K/UL (ref 0.05–0.5)
EOSINOPHILS RELATIVE PERCENT: 1 % (ref 0–6)
ERYTHROCYTE [DISTWIDTH] IN BLOOD BY AUTOMATED COUNT: 15.9 % (ref 11.5–15)
FERRITIN SERPL-MCNC: 490 NG/ML
FLUAV RNA NPH QL NAA+NON-PROBE: NOT DETECTED
FLUBV RNA NPH QL NAA+NON-PROBE: NOT DETECTED
FOLATE SERPL-MCNC: 11.3 NG/ML (ref 4.8–24.2)
GFR, ESTIMATED: >90 ML/MIN/1.73M2
GLUCOSE SERPL-MCNC: 72 MG/DL (ref 74–99)
HADV DNA NPH QL NAA+NON-PROBE: NOT DETECTED
HCOV 229E RNA NPH QL NAA+NON-PROBE: NOT DETECTED
HCOV HKU1 RNA NPH QL NAA+NON-PROBE: NOT DETECTED
HCOV NL63 RNA NPH QL NAA+NON-PROBE: NOT DETECTED
HCOV OC43 RNA NPH QL NAA+NON-PROBE: NOT DETECTED
HCT VFR BLD AUTO: 30.6 % (ref 34–48)
HGB BLD-MCNC: 9.7 G/DL (ref 11.5–15.5)
HMPV RNA NPH QL NAA+NON-PROBE: NOT DETECTED
HPIV1 RNA NPH QL NAA+NON-PROBE: NOT DETECTED
HPIV2 RNA NPH QL NAA+NON-PROBE: NOT DETECTED
HPIV3 RNA NPH QL NAA+NON-PROBE: NOT DETECTED
HPIV4 RNA NPH QL NAA+NON-PROBE: NOT DETECTED
IMM GRANULOCYTES # BLD AUTO: 0.04 K/UL (ref 0–0.58)
IMM GRANULOCYTES NFR BLD: 1 % (ref 0–5)
IRON SATN MFR SERPL: 37 % (ref 15–50)
IRON SERPL-MCNC: 125 UG/DL (ref 37–145)
L PNEUMO1 AG UR QL IA.RAPID: NEGATIVE
LYMPHOCYTES NFR BLD: 0.72 K/UL (ref 1.5–4)
LYMPHOCYTES RELATIVE PERCENT: 14 % (ref 20–42)
M PNEUMO DNA NPH QL NAA+NON-PROBE: NOT DETECTED
MCH RBC QN AUTO: 30.8 PG (ref 26–35)
MCHC RBC AUTO-ENTMCNC: 31.7 G/DL (ref 32–34.5)
MCV RBC AUTO: 97.1 FL (ref 80–99.9)
MONOCYTES NFR BLD: 0.33 K/UL (ref 0.1–0.95)
MONOCYTES NFR BLD: 7 % (ref 2–12)
NEUTROPHILS NFR BLD: 77 % (ref 43–80)
NEUTS SEG NFR BLD: 3.85 K/UL (ref 1.8–7.3)
PLATELET # BLD AUTO: 89 K/UL (ref 130–450)
PLATELET CONFIRMATION: NORMAL
PMV BLD AUTO: 12.1 FL (ref 7–12)
POTASSIUM SERPL-SCNC: 4.1 MMOL/L (ref 3.5–5)
PROCALCITONIN SERPL-MCNC: 0.05 NG/ML (ref 0–0.08)
PROT SERPL-MCNC: 6.5 G/DL (ref 6.4–8.3)
RBC # BLD AUTO: 3.15 M/UL (ref 3.5–5.5)
RSV RNA NPH QL NAA+NON-PROBE: NOT DETECTED
RV+EV RNA NPH QL NAA+NON-PROBE: NOT DETECTED
S PNEUM AG SPEC QL: NEGATIVE
SARS-COV-2 RNA NPH QL NAA+NON-PROBE: NOT DETECTED
SODIUM SERPL-SCNC: 142 MMOL/L (ref 132–146)
SPECIMEN DESCRIPTION: NORMAL
TIBC SERPL-MCNC: 337 UG/DL (ref 250–450)
VIT B12 SERPL-MCNC: 1259 PG/ML (ref 211–946)
WBC OTHER # BLD: 5 K/UL (ref 4.5–11.5)

## 2025-01-09 PROCEDURE — G0378 HOSPITAL OBSERVATION PER HR: HCPCS

## 2025-01-09 PROCEDURE — 6360000002 HC RX W HCPCS

## 2025-01-09 PROCEDURE — 71046 X-RAY EXAM CHEST 2 VIEWS: CPT

## 2025-01-09 PROCEDURE — 2500000003 HC RX 250 WO HCPCS

## 2025-01-09 PROCEDURE — 82746 ASSAY OF FOLIC ACID SERUM: CPT

## 2025-01-09 PROCEDURE — 96365 THER/PROPH/DIAG IV INF INIT: CPT

## 2025-01-09 PROCEDURE — 82533 TOTAL CORTISOL: CPT

## 2025-01-09 PROCEDURE — 83540 ASSAY OF IRON: CPT

## 2025-01-09 PROCEDURE — 84145 PROCALCITONIN (PCT): CPT

## 2025-01-09 PROCEDURE — 85025 COMPLETE CBC W/AUTO DIFF WBC: CPT

## 2025-01-09 PROCEDURE — 82728 ASSAY OF FERRITIN: CPT

## 2025-01-09 PROCEDURE — 6370000000 HC RX 637 (ALT 250 FOR IP)

## 2025-01-09 PROCEDURE — 80053 COMPREHEN METABOLIC PANEL: CPT

## 2025-01-09 PROCEDURE — 83550 IRON BINDING TEST: CPT

## 2025-01-09 PROCEDURE — 0202U NFCT DS 22 TRGT SARS-COV-2: CPT

## 2025-01-09 PROCEDURE — 93010 ELECTROCARDIOGRAM REPORT: CPT | Performed by: INTERNAL MEDICINE

## 2025-01-09 PROCEDURE — 96372 THER/PROPH/DIAG INJ SC/IM: CPT

## 2025-01-09 PROCEDURE — 51798 US URINE CAPACITY MEASURE: CPT

## 2025-01-09 PROCEDURE — 82607 VITAMIN B-12: CPT

## 2025-01-09 PROCEDURE — 36415 COLL VENOUS BLD VENIPUNCTURE: CPT

## 2025-01-09 RX ORDER — DOXYCYCLINE 100 MG/1
100 CAPSULE ORAL EVERY 12 HOURS SCHEDULED
DISCHARGE
Start: 2025-01-09 | End: 2025-01-14

## 2025-01-09 RX ORDER — DOXYCYCLINE 100 MG/1
100 CAPSULE ORAL EVERY 12 HOURS SCHEDULED
Status: DISCONTINUED | OUTPATIENT
Start: 2025-01-09 | End: 2025-01-09 | Stop reason: HOSPADM

## 2025-01-09 RX ORDER — CEFDINIR 300 MG/1
300 CAPSULE ORAL 2 TIMES DAILY
DISCHARGE
Start: 2025-01-09 | End: 2025-01-14

## 2025-01-09 RX ADMIN — SODIUM CHLORIDE, PRESERVATIVE FREE 10 ML: 5 INJECTION INTRAVENOUS at 09:30

## 2025-01-09 RX ADMIN — DOXYCYCLINE HYCLATE 100 MG: 100 CAPSULE ORAL at 09:27

## 2025-01-09 RX ADMIN — LACTULOSE 20 G: 20 SOLUTION ORAL at 09:28

## 2025-01-09 RX ADMIN — Medication 1 TABLET: at 09:27

## 2025-01-09 RX ADMIN — Medication 5000 UNITS: at 09:28

## 2025-01-09 RX ADMIN — TOPIRAMATE 100 MG: 100 TABLET, FILM COATED ORAL at 09:27

## 2025-01-09 RX ADMIN — POLYETHYLENE GLYCOL 3350 17 G: 17 POWDER, FOR SOLUTION ORAL at 09:28

## 2025-01-09 RX ADMIN — RIVASTIGMINE TARTRATE 3 MG: 3 CAPSULE ORAL at 09:27

## 2025-01-09 RX ADMIN — SERTRALINE 100 MG: 100 TABLET, FILM COATED ORAL at 09:27

## 2025-01-09 RX ADMIN — BENZTROPINE MESYLATE 0.5 MG: 0.5 TABLET ORAL at 09:27

## 2025-01-09 RX ADMIN — LISINOPRIL 5 MG: 5 TABLET ORAL at 09:27

## 2025-01-09 RX ADMIN — ENOXAPARIN SODIUM 40 MG: 100 INJECTION SUBCUTANEOUS at 09:28

## 2025-01-09 NOTE — PROGRESS NOTES
Attempted to call nurse to nurse, no answer. Left message.     Electronically signed by Cristiano Alegre RN on 1/9/2025 at 1:36 PM      Spoke to [place] at [facility], Nurse to Nurse given, all questions answered.    Electronically signed by Cristiano Alegre RN on 1/9/2025 at 2:38 PM

## 2025-01-09 NOTE — CARE COORDINATION
CASE MANAGEMENT... Patient admitted from [facility]. Spoke to Legal Guardian Ifrah and notified of admission and discusses discharge plans. Discharge plan confirmed is to return to [facility]. BRIGITTE, demos, transport forms completed and in soft chart. Spoke to Kevin from [facility]. Patient is a bedhold and does not require precert to return.   Electronically signed by Anita Caldwell RN on 1/9/2025 at 10:22 AM    UPDATE: Discharge order placed. [facility] set up for [id number]. Nursing, legal guardian, and facility aware.   Electronically signed by Anita Caldwell RN on 1/9/2025 at 1:27 PM

## 2025-01-09 NOTE — PLAN OF CARE
Problem: Chronic Conditions and Co-morbidities  Goal: Patient's chronic conditions and co-morbidity symptoms are monitored and maintained or improved  1/9/2025 1438 by Milena Alegre RN  Outcome: Adequate for Discharge  1/9/2025 1244 by Milena Alegre RN  Outcome: Progressing  1/9/2025 0104 by Deborah Bhandari  Outcome: Progressing     Problem: Pain  Goal: Verbalizes/displays adequate comfort level or baseline comfort level  1/9/2025 1438 by Milena Alegre RN  Outcome: Adequate for Discharge  1/9/2025 1244 by Milena Alegre RN  Outcome: Progressing  1/9/2025 0104 by Deborah Bhandari  Outcome: Progressing     Problem: Safety - Adult  Goal: Free from fall injury  1/9/2025 1438 by Milena Alegre RN  Outcome: Adequate for Discharge  1/9/2025 1244 by Milena Alegre RN  Outcome: Progressing  1/9/2025 0104 by Deborah Bhandari  Outcome: Progressing     Problem: Skin/Tissue Integrity  Goal: Absence of new skin breakdown  Description: 1.  Monitor for areas of redness and/or skin breakdown  2.  Assess vascular access sites hourly  3.  Every 4-6 hours minimum:  Change oxygen saturation probe site  4.  Every 4-6 hours:  If on nasal continuous positive airway pressure, respiratory therapy assess nares and determine need for appliance change or resting period.  1/9/2025 1438 by Milena Alegre RN  Outcome: Adequate for Discharge  1/9/2025 1244 by Milena Alegre RN  Outcome: Progressing     Problem: Neurosensory - Adult  Goal: Achieves stable or improved neurological status  1/9/2025 1438 by Milena Alegre RN  Outcome: Adequate for Discharge  1/9/2025 1244 by Milena Alegre RN  Outcome: Progressing  Goal: Absence of seizures  1/9/2025 1438 by Milena Alegre RN  Outcome: Adequate for Discharge  1/9/2025 1244 by Milena Alegre RN  Outcome: Progressing  Goal: Remains free of injury related to seizures activity  1/9/2025 1438 by Milena Alegre RN  Outcome: Adequate for Discharge  1/9/2025 1244 by Milena Alegre RN  Outcome: Progressing  Goal:

## 2025-01-09 NOTE — PROGRESS NOTES
Patient did not void this shift. External catheter in place. Encouraged patient to void. Bladder scanned for 71 ml.

## 2025-01-09 NOTE — PROGRESS NOTES
Pt saw her  ct scan of heart and would like to talk to [facility] about results. [phone]   4 Eyes Skin Assessment     NAME:  Any Arreola  YOB: 1960  MEDICAL RECORD NUMBER:  [id number]    The patient is being assessed for  Admission    I agree that at least one RN has performed a thorough Head to Toe Skin Assessment on the patient. ALL assessment sites listed below have been assessed.      Areas assessed by both nurses:    Head, Face, Ears, Shoulders, Back, Chest, Arms, Elbows, Hands, Sacrum. Buttock, Coccyx, Ischium, Legs. Feet and Heels, Under Medical Devices , and Other          Does the Patient have a Wound? No noted wound(s)       Long Prevention initiated by RN: Yes  Wound Care Orders initiated by RN: No    Pressure Injury (Stage 3,4, Unstageable, DTI, NWPT, and Complex wounds) if present, place Wound referral order by RN under : No    New Ostomies, if present place, Ostomy referral order under : No     Nurse 1 eSignature: Electronically signed by Deborah Bhandari on 1/8/25 at 10:29 PM EST    **SHARE this note so that the co-signing nurse can place an eSignature**    Nurse 2 eSignature: Electronically signed by Sharon Gillespie RN on 1/8/25 at 10:30 PM EST

## 2025-01-09 NOTE — PLAN OF CARE
Problem: Chronic Conditions and Co-morbidities  Goal: Patient's chronic conditions and co-morbidity symptoms are monitored and maintained or improved  1/9/2025 1244 by Milena Alegre RN  Outcome: Progressing  1/9/2025 0104 by Deborah Bhandari  Outcome: Progressing     Problem: Pain  Goal: Verbalizes/displays adequate comfort level or baseline comfort level  1/9/2025 1244 by Milena Alegre RN  Outcome: Progressing  1/9/2025 0104 by Deborah Bhandari  Outcome: Progressing     Problem: Safety - Adult  Goal: Free from fall injury  1/9/2025 1244 by Milena Alegre RN  Outcome: Progressing  1/9/2025 0104 by Deborah Bhandari  Outcome: Progressing     Problem: Skin/Tissue Integrity  Goal: Absence of new skin breakdown  Description: 1.  Monitor for areas of redness and/or skin breakdown  2.  Assess vascular access sites hourly  3.  Every 4-6 hours minimum:  Change oxygen saturation probe site  4.  Every 4-6 hours:  If on nasal continuous positive airway pressure, respiratory therapy assess nares and determine need for appliance change or resting period.  Outcome: Progressing     Problem: Neurosensory - Adult  Goal: Achieves stable or improved neurological status  Outcome: Progressing  Goal: Absence of seizures  Outcome: Progressing  Goal: Remains free of injury related to seizures activity  Outcome: Progressing  Goal: Achieves maximal functionality and self care  Outcome: Progressing     Problem: Skin/Tissue Integrity - Adult  Goal: Skin integrity remains intact  Outcome: Progressing  Flowsheets (Taken 1/9/2025 0105 by Deborah Bhandari)  Skin Integrity Remains Intact: Monitor for areas of redness and/or skin breakdown  Goal: Incisions, wounds, or drain sites healing without S/S of infection  Outcome: Progressing  Goal: Oral mucous membranes remain intact  Outcome: Progressing     Problem: Musculoskeletal - Adult  Goal: Return mobility to safest level of function  Outcome: Progressing  Goal: Maintain proper alignment of affected body

## 2025-01-09 NOTE — PATIENT CARE CONFERENCE
[facility] Quality Flow/Interdisciplinary Rounds Progress Note        Quality Flow Rounds held on January 9, 2025    Disciplines Attending:  Bedside Nurse, , , and Nursing Unit Leadership    Any Arreola was admitted on 1/8/2025  4:34 PM    Anticipated Discharge Date:       Disposition:    Long Score:  Long Scale Score: 12    [facility] RISK OF UNPLANNED READMISSION 2.0             0 Total Score        Discussed patient goal for the day, patient clinical progression, and barriers to discharge.  The following Goal(s) of the Day/Commitment(s) have been identified:  Diagnostics - Report Results      Marcella Larsen RN  January 9, 2025

## 2025-01-09 NOTE — CARE COORDINATION
Internal Medicine On-call Care Coordination Note    I was called by the ED physician because they recommended admission for this patient and we cover their PCP.  The history as I understand it after discussion with the ED physician is as follows:    The patient presented from nursing facility with increased confusion, and \"shaking\"  In the ED they found CT of head without any acute changes. CXR showing possible pneumonia. Patients temp 93 - bear hugger applied and did increase to 95. Given Rocephin & Doxy. Decision made for admission for hypothermia.     I placed admission orders.  Including:    Continued IV Doxy & Rocephin  Home meds reconciled  General admission orders    Dr. Jay or his coverage will see the patient tomorrow for H&P.    Electronically signed by IFEANYI Yadav CNP on 1/8/2025 at 9:12 PM

## 2025-01-09 NOTE — ACP (ADVANCE CARE PLANNING)
Advance Care Planning   Healthcare Decision Maker:    Primary Decision Maker: Ifrah Roman - Legal Guardian, Legal Guardian - [phone]    Click here to complete Healthcare Decision Makers including selection of the Healthcare Decision Maker Relationship (ie \"Primary\").

## 2025-01-09 NOTE — ED NOTES
ED to Inpatient Handoff Report    Notified [facility] that electronic handoff available and patient ready for transport to room 512.    Safety Risks: Risk of falls    Patient in Restraints: no    Constant Observer or Patient : no    Telemetry Monitoring Ordered: No     Cardiac Rhythm: Sinus oscar    Order to transfer to unit without monitor: NA    Last MEWS: 1 Time completed: 2202         Vitals:    01/08/25 1853 01/08/25 2023 01/08/25 2100 01/08/25 2203   BP: (!) 160/93  (!) 156/99 (!) 159/99   Pulse: 77  80 81   Resp: 17  17 20   Temp:  (!) 95 °F (35 °C)  (!) 95 °F (35 °C)   TempSrc:  Rectal  Rectal   SpO2: 96%  96% 100%   Weight:       Height:           Opportunity for questions and clarification was provided.

## 2025-01-09 NOTE — DISCHARGE INSTR - COC
***  Oxygen Therapy:  is not on home oxygen therapy.  Ventilator:    - No ventilator support    Rehab Therapies: ***  Weight Bearing Status/Restrictions: No weight bearing restrictions  Other Medical Equipment (for information only, NOT a DME order):  walker  Other Treatments: ***    Patient's personal belongings (please select all that are sent with patient):  ***    RN SIGNATURE:  Electronically signed by Cristiano Alegre RN on 1/9/25 at 1:29 PM EST    CASE MANAGEMENT/SOCIAL WORK SECTION    Inpatient Status Date: 1/8/25    Readmission Risk Assessment Score:  [facility] RISK OF UNPLANNED READMISSION 2.0             0 Total Score        Discharging to Facility/ Agency   Name: [facility]   Address: [address] [place] [address]  Phone: [phone]  Fax: [phone]    Dialysis Facility (if applicable)   Name:  Address:  Dialysis Schedule:  Phone:  Fax:    / signature: Electronically signed by Anita Caldwell RN on 1/9/25 at 10:18 AM EST    PHYSICIAN SECTION    Prognosis: Fair    Condition at Discharge: Stable    Rehab Potential (if transferring to Rehab): Fair    Recommended Labs or Other Treatments After Discharge: none    Physician Certification: I certify the above information and transfer of Any Arreola  is necessary for the continuing treatment of the diagnosis listed and that she requires Skilled Nursing Facility for greater 30 days.     Update Admission H&P: Changes in H&P as follows - see DC summary    PHYSICIAN SIGNATURE:  Electronically signed by Kyrie Jay DO on 1/9/25 at 10:44 AM EST

## 2025-01-09 NOTE — H&P
chest x-ray pending  Respiratory panel pending  Blood cultures negative so far  Warming blanket has been discontinued  Her mentation is at baseline  Continue chronic Cogentin for her tremors  Likely back to the nursing facility today or tomorrow  She was actually oriented to person, place, year, but not purpose    Electronically signed by Kyrie Jay DO on 1/9/2025 at 9:22 AM    I can be reached through [employer].     unknown

## 2025-01-10 LAB — PATH REV BLD -IMP: NORMAL

## 2025-01-10 NOTE — PROGRESS NOTES
Spiritual Health History and Assessment/Progress Note  [facility]     Encounter, Attempted Encounter (Patient had been discharged.),  ,  ,      Name: Any Arreola MRN: [id number]    Age: 64 y.o.     Sex: female   Language: English   Mormon: Confucianism   Hypothermia     Date: 1/9/2025                           Spiritual Assessment began in [address] MED SURG        Referral/Consult From: Rounding   Encounter Overview/Reason:  Encounter, Attempted Encounter (Patient had been discharged.)  Service Provided For: Patient    [place], Belief, Meaning:   Patient unable to assess at this time  Family/Friends No family/friends present      Importance and Influence:  Patient unable to assess at this time  Family/Friends No family/friends present    Community:  Patient Other: Unable to assess.  Family/Friends No family/friends present    Assessment and Plan of Care:     Patient Interventions include: Other: None  Family/Friends Interventions include: No family/friends present    Patient Plan of Care: No spiritual needs identified for follow-up  Family/Friends Plan of Care: No spiritual needs identified for follow-up    Electronically signed by Chaplain Derek on 1/9/2025 at 7:29 PM

## 2025-01-12 LAB
MICROORGANISM SPEC CULT: NORMAL
MICROORGANISM SPEC CULT: NORMAL
SERVICE CMNT-IMP: NORMAL
SERVICE CMNT-IMP: NORMAL
SPECIMEN DESCRIPTION: NORMAL
SPECIMEN DESCRIPTION: NORMAL

## 2025-02-24 ENCOUNTER — APPOINTMENT (OUTPATIENT)
Dept: GENERAL RADIOLOGY | Age: 65
DRG: 720 | End: 2025-02-24
Payer: MEDICAID

## 2025-02-24 ENCOUNTER — APPOINTMENT (OUTPATIENT)
Dept: CT IMAGING | Age: 65
DRG: 720 | End: 2025-02-24
Payer: MEDICAID

## 2025-02-24 ENCOUNTER — HOSPITAL ENCOUNTER (INPATIENT)
Age: 65
LOS: 9 days | Discharge: HOME | DRG: 720 | End: 2025-03-05
Attending: EMERGENCY MEDICINE | Admitting: INTERNAL MEDICINE
Payer: MEDICAID

## 2025-02-24 DIAGNOSIS — J18.9 PNEUMONIA DUE TO INFECTIOUS ORGANISM, UNSPECIFIED LATERALITY, UNSPECIFIED PART OF LUNG: Primary | ICD-10-CM

## 2025-02-24 DIAGNOSIS — I42.9 CARDIOMYOPATHY, UNSPECIFIED TYPE (HCC): ICD-10-CM

## 2025-02-24 DIAGNOSIS — G93.40 ENCEPHALOPATHY, UNSPECIFIED TYPE: ICD-10-CM

## 2025-02-24 PROBLEM — A41.9 SEPTIC SHOCK (HCC): Status: ACTIVE | Noted: 2025-02-24

## 2025-02-24 PROBLEM — R65.21 SEPTIC SHOCK (HCC): Status: ACTIVE | Noted: 2025-02-24

## 2025-02-24 LAB
ALBUMIN SERPL-MCNC: 3.6 G/DL (ref 3.5–5.2)
ALP SERPL-CCNC: 131 U/L (ref 35–104)
ALT SERPL-CCNC: 84 U/L (ref 0–32)
ANION GAP SERPL CALCULATED.3IONS-SCNC: 8 MMOL/L (ref 7–16)
AST SERPL-CCNC: 67 U/L (ref 0–31)
BASOPHILS # BLD: 0.01 K/UL (ref 0–0.2)
BASOPHILS NFR BLD: 0 % (ref 0–2)
BILIRUB SERPL-MCNC: 0.3 MG/DL (ref 0–1.2)
BILIRUB UR QL STRIP: NEGATIVE
BUN SERPL-MCNC: 24 MG/DL (ref 6–23)
CALCIUM SERPL-MCNC: 10.6 MG/DL (ref 8.6–10.2)
CHLORIDE SERPL-SCNC: 106 MMOL/L (ref 98–107)
CLARITY UR: CLEAR
CO2 SERPL-SCNC: 30 MMOL/L (ref 22–29)
COLOR UR: YELLOW
CREAT SERPL-MCNC: 0.9 MG/DL (ref 0.5–1)
EKG ATRIAL RATE: 56 BPM
EKG P AXIS: 60 DEGREES
EKG P-R INTERVAL: 202 MS
EKG Q-T INTERVAL: 440 MS
EKG QRS DURATION: 102 MS
EKG QTC CALCULATION (BAZETT): 424 MS
EKG R AXIS: 90 DEGREES
EKG T AXIS: 72 DEGREES
EKG VENTRICULAR RATE: 56 BPM
EOSINOPHIL # BLD: 0.09 K/UL (ref 0.05–0.5)
EOSINOPHILS RELATIVE PERCENT: 2 % (ref 0–6)
ERYTHROCYTE [DISTWIDTH] IN BLOOD BY AUTOMATED COUNT: 17.5 % (ref 11.5–15)
GFR, ESTIMATED: 75 ML/MIN/1.73M2
GLUCOSE BLD-MCNC: 170 MG/DL (ref 74–99)
GLUCOSE SERPL-MCNC: 132 MG/DL (ref 74–99)
GLUCOSE UR STRIP-MCNC: NEGATIVE MG/DL
HCT VFR BLD AUTO: 25.7 % (ref 34–48)
HGB BLD-MCNC: 8.1 G/DL (ref 11.5–15.5)
HGB UR QL STRIP.AUTO: NEGATIVE
IMM GRANULOCYTES # BLD AUTO: 0.06 K/UL (ref 0–0.58)
IMM GRANULOCYTES NFR BLD: 2 % (ref 0–5)
INFLUENZA A BY PCR: NOT DETECTED
INFLUENZA B BY PCR: NOT DETECTED
KETONES UR STRIP-MCNC: NEGATIVE MG/DL
LACTATE BLDV-SCNC: 1.3 MMOL/L (ref 0.5–2.2)
LEUKOCYTE ESTERASE UR QL STRIP: NEGATIVE
LYMPHOCYTES NFR BLD: 0.69 K/UL (ref 1.5–4)
LYMPHOCYTES RELATIVE PERCENT: 18 % (ref 20–42)
MAGNESIUM SERPL-MCNC: 1.7 MG/DL (ref 1.6–2.6)
MCH RBC QN AUTO: 31.5 PG (ref 26–35)
MCHC RBC AUTO-ENTMCNC: 31.5 G/DL (ref 32–34.5)
MCV RBC AUTO: 100 FL (ref 80–99.9)
MONOCYTES NFR BLD: 0.23 K/UL (ref 0.1–0.95)
MONOCYTES NFR BLD: 6 % (ref 2–12)
NEUTROPHILS NFR BLD: 72 % (ref 43–80)
NEUTS SEG NFR BLD: 2.76 K/UL (ref 1.8–7.3)
NITRITE UR QL STRIP: NEGATIVE
PH UR STRIP: 6 [PH] (ref 5–8)
PLATELET # BLD AUTO: 69 K/UL (ref 130–450)
PLATELET CONFIRMATION: NORMAL
PMV BLD AUTO: 12.8 FL (ref 7–12)
POTASSIUM SERPL-SCNC: 4.3 MMOL/L (ref 3.5–5)
PROT SERPL-MCNC: 6.6 G/DL (ref 6.4–8.3)
PROT UR STRIP-MCNC: NEGATIVE MG/DL
RBC # BLD AUTO: 2.57 M/UL (ref 3.5–5.5)
RBC # BLD: ABNORMAL 10*6/UL
RBC #/AREA URNS HPF: ABNORMAL /HPF
SARS-COV-2 RDRP RESP QL NAA+PROBE: NOT DETECTED
SODIUM SERPL-SCNC: 144 MMOL/L (ref 132–146)
SP GR UR STRIP: <1.005 (ref 1–1.03)
SPECIMEN DESCRIPTION: NORMAL
T4 FREE SERPL-MCNC: 1.1 NG/DL (ref 0.9–1.7)
TROPONIN I SERPL HS-MCNC: 23 NG/L (ref 0–9)
TSH SERPL DL<=0.05 MIU/L-ACNC: 5.97 UIU/ML (ref 0.27–4.2)
UROBILINOGEN UR STRIP-ACNC: 0.2 EU/DL (ref 0–1)
WBC #/AREA URNS HPF: ABNORMAL /HPF
WBC OTHER # BLD: 3.8 K/UL (ref 4.5–11.5)

## 2025-02-24 PROCEDURE — 2500000003 HC RX 250 WO HCPCS

## 2025-02-24 PROCEDURE — 83735 ASSAY OF MAGNESIUM: CPT

## 2025-02-24 PROCEDURE — 84145 PROCALCITONIN (PCT): CPT

## 2025-02-24 PROCEDURE — 83605 ASSAY OF LACTIC ACID: CPT

## 2025-02-24 PROCEDURE — 84443 ASSAY THYROID STIM HORMONE: CPT

## 2025-02-24 PROCEDURE — 87635 SARS-COV-2 COVID-19 AMP PRB: CPT

## 2025-02-24 PROCEDURE — 82962 GLUCOSE BLOOD TEST: CPT

## 2025-02-24 PROCEDURE — 87040 BLOOD CULTURE FOR BACTERIA: CPT

## 2025-02-24 PROCEDURE — 93005 ELECTROCARDIOGRAM TRACING: CPT

## 2025-02-24 PROCEDURE — 80053 COMPREHEN METABOLIC PANEL: CPT

## 2025-02-24 PROCEDURE — 84439 ASSAY OF FREE THYROXINE: CPT

## 2025-02-24 PROCEDURE — 96360 HYDRATION IV INFUSION INIT: CPT

## 2025-02-24 PROCEDURE — 96361 HYDRATE IV INFUSION ADD-ON: CPT

## 2025-02-24 PROCEDURE — 70450 CT HEAD/BRAIN W/O DYE: CPT

## 2025-02-24 PROCEDURE — 3E033XZ INTRODUCTION OF VASOPRESSOR INTO PERIPHERAL VEIN, PERCUTANEOUS APPROACH: ICD-10-PCS | Performed by: EMERGENCY MEDICINE

## 2025-02-24 PROCEDURE — 84484 ASSAY OF TROPONIN QUANT: CPT

## 2025-02-24 PROCEDURE — 96375 TX/PRO/DX INJ NEW DRUG ADDON: CPT

## 2025-02-24 PROCEDURE — 81001 URINALYSIS AUTO W/SCOPE: CPT

## 2025-02-24 PROCEDURE — 96365 THER/PROPH/DIAG IV INF INIT: CPT

## 2025-02-24 PROCEDURE — 86140 C-REACTIVE PROTEIN: CPT

## 2025-02-24 PROCEDURE — 2060000000 HC ICU INTERMEDIATE R&B

## 2025-02-24 PROCEDURE — 85025 COMPLETE CBC W/AUTO DIFF WBC: CPT

## 2025-02-24 PROCEDURE — 2580000003 HC RX 258

## 2025-02-24 PROCEDURE — 71045 X-RAY EXAM CHEST 1 VIEW: CPT

## 2025-02-24 PROCEDURE — 87086 URINE CULTURE/COLONY COUNT: CPT

## 2025-02-24 PROCEDURE — 99285 EMERGENCY DEPT VISIT HI MDM: CPT

## 2025-02-24 PROCEDURE — 6360000002 HC RX W HCPCS

## 2025-02-24 PROCEDURE — 93010 ELECTROCARDIOGRAM REPORT: CPT | Performed by: INTERNAL MEDICINE

## 2025-02-24 PROCEDURE — 87077 CULTURE AEROBIC IDENTIFY: CPT

## 2025-02-24 PROCEDURE — 87502 INFLUENZA DNA AMP PROBE: CPT

## 2025-02-24 PROCEDURE — 6370000000 HC RX 637 (ALT 250 FOR IP): Performed by: PHYSICIAN ASSISTANT

## 2025-02-24 RX ORDER — SODIUM CHLORIDE 0.9 % (FLUSH) 0.9 %
10 SYRINGE (ML) INJECTION PRN
Status: DISCONTINUED | OUTPATIENT
Start: 2025-02-24 | End: 2025-03-05 | Stop reason: HOSPADM

## 2025-02-24 RX ORDER — POTASSIUM CHLORIDE 1500 MG/1
40 TABLET, EXTENDED RELEASE ORAL PRN
Status: DISCONTINUED | OUTPATIENT
Start: 2025-02-24 | End: 2025-03-05 | Stop reason: HOSPADM

## 2025-02-24 RX ORDER — TOPIRAMATE 100 MG/1
100 TABLET, FILM COATED ORAL 2 TIMES DAILY
Status: DISCONTINUED | OUTPATIENT
Start: 2025-02-24 | End: 2025-03-05 | Stop reason: HOSPADM

## 2025-02-24 RX ORDER — BENZTROPINE MESYLATE 0.5 MG/1
0.5 TABLET ORAL 2 TIMES DAILY
Status: DISCONTINUED | OUTPATIENT
Start: 2025-02-24 | End: 2025-03-05 | Stop reason: HOSPADM

## 2025-02-24 RX ORDER — SODIUM CHLORIDE 0.9 % (FLUSH) 0.9 %
10 SYRINGE (ML) INJECTION EVERY 12 HOURS SCHEDULED
Status: DISCONTINUED | OUTPATIENT
Start: 2025-02-24 | End: 2025-03-05 | Stop reason: HOSPADM

## 2025-02-24 RX ORDER — LACTULOSE 10 G/15ML
20 SOLUTION ORAL DAILY
Status: DISCONTINUED | OUTPATIENT
Start: 2025-02-25 | End: 2025-03-05 | Stop reason: HOSPADM

## 2025-02-24 RX ORDER — POTASSIUM CHLORIDE 7.45 MG/ML
10 INJECTION INTRAVENOUS PRN
Status: DISCONTINUED | OUTPATIENT
Start: 2025-02-24 | End: 2025-03-05 | Stop reason: HOSPADM

## 2025-02-24 RX ORDER — 0.9 % SODIUM CHLORIDE 0.9 %
1000 INTRAVENOUS SOLUTION INTRAVENOUS ONCE
Status: DISCONTINUED | OUTPATIENT
Start: 2025-02-24 | End: 2025-02-24

## 2025-02-24 RX ORDER — ONDANSETRON 4 MG/1
4 TABLET, ORALLY DISINTEGRATING ORAL EVERY 8 HOURS PRN
Status: DISCONTINUED | OUTPATIENT
Start: 2025-02-24 | End: 2025-03-05 | Stop reason: HOSPADM

## 2025-02-24 RX ORDER — ATORVASTATIN CALCIUM 40 MG/1
40 TABLET, FILM COATED ORAL NIGHTLY
Status: DISCONTINUED | OUTPATIENT
Start: 2025-02-24 | End: 2025-03-05 | Stop reason: HOSPADM

## 2025-02-24 RX ORDER — ACETAMINOPHEN 650 MG/1
650 SUPPOSITORY RECTAL EVERY 6 HOURS PRN
Status: DISCONTINUED | OUTPATIENT
Start: 2025-02-24 | End: 2025-03-05 | Stop reason: HOSPADM

## 2025-02-24 RX ORDER — ENOXAPARIN SODIUM 100 MG/ML
40 INJECTION SUBCUTANEOUS DAILY
Status: DISCONTINUED | OUTPATIENT
Start: 2025-02-24 | End: 2025-03-05 | Stop reason: HOSPADM

## 2025-02-24 RX ORDER — MAGNESIUM SULFATE IN WATER 40 MG/ML
2000 INJECTION, SOLUTION INTRAVENOUS PRN
Status: DISCONTINUED | OUTPATIENT
Start: 2025-02-24 | End: 2025-03-05 | Stop reason: HOSPADM

## 2025-02-24 RX ORDER — RIVASTIGMINE TARTRATE 3 MG/1
3 CAPSULE ORAL 2 TIMES DAILY WITH MEALS
Status: DISCONTINUED | OUTPATIENT
Start: 2025-02-25 | End: 2025-03-05 | Stop reason: HOSPADM

## 2025-02-24 RX ORDER — 0.9 % SODIUM CHLORIDE 0.9 %
30 INTRAVENOUS SOLUTION INTRAVENOUS ONCE
Status: COMPLETED | OUTPATIENT
Start: 2025-02-24 | End: 2025-02-24

## 2025-02-24 RX ORDER — ACETAMINOPHEN 325 MG/1
650 TABLET ORAL EVERY 6 HOURS PRN
Status: DISCONTINUED | OUTPATIENT
Start: 2025-02-24 | End: 2025-03-05 | Stop reason: HOSPADM

## 2025-02-24 RX ORDER — SERTRALINE HYDROCHLORIDE 100 MG/1
100 TABLET, FILM COATED ORAL DAILY
Status: DISCONTINUED | OUTPATIENT
Start: 2025-02-24 | End: 2025-03-05 | Stop reason: HOSPADM

## 2025-02-24 RX ORDER — ONDANSETRON 2 MG/ML
4 INJECTION INTRAMUSCULAR; INTRAVENOUS EVERY 6 HOURS PRN
Status: DISCONTINUED | OUTPATIENT
Start: 2025-02-24 | End: 2025-03-05 | Stop reason: HOSPADM

## 2025-02-24 RX ORDER — SODIUM CHLORIDE 9 MG/ML
INJECTION, SOLUTION INTRAVENOUS PRN
Status: DISCONTINUED | OUTPATIENT
Start: 2025-02-24 | End: 2025-03-05 | Stop reason: HOSPADM

## 2025-02-24 RX ORDER — SENNOSIDES A AND B 8.6 MG/1
1 TABLET, FILM COATED ORAL DAILY PRN
Status: DISCONTINUED | OUTPATIENT
Start: 2025-02-24 | End: 2025-03-05 | Stop reason: HOSPADM

## 2025-02-24 RX ADMIN — SODIUM CHLORIDE 1782 ML: 9 INJECTION, SOLUTION INTRAVENOUS at 13:40

## 2025-02-24 RX ADMIN — DOXYCYCLINE 100 MG: 100 INJECTION, POWDER, LYOPHILIZED, FOR SOLUTION INTRAVENOUS at 13:26

## 2025-02-24 RX ADMIN — ACETAMINOPHEN 650 MG: 650 SUPPOSITORY RECTAL at 20:37

## 2025-02-24 RX ADMIN — SODIUM CHLORIDE 1000 ML: 9 INJECTION, SOLUTION INTRAVENOUS at 12:23

## 2025-02-24 RX ADMIN — WATER 1000 MG: 1 INJECTION INTRAMUSCULAR; INTRAVENOUS; SUBCUTANEOUS at 13:25

## 2025-02-24 ASSESSMENT — PAIN SCALES - GENERAL
PAINLEVEL_OUTOF10: 0
PAINLEVEL_OUTOF10: 0

## 2025-02-24 NOTE — ED NOTES
ED to Inpatient Handoff Report    Notified [facility] that electronic handoff available and patient ready for transport to room 429.    Safety Risks: Memory Problems, Difficulty with daily activities, and Risk of falls    Patient in Restraints: no    Constant Observer or Patient : no    Telemetry Monitoring Ordered :Yes      Cardiac Rhythm: Sinus oscar    Order to transfer to unit without monitor:NO    Last MEWS: 3 Time completed: 1800    Deterioration Index Score:   Predictive Model Details          43 (Caution)  Factor Value    Calculated 2/24/2025 18:24 27% Cameron coma scale 13    Deterioration Index Model 24% Age 64 years old     15% Respiratory rate 21     9% Cardiac rhythm Sinus oscar     7% Sodium 144 mmol/L     4% Potassium 4.3 mmol/L     4% Hematocrit abnormal (25.7 %)     2% Pulse oximetry 93 %     2% Systolic 128     2% BUN abnormal (24 mg/dL)     1% Platelet count abnormal (69 k/uL)     1% WBC count abnormal (3.8 k/uL)     0% Temperature 94.7 °F (34.8 °C)     0% Pulse 79        Vitals:    02/24/25 1656 02/24/25 1723 02/24/25 1808 02/24/25 1813   BP: 134/67 (!) 142/70 124/68 128/67   Pulse: 71 77 79 79   Resp: 18 19 20 21   Temp:       TempSrc:       SpO2: 96% 95% 93% 93%   Weight:       Height:             Opportunity for questions and clarification was provided.

## 2025-02-24 NOTE — CARE COORDINATION
Internal Medicine On-call Care Coordination Note    I was called by the ED physician because they recommended admission for this patient and I cover their PCP.  The history as I understand it after discussion with the ED physician is as follows:    Presents from nursing facility with altered mental status  AOx4 in ED but states she is feeling \"off\"  Hypothermic to 91.1F, Bradycardic to 40-50s  T4 WNL  CT head unrevealing  CXR showing evidence of pneumonia  Started on ceftriaxone / doxycycline  Decision made for admission     I placed admission orders.  Including:    Continue ceftriaxone / doxycycline  CRP/Procal  Urinary antigens   Hold home BP meds    Dr. Jay or his coverage will see the patient tomorrow for H&P.    Electronically signed by Gage Torres PA-C on 2/24/2025 at 2:42 PM
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
## 2025-02-25 ENCOUNTER — APPOINTMENT (OUTPATIENT)
Dept: CT IMAGING | Age: 65
DRG: 720 | End: 2025-02-25
Payer: MEDICAID

## 2025-02-25 ENCOUNTER — APPOINTMENT (OUTPATIENT)
Dept: ULTRASOUND IMAGING | Age: 65
DRG: 720 | End: 2025-02-25
Payer: MEDICAID

## 2025-02-25 PROBLEM — I38 VHD (VALVULAR HEART DISEASE): Status: ACTIVE | Noted: 2025-02-25

## 2025-02-25 PROBLEM — G93.40 ENCEPHALOPATHY: Status: ACTIVE | Noted: 2025-02-25

## 2025-02-25 LAB
ALBUMIN SERPL-MCNC: 2.8 G/DL (ref 3.5–5.2)
ALP SERPL-CCNC: 114 U/L (ref 35–104)
ALT SERPL-CCNC: 69 U/L (ref 0–32)
ANION GAP SERPL CALCULATED.3IONS-SCNC: 10 MMOL/L (ref 7–16)
AST SERPL-CCNC: 59 U/L (ref 0–31)
B PARAP IS1001 DNA NPH QL NAA+NON-PROBE: NOT DETECTED
B PERT DNA SPEC QL NAA+PROBE: NOT DETECTED
BASOPHILS # BLD: 0.02 K/UL (ref 0–0.2)
BASOPHILS NFR BLD: 1 % (ref 0–2)
BILIRUB SERPL-MCNC: 0.3 MG/DL (ref 0–1.2)
BUN SERPL-MCNC: 23 MG/DL (ref 6–23)
C PNEUM DNA NPH QL NAA+NON-PROBE: NOT DETECTED
CALCIUM SERPL-MCNC: 9.6 MG/DL (ref 8.6–10.2)
CHLORIDE SERPL-SCNC: 111 MMOL/L (ref 98–107)
CO2 SERPL-SCNC: 24 MMOL/L (ref 22–29)
CREAT SERPL-MCNC: 1 MG/DL (ref 0.5–1)
CRP SERPL HS-MCNC: 30 MG/L (ref 0–5)
EOSINOPHIL # BLD: 0.12 K/UL (ref 0.05–0.5)
EOSINOPHILS RELATIVE PERCENT: 3 % (ref 0–6)
ERYTHROCYTE [DISTWIDTH] IN BLOOD BY AUTOMATED COUNT: 17.5 % (ref 11.5–15)
FLUAV RNA NPH QL NAA+NON-PROBE: NOT DETECTED
FLUBV RNA NPH QL NAA+NON-PROBE: NOT DETECTED
GFR, ESTIMATED: 64 ML/MIN/1.73M2
GLUCOSE BLD-MCNC: 69 MG/DL (ref 74–99)
GLUCOSE BLD-MCNC: 76 MG/DL (ref 74–99)
GLUCOSE SERPL-MCNC: 63 MG/DL (ref 74–99)
HADV DNA NPH QL NAA+NON-PROBE: NOT DETECTED
HCOV 229E RNA NPH QL NAA+NON-PROBE: NOT DETECTED
HCOV HKU1 RNA NPH QL NAA+NON-PROBE: NOT DETECTED
HCOV NL63 RNA NPH QL NAA+NON-PROBE: NOT DETECTED
HCOV OC43 RNA NPH QL NAA+NON-PROBE: DETECTED
HCT VFR BLD AUTO: 22.9 % (ref 34–48)
HGB BLD-MCNC: 7 G/DL (ref 11.5–15.5)
HMPV RNA NPH QL NAA+NON-PROBE: NOT DETECTED
HPIV1 RNA NPH QL NAA+NON-PROBE: NOT DETECTED
HPIV2 RNA NPH QL NAA+NON-PROBE: NOT DETECTED
HPIV3 RNA NPH QL NAA+NON-PROBE: NOT DETECTED
HPIV4 RNA NPH QL NAA+NON-PROBE: NOT DETECTED
IMM GRANULOCYTES # BLD AUTO: 0.06 K/UL (ref 0–0.58)
IMM GRANULOCYTES NFR BLD: 1 % (ref 0–5)
L PNEUMO1 AG UR QL IA.RAPID: NEGATIVE
LYMPHOCYTES NFR BLD: 1.18 K/UL (ref 1.5–4)
LYMPHOCYTES RELATIVE PERCENT: 28 % (ref 20–42)
M PNEUMO DNA NPH QL NAA+NON-PROBE: NOT DETECTED
MCH RBC QN AUTO: 31.3 PG (ref 26–35)
MCHC RBC AUTO-ENTMCNC: 30.6 G/DL (ref 32–34.5)
MCV RBC AUTO: 102.2 FL (ref 80–99.9)
MONOCYTES NFR BLD: 0.36 K/UL (ref 0.1–0.95)
MONOCYTES NFR BLD: 9 % (ref 2–12)
NEUTROPHILS NFR BLD: 59 % (ref 43–80)
NEUTS SEG NFR BLD: 2.52 K/UL (ref 1.8–7.3)
PLATELET # BLD AUTO: 75 K/UL (ref 130–450)
PLATELET CONFIRMATION: NORMAL
PMV BLD AUTO: 12.7 FL (ref 7–12)
POTASSIUM SERPL-SCNC: 4.4 MMOL/L (ref 3.5–5)
PROCALCITONIN SERPL-MCNC: 0.05 NG/ML (ref 0–0.08)
PROT SERPL-MCNC: 5.5 G/DL (ref 6.4–8.3)
RBC # BLD AUTO: 2.24 M/UL (ref 3.5–5.5)
RSV RNA NPH QL NAA+NON-PROBE: NOT DETECTED
RV+EV RNA NPH QL NAA+NON-PROBE: NOT DETECTED
S PNEUM AG SPEC QL: NEGATIVE
SARS-COV-2 RNA NPH QL NAA+NON-PROBE: NOT DETECTED
SODIUM SERPL-SCNC: 145 MMOL/L (ref 132–146)
SPECIMEN DESCRIPTION: ABNORMAL
SPECIMEN SOURCE: NORMAL
WBC OTHER # BLD: 4.3 K/UL (ref 4.5–11.5)

## 2025-02-25 PROCEDURE — 2500000003 HC RX 250 WO HCPCS: Performed by: CLINICAL NURSE SPECIALIST

## 2025-02-25 PROCEDURE — 6360000002 HC RX W HCPCS: Performed by: CLINICAL NURSE SPECIALIST

## 2025-02-25 PROCEDURE — 0202U NFCT DS 22 TRGT SARS-COV-2: CPT

## 2025-02-25 PROCEDURE — 2500000003 HC RX 250 WO HCPCS: Performed by: PHYSICIAN ASSISTANT

## 2025-02-25 PROCEDURE — 87449 NOS EACH ORGANISM AG IA: CPT

## 2025-02-25 PROCEDURE — 2060000000 HC ICU INTERMEDIATE R&B

## 2025-02-25 PROCEDURE — 2580000003 HC RX 258: Performed by: CLINICAL NURSE SPECIALIST

## 2025-02-25 PROCEDURE — APPSS180 APP SPLIT SHARED TIME > 60 MINUTES: Performed by: NURSE PRACTITIONER

## 2025-02-25 PROCEDURE — 80053 COMPREHEN METABOLIC PANEL: CPT

## 2025-02-25 PROCEDURE — 6370000000 HC RX 637 (ALT 250 FOR IP): Performed by: CLINICAL NURSE SPECIALIST

## 2025-02-25 PROCEDURE — 71250 CT THORAX DX C-: CPT

## 2025-02-25 PROCEDURE — 76705 ECHO EXAM OF ABDOMEN: CPT

## 2025-02-25 PROCEDURE — 94640 AIRWAY INHALATION TREATMENT: CPT

## 2025-02-25 PROCEDURE — 87899 AGENT NOS ASSAY W/OPTIC: CPT

## 2025-02-25 PROCEDURE — 82270 OCCULT BLOOD FECES: CPT

## 2025-02-25 PROCEDURE — 2580000003 HC RX 258: Performed by: PHYSICIAN ASSISTANT

## 2025-02-25 PROCEDURE — 36415 COLL VENOUS BLD VENIPUNCTURE: CPT

## 2025-02-25 PROCEDURE — 99222 1ST HOSP IP/OBS MODERATE 55: CPT | Performed by: INTERNAL MEDICINE

## 2025-02-25 PROCEDURE — 85025 COMPLETE CBC W/AUTO DIFF WBC: CPT

## 2025-02-25 PROCEDURE — 2580000003 HC RX 258: Performed by: INTERNAL MEDICINE

## 2025-02-25 PROCEDURE — 82962 GLUCOSE BLOOD TEST: CPT

## 2025-02-25 PROCEDURE — 51798 US URINE CAPACITY MEASURE: CPT

## 2025-02-25 PROCEDURE — 87081 CULTURE SCREEN ONLY: CPT

## 2025-02-25 PROCEDURE — 94669 MECHANICAL CHEST WALL OSCILL: CPT

## 2025-02-25 PROCEDURE — 6360000002 HC RX W HCPCS: Performed by: PHYSICIAN ASSISTANT

## 2025-02-25 PROCEDURE — 51701 INSERT BLADDER CATHETER: CPT

## 2025-02-25 RX ORDER — DEXTROSE MONOHYDRATE AND SODIUM CHLORIDE 5; .45 G/100ML; G/100ML
INJECTION, SOLUTION INTRAVENOUS CONTINUOUS
Status: DISCONTINUED | OUTPATIENT
Start: 2025-02-25 | End: 2025-02-28

## 2025-02-25 RX ORDER — ARFORMOTEROL TARTRATE 15 UG/2ML
15 SOLUTION RESPIRATORY (INHALATION)
Status: DISCONTINUED | OUTPATIENT
Start: 2025-02-25 | End: 2025-03-05 | Stop reason: HOSPADM

## 2025-02-25 RX ORDER — IPRATROPIUM BROMIDE AND ALBUTEROL SULFATE 2.5; .5 MG/3ML; MG/3ML
1 SOLUTION RESPIRATORY (INHALATION)
Status: DISCONTINUED | OUTPATIENT
Start: 2025-02-25 | End: 2025-02-27

## 2025-02-25 RX ORDER — BUDESONIDE 0.5 MG/2ML
500 INHALANT ORAL
Status: DISCONTINUED | OUTPATIENT
Start: 2025-02-25 | End: 2025-03-05 | Stop reason: HOSPADM

## 2025-02-25 RX ORDER — GLUCAGON 1 MG/ML
1 KIT INJECTION PRN
Status: DISCONTINUED | OUTPATIENT
Start: 2025-02-25 | End: 2025-03-05 | Stop reason: HOSPADM

## 2025-02-25 RX ORDER — DEXTROSE MONOHYDRATE 100 MG/ML
INJECTION, SOLUTION INTRAVENOUS CONTINUOUS PRN
Status: DISCONTINUED | OUTPATIENT
Start: 2025-02-25 | End: 2025-03-05 | Stop reason: HOSPADM

## 2025-02-25 RX ADMIN — SODIUM CHLORIDE, PRESERVATIVE FREE 10 ML: 5 INJECTION INTRAVENOUS at 10:24

## 2025-02-25 RX ADMIN — WATER 1000 MG: 1 INJECTION INTRAMUSCULAR; INTRAVENOUS; SUBCUTANEOUS at 15:48

## 2025-02-25 RX ADMIN — SODIUM CHLORIDE, PRESERVATIVE FREE 10 ML: 5 INJECTION INTRAVENOUS at 21:13

## 2025-02-25 RX ADMIN — SODIUM CHLORIDE, PRESERVATIVE FREE 10 ML: 5 INJECTION INTRAVENOUS at 01:06

## 2025-02-25 RX ADMIN — VANCOMYCIN HYDROCHLORIDE 1500 MG: 500 INJECTION, POWDER, LYOPHILIZED, FOR SOLUTION INTRAVENOUS at 18:41

## 2025-02-25 RX ADMIN — DEXTROSE AND SODIUM CHLORIDE: 5; .45 INJECTION, SOLUTION INTRAVENOUS at 21:12

## 2025-02-25 RX ADMIN — IPRATROPIUM BROMIDE AND ALBUTEROL SULFATE 1 DOSE: 2.5; .5 SOLUTION RESPIRATORY (INHALATION) at 18:01

## 2025-02-25 RX ADMIN — WATER 2000 MG: 1 INJECTION INTRAMUSCULAR; INTRAVENOUS; SUBCUTANEOUS at 17:15

## 2025-02-25 RX ADMIN — BUDESONIDE INHALATION 500 MCG: 0.5 SUSPENSION RESPIRATORY (INHALATION) at 18:01

## 2025-02-25 RX ADMIN — ARFORMOTEROL TARTRATE 15 MCG: 15 SOLUTION RESPIRATORY (INHALATION) at 18:01

## 2025-02-25 RX ADMIN — DOXYCYCLINE 100 MG: 100 INJECTION, POWDER, LYOPHILIZED, FOR SOLUTION INTRAVENOUS at 01:06

## 2025-02-25 RX ADMIN — DOXYCYCLINE 100 MG: 100 INJECTION, POWDER, LYOPHILIZED, FOR SOLUTION INTRAVENOUS at 15:49

## 2025-02-25 ASSESSMENT — PAIN SCALES - GENERAL: PAINLEVEL_OUTOF10: 0

## 2025-02-25 NOTE — CARE COORDINATION
Social Work/Discharge Planning:   attempted to meet with patient but she was resting.  Patient admitted from [facility] at [facility].  Called her Guardian Ifrah (ph: [phone]) and confirmed plan for patient to return to [facility] at discharge.  Called liaison Kristine with [facility] and confirmed patient is a long term care bed hold and no pre-cert needed to return.  Kristine states patient was independent with no adaptive device at facility.  Electronic N-17 in epic and ambulette form in soft chart.  Will continue to follow and assist with discharge planning.  Electronically signed by LIZA Sorenson on 2/25/2025 at 3:09 PM
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
## 2025-02-26 LAB
ALBUMIN SERPL-MCNC: 3.5 G/DL (ref 3.5–5.2)
ALP SERPL-CCNC: 130 U/L (ref 35–104)
ALT SERPL-CCNC: 67 U/L (ref 0–32)
ANION GAP SERPL CALCULATED.3IONS-SCNC: 11 MMOL/L (ref 7–16)
ANION GAP SERPL CALCULATED.3IONS-SCNC: 12 MMOL/L (ref 7–16)
AST SERPL-CCNC: 50 U/L (ref 0–31)
BASOPHILS # BLD: 0.02 K/UL (ref 0–0.2)
BASOPHILS # BLD: 0.03 K/UL (ref 0–0.2)
BASOPHILS NFR BLD: 0 % (ref 0–2)
BASOPHILS NFR BLD: 1 % (ref 0–2)
BILIRUB SERPL-MCNC: 0.5 MG/DL (ref 0–1.2)
BUN SERPL-MCNC: 21 MG/DL (ref 6–23)
BUN SERPL-MCNC: 25 MG/DL (ref 6–23)
CALCIUM SERPL-MCNC: 10.1 MG/DL (ref 8.6–10.2)
CALCIUM SERPL-MCNC: 10.2 MG/DL (ref 8.6–10.2)
CHLORIDE SERPL-SCNC: 106 MMOL/L (ref 98–107)
CHLORIDE SERPL-SCNC: 107 MMOL/L (ref 98–107)
CO2 SERPL-SCNC: 24 MMOL/L (ref 22–29)
CO2 SERPL-SCNC: 24 MMOL/L (ref 22–29)
CREAT SERPL-MCNC: 1 MG/DL (ref 0.5–1)
CREAT SERPL-MCNC: 1.1 MG/DL (ref 0.5–1)
EOSINOPHIL # BLD: 0.06 K/UL (ref 0.05–0.5)
EOSINOPHIL # BLD: 0.13 K/UL (ref 0.05–0.5)
EOSINOPHILS RELATIVE PERCENT: 1 % (ref 0–6)
EOSINOPHILS RELATIVE PERCENT: 2 % (ref 0–6)
ERYTHROCYTE [DISTWIDTH] IN BLOOD BY AUTOMATED COUNT: 17.3 % (ref 11.5–15)
ERYTHROCYTE [DISTWIDTH] IN BLOOD BY AUTOMATED COUNT: 17.4 % (ref 11.5–15)
GFR, ESTIMATED: 57 ML/MIN/1.73M2
GFR, ESTIMATED: 60 ML/MIN/1.73M2
GLUCOSE BLD-MCNC: 106 MG/DL (ref 74–99)
GLUCOSE BLD-MCNC: 124 MG/DL (ref 74–99)
GLUCOSE BLD-MCNC: 159 MG/DL (ref 74–99)
GLUCOSE BLD-MCNC: 213 MG/DL (ref 74–99)
GLUCOSE BLD-MCNC: 95 MG/DL (ref 74–99)
GLUCOSE BLD-MCNC: 95 MG/DL (ref 74–99)
GLUCOSE SERPL-MCNC: 158 MG/DL (ref 74–99)
GLUCOSE SERPL-MCNC: 80 MG/DL (ref 74–99)
HCT VFR BLD AUTO: 22.9 % (ref 34–48)
HCT VFR BLD AUTO: 25.5 % (ref 34–48)
HGB BLD-MCNC: 7.1 G/DL (ref 11.5–15.5)
HGB BLD-MCNC: 7.9 G/DL (ref 11.5–15.5)
IMM GRANULOCYTES # BLD AUTO: 0.09 K/UL (ref 0–0.58)
IMM GRANULOCYTES # BLD AUTO: 0.12 K/UL (ref 0–0.58)
IMM GRANULOCYTES NFR BLD: 2 % (ref 0–5)
IMM GRANULOCYTES NFR BLD: 2 % (ref 0–5)
LYMPHOCYTES NFR BLD: 0.7 K/UL (ref 1.5–4)
LYMPHOCYTES NFR BLD: 0.97 K/UL (ref 1.5–4)
LYMPHOCYTES RELATIVE PERCENT: 13 % (ref 20–42)
LYMPHOCYTES RELATIVE PERCENT: 18 % (ref 20–42)
MCH RBC QN AUTO: 30.7 PG (ref 26–35)
MCH RBC QN AUTO: 31.4 PG (ref 26–35)
MCHC RBC AUTO-ENTMCNC: 31 G/DL (ref 32–34.5)
MCHC RBC AUTO-ENTMCNC: 31 G/DL (ref 32–34.5)
MCV RBC AUTO: 101.3 FL (ref 80–99.9)
MCV RBC AUTO: 99.2 FL (ref 80–99.9)
MICROORGANISM SPEC CULT: ABNORMAL
MONOCYTES NFR BLD: 0.38 K/UL (ref 0.1–0.95)
MONOCYTES NFR BLD: 0.43 K/UL (ref 0.1–0.95)
MONOCYTES NFR BLD: 7 % (ref 2–12)
MONOCYTES NFR BLD: 8 % (ref 2–12)
NEUTROPHILS NFR BLD: 70 % (ref 43–80)
NEUTROPHILS NFR BLD: 77 % (ref 43–80)
NEUTS SEG NFR BLD: 3.83 K/UL (ref 1.8–7.3)
NEUTS SEG NFR BLD: 4.22 K/UL (ref 1.8–7.3)
PATH REV BLD -IMP: NORMAL
PLATELET, FLUORESCENCE: 136 K/UL (ref 130–450)
PLATELET, FLUORESCENCE: 142 K/UL (ref 130–450)
PMV BLD AUTO: 10.6 FL (ref 7–12)
PMV BLD AUTO: 11.7 FL (ref 7–12)
POTASSIUM SERPL-SCNC: 3.6 MMOL/L (ref 3.5–5)
POTASSIUM SERPL-SCNC: 4.2 MMOL/L (ref 3.5–5)
PROT SERPL-MCNC: 6.6 G/DL (ref 6.4–8.3)
RBC # BLD AUTO: 2.26 M/UL (ref 3.5–5.5)
RBC # BLD AUTO: 2.57 M/UL (ref 3.5–5.5)
SERVICE CMNT-IMP: ABNORMAL
SODIUM SERPL-SCNC: 142 MMOL/L (ref 132–146)
SODIUM SERPL-SCNC: 142 MMOL/L (ref 132–146)
SPECIMEN DESCRIPTION: ABNORMAL
WBC OTHER # BLD: 5.5 K/UL (ref 4.5–11.5)
WBC OTHER # BLD: 5.5 K/UL (ref 4.5–11.5)

## 2025-02-26 PROCEDURE — 94640 AIRWAY INHALATION TREATMENT: CPT

## 2025-02-26 PROCEDURE — 51798 US URINE CAPACITY MEASURE: CPT

## 2025-02-26 PROCEDURE — 2580000003 HC RX 258: Performed by: INTERNAL MEDICINE

## 2025-02-26 PROCEDURE — 6370000000 HC RX 637 (ALT 250 FOR IP): Performed by: CLINICAL NURSE SPECIALIST

## 2025-02-26 PROCEDURE — 80048 BASIC METABOLIC PNL TOTAL CA: CPT

## 2025-02-26 PROCEDURE — 2580000003 HC RX 258: Performed by: CLINICAL NURSE SPECIALIST

## 2025-02-26 PROCEDURE — 36415 COLL VENOUS BLD VENIPUNCTURE: CPT

## 2025-02-26 PROCEDURE — 2060000000 HC ICU INTERMEDIATE R&B

## 2025-02-26 PROCEDURE — 6360000002 HC RX W HCPCS: Performed by: CLINICAL NURSE SPECIALIST

## 2025-02-26 PROCEDURE — 92610 EVALUATE SWALLOWING FUNCTION: CPT

## 2025-02-26 PROCEDURE — 80053 COMPREHEN METABOLIC PANEL: CPT

## 2025-02-26 PROCEDURE — 82962 GLUCOSE BLOOD TEST: CPT

## 2025-02-26 PROCEDURE — 85025 COMPLETE CBC W/AUTO DIFF WBC: CPT

## 2025-02-26 PROCEDURE — 99222 1ST HOSP IP/OBS MODERATE 55: CPT | Performed by: INTERNAL MEDICINE

## 2025-02-26 PROCEDURE — 51701 INSERT BLADDER CATHETER: CPT

## 2025-02-26 PROCEDURE — 6370000000 HC RX 637 (ALT 250 FOR IP): Performed by: PHYSICIAN ASSISTANT

## 2025-02-26 PROCEDURE — 2500000003 HC RX 250 WO HCPCS: Performed by: PHYSICIAN ASSISTANT

## 2025-02-26 PROCEDURE — 94669 MECHANICAL CHEST WALL OSCILL: CPT

## 2025-02-26 RX ORDER — COSYNTROPIN 0.25 MG/ML
250 INJECTION, POWDER, FOR SOLUTION INTRAMUSCULAR; INTRAVENOUS ONCE
Status: COMPLETED | OUTPATIENT
Start: 2025-02-27 | End: 2025-02-27

## 2025-02-26 RX ADMIN — RIVASTIGMINE TARTRATE 3 MG: 3 CAPSULE ORAL at 16:47

## 2025-02-26 RX ADMIN — BENZTROPINE MESYLATE 0.5 MG: 0.5 TABLET ORAL at 20:12

## 2025-02-26 RX ADMIN — BENZTROPINE MESYLATE 0.5 MG: 0.5 TABLET ORAL at 08:47

## 2025-02-26 RX ADMIN — IPRATROPIUM BROMIDE AND ALBUTEROL SULFATE 1 DOSE: 2.5; .5 SOLUTION RESPIRATORY (INHALATION) at 15:42

## 2025-02-26 RX ADMIN — ARFORMOTEROL TARTRATE 15 MCG: 15 SOLUTION RESPIRATORY (INHALATION) at 07:43

## 2025-02-26 RX ADMIN — ARFORMOTEROL TARTRATE 15 MCG: 15 SOLUTION RESPIRATORY (INHALATION) at 19:08

## 2025-02-26 RX ADMIN — TOPIRAMATE 100 MG: 100 TABLET, FILM COATED ORAL at 08:47

## 2025-02-26 RX ADMIN — ATORVASTATIN CALCIUM 40 MG: 40 TABLET, FILM COATED ORAL at 20:12

## 2025-02-26 RX ADMIN — CEFEPIME 2000 MG: 2 INJECTION, POWDER, FOR SOLUTION INTRAVENOUS at 17:51

## 2025-02-26 RX ADMIN — VANCOMYCIN HYDROCHLORIDE 1250 MG: 500 INJECTION, POWDER, LYOPHILIZED, FOR SOLUTION INTRAVENOUS at 08:57

## 2025-02-26 RX ADMIN — LACTULOSE 20 G: 20 SOLUTION ORAL at 08:47

## 2025-02-26 RX ADMIN — SODIUM CHLORIDE, PRESERVATIVE FREE 10 ML: 5 INJECTION INTRAVENOUS at 08:56

## 2025-02-26 RX ADMIN — DEXTROSE AND SODIUM CHLORIDE: 5; .45 INJECTION, SOLUTION INTRAVENOUS at 11:37

## 2025-02-26 RX ADMIN — CEFEPIME 2000 MG: 2 INJECTION, POWDER, FOR SOLUTION INTRAVENOUS at 04:51

## 2025-02-26 RX ADMIN — SERTRALINE 100 MG: 100 TABLET, FILM COATED ORAL at 08:47

## 2025-02-26 RX ADMIN — RIVASTIGMINE TARTRATE 3 MG: 3 CAPSULE ORAL at 08:47

## 2025-02-26 RX ADMIN — IPRATROPIUM BROMIDE AND ALBUTEROL SULFATE 1 DOSE: 2.5; .5 SOLUTION RESPIRATORY (INHALATION) at 19:08

## 2025-02-26 RX ADMIN — IPRATROPIUM BROMIDE AND ALBUTEROL SULFATE 1 DOSE: 2.5; .5 SOLUTION RESPIRATORY (INHALATION) at 11:54

## 2025-02-26 RX ADMIN — BUDESONIDE INHALATION 500 MCG: 0.5 SUSPENSION RESPIRATORY (INHALATION) at 07:43

## 2025-02-26 RX ADMIN — IPRATROPIUM BROMIDE AND ALBUTEROL SULFATE 1 DOSE: 2.5; .5 SOLUTION RESPIRATORY (INHALATION) at 07:43

## 2025-02-26 RX ADMIN — BUDESONIDE INHALATION 500 MCG: 0.5 SUSPENSION RESPIRATORY (INHALATION) at 19:08

## 2025-02-26 RX ADMIN — TOPIRAMATE 100 MG: 100 TABLET, FILM COATED ORAL at 20:12

## 2025-02-26 NOTE — DISCHARGE INSTR - COC
Schizoaffective disorder, bipolar type ([facility]) F25.0    AMS (altered mental status) R41.82    Hypothermia T68.XXXA    Bradycardia R00.1    Pneumonia due to organism J18.9    Septic shock ([facility]) A41.9, R65.21    Encephalopathy G93.40    VHD (valvular heart disease) I38       Isolation/Infection:   Isolation            No Isolation          Patient Infection Status       Infection Onset Added Last Indicated Last Indicated By Review Planned Expiration Resolved Resolved By    None active    Resolved    COVID-19 12/15/20 12/16/20 12/15/20 Covid-19 Ambulatory   20 Infection                        Nurse Assessment:  Last Vital Signs: BP (!) 142/72   Pulse 61   Temp 97.9 °F (36.6 °C) (Axillary)   Resp 18   Ht 1.651 m (5' 5\")   Wt 66.6 kg (146 lb 12.8 oz)   LMP 2011 (Exact Date)   SpO2 94%   BMI 24.43 kg/m²     Last documented pain score (0-10 scale): Pain Level: 0  Last Weight:   Wt Readings from Last 1 Encounters:   25 66.6 kg (146 lb 12.8 oz)     Mental Status:  oriented, alert, and able to concentrate and follow conversation    IV Access:  - None    Nursing Mobility/ADLs:  Walking   Assisted  Transfer  Assisted  Bathing  Assisted  Dressing  Assisted  Toileting  Assisted  Feeding  Independent  Med Admin  Independent  Med Delivery   whole    Wound Care Documentation and Therapy:        Elimination:  Continence:   Bowel: Yes  Bladder: Yes  Urinary Catheter: None   Colostomy/Ileostomy/Ileal Conduit: No       Date of Last BM: 3/3/2025      Intake/Output Summary (Last 24 hours) at 2025 0919  Last data filed at 2025 0345  Gross per 24 hour   Intake 0 ml   Output 1400 ml   Net -1400 ml     I/O last 3 completed shifts:  In: 1814.5 [I.V.:5; IV Piggyback:1809.5]  Out: 2400 [Urine:2400]    Safety Concerns:     None    Impairments/Disabilities:      None    Nutrition Therapy:  Current Nutrition Therapy:   - Oral Diet:  General    Routes of Feeding: Oral  Liquids: No Restrictions  Daily Fluid

## 2025-02-27 ENCOUNTER — APPOINTMENT (OUTPATIENT)
Dept: CT IMAGING | Age: 65
DRG: 720 | End: 2025-02-27
Payer: MEDICAID

## 2025-02-27 PROBLEM — E44.0 MODERATE PROTEIN-CALORIE MALNUTRITION: Status: ACTIVE | Noted: 2019-07-03

## 2025-02-27 LAB
ALBUMIN SERPL-MCNC: 3.3 G/DL (ref 3.5–5.2)
ALP SERPL-CCNC: 115 U/L (ref 35–104)
ALT SERPL-CCNC: 70 U/L (ref 0–32)
ANION GAP SERPL CALCULATED.3IONS-SCNC: 10 MMOL/L (ref 7–16)
AST SERPL-CCNC: 57 U/L (ref 0–31)
BASOPHILS # BLD: 0.03 K/UL (ref 0–0.2)
BASOPHILS NFR BLD: 1 % (ref 0–2)
BILIRUB SERPL-MCNC: 0.4 MG/DL (ref 0–1.2)
BUN SERPL-MCNC: 17 MG/DL (ref 6–23)
CALCIUM SERPL-MCNC: 9.9 MG/DL (ref 8.6–10.2)
CHLORIDE SERPL-SCNC: 108 MMOL/L (ref 98–107)
CO2 SERPL-SCNC: 25 MMOL/L (ref 22–29)
CORTIS SERPL-MCNC: 17.5 UG/DL (ref 2.7–18.4)
CORTIS SERPL-MCNC: 32.6 UG/DL (ref 2.7–18.4)
CORTIS SERPL-MCNC: 37.3 UG/DL (ref 2.7–18.4)
CREAT SERPL-MCNC: 0.9 MG/DL (ref 0.5–1)
DATE, STOOL #1: ABNORMAL
EOSINOPHIL # BLD: 0.06 K/UL (ref 0.05–0.5)
EOSINOPHILS RELATIVE PERCENT: 1 % (ref 0–6)
ERYTHROCYTE [DISTWIDTH] IN BLOOD BY AUTOMATED COUNT: 17.3 % (ref 11.5–15)
GFR, ESTIMATED: 68 ML/MIN/1.73M2
GLUCOSE BLD-MCNC: 158 MG/DL (ref 74–99)
GLUCOSE BLD-MCNC: 172 MG/DL (ref 74–99)
GLUCOSE BLD-MCNC: 206 MG/DL (ref 74–99)
GLUCOSE BLD-MCNC: 77 MG/DL (ref 74–99)
GLUCOSE SERPL-MCNC: 90 MG/DL (ref 74–99)
HCT VFR BLD AUTO: 21.2 % (ref 34–48)
HCT VFR BLD AUTO: 26.4 % (ref 34–48)
HEMOCCULT SP1 STL QL: POSITIVE
HGB BLD-MCNC: 6.6 G/DL (ref 11.5–15.5)
HGB BLD-MCNC: 8.5 G/DL (ref 11.5–15.5)
IMM GRANULOCYTES # BLD AUTO: 0.07 K/UL (ref 0–0.58)
IMM GRANULOCYTES NFR BLD: 2 % (ref 0–5)
LYMPHOCYTES NFR BLD: 0.91 K/UL (ref 1.5–4)
LYMPHOCYTES RELATIVE PERCENT: 21 % (ref 20–42)
MCH RBC QN AUTO: 30.7 PG (ref 26–35)
MCHC RBC AUTO-ENTMCNC: 31.1 G/DL (ref 32–34.5)
MCV RBC AUTO: 98.6 FL (ref 80–99.9)
MICROORGANISM SPEC CULT: ABNORMAL
MONOCYTES NFR BLD: 0.43 K/UL (ref 0.1–0.95)
MONOCYTES NFR BLD: 10 % (ref 2–12)
NEUTROPHILS NFR BLD: 66 % (ref 43–80)
NEUTS SEG NFR BLD: 2.88 K/UL (ref 1.8–7.3)
PLATELET, FLUORESCENCE: 143 K/UL (ref 130–450)
PMV BLD AUTO: 11.8 FL (ref 7–12)
POTASSIUM SERPL-SCNC: 3.8 MMOL/L (ref 3.5–5)
PROT SERPL-MCNC: 6.2 G/DL (ref 6.4–8.3)
RBC # BLD AUTO: 2.15 M/UL (ref 3.5–5.5)
SERVICE CMNT-IMP: ABNORMAL
SODIUM SERPL-SCNC: 143 MMOL/L (ref 132–146)
SPECIMEN DESCRIPTION: ABNORMAL
TIME, STOOL #1: 1809
WBC OTHER # BLD: 4.4 K/UL (ref 4.5–11.5)

## 2025-02-27 PROCEDURE — 6370000000 HC RX 637 (ALT 250 FOR IP): Performed by: CLINICAL NURSE SPECIALIST

## 2025-02-27 PROCEDURE — 99231 SBSQ HOSP IP/OBS SF/LOW 25: CPT | Performed by: INTERNAL MEDICINE

## 2025-02-27 PROCEDURE — 30233N1 TRANSFUSION OF NONAUTOLOGOUS RED BLOOD CELLS INTO PERIPHERAL VEIN, PERCUTANEOUS APPROACH: ICD-10-PCS | Performed by: INTERNAL MEDICINE

## 2025-02-27 PROCEDURE — 2580000003 HC RX 258: Performed by: INTERNAL MEDICINE

## 2025-02-27 PROCEDURE — 36415 COLL VENOUS BLD VENIPUNCTURE: CPT

## 2025-02-27 PROCEDURE — 36430 TRANSFUSION BLD/BLD COMPNT: CPT

## 2025-02-27 PROCEDURE — 94669 MECHANICAL CHEST WALL OSCILL: CPT

## 2025-02-27 PROCEDURE — 6360000002 HC RX W HCPCS: Performed by: CLINICAL NURSE SPECIALIST

## 2025-02-27 PROCEDURE — 86923 COMPATIBILITY TEST ELECTRIC: CPT

## 2025-02-27 PROCEDURE — 85018 HEMOGLOBIN: CPT

## 2025-02-27 PROCEDURE — 2500000003 HC RX 250 WO HCPCS: Performed by: PHYSICIAN ASSISTANT

## 2025-02-27 PROCEDURE — 85014 HEMATOCRIT: CPT

## 2025-02-27 PROCEDURE — 6370000000 HC RX 637 (ALT 250 FOR IP): Performed by: PHYSICIAN ASSISTANT

## 2025-02-27 PROCEDURE — 82962 GLUCOSE BLOOD TEST: CPT

## 2025-02-27 PROCEDURE — 80053 COMPREHEN METABOLIC PANEL: CPT

## 2025-02-27 PROCEDURE — 2060000000 HC ICU INTERMEDIATE R&B

## 2025-02-27 PROCEDURE — 86901 BLOOD TYPING SEROLOGIC RH(D): CPT

## 2025-02-27 PROCEDURE — 85025 COMPLETE CBC W/AUTO DIFF WBC: CPT

## 2025-02-27 PROCEDURE — 74178 CT ABD&PLV WO CNTR FLWD CNTR: CPT

## 2025-02-27 PROCEDURE — 2580000003 HC RX 258: Performed by: CLINICAL NURSE SPECIALIST

## 2025-02-27 PROCEDURE — 6360000004 HC RX CONTRAST MEDICATION: Performed by: RADIOLOGY

## 2025-02-27 PROCEDURE — 2500000003 HC RX 250 WO HCPCS: Performed by: CLINICAL NURSE SPECIALIST

## 2025-02-27 PROCEDURE — 6360000002 HC RX W HCPCS: Performed by: INTERNAL MEDICINE

## 2025-02-27 PROCEDURE — P9016 RBC LEUKOCYTES REDUCED: HCPCS

## 2025-02-27 PROCEDURE — 94640 AIRWAY INHALATION TREATMENT: CPT

## 2025-02-27 PROCEDURE — 86850 RBC ANTIBODY SCREEN: CPT

## 2025-02-27 PROCEDURE — 6370000000 HC RX 637 (ALT 250 FOR IP): Performed by: INTERNAL MEDICINE

## 2025-02-27 PROCEDURE — 82533 TOTAL CORTISOL: CPT

## 2025-02-27 PROCEDURE — 86900 BLOOD TYPING SEROLOGIC ABO: CPT

## 2025-02-27 RX ORDER — IOPAMIDOL 755 MG/ML
75 INJECTION, SOLUTION INTRAVASCULAR
Status: COMPLETED | OUTPATIENT
Start: 2025-02-27 | End: 2025-02-27

## 2025-02-27 RX ORDER — CEFEPIME HYDROCHLORIDE 2 G/1
2 INJECTION, POWDER, FOR SOLUTION INTRAVENOUS EVERY 12 HOURS
Status: DISCONTINUED | OUTPATIENT
Start: 2025-02-27 | End: 2025-02-28

## 2025-02-27 RX ORDER — IOPAMIDOL 755 MG/ML
18 INJECTION, SOLUTION INTRAVASCULAR
Status: COMPLETED | OUTPATIENT
Start: 2025-02-27 | End: 2025-02-27

## 2025-02-27 RX ORDER — WATER 10 ML/10ML
20 INJECTION INTRAMUSCULAR; INTRAVENOUS; SUBCUTANEOUS EVERY 12 HOURS
Status: DISCONTINUED | OUTPATIENT
Start: 2025-02-27 | End: 2025-02-28

## 2025-02-27 RX ORDER — SODIUM CHLORIDE 9 MG/ML
INJECTION, SOLUTION INTRAVENOUS PRN
Status: DISCONTINUED | OUTPATIENT
Start: 2025-02-27 | End: 2025-03-05 | Stop reason: HOSPADM

## 2025-02-27 RX ORDER — IPRATROPIUM BROMIDE AND ALBUTEROL SULFATE 2.5; .5 MG/3ML; MG/3ML
1 SOLUTION RESPIRATORY (INHALATION) EVERY 4 HOURS PRN
Status: DISCONTINUED | OUTPATIENT
Start: 2025-02-27 | End: 2025-03-05 | Stop reason: HOSPADM

## 2025-02-27 RX ADMIN — BENZTROPINE MESYLATE 0.5 MG: 0.5 TABLET ORAL at 19:50

## 2025-02-27 RX ADMIN — SODIUM CHLORIDE, PRESERVATIVE FREE 10 ML: 5 INJECTION INTRAVENOUS at 08:35

## 2025-02-27 RX ADMIN — BUDESONIDE INHALATION 500 MCG: 0.5 SUSPENSION RESPIRATORY (INHALATION) at 21:12

## 2025-02-27 RX ADMIN — CEFEPIME 2000 MG: 2 INJECTION, POWDER, FOR SOLUTION INTRAVENOUS at 05:05

## 2025-02-27 RX ADMIN — Medication 3 MG: at 19:50

## 2025-02-27 RX ADMIN — ARFORMOTEROL TARTRATE 15 MCG: 15 SOLUTION RESPIRATORY (INHALATION) at 08:04

## 2025-02-27 RX ADMIN — ARFORMOTEROL TARTRATE 15 MCG: 15 SOLUTION RESPIRATORY (INHALATION) at 21:12

## 2025-02-27 RX ADMIN — TOPIRAMATE 100 MG: 100 TABLET, FILM COATED ORAL at 08:39

## 2025-02-27 RX ADMIN — SERTRALINE 100 MG: 100 TABLET, FILM COATED ORAL at 08:39

## 2025-02-27 RX ADMIN — SODIUM CHLORIDE, PRESERVATIVE FREE 10 ML: 5 INJECTION INTRAVENOUS at 19:47

## 2025-02-27 RX ADMIN — DEXTROSE AND SODIUM CHLORIDE: 5; .45 INJECTION, SOLUTION INTRAVENOUS at 09:52

## 2025-02-27 RX ADMIN — ATORVASTATIN CALCIUM 40 MG: 40 TABLET, FILM COATED ORAL at 19:50

## 2025-02-27 RX ADMIN — ACETAMINOPHEN 650 MG: 325 TABLET ORAL at 19:49

## 2025-02-27 RX ADMIN — BENZTROPINE MESYLATE 0.5 MG: 0.5 TABLET ORAL at 08:38

## 2025-02-27 RX ADMIN — IOPAMIDOL 18 ML: 755 INJECTION, SOLUTION INTRAVENOUS at 16:20

## 2025-02-27 RX ADMIN — LACTULOSE 20 G: 20 SOLUTION ORAL at 08:39

## 2025-02-27 RX ADMIN — IPRATROPIUM BROMIDE AND ALBUTEROL SULFATE 1 DOSE: 2.5; .5 SOLUTION RESPIRATORY (INHALATION) at 12:34

## 2025-02-27 RX ADMIN — CEFEPIME 2 G: 2 INJECTION, POWDER, FOR SOLUTION INTRAVENOUS at 17:53

## 2025-02-27 RX ADMIN — IOPAMIDOL 75 ML: 755 INJECTION, SOLUTION INTRAVENOUS at 16:21

## 2025-02-27 RX ADMIN — COSYNTROPIN 250 MCG: 0.25 INJECTION, POWDER, LYOPHILIZED, FOR SOLUTION INTRAMUSCULAR; INTRAVENOUS at 08:35

## 2025-02-27 RX ADMIN — TOPIRAMATE 100 MG: 100 TABLET, FILM COATED ORAL at 19:50

## 2025-02-27 RX ADMIN — RIVASTIGMINE TARTRATE 3 MG: 3 CAPSULE ORAL at 08:39

## 2025-02-27 RX ADMIN — WATER 20 ML: 1 INJECTION INTRAMUSCULAR; INTRAVENOUS; SUBCUTANEOUS at 17:52

## 2025-02-27 RX ADMIN — IPRATROPIUM BROMIDE AND ALBUTEROL SULFATE 1 DOSE: 2.5; .5 SOLUTION RESPIRATORY (INHALATION) at 08:04

## 2025-02-27 RX ADMIN — BUDESONIDE INHALATION 500 MCG: 0.5 SUSPENSION RESPIRATORY (INHALATION) at 08:05

## 2025-02-27 RX ADMIN — VANCOMYCIN HYDROCHLORIDE 1250 MG: 500 INJECTION, POWDER, LYOPHILIZED, FOR SOLUTION INTRAVENOUS at 09:57

## 2025-02-27 RX ADMIN — SODIUM CHLORIDE, PRESERVATIVE FREE 10 ML: 5 INJECTION INTRAVENOUS at 08:39

## 2025-02-27 RX ADMIN — IPRATROPIUM BROMIDE AND ALBUTEROL SULFATE 1 DOSE: 2.5; .5 SOLUTION RESPIRATORY (INHALATION) at 21:12

## 2025-02-27 ASSESSMENT — PAIN SCALES - GENERAL
PAINLEVEL_OUTOF10: 0

## 2025-02-28 LAB
ABO/RH: NORMAL
ALBUMIN SERPL-MCNC: 3.3 G/DL (ref 3.5–5.2)
ALP SERPL-CCNC: 107 U/L (ref 35–104)
ALT SERPL-CCNC: 70 U/L (ref 0–32)
ANION GAP SERPL CALCULATED.3IONS-SCNC: 9 MMOL/L (ref 7–16)
ANTIBODY SCREEN: NEGATIVE
ARM BAND NUMBER: NORMAL
AST SERPL-CCNC: 56 U/L (ref 0–31)
BASOPHILS # BLD: 0.02 K/UL (ref 0–0.2)
BASOPHILS NFR BLD: 0 % (ref 0–2)
BILIRUB SERPL-MCNC: 0.4 MG/DL (ref 0–1.2)
BLOOD BANK BLOOD PRODUCT EXPIRATION DATE: NORMAL
BLOOD BANK DISPENSE STATUS: NORMAL
BLOOD BANK ISBT PRODUCT BLOOD TYPE: 600
BLOOD BANK PRODUCT CODE: NORMAL
BLOOD BANK SAMPLE EXPIRATION: NORMAL
BLOOD BANK UNIT TYPE AND RH: NORMAL
BPU ID: NORMAL
BUN SERPL-MCNC: 10 MG/DL (ref 6–23)
CALCIUM SERPL-MCNC: 9.3 MG/DL (ref 8.6–10.2)
CHLORIDE SERPL-SCNC: 106 MMOL/L (ref 98–107)
CO2 SERPL-SCNC: 25 MMOL/L (ref 22–29)
COMPONENT: NORMAL
CREAT SERPL-MCNC: 0.8 MG/DL (ref 0.5–1)
CROSSMATCH RESULT: NORMAL
EOSINOPHIL # BLD: 0.13 K/UL (ref 0.05–0.5)
EOSINOPHILS RELATIVE PERCENT: 3 % (ref 0–6)
ERYTHROCYTE [DISTWIDTH] IN BLOOD BY AUTOMATED COUNT: 17.8 % (ref 11.5–15)
GFR, ESTIMATED: 89 ML/MIN/1.73M2
GLUCOSE BLD-MCNC: 110 MG/DL (ref 74–99)
GLUCOSE BLD-MCNC: 134 MG/DL (ref 74–99)
GLUCOSE BLD-MCNC: 70 MG/DL (ref 74–99)
GLUCOSE BLD-MCNC: 85 MG/DL (ref 74–99)
GLUCOSE SERPL-MCNC: 149 MG/DL (ref 74–99)
HCT VFR BLD AUTO: 25.2 % (ref 34–48)
HGB BLD-MCNC: 8 G/DL (ref 11.5–15.5)
IMM GRANULOCYTES # BLD AUTO: 0.06 K/UL (ref 0–0.58)
IMM GRANULOCYTES NFR BLD: 1 % (ref 0–5)
LYMPHOCYTES NFR BLD: 1.25 K/UL (ref 1.5–4)
LYMPHOCYTES RELATIVE PERCENT: 26 % (ref 20–42)
MAGNESIUM SERPL-MCNC: 1.7 MG/DL (ref 1.6–2.6)
MCH RBC QN AUTO: 31 PG (ref 26–35)
MCHC RBC AUTO-ENTMCNC: 31.7 G/DL (ref 32–34.5)
MCV RBC AUTO: 97.7 FL (ref 80–99.9)
MONOCYTES NFR BLD: 0.4 K/UL (ref 0.1–0.95)
MONOCYTES NFR BLD: 8 % (ref 2–12)
NEUTROPHILS NFR BLD: 62 % (ref 43–80)
NEUTS SEG NFR BLD: 2.96 K/UL (ref 1.8–7.3)
PLATELET, FLUORESCENCE: 146 K/UL (ref 130–450)
PMV BLD AUTO: 11.3 FL (ref 7–12)
POTASSIUM SERPL-SCNC: 3.2 MMOL/L (ref 3.5–5)
PROT SERPL-MCNC: 5.9 G/DL (ref 6.4–8.3)
RBC # BLD AUTO: 2.58 M/UL (ref 3.5–5.5)
SODIUM SERPL-SCNC: 140 MMOL/L (ref 132–146)
TRANSFUSION STATUS: NORMAL
UNIT DIVISION: 0
UNIT ISSUE DATE/TIME: NORMAL
VANCOMYCIN TROUGH SERPL-MCNC: 14.4 UG/ML (ref 5–16)
WBC OTHER # BLD: 4.8 K/UL (ref 4.5–11.5)

## 2025-02-28 PROCEDURE — 83735 ASSAY OF MAGNESIUM: CPT

## 2025-02-28 PROCEDURE — 51798 US URINE CAPACITY MEASURE: CPT

## 2025-02-28 PROCEDURE — 94669 MECHANICAL CHEST WALL OSCILL: CPT

## 2025-02-28 PROCEDURE — 6370000000 HC RX 637 (ALT 250 FOR IP): Performed by: INTERNAL MEDICINE

## 2025-02-28 PROCEDURE — 6360000002 HC RX W HCPCS: Performed by: CLINICAL NURSE SPECIALIST

## 2025-02-28 PROCEDURE — 94640 AIRWAY INHALATION TREATMENT: CPT

## 2025-02-28 PROCEDURE — 85025 COMPLETE CBC W/AUTO DIFF WBC: CPT

## 2025-02-28 PROCEDURE — 51701 INSERT BLADDER CATHETER: CPT

## 2025-02-28 PROCEDURE — 80202 ASSAY OF VANCOMYCIN: CPT

## 2025-02-28 PROCEDURE — 82962 GLUCOSE BLOOD TEST: CPT

## 2025-02-28 PROCEDURE — 80053 COMPREHEN METABOLIC PANEL: CPT

## 2025-02-28 PROCEDURE — 6370000000 HC RX 637 (ALT 250 FOR IP): Performed by: PHYSICIAN ASSISTANT

## 2025-02-28 PROCEDURE — 2580000003 HC RX 258: Performed by: CLINICAL NURSE SPECIALIST

## 2025-02-28 PROCEDURE — 2580000003 HC RX 258: Performed by: INTERNAL MEDICINE

## 2025-02-28 PROCEDURE — 2500000003 HC RX 250 WO HCPCS: Performed by: CLINICAL NURSE SPECIALIST

## 2025-02-28 PROCEDURE — 2060000000 HC ICU INTERMEDIATE R&B

## 2025-02-28 PROCEDURE — 99232 SBSQ HOSP IP/OBS MODERATE 35: CPT | Performed by: INTERNAL MEDICINE

## 2025-02-28 PROCEDURE — 2500000003 HC RX 250 WO HCPCS: Performed by: PHYSICIAN ASSISTANT

## 2025-02-28 RX ORDER — POTASSIUM CHLORIDE 1500 MG/1
40 TABLET, EXTENDED RELEASE ORAL ONCE
Status: DISCONTINUED | OUTPATIENT
Start: 2025-02-28 | End: 2025-03-05 | Stop reason: HOSPADM

## 2025-02-28 RX ADMIN — DEXTROSE AND SODIUM CHLORIDE: 5; .45 INJECTION, SOLUTION INTRAVENOUS at 00:49

## 2025-02-28 RX ADMIN — BUDESONIDE INHALATION 500 MCG: 0.5 SUSPENSION RESPIRATORY (INHALATION) at 20:05

## 2025-02-28 RX ADMIN — BENZTROPINE MESYLATE 0.5 MG: 0.5 TABLET ORAL at 08:07

## 2025-02-28 RX ADMIN — SERTRALINE 100 MG: 100 TABLET, FILM COATED ORAL at 08:06

## 2025-02-28 RX ADMIN — AMOXICILLIN AND CLAVULANATE POTASSIUM 1 TABLET: 875; 125 TABLET, FILM COATED ORAL at 20:46

## 2025-02-28 RX ADMIN — SODIUM CHLORIDE, PRESERVATIVE FREE 10 ML: 5 INJECTION INTRAVENOUS at 08:09

## 2025-02-28 RX ADMIN — RIVASTIGMINE TARTRATE 3 MG: 3 CAPSULE ORAL at 08:06

## 2025-02-28 RX ADMIN — CEFEPIME 2 G: 2 INJECTION, POWDER, FOR SOLUTION INTRAVENOUS at 05:05

## 2025-02-28 RX ADMIN — VANCOMYCIN HYDROCHLORIDE 1250 MG: 500 INJECTION, POWDER, LYOPHILIZED, FOR SOLUTION INTRAVENOUS at 09:28

## 2025-02-28 RX ADMIN — ARFORMOTEROL TARTRATE 15 MCG: 15 SOLUTION RESPIRATORY (INHALATION) at 20:05

## 2025-02-28 RX ADMIN — LACTULOSE 20 G: 20 SOLUTION ORAL at 08:07

## 2025-02-28 RX ADMIN — WATER 20 ML: 1 INJECTION INTRAMUSCULAR; INTRAVENOUS; SUBCUTANEOUS at 05:05

## 2025-02-28 RX ADMIN — SODIUM CHLORIDE, PRESERVATIVE FREE 10 ML: 5 INJECTION INTRAVENOUS at 20:46

## 2025-02-28 RX ADMIN — TOPIRAMATE 100 MG: 100 TABLET, FILM COATED ORAL at 08:06

## 2025-02-28 RX ADMIN — ATORVASTATIN CALCIUM 40 MG: 40 TABLET, FILM COATED ORAL at 20:46

## 2025-02-28 RX ADMIN — POTASSIUM CHLORIDE 40 MEQ: 1500 TABLET, EXTENDED RELEASE ORAL at 08:07

## 2025-02-28 RX ADMIN — ARFORMOTEROL TARTRATE 15 MCG: 15 SOLUTION RESPIRATORY (INHALATION) at 08:22

## 2025-02-28 RX ADMIN — BUDESONIDE INHALATION 500 MCG: 0.5 SUSPENSION RESPIRATORY (INHALATION) at 08:22

## 2025-02-28 NOTE — RT PROTOCOL NOTE
order with TID Frequency and one order with Frequency of every 4 hours PRN wheezing or increased work of breathing using Per Protocol order mode.       11-13 - enter or revise RT Bronchodilator order(s) to one equivalent RT bronchodilator order with QID Frequency and an Albuterol order with Frequency of every 4 hours PRN wheezing or increased work of breathing using Per Protocol order mode.      Greater than 13 - enter or revise RT Bronchodilator order(s) to one equivalent RT bronchodilator order with every 4 hours Frequency and an Albuterol order with Frequency of every 2 hours PRN wheezing or increased work of breathing using Per Protocol order mode.       Electronically signed by Svetlana Velasquez RCP on 2/27/2025 at 9:39 PM

## 2025-02-28 NOTE — CARE COORDINATION
Social Work/Discharge Planning:  Discharge order noted if okay with Pulmonary.  Placed patient on Will Call with [facility].  Received notification patient will not discharge and Cardiology will be consulted.  Plan remains to [facility] when medically stable.  Updated Kristine with [facility] of possible discharge over the weekend via voicemail.  Patient Guardian Ifrah (ph: [phone]) will need to be notified when patient discharges.  Will continue to follow.  Electronically signed by LIZA Sorenson on 2/28/2025 at 10:23 AM

## 2025-03-01 LAB
ALBUMIN SERPL-MCNC: 3.3 G/DL (ref 3.5–5.2)
ALP SERPL-CCNC: 106 U/L (ref 35–104)
ALT SERPL-CCNC: 66 U/L (ref 0–32)
ANION GAP SERPL CALCULATED.3IONS-SCNC: 9 MMOL/L (ref 7–16)
AST SERPL-CCNC: 50 U/L (ref 0–31)
BASOPHILS # BLD: 0.05 K/UL (ref 0–0.2)
BASOPHILS NFR BLD: 1 % (ref 0–2)
BILIRUB SERPL-MCNC: 0.4 MG/DL (ref 0–1.2)
BUN SERPL-MCNC: 16 MG/DL (ref 6–23)
CALCIUM SERPL-MCNC: 9.6 MG/DL (ref 8.6–10.2)
CHLORIDE SERPL-SCNC: 108 MMOL/L (ref 98–107)
CO2 SERPL-SCNC: 25 MMOL/L (ref 22–29)
CREAT SERPL-MCNC: 0.9 MG/DL (ref 0.5–1)
EOSINOPHIL # BLD: 0.22 K/UL (ref 0.05–0.5)
EOSINOPHILS RELATIVE PERCENT: 4 % (ref 0–6)
ERYTHROCYTE [DISTWIDTH] IN BLOOD BY AUTOMATED COUNT: 18.1 % (ref 11.5–15)
GFR, ESTIMATED: 76 ML/MIN/1.73M2
GLUCOSE BLD-MCNC: 111 MG/DL (ref 74–99)
GLUCOSE BLD-MCNC: 76 MG/DL (ref 74–99)
GLUCOSE BLD-MCNC: 86 MG/DL (ref 74–99)
GLUCOSE BLD-MCNC: 88 MG/DL (ref 74–99)
GLUCOSE BLD-MCNC: 91 MG/DL (ref 74–99)
GLUCOSE SERPL-MCNC: 72 MG/DL (ref 74–99)
HCT VFR BLD AUTO: 26.1 % (ref 34–48)
HGB BLD-MCNC: 8.4 G/DL (ref 11.5–15.5)
IMM GRANULOCYTES # BLD AUTO: 0.05 K/UL (ref 0–0.58)
IMM GRANULOCYTES NFR BLD: 1 % (ref 0–5)
LYMPHOCYTES NFR BLD: 1.13 K/UL (ref 1.5–4)
LYMPHOCYTES RELATIVE PERCENT: 19 % (ref 20–42)
MCH RBC QN AUTO: 31.6 PG (ref 26–35)
MCHC RBC AUTO-ENTMCNC: 32.2 G/DL (ref 32–34.5)
MCV RBC AUTO: 98.1 FL (ref 80–99.9)
MICROORGANISM SPEC CULT: NORMAL
MICROORGANISM SPEC CULT: NORMAL
MONOCYTES NFR BLD: 0.34 K/UL (ref 0.1–0.95)
MONOCYTES NFR BLD: 6 % (ref 2–12)
NEUTROPHILS NFR BLD: 70 % (ref 43–80)
NEUTS SEG NFR BLD: 4.14 K/UL (ref 1.8–7.3)
PLATELET # BLD AUTO: 174 K/UL (ref 130–450)
PMV BLD AUTO: 11.5 FL (ref 7–12)
POTASSIUM SERPL-SCNC: 4.7 MMOL/L (ref 3.5–5)
PROT SERPL-MCNC: 6 G/DL (ref 6.4–8.3)
RBC # BLD AUTO: 2.66 M/UL (ref 3.5–5.5)
SERVICE CMNT-IMP: NORMAL
SERVICE CMNT-IMP: NORMAL
SODIUM SERPL-SCNC: 142 MMOL/L (ref 132–146)
SPECIMEN DESCRIPTION: NORMAL
SPECIMEN DESCRIPTION: NORMAL
WBC OTHER # BLD: 5.9 K/UL (ref 4.5–11.5)

## 2025-03-01 PROCEDURE — 99232 SBSQ HOSP IP/OBS MODERATE 35: CPT | Performed by: INTERNAL MEDICINE

## 2025-03-01 PROCEDURE — 6370000000 HC RX 637 (ALT 250 FOR IP): Performed by: INTERNAL MEDICINE

## 2025-03-01 PROCEDURE — 2500000003 HC RX 250 WO HCPCS: Performed by: PHYSICIAN ASSISTANT

## 2025-03-01 PROCEDURE — 85025 COMPLETE CBC W/AUTO DIFF WBC: CPT

## 2025-03-01 PROCEDURE — 80053 COMPREHEN METABOLIC PANEL: CPT

## 2025-03-01 PROCEDURE — 82962 GLUCOSE BLOOD TEST: CPT

## 2025-03-01 PROCEDURE — 2060000000 HC ICU INTERMEDIATE R&B

## 2025-03-01 PROCEDURE — 6370000000 HC RX 637 (ALT 250 FOR IP): Performed by: PHYSICIAN ASSISTANT

## 2025-03-01 RX ADMIN — SODIUM CHLORIDE, PRESERVATIVE FREE 10 ML: 5 INJECTION INTRAVENOUS at 21:27

## 2025-03-01 RX ADMIN — ACETAMINOPHEN 650 MG: 650 SUPPOSITORY RECTAL at 15:23

## 2025-03-01 RX ADMIN — LACTULOSE 20 G: 20 SOLUTION ORAL at 08:48

## 2025-03-01 RX ADMIN — AMOXICILLIN AND CLAVULANATE POTASSIUM 1 TABLET: 875; 125 TABLET, FILM COATED ORAL at 08:48

## 2025-03-01 RX ADMIN — ATORVASTATIN CALCIUM 40 MG: 40 TABLET, FILM COATED ORAL at 21:27

## 2025-03-01 RX ADMIN — Medication 3 MG: at 21:27

## 2025-03-01 RX ADMIN — SODIUM CHLORIDE, PRESERVATIVE FREE 10 ML: 5 INJECTION INTRAVENOUS at 08:52

## 2025-03-01 RX ADMIN — AMOXICILLIN AND CLAVULANATE POTASSIUM 1 TABLET: 875; 125 TABLET, FILM COATED ORAL at 21:27

## 2025-03-01 RX ADMIN — ACETAMINOPHEN 650 MG: 325 TABLET ORAL at 08:57

## 2025-03-01 ASSESSMENT — PAIN DESCRIPTION - ORIENTATION: ORIENTATION: OTHER (COMMENT)

## 2025-03-01 ASSESSMENT — PAIN - FUNCTIONAL ASSESSMENT: PAIN_FUNCTIONAL_ASSESSMENT: ACTIVITIES ARE NOT PREVENTED

## 2025-03-01 ASSESSMENT — PAIN DESCRIPTION - LOCATION: LOCATION: HEAD

## 2025-03-01 ASSESSMENT — PAIN DESCRIPTION - DESCRIPTORS: DESCRIPTORS: ACHING

## 2025-03-01 ASSESSMENT — PAIN SCALES - GENERAL: PAINLEVEL_OUTOF10: 8
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
## 2025-03-02 LAB
ALBUMIN SERPL-MCNC: 3.3 G/DL (ref 3.5–5.2)
ALP SERPL-CCNC: 112 U/L (ref 35–104)
ALT SERPL-CCNC: 67 U/L (ref 0–32)
ANION GAP SERPL CALCULATED.3IONS-SCNC: 11 MMOL/L (ref 7–16)
AST SERPL-CCNC: 52 U/L (ref 0–31)
BASOPHILS # BLD: 0.03 K/UL (ref 0–0.2)
BASOPHILS NFR BLD: 1 % (ref 0–2)
BILIRUB SERPL-MCNC: 0.5 MG/DL (ref 0–1.2)
BUN SERPL-MCNC: 20 MG/DL (ref 6–23)
CALCIUM SERPL-MCNC: 9.8 MG/DL (ref 8.6–10.2)
CHLORIDE SERPL-SCNC: 103 MMOL/L (ref 98–107)
CO2 SERPL-SCNC: 24 MMOL/L (ref 22–29)
CREAT SERPL-MCNC: 0.9 MG/DL (ref 0.5–1)
EOSINOPHIL # BLD: 0.26 K/UL (ref 0.05–0.5)
EOSINOPHILS RELATIVE PERCENT: 5 % (ref 0–6)
ERYTHROCYTE [DISTWIDTH] IN BLOOD BY AUTOMATED COUNT: 17.8 % (ref 11.5–15)
GFR, ESTIMATED: 72 ML/MIN/1.73M2
GLUCOSE BLD-MCNC: 74 MG/DL (ref 74–99)
GLUCOSE BLD-MCNC: 80 MG/DL (ref 74–99)
GLUCOSE BLD-MCNC: 98 MG/DL (ref 74–99)
GLUCOSE SERPL-MCNC: 72 MG/DL (ref 74–99)
HCT VFR BLD AUTO: 28.2 % (ref 34–48)
HGB BLD-MCNC: 8.8 G/DL (ref 11.5–15.5)
IMM GRANULOCYTES # BLD AUTO: 0.04 K/UL (ref 0–0.58)
IMM GRANULOCYTES NFR BLD: 1 % (ref 0–5)
LYMPHOCYTES NFR BLD: 1.64 K/UL (ref 1.5–4)
LYMPHOCYTES RELATIVE PERCENT: 30 % (ref 20–42)
MCH RBC QN AUTO: 30.9 PG (ref 26–35)
MCHC RBC AUTO-ENTMCNC: 31.2 G/DL (ref 32–34.5)
MCV RBC AUTO: 98.9 FL (ref 80–99.9)
MONOCYTES NFR BLD: 0.38 K/UL (ref 0.1–0.95)
MONOCYTES NFR BLD: 7 % (ref 2–12)
NEUTROPHILS NFR BLD: 57 % (ref 43–80)
NEUTS SEG NFR BLD: 3.15 K/UL (ref 1.8–7.3)
PLATELET # BLD AUTO: 217 K/UL (ref 130–450)
PMV BLD AUTO: 11 FL (ref 7–12)
POTASSIUM SERPL-SCNC: 3.9 MMOL/L (ref 3.5–5)
PROT SERPL-MCNC: 6.3 G/DL (ref 6.4–8.3)
RBC # BLD AUTO: 2.85 M/UL (ref 3.5–5.5)
SODIUM SERPL-SCNC: 138 MMOL/L (ref 132–146)
WBC OTHER # BLD: 5.5 K/UL (ref 4.5–11.5)

## 2025-03-02 PROCEDURE — 6370000000 HC RX 637 (ALT 250 FOR IP): Performed by: PHYSICIAN ASSISTANT

## 2025-03-02 PROCEDURE — 99233 SBSQ HOSP IP/OBS HIGH 50: CPT | Performed by: INTERNAL MEDICINE

## 2025-03-02 PROCEDURE — 6370000000 HC RX 637 (ALT 250 FOR IP): Performed by: INTERNAL MEDICINE

## 2025-03-02 PROCEDURE — 2060000000 HC ICU INTERMEDIATE R&B

## 2025-03-02 PROCEDURE — 82962 GLUCOSE BLOOD TEST: CPT

## 2025-03-02 PROCEDURE — 85025 COMPLETE CBC W/AUTO DIFF WBC: CPT

## 2025-03-02 PROCEDURE — 2500000003 HC RX 250 WO HCPCS: Performed by: PHYSICIAN ASSISTANT

## 2025-03-02 PROCEDURE — 80053 COMPREHEN METABOLIC PANEL: CPT

## 2025-03-02 RX ADMIN — Medication 3 MG: at 21:23

## 2025-03-02 RX ADMIN — SODIUM CHLORIDE, PRESERVATIVE FREE 10 ML: 5 INJECTION INTRAVENOUS at 09:08

## 2025-03-02 RX ADMIN — SODIUM CHLORIDE, PRESERVATIVE FREE 10 ML: 5 INJECTION INTRAVENOUS at 21:24

## 2025-03-02 RX ADMIN — AMOXICILLIN AND CLAVULANATE POTASSIUM 1 TABLET: 875; 125 TABLET, FILM COATED ORAL at 08:56

## 2025-03-02 RX ADMIN — ATORVASTATIN CALCIUM 40 MG: 40 TABLET, FILM COATED ORAL at 21:24

## 2025-03-02 RX ADMIN — BENZTROPINE MESYLATE 0.5 MG: 0.5 TABLET ORAL at 21:23

## 2025-03-02 RX ADMIN — LACTULOSE 20 G: 20 SOLUTION ORAL at 08:56

## 2025-03-02 RX ADMIN — ACETAMINOPHEN 650 MG: 325 TABLET ORAL at 08:57

## 2025-03-02 RX ADMIN — AMOXICILLIN AND CLAVULANATE POTASSIUM 1 TABLET: 875; 125 TABLET, FILM COATED ORAL at 21:23

## 2025-03-02 ASSESSMENT — PAIN - FUNCTIONAL ASSESSMENT: PAIN_FUNCTIONAL_ASSESSMENT: ACTIVITIES ARE NOT PREVENTED

## 2025-03-02 ASSESSMENT — PAIN SCALES - GENERAL: PAINLEVEL_OUTOF10: 3

## 2025-03-02 ASSESSMENT — PAIN DESCRIPTION - DESCRIPTORS: DESCRIPTORS: ACHING

## 2025-03-02 ASSESSMENT — PAIN DESCRIPTION - LOCATION: LOCATION: HEAD

## 2025-03-02 ASSESSMENT — PAIN DESCRIPTION - ORIENTATION: ORIENTATION: RIGHT;LEFT;MID

## 2025-03-03 ENCOUNTER — APPOINTMENT (OUTPATIENT)
Age: 65
DRG: 720 | End: 2025-03-03
Attending: INTERNAL MEDICINE
Payer: MEDICAID

## 2025-03-03 LAB
ALBUMIN SERPL-MCNC: 3.6 G/DL (ref 3.5–5.2)
ALP SERPL-CCNC: 115 U/L (ref 35–104)
ALT SERPL-CCNC: 68 U/L (ref 0–32)
ANION GAP SERPL CALCULATED.3IONS-SCNC: 11 MMOL/L (ref 7–16)
AST SERPL-CCNC: 50 U/L (ref 0–31)
BASOPHILS # BLD: 0.06 K/UL (ref 0–0.2)
BASOPHILS NFR BLD: 1 % (ref 0–2)
BILIRUB SERPL-MCNC: 0.4 MG/DL (ref 0–1.2)
BUN SERPL-MCNC: 22 MG/DL (ref 6–23)
CALCIUM SERPL-MCNC: 9.9 MG/DL (ref 8.6–10.2)
CHLORIDE SERPL-SCNC: 103 MMOL/L (ref 98–107)
CO2 SERPL-SCNC: 26 MMOL/L (ref 22–29)
CREAT SERPL-MCNC: 0.8 MG/DL (ref 0.5–1)
ECHO AO ASC DIAM: 2.8 CM
ECHO AO ASCENDING AORTA INDEX: 1.76 CM/M2
ECHO AV AREA PEAK VELOCITY: 2.3 CM2
ECHO AV AREA VTI: 2.2 CM2
ECHO AV AREA/BSA PEAK VELOCITY: 1.4 CM2/M2
ECHO AV AREA/BSA VTI: 1.4 CM2/M2
ECHO AV CUSP MM: 1.6 CM
ECHO AV MEAN GRADIENT: 5 MMHG
ECHO AV MEAN VELOCITY: 1 M/S
ECHO AV PEAK GRADIENT: 10 MMHG
ECHO AV PEAK VELOCITY: 1.6 M/S
ECHO AV VELOCITY RATIO: 0.75
ECHO AV VTI: 35.7 CM
ECHO BSA: 1.65 M2
ECHO EST RA PRESSURE: 3 MMHG
ECHO LA DIAMETER INDEX: 2.2 CM/M2
ECHO LA DIAMETER: 3.5 CM
ECHO LA VOL A-L A2C: 80 ML (ref 22–52)
ECHO LA VOL A-L A4C: 69 ML (ref 22–52)
ECHO LA VOL MOD A2C: 73 ML (ref 22–52)
ECHO LA VOL MOD A4C: 66 ML (ref 22–52)
ECHO LA VOLUME AREA LENGTH: 76 ML
ECHO LA VOLUME INDEX A-L A2C: 50 ML/M2 (ref 16–34)
ECHO LA VOLUME INDEX A-L A4C: 43 ML/M2 (ref 16–34)
ECHO LA VOLUME INDEX AREA LENGTH: 48 ML/M2 (ref 16–34)
ECHO LA VOLUME INDEX MOD A2C: 46 ML/M2 (ref 16–34)
ECHO LA VOLUME INDEX MOD A4C: 42 ML/M2 (ref 16–34)
ECHO LV E' LATERAL VELOCITY: 8 CM/S
ECHO LV E' SEPTAL VELOCITY: 7 CM/S
ECHO LV EJECTION FRACTION 3D: 60 %
ECHO LV FRACTIONAL SHORTENING: 29 % (ref 28–44)
ECHO LV INTERNAL DIMENSION DIASTOLE INDEX: 2.83 CM/M2
ECHO LV INTERNAL DIMENSION DIASTOLIC: 4.5 CM (ref 3.9–5.3)
ECHO LV INTERNAL DIMENSION SYSTOLIC INDEX: 2.01 CM/M2
ECHO LV INTERNAL DIMENSION SYSTOLIC: 3.2 CM
ECHO LV ISOVOLUMETRIC RELAXATION TIME (IVRT): 143 MS
ECHO LV IVSD: 0.9 CM (ref 0.6–0.9)
ECHO LV IVSS: 1.2 CM
ECHO LV MASS 2D: 132.8 G (ref 67–162)
ECHO LV MASS INDEX 2D: 83.5 G/M2 (ref 43–95)
ECHO LV POSTERIOR WALL DIASTOLIC: 0.9 CM (ref 0.6–0.9)
ECHO LV POSTERIOR WALL SYSTOLIC: 1.3 CM
ECHO LV RELATIVE WALL THICKNESS RATIO: 0.4
ECHO LVOT AREA: 3.1 CM2
ECHO LVOT AV VTI INDEX: 0.7
ECHO LVOT DIAM: 2 CM
ECHO LVOT MEAN GRADIENT: 2 MMHG
ECHO LVOT PEAK GRADIENT: 5 MMHG
ECHO LVOT PEAK VELOCITY: 1.2 M/S
ECHO LVOT STROKE VOLUME INDEX: 49.4 ML/M2
ECHO LVOT SV: 78.5 ML
ECHO LVOT VTI: 25 CM
ECHO MV "A" WAVE DURATION: 143 MSEC
ECHO MV A VELOCITY: 0.7 M/S
ECHO MV AREA PHT: 2.6 CM2
ECHO MV AREA VTI: 2 CM2
ECHO MV E DECELERATION TIME (DT): 267.8 MS
ECHO MV E VELOCITY: 0.83 M/S
ECHO MV E/A RATIO: 1.19
ECHO MV E/E' LATERAL: 10.38
ECHO MV E/E' RATIO (AVERAGED): 11.12
ECHO MV E/E' SEPTAL: 11.86
ECHO MV LVOT VTI INDEX: 1.55
ECHO MV MAX VELOCITY: 1 M/S
ECHO MV MEAN GRADIENT: 1 MMHG
ECHO MV MEAN VELOCITY: 0.5 M/S
ECHO MV PEAK GRADIENT: 4 MMHG
ECHO MV PRESSURE HALF TIME (PHT): 85.1 MS
ECHO MV VTI: 38.8 CM
ECHO PV MAX VELOCITY: 0.7 M/S
ECHO PV MEAN GRADIENT: 1 MMHG
ECHO PV MEAN VELOCITY: 0.5 M/S
ECHO PV PEAK GRADIENT: 2 MMHG
ECHO PV VTI: 20 CM
ECHO PVEIN A DURATION: 156.9 MS
ECHO PVEIN A VELOCITY: 0.3 M/S
ECHO PVEIN PEAK D VELOCITY: 0.4 M/S
ECHO PVEIN PEAK S VELOCITY: 0.6 M/S
ECHO PVEIN S/D RATIO: 1.5
ECHO RIGHT VENTRICULAR SYSTOLIC PRESSURE (RVSP): 25 MMHG
ECHO RV INTERNAL DIMENSION: 2.9 CM
ECHO RV TAPSE: 2.3 CM (ref 1.7–?)
ECHO TV REGURGITANT MAX VELOCITY: 2.33 M/S
ECHO TV REGURGITANT PEAK GRADIENT: 22 MMHG
EOSINOPHIL # BLD: 0.19 K/UL (ref 0.05–0.5)
EOSINOPHILS RELATIVE PERCENT: 4 % (ref 0–6)
ERYTHROCYTE [DISTWIDTH] IN BLOOD BY AUTOMATED COUNT: 17.1 % (ref 11.5–15)
GFR, ESTIMATED: 80 ML/MIN/1.73M2
GLUCOSE BLD-MCNC: 140 MG/DL (ref 74–99)
GLUCOSE BLD-MCNC: 85 MG/DL (ref 74–99)
GLUCOSE BLD-MCNC: 93 MG/DL (ref 74–99)
GLUCOSE SERPL-MCNC: 79 MG/DL (ref 74–99)
HCT VFR BLD AUTO: 30.6 % (ref 34–48)
HGB BLD-MCNC: 9.7 G/DL (ref 11.5–15.5)
IMM GRANULOCYTES # BLD AUTO: 0.03 K/UL (ref 0–0.58)
IMM GRANULOCYTES NFR BLD: 1 % (ref 0–5)
LYMPHOCYTES NFR BLD: 1.4 K/UL (ref 1.5–4)
LYMPHOCYTES RELATIVE PERCENT: 33 % (ref 20–42)
MCH RBC QN AUTO: 31.5 PG (ref 26–35)
MCHC RBC AUTO-ENTMCNC: 31.7 G/DL (ref 32–34.5)
MCV RBC AUTO: 99.4 FL (ref 80–99.9)
MONOCYTES NFR BLD: 0.35 K/UL (ref 0.1–0.95)
MONOCYTES NFR BLD: 8 % (ref 2–12)
NEUTROPHILS NFR BLD: 53 % (ref 43–80)
NEUTS SEG NFR BLD: 2.26 K/UL (ref 1.8–7.3)
PLATELET # BLD AUTO: 275 K/UL (ref 130–450)
PMV BLD AUTO: 11 FL (ref 7–12)
POTASSIUM SERPL-SCNC: 4 MMOL/L (ref 3.5–5)
PROT SERPL-MCNC: 6.7 G/DL (ref 6.4–8.3)
RBC # BLD AUTO: 3.08 M/UL (ref 3.5–5.5)
SODIUM SERPL-SCNC: 140 MMOL/L (ref 132–146)
T4 FREE SERPL-MCNC: 1.1 NG/DL (ref 0.9–1.7)
WBC OTHER # BLD: 4.3 K/UL (ref 4.5–11.5)

## 2025-03-03 PROCEDURE — 93306 TTE W/DOPPLER COMPLETE: CPT

## 2025-03-03 PROCEDURE — 6370000000 HC RX 637 (ALT 250 FOR IP): Performed by: PHYSICIAN ASSISTANT

## 2025-03-03 PROCEDURE — 94640 AIRWAY INHALATION TREATMENT: CPT

## 2025-03-03 PROCEDURE — 94669 MECHANICAL CHEST WALL OSCILL: CPT

## 2025-03-03 PROCEDURE — 2060000000 HC ICU INTERMEDIATE R&B

## 2025-03-03 PROCEDURE — 93306 TTE W/DOPPLER COMPLETE: CPT | Performed by: INTERNAL MEDICINE

## 2025-03-03 PROCEDURE — 84439 ASSAY OF FREE THYROXINE: CPT

## 2025-03-03 PROCEDURE — 2500000003 HC RX 250 WO HCPCS: Performed by: PHYSICIAN ASSISTANT

## 2025-03-03 PROCEDURE — 6360000002 HC RX W HCPCS: Performed by: CLINICAL NURSE SPECIALIST

## 2025-03-03 PROCEDURE — 82962 GLUCOSE BLOOD TEST: CPT

## 2025-03-03 PROCEDURE — 85025 COMPLETE CBC W/AUTO DIFF WBC: CPT

## 2025-03-03 PROCEDURE — 80053 COMPREHEN METABOLIC PANEL: CPT

## 2025-03-03 PROCEDURE — 6360000002 HC RX W HCPCS: Performed by: PHYSICIAN ASSISTANT

## 2025-03-03 RX ADMIN — LACTULOSE 20 G: 20 SOLUTION ORAL at 08:51

## 2025-03-03 RX ADMIN — SODIUM CHLORIDE, PRESERVATIVE FREE 10 ML: 5 INJECTION INTRAVENOUS at 08:51

## 2025-03-03 RX ADMIN — ACETAMINOPHEN 650 MG: 325 TABLET ORAL at 20:08

## 2025-03-03 RX ADMIN — BENZTROPINE MESYLATE 0.5 MG: 0.5 TABLET ORAL at 08:51

## 2025-03-03 RX ADMIN — BENZTROPINE MESYLATE 0.5 MG: 0.5 TABLET ORAL at 20:10

## 2025-03-03 RX ADMIN — ACETAMINOPHEN 650 MG: 325 TABLET ORAL at 06:44

## 2025-03-03 RX ADMIN — SODIUM CHLORIDE, PRESERVATIVE FREE 10 ML: 5 INJECTION INTRAVENOUS at 20:11

## 2025-03-03 RX ADMIN — ATORVASTATIN CALCIUM 40 MG: 40 TABLET, FILM COATED ORAL at 20:10

## 2025-03-03 RX ADMIN — ARFORMOTEROL TARTRATE 15 MCG: 15 SOLUTION RESPIRATORY (INHALATION) at 07:48

## 2025-03-03 RX ADMIN — BUDESONIDE INHALATION 500 MCG: 0.5 SUSPENSION RESPIRATORY (INHALATION) at 07:48

## 2025-03-03 RX ADMIN — ACETAMINOPHEN 650 MG: 325 TABLET ORAL at 13:32

## 2025-03-03 RX ADMIN — SERTRALINE 100 MG: 100 TABLET, FILM COATED ORAL at 08:51

## 2025-03-03 ASSESSMENT — PAIN SCALES - GENERAL
PAINLEVEL_OUTOF10: 4
PAINLEVEL_OUTOF10: 4
PAINLEVEL_OUTOF10: 7

## 2025-03-03 ASSESSMENT — PAIN DESCRIPTION - LOCATION
LOCATION: FOOT
LOCATION: FOOT
LOCATION: HEAD

## 2025-03-03 ASSESSMENT — PAIN DESCRIPTION - DESCRIPTORS: DESCRIPTORS: ACHING

## 2025-03-03 ASSESSMENT — PAIN DESCRIPTION - ORIENTATION
ORIENTATION: LEFT;RIGHT
ORIENTATION: LEFT;RIGHT

## 2025-03-04 LAB
ALBUMIN SERPL-MCNC: 4 G/DL (ref 3.5–5.2)
ALP SERPL-CCNC: 129 U/L (ref 35–104)
ALT SERPL-CCNC: 69 U/L (ref 0–32)
ANION GAP SERPL CALCULATED.3IONS-SCNC: 11 MMOL/L (ref 7–16)
AST SERPL-CCNC: 53 U/L (ref 0–31)
BASOPHILS # BLD: 0.06 K/UL (ref 0–0.2)
BASOPHILS NFR BLD: 1 % (ref 0–2)
BILIRUB SERPL-MCNC: 0.3 MG/DL (ref 0–1.2)
BUN SERPL-MCNC: 15 MG/DL (ref 6–23)
CALCIUM SERPL-MCNC: 10.3 MG/DL (ref 8.6–10.2)
CHLORIDE SERPL-SCNC: 102 MMOL/L (ref 98–107)
CO2 SERPL-SCNC: 26 MMOL/L (ref 22–29)
CREAT SERPL-MCNC: 1 MG/DL (ref 0.5–1)
EOSINOPHIL # BLD: 0.11 K/UL (ref 0.05–0.5)
EOSINOPHILS RELATIVE PERCENT: 2 % (ref 0–6)
ERYTHROCYTE [DISTWIDTH] IN BLOOD BY AUTOMATED COUNT: 16.7 % (ref 11.5–15)
GFR, ESTIMATED: 61 ML/MIN/1.73M2
GLUCOSE BLD-MCNC: 108 MG/DL (ref 74–99)
GLUCOSE BLD-MCNC: 86 MG/DL (ref 74–99)
GLUCOSE BLD-MCNC: 92 MG/DL (ref 74–99)
GLUCOSE SERPL-MCNC: 86 MG/DL (ref 74–99)
HCT VFR BLD AUTO: 34 % (ref 34–48)
HGB BLD-MCNC: 10.5 G/DL (ref 11.5–15.5)
IMM GRANULOCYTES # BLD AUTO: <0.03 K/UL (ref 0–0.58)
IMM GRANULOCYTES NFR BLD: 0 % (ref 0–5)
LYMPHOCYTES NFR BLD: 0.87 K/UL (ref 1.5–4)
LYMPHOCYTES RELATIVE PERCENT: 17 % (ref 20–42)
MCH RBC QN AUTO: 31.1 PG (ref 26–35)
MCHC RBC AUTO-ENTMCNC: 30.9 G/DL (ref 32–34.5)
MCV RBC AUTO: 100.6 FL (ref 80–99.9)
MONOCYTES NFR BLD: 0.25 K/UL (ref 0.1–0.95)
MONOCYTES NFR BLD: 5 % (ref 2–12)
NEUTROPHILS NFR BLD: 75 % (ref 43–80)
NEUTS SEG NFR BLD: 3.83 K/UL (ref 1.8–7.3)
PLATELET # BLD AUTO: 363 K/UL (ref 130–450)
PMV BLD AUTO: 10.7 FL (ref 7–12)
POTASSIUM SERPL-SCNC: 4.3 MMOL/L (ref 3.5–5)
PROT SERPL-MCNC: 7.3 G/DL (ref 6.4–8.3)
RBC # BLD AUTO: 3.38 M/UL (ref 3.5–5.5)
SODIUM SERPL-SCNC: 139 MMOL/L (ref 132–146)
WBC OTHER # BLD: 5.1 K/UL (ref 4.5–11.5)

## 2025-03-04 PROCEDURE — 85025 COMPLETE CBC W/AUTO DIFF WBC: CPT

## 2025-03-04 PROCEDURE — 2060000000 HC ICU INTERMEDIATE R&B

## 2025-03-04 PROCEDURE — 94640 AIRWAY INHALATION TREATMENT: CPT

## 2025-03-04 PROCEDURE — 80053 COMPREHEN METABOLIC PANEL: CPT

## 2025-03-04 PROCEDURE — 92526 ORAL FUNCTION THERAPY: CPT

## 2025-03-04 PROCEDURE — 6360000002 HC RX W HCPCS: Performed by: CLINICAL NURSE SPECIALIST

## 2025-03-04 PROCEDURE — 2500000003 HC RX 250 WO HCPCS: Performed by: PHYSICIAN ASSISTANT

## 2025-03-04 PROCEDURE — 36415 COLL VENOUS BLD VENIPUNCTURE: CPT

## 2025-03-04 PROCEDURE — 82962 GLUCOSE BLOOD TEST: CPT

## 2025-03-04 PROCEDURE — 6370000000 HC RX 637 (ALT 250 FOR IP): Performed by: PHYSICIAN ASSISTANT

## 2025-03-04 RX ADMIN — SERTRALINE 100 MG: 100 TABLET, FILM COATED ORAL at 07:34

## 2025-03-04 RX ADMIN — SODIUM CHLORIDE, PRESERVATIVE FREE 10 ML: 5 INJECTION INTRAVENOUS at 07:35

## 2025-03-04 RX ADMIN — ARFORMOTEROL TARTRATE 15 MCG: 15 SOLUTION RESPIRATORY (INHALATION) at 07:51

## 2025-03-04 RX ADMIN — BUDESONIDE INHALATION 500 MCG: 0.5 SUSPENSION RESPIRATORY (INHALATION) at 07:51

## 2025-03-04 RX ADMIN — BENZTROPINE MESYLATE 0.5 MG: 0.5 TABLET ORAL at 19:45

## 2025-03-04 RX ADMIN — LACTULOSE 20 G: 20 SOLUTION ORAL at 07:34

## 2025-03-04 RX ADMIN — BENZTROPINE MESYLATE 0.5 MG: 0.5 TABLET ORAL at 07:33

## 2025-03-04 RX ADMIN — SODIUM CHLORIDE, PRESERVATIVE FREE 10 ML: 5 INJECTION INTRAVENOUS at 19:46

## 2025-03-04 RX ADMIN — ATORVASTATIN CALCIUM 40 MG: 40 TABLET, FILM COATED ORAL at 19:45

## 2025-03-04 ASSESSMENT — PAIN SCALES - GENERAL: PAINLEVEL_OUTOF10: 0

## 2025-03-04 NOTE — CARE COORDINATION
Social Work/Discharge Planning:  Chart reviewed.  Patient from [facility].  Plan remains to return to [facility] at discharge and no pre-cert needed.  Patient Guardian Nicolás Steinberg will need to be notified when patient discharges.  Will continue to follow.  Electronically signed by LIZA Sorenson on 3/4/2025 at 10:51 AM

## 2025-03-05 VITALS
HEIGHT: 65 IN | BODY MASS INDEX: 19.74 KG/M2 | DIASTOLIC BLOOD PRESSURE: 67 MMHG | WEIGHT: 118.5 LBS | TEMPERATURE: 97.9 F | SYSTOLIC BLOOD PRESSURE: 143 MMHG | HEART RATE: 57 BPM | OXYGEN SATURATION: 96 % | RESPIRATION RATE: 16 BRPM

## 2025-03-05 LAB
ALBUMIN SERPL-MCNC: 3.9 G/DL (ref 3.5–5.2)
ALP SERPL-CCNC: 118 U/L (ref 35–104)
ALT SERPL-CCNC: 68 U/L (ref 0–32)
ANION GAP SERPL CALCULATED.3IONS-SCNC: 11 MMOL/L (ref 7–16)
AST SERPL-CCNC: 54 U/L (ref 0–31)
BASOPHILS # BLD: 0.06 K/UL (ref 0–0.2)
BASOPHILS NFR BLD: 1 % (ref 0–2)
BILIRUB SERPL-MCNC: 0.3 MG/DL (ref 0–1.2)
BUN SERPL-MCNC: 18 MG/DL (ref 6–23)
CALCIUM SERPL-MCNC: 10.1 MG/DL (ref 8.6–10.2)
CHLORIDE SERPL-SCNC: 99 MMOL/L (ref 98–107)
CO2 SERPL-SCNC: 28 MMOL/L (ref 22–29)
CREAT SERPL-MCNC: 1 MG/DL (ref 0.5–1)
EOSINOPHIL # BLD: 0.1 K/UL (ref 0.05–0.5)
EOSINOPHILS RELATIVE PERCENT: 2 % (ref 0–6)
ERYTHROCYTE [DISTWIDTH] IN BLOOD BY AUTOMATED COUNT: 16.1 % (ref 11.5–15)
GFR, ESTIMATED: 65 ML/MIN/1.73M2
GLUCOSE BLD-MCNC: 138 MG/DL (ref 74–99)
GLUCOSE BLD-MCNC: 83 MG/DL (ref 74–99)
GLUCOSE BLD-MCNC: 86 MG/DL (ref 74–99)
GLUCOSE SERPL-MCNC: 142 MG/DL (ref 74–99)
HCT VFR BLD AUTO: 32 % (ref 34–48)
HGB BLD-MCNC: 9.9 G/DL (ref 11.5–15.5)
IMM GRANULOCYTES # BLD AUTO: 0.03 K/UL (ref 0–0.58)
IMM GRANULOCYTES NFR BLD: 1 % (ref 0–5)
LYMPHOCYTES NFR BLD: 1.1 K/UL (ref 1.5–4)
LYMPHOCYTES RELATIVE PERCENT: 22 % (ref 20–42)
MCH RBC QN AUTO: 31.1 PG (ref 26–35)
MCHC RBC AUTO-ENTMCNC: 30.9 G/DL (ref 32–34.5)
MCV RBC AUTO: 100.6 FL (ref 80–99.9)
MONOCYTES NFR BLD: 0.3 K/UL (ref 0.1–0.95)
MONOCYTES NFR BLD: 6 % (ref 2–12)
NEUTROPHILS NFR BLD: 68 % (ref 43–80)
NEUTS SEG NFR BLD: 3.43 K/UL (ref 1.8–7.3)
PLATELET # BLD AUTO: 384 K/UL (ref 130–450)
PMV BLD AUTO: 10.4 FL (ref 7–12)
POTASSIUM SERPL-SCNC: 4.2 MMOL/L (ref 3.5–5)
PROT SERPL-MCNC: 6.8 G/DL (ref 6.4–8.3)
RBC # BLD AUTO: 3.18 M/UL (ref 3.5–5.5)
SODIUM SERPL-SCNC: 138 MMOL/L (ref 132–146)
WBC OTHER # BLD: 5 K/UL (ref 4.5–11.5)

## 2025-03-05 PROCEDURE — 80053 COMPREHEN METABOLIC PANEL: CPT

## 2025-03-05 PROCEDURE — 94640 AIRWAY INHALATION TREATMENT: CPT

## 2025-03-05 PROCEDURE — 85025 COMPLETE CBC W/AUTO DIFF WBC: CPT

## 2025-03-05 PROCEDURE — 82962 GLUCOSE BLOOD TEST: CPT

## 2025-03-05 PROCEDURE — 6360000002 HC RX W HCPCS: Performed by: CLINICAL NURSE SPECIALIST

## 2025-03-05 PROCEDURE — 6370000000 HC RX 637 (ALT 250 FOR IP): Performed by: PHYSICIAN ASSISTANT

## 2025-03-05 PROCEDURE — 51798 US URINE CAPACITY MEASURE: CPT

## 2025-03-05 PROCEDURE — 36415 COLL VENOUS BLD VENIPUNCTURE: CPT

## 2025-03-05 PROCEDURE — 2500000003 HC RX 250 WO HCPCS: Performed by: PHYSICIAN ASSISTANT

## 2025-03-05 RX ADMIN — ARFORMOTEROL TARTRATE 15 MCG: 15 SOLUTION RESPIRATORY (INHALATION) at 10:14

## 2025-03-05 RX ADMIN — BENZTROPINE MESYLATE 0.5 MG: 0.5 TABLET ORAL at 09:40

## 2025-03-05 RX ADMIN — SODIUM CHLORIDE, PRESERVATIVE FREE 10 ML: 5 INJECTION INTRAVENOUS at 10:42

## 2025-03-05 RX ADMIN — SERTRALINE 100 MG: 100 TABLET, FILM COATED ORAL at 09:40

## 2025-03-05 RX ADMIN — LACTULOSE 20 G: 20 SOLUTION ORAL at 09:40

## 2025-03-05 RX ADMIN — BUDESONIDE INHALATION 500 MCG: 0.5 SUSPENSION RESPIRATORY (INHALATION) at 10:14

## 2025-03-05 ASSESSMENT — PAIN SCALES - GENERAL: PAINLEVEL_OUTOF10: 0

## 2025-03-05 NOTE — PLAN OF CARE
Problem: ABCDS Injury Assessment  Goal: Absence of physical injury  2/26/2025 1159 by Milly Greenfield, RN  Outcome: Progressing  2/26/2025 1159 by Milly Greenfield, RN  Outcome: Progressing     Problem: Skin/Tissue Integrity  Goal: Skin integrity remains intact  Description: 1.  Monitor for areas of redness and/or skin breakdown  2.  Assess vascular access sites hourly  3.  Every 4-6 hours minimum:  Change oxygen saturation probe site  4.  Every 4-6 hours:  If on nasal continuous positive airway pressure, respiratory therapy assess nares and determine need for appliance change or resting period  Outcome: Progressing     Problem: Skin/Tissue Integrity - Adult  Goal: Skin integrity remains intact  Description: 1.  Monitor for areas of redness and/or skin breakdown  2.  Assess vascular access sites hourly  3.  Every 4-6 hours minimum:  Change oxygen saturation probe site  4.  Every 4-6 hours:  If on nasal continuous positive airway pressure, respiratory therapy assess nares and determine need for appliance change or resting period  Outcome: Progressing     
  Problem: ABCDS Injury Assessment  Goal: Absence of physical injury  3/5/2025 0346 by Nidia Medrano RN  Outcome: Progressing     Problem: Skin/Tissue Integrity  Goal: Skin integrity remains intact  Description: 1.  Monitor for areas of redness and/or skin breakdown  2.  Assess vascular access sites hourly  3.  Every 4-6 hours minimum:  Change oxygen saturation probe site  4.  Every 4-6 hours:  If on nasal continuous positive airway pressure, respiratory therapy assess nares and determine need for appliance change or resting period  3/5/2025 0346 by Nidia Medrano RN  Outcome: Progressing     Problem: Discharge Planning  Goal: Discharge to home or other facility with appropriate resources  3/5/2025 0346 by Nidia Medrano RN  Outcome: Progressing     Problem: Chronic Conditions and Co-morbidities  Goal: Patient's chronic conditions and co-morbidity symptoms are monitored and maintained or improved  3/5/2025 0346 by Nidia Medrano RN  Outcome: Progressing     Problem: Pain  Goal: Verbalizes/displays adequate comfort level or baseline comfort level  3/5/2025 0346 by Nidia Medrano RN  Outcome: Progressing     Problem: Neurosensory - Adult  Goal: Achieves stable or improved neurological status  3/5/2025 0346 by Nidia Medrano RN  Outcome: Progressing     Problem: Neurosensory - Adult  Goal: Achieves maximal functionality and self care  3/5/2025 0346 by Nidia Medrano RN  Outcome: Progressing     Problem: Respiratory - Adult  Goal: Achieves optimal ventilation and oxygenation  3/5/2025 0346 by Nidia Medrano RN  Outcome: Progressing     Problem: Cardiovascular - Adult  Goal: Maintains optimal cardiac output and hemodynamic stability  3/5/2025 0346 by Nidia Medrano RN  Outcome: Progressing     Problem: Skin/Tissue Integrity - Adult  Goal: Skin integrity remains intact  Description: 1.  Monitor for areas of redness and/or skin breakdown  2.  Assess vascular access sites hourly  3.  Every 4-6 hours minimum:  
  Problem: ABCDS Injury Assessment  Goal: Absence of physical injury  Outcome: Progressing     Problem: Skin/Tissue Integrity  Goal: Skin integrity remains intact  Description: 1.  Monitor for areas of redness and/or skin breakdown  2.  Assess vascular access sites hourly  3.  Every 4-6 hours minimum:  Change oxygen saturation probe site  4.  Every 4-6 hours:  If on nasal continuous positive airway pressure, respiratory therapy assess nares and determine need for appliance change or resting period  Outcome: Progressing     Problem: Discharge Planning  Goal: Discharge to home or other facility with appropriate resources  Outcome: Progressing     Problem: Chronic Conditions and Co-morbidities  Goal: Patient's chronic conditions and co-morbidity symptoms are monitored and maintained or improved  Outcome: Progressing     Problem: Pain  Goal: Verbalizes/displays adequate comfort level or baseline comfort level  Outcome: Progressing     Problem: Neurosensory - Adult  Goal: Achieves stable or improved neurological status  Outcome: Progressing  Goal: Achieves maximal functionality and self care  Outcome: Progressing     Problem: Respiratory - Adult  Goal: Achieves optimal ventilation and oxygenation  Outcome: Progressing     Problem: Cardiovascular - Adult  Goal: Maintains optimal cardiac output and hemodynamic stability  Outcome: Progressing     Problem: Skin/Tissue Integrity - Adult  Goal: Skin integrity remains intact  Description: 1.  Monitor for areas of redness and/or skin breakdown  2.  Assess vascular access sites hourly  3.  Every 4-6 hours minimum:  Change oxygen saturation probe site  4.  Every 4-6 hours:  If on nasal continuous positive airway pressure, respiratory therapy assess nares and determine need for appliance change or resting period  Outcome: Progressing     Problem: Musculoskeletal - Adult  Goal: Return mobility to safest level of function  Outcome: Progressing  Goal: Return ADL status to a safe level 
Adult  Goal: Free from fall injury  2/27/2025 0121 by Chiqui Hogan RN  Outcome: Progressing     Problem: Gastrointestinal - Adult  Goal: Minimal or absence of nausea and vomiting  2/27/2025 0121 by Chiqui Hogan RN  Outcome: Progressing  Goal: Maintains or returns to baseline bowel function  2/27/2025 0121 by Chiqui Hogan RN  Outcome: Progressing  Goal: Establish and maintain optimal ostomy function  2/27/2025 0121 by Chiqui Hogan RN  Outcome: Progressing     Problem: Anxiety  Goal: Will report anxiety at manageable levels  2/27/2025 0121 by Chiqui Hogan RN  Outcome: Progressing     Problem: Behavior  Goal: Pt/Family maintain appropriate behavior and adhere to behavioral management agreement, if implemented  2/27/2025 0121 by Chiqui Hogan RN  Outcome: Progressing     
Progressing  Goal: Return ADL status to a safe level of function  3/5/2025 0346 by Nidia Medrano RN  Outcome: Progressing  3/4/2025 2051 by Deborah Schneider RN  Outcome: Progressing  3/4/2025 1755 by Deborah Schneider RN  Outcome: Progressing     Problem: Genitourinary - Adult  Goal: Absence of urinary retention  3/5/2025 0346 by Nidia Medrano RN  Outcome: Progressing  3/4/2025 2051 by Deborah Schneider RN  Outcome: Progressing  3/4/2025 1755 by Deborah Schneider RN  Outcome: Progressing     Problem: Safety - Adult  Goal: Free from fall injury  3/5/2025 0346 by iNdia Medrano RN  Outcome: Progressing  3/4/2025 2051 by Deborah Schneider RN  Outcome: Progressing  3/4/2025 1755 by Deborah Schneider RN  Outcome: Progressing     Problem: Gastrointestinal - Adult  Goal: Minimal or absence of nausea and vomiting  3/5/2025 0346 by Nidia Medrano RN  Outcome: Progressing  3/4/2025 2051 by Deborah Schneider RN  Outcome: Progressing  3/4/2025 1755 by Deborah Schneider RN  Outcome: Progressing  Goal: Maintains or returns to baseline bowel function  3/5/2025 0346 by Nidia Medrano RN  Outcome: Progressing  3/4/2025 2051 by Deborah Schneider RN  Outcome: Progressing  3/4/2025 1755 by Deborah Schneider RN  Outcome: Progressing  Goal: Establish and maintain optimal ostomy function  3/5/2025 0346 by Nidia Medrano RN  Outcome: Progressing  3/4/2025 2051 by Deborah Schneider RN  Outcome: Progressing  3/4/2025 1755 by Deborah Schneider RN  Outcome: Progressing     Problem: Anxiety  Goal: Will report anxiety at manageable levels  Description: INTERVENTIONS:  1. Administer medication as ordered  2. Teach and rehearse alternative coping skills  3. Provide emotional support with 1:1 interaction with staff  3/5/2025 0346 by Nidia Medrano RN  Outcome: Progressing  3/4/2025 2051 by Deborah Schneider RN  Outcome: Progressing  3/4/2025 1755 by Deborah Schneider RN  Outcome: Progressing     Problem: Behavior  Goal: Pt/Family maintain 
organization policy.  7. Initiate consult with , Psychosocial CNS, Spiritual Care as appropriate  Outcome: Progressing     Problem: Nutrition Deficit:  Goal: Optimize nutritional status  Outcome: Progressing

## 2025-03-05 NOTE — DISCHARGE SUMMARY
400 MG/5ML suspension  Commonly known as: Milk of Magnesia  Take 30 mLs by mouth daily as needed for Constipation     polyethylene glycol 17 g Pack packet  Commonly known as: MIRALAX     sertraline 100 MG tablet  Commonly known as: ZOLOFT     sodium phosphate     therapeutic multivitamin-minerals tablet     vitamin D 125 MCG (5000 UT) Caps capsule  Commonly known as: CHOLECALCIFEROL            STOP taking these medications      Invega Sustenna 234 MG/1.5ML [facility] IM injection  Generic drug: paliperidone palmitate ER     lisinopril 5 MG tablet  Commonly known as: PRINIVIL;ZESTRIL     mirtazapine 15 MG tablet  Commonly known as: REMERON     rivastigmine 3 MG capsule  Commonly known as: EXELON     topiramate 100 MG tablet  Commonly known as: TOPAMAX                INTERNAL MEDICINE INSTRUCTIONS:  Follow-up with primary care physician as directed in discharge paperwork.  Please review results of imaging studies with PCP.  Follow-up with consultants as directed by them.  If recurrence or worsening of symptoms go to the ED or call primary care physician.  Diet: ADULT DIET; Regular  ADULT ORAL NUTRITION SUPPLEMENT; Breakfast, Lunch, Dinner; Diabetic Oral Supplement  ADULT ORAL NUTRITION SUPPLEMENT; Lunch; Fortified Gelatin Oral Supplement    FOLLOW-IP  [place] [address]  [phone]        Josie Cadet MD  [address] [place], [place] [id number] [phone]    Follow up      Kyrie Jay DO  [address] [facility] [address]  [phone]    Follow up  Will see upon return to [facility]    Zack Arriola MD  [address] [facility] [address] [phone]    Call  for follow up appointment      Preparing for this patient's discharge, including paperwork, orders, instructions, and meeting with patient did require greater than 35 minutes.    Electronically signed by IFEANYI Ross CNP on 3/5/2025 at 3:13 PM

## 2025-03-05 NOTE — CARE COORDINATION
Social Work/Discharge Planning:  Discharge order noted.  Patient to return to [facility] at [facility].  Called patient Guardian Ifrah and informed her of discharge.  Called liaison Kristine with [facility] and left a message informing her of discharge.  Unit to arrange ambulette transport.  Will continue to follow.  Electronically signed by LIZA Sorenson on 3/5/2025 at 11:36 AM

## 2025-03-07 NOTE — PROGRESS NOTES
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 Nightly PRN Kyrie Jay DO   3 mg at 253    arformoterol tartrate (BROVANA) nebulizer solution 15 mcg  15 mcg Nebulization BID RT Sharyn Storm APRN - CNS   15 mcg at 25 0751    budesonide (PULMICORT) nebulizer suspension 500 mcg  500 mcg Nebulization BID RT Sharyn Storm APRN - CNS   500 mcg at 25 0751    glucose chewable tablet 16 g  4 tablet Oral 
Message regarding the CT of chest results sent to Dr Jay   
Noted Dr. Jay's cancellation of discharge order. Rechecked patient's temp rectally, 96.1 F. Messaged Dr. Jay's nurse practitioner to update. Bear hugger placed on patient. [facility] to notify cancellation of transport. Notified, meena Bray for patient's guardian regarding discharge cancellation. Left voicemail for Kristine with [facility].   
Notified Dr. Dickson of new inpatient psychiatry consult.   
Notified Dr. Dickson of psych medications that patient is currently ordered.   
Notified Dr. Jay of patient's blood glucose of 76 and for possible continuous IV fluids d/t pt inability to remain alert enough for PO intake.   
Notified Dr. Jay patient's temperature is 92.3 rectally and obtaining latosha hugger to apply to patient.   
Notified Dr. Rosario of new inpatient endocrinology cx per Dr. Jay for possible adrenal insufficiency.    No new orders received at this time.   
Notified Gage Iyer of patient's blood glucose of 76.  
Notified Nicolás Serrato of new inpatient cardiology consult for recurrent bradycardia.   
Nurse to nurse called to Jose Roberto at [facility].  at 1430 today.   
Occupational Therapy  Date:2/27/2025  Patient Name: Any Arreola  Room: 0429/0429-A     Occupational Therapy (OT) order received, patient's medical record reviewed, and OT evaluation attempted this date; OT evaluation held, per nursing. OT evaluation to be re-attempted at later date, as able/appropriate.    Anita Barker, OTR/L  License Number: [id number]        
Oral temp 98.1, warming blanket removed.   
Orders received per Dr. Jay for new inpatient endocrinology consult for possible adrenal insufficiency d/t hypothermic temperatures.   
Patient hgb 6.6. Leola CLINE notified and ordered 1 unit PRBC. Patient cannot consent for themselves. Attempted to reach legal guardian, but not in the office at this time. Type and screen sent.  
Patient skin assessed by Susan BERRY and Chiqui BERRY. No new skin issues at this time.  
Patient skin assessment completed with Abram Duenas RN   
Pharmacy Consultation Note  (Antibiotic Dosing and Monitoring)    Initial consult date: 2/25  Consulting physician/provider: Emeterio  Drug: Vancomycin  Indication: CAP    Age/  Gender Height Weight IBW  Allergy Information   64 y.o./female 165.1 cm (5' 5\") 59.4 kg (131 lb)     Ideal body weight: 57 kg (125 lb 10.6 oz)  Adjusted ideal body weight: 57.3 kg (126 lb 5.7 oz)   Clozapine; Ibuprofen; and Carbamazepine, eslicarbazepine, and oxcarbazepine      Renal Function:  Recent Labs     02/26/25  2050 02/27/25  0335 02/28/25  0230   BUN 21 17 10   CREATININE 1.0 0.9 0.8       Intake/Output Summary (Last 24 hours) at 2/28/2025 1008  Last data filed at 2/28/2025 0935  Gross per 24 hour   Intake 2552.34 ml   Output 2400 ml   Net 152.34 ml       Vancomycin Monitoring:  Trough:    Recent Labs     02/28/25  0855   VANCOTROUGH 14.4     Random:  No results for input(s): \"VANCORANDOM\" in the last 72 hours.        Assessment:  Patient is a 64 y.o. female who has been initiated on vancomycin  Estimated Creatinine Clearance: 64 mL/min (based on SCr of 0.8 mg/dL).  No history of vancomycin levels in EPIC.   Vancomycin level on 2/28 was 14.4 mcg/mL, which correlates to AUC/-600, within goal range.    Plan:  Will continue vancomycin 1250 mg IV every 24 hours  Will continue to monitor renal function   Pharmacy to follow      Hernán Herrera PharmD 2/28/2025 10:08 AM   Ext: [phone]    DEMARCUS: [phone]  SEY: [phone]  SJW: [phone]   
Pharmacy Consultation Note  (Antibiotic Dosing and Monitoring)    Initial consult date: 2/25  Consulting physician/provider: Emeterio  Drug: Vancomycin  Indication: CAP    Age/  Gender Height Weight IBW  Allergy Information   64 y.o./female 165.1 cm (5' 5\") 59.4 kg (131 lb)     Ideal body weight: 57 kg (125 lb 10.6 oz)  Adjusted ideal body weight: 58 kg (127 lb 12.8 oz)   Clozapine; Ibuprofen; and Carbamazepine, eslicarbazepine, and oxcarbazepine      Renal Function:  Recent Labs     02/24/25  1137 02/25/25  0613   BUN 24* 23   CREATININE 0.9 1.0       Intake/Output Summary (Last 24 hours) at 2/25/2025 1620  Last data filed at 2/25/2025 1553  Gross per 24 hour   Intake 1814.47 ml   Output 1000 ml   Net 814.47 ml       Vancomycin Monitoring:  Trough:  No results for input(s): \"VANCOTROUGH\" in the last 72 hours.  Random:  No results for input(s): \"VANCORANDOM\" in the last 72 hours.        Assessment:  Patient is a 64 y.o. female who has been initiated on vancomycin  Estimated Creatinine Clearance: 51 mL/min (based on SCr of 1 mg/dL).  No history of vancomycin levels in EPIC.     Plan:  Will order vancomycin 1500 mg IV x 1 loading dose now  Will place standing order for vancomycin 1250 mg IV every 24 hours to begin on 2/26 at 0900 for projected AUC/-600.   Will check vancomycin levels when appropriate  Will continue to monitor renal function   Pharmacy to follow      Hernán Herrera PharmD 2/25/2025 4:20 PM   Ext: [phone]    DEMARCUS: [phone]  SEY: [phone]  SJW: [phone]   
Pharmacy Consultation Note  (Antibiotic Dosing and Monitoring)    Initial consult date: 2/25  Consulting physician/provider: Emeterio  Drug: Vancomycin  Indication: CAP    Age/  Gender Height Weight IBW  Allergy Information   64 y.o./female 165.1 cm (5' 5\") 59.4 kg (131 lb)     Ideal body weight: 57 kg (125 lb 10.6 oz)  Adjusted ideal body weight: 60.1 kg (132 lb 8.9 oz)   Clozapine; Ibuprofen; and Carbamazepine, eslicarbazepine, and oxcarbazepine      Renal Function:  Recent Labs     02/26/25  0823 02/26/25 2050 02/27/25  0335   BUN 25* 21 17   CREATININE 1.1* 1.0 0.9       Intake/Output Summary (Last 24 hours) at 2/27/2025 1028  Last data filed at 2/27/2025 0925  Gross per 24 hour   Intake 1485.28 ml   Output 2350 ml   Net -864.72 ml       Vancomycin Monitoring:  Trough:  No results for input(s): \"VANCOTROUGH\" in the last 72 hours.  Random:  No results for input(s): \"VANCORANDOM\" in the last 72 hours.        Assessment:  Patient is a 64 y.o. female who has been initiated on vancomycin  Estimated Creatinine Clearance: 57 mL/min (based on SCr of 0.9 mg/dL).  No history of vancomycin levels in EPIC.     Plan:  Will continue vancomycin 1250 mg IV every 24 hours  Vancomycin trough level ordered with morning labs on 2/28  PLEASE HOLD VANCOMYCIN IF VANCOMYCIN LEVEL IS GREATER THAN 20 MCG/ML.  Will assess vancomycin level on 2/28 to determine future vancomycin dosing plans.   Will continue to monitor renal function   Pharmacy to follow      Hernán Herrera PharmD 2/27/2025 10:28 AM   Ext: [phone]    DEMARCUS: [phone]  SEY: [phone]  SJW: [phone]   
Pharmacy Consultation Note  (Antibiotic Dosing and Monitoring)    Initial consult date: 2/25  Consulting physician/provider: Emeterio  Drug: Vancomycin  Indication: CAP    Age/  Gender Height Weight IBW  Allergy Information   64 y.o./female 165.1 cm (5' 5\") 59.4 kg (131 lb)     Ideal body weight: 57 kg (125 lb 10.6 oz)  Adjusted ideal body weight: 60.8 kg (134 lb 1.9 oz)   Clozapine; Ibuprofen; and Carbamazepine, eslicarbazepine, and oxcarbazepine      Renal Function:  Recent Labs     02/24/25  1137 02/25/25  0613   BUN 24* 23   CREATININE 0.9 1.0       Intake/Output Summary (Last 24 hours) at 2/26/2025 0847  Last data filed at 2/26/2025 0345  Gross per 24 hour   Intake 0 ml   Output 1400 ml   Net -1400 ml       Vancomycin Monitoring:  Trough:  No results for input(s): \"VANCOTROUGH\" in the last 72 hours.  Random:  No results for input(s): \"VANCORANDOM\" in the last 72 hours.        Assessment:  Patient is a 64 y.o. female who has been initiated on vancomycin  Estimated Creatinine Clearance: 51 mL/min (based on SCr of 1 mg/dL).  No history of vancomycin levels in EPIC.     Plan:  Will continue vancomycin 1250 mg IV every 24 hours   Will check vancomycin levels when appropriate  Will continue to monitor renal function   Pharmacy to follow      Hernán Herrera PharmD 2/26/2025 8:47 AM   Ext: [phone]    DEMARCUS: [phone]  SEY: [phone]  SJW: [phone]   
Physical Therapy  [facility]      Name: Any Arreola  : 1960  MRN: [id number]  Date of Service: 2025    Attempted to see pt for PT evaluation, hold per nursing.   Ruth Duran PT [phone]  
[facility]130 for transport to [facility].   
Pt now using bedside commode. Bladder scan post void 78mL.   
Pts legal guardian contacted, Ifrah Roman regarding discharge   
Rosetta CLINE notified of rectal temp 96.4. Patient placed on bear hugger.   
Rosetta NP notified of patient hypothermic and petechia on LUE. Orders received for stat BMP and CBC. Bear hugger placed on patient.  
SPEECH LANGUAGE PATHOLOGY  DAILY PROGRESS NOTE      PATIENT NAME:  Any Arreola      :  1960          TODAY'S DATE:  3/4/2025 ROOM:  0429/042A    Current Diet: ADULT DIET; Regular  ADULT ORAL NUTRITION SUPPLEMENT; Breakfast, Lunch, Dinner; Diabetic Oral Supplement  ADULT ORAL NUTRITION SUPPLEMENT; Lunch; Fortified Gelatin Oral Supplement    Patient seen for ongoing dysphagia tx during lunch meal. Patient assisted with proper positioning and tray set up, but was able to feed self. Patient with good mastication and oral clearance. No overt s/s of aspiration exhibited with regular solids or thin liquids.   It should be noted, silent aspiration can not be ruled out at the bedside, and if silent aspiration is suspected based on clinical correlation an MBSS would be recommended. Patient safely tolerating LRD at this time. No further dysphagia tx warranted. Please reconsult if further concerns arise.     Recommendation: cont current diet       CPT code(s) 49309  dysphagia tx  Total minutes :  15 minutes    Lenora Lao M.S. CCC-SLP/L  Speech Language Pathologist  [id number]     
Since admission to the floor, patient has not produced urine. Bladder scan obtained for 868 mL.  
Spiritual Health History and Assessment/Progress Note  [facility]     Encounter, Rituals, Rites and Sacraments,  ,  ,      Name: Any Arreola MRN: [id number]    Age: 64 y.o.     Sex: female   Language: English   Yarsanism: Mosque   Pneumonia due to organism     Date: 2/28/2025                           Spiritual Assessment began in [facility] INTERMEDIATE 1        Referral/Consult From: Rounding   Encounter Overview/Reason:  Encounter, Rituals, Rites and Sacraments  Service Provided For: Patient    [place], Belief, Meaning:   Patient unable to assess at this time  Family/Friends No family/friends present      Importance and Influence:  Patient unable to assess at this time  Family/Friends No family/friends present    Community:  Patient Other: Patient was none verbal  Family/Friends No family/friends present    Assessment and Plan of Care:     Patient Interventions include: Provided sacramental/Protestant ritual  Family/Friends Interventions include: No family/friends present    Patient Plan of Care: Spiritual Care available upon further referral  Family/Friends Plan of Care: Spiritual Care available upon further referral    Electronically signed by Chaplain Derek on 2/28/2025 at 3:47 PM   
Spiritual Health History and Assessment/Progress Note  [facility]     Encounter, Attempted Encounter (Patient was unavailable for a visit.),  ,  ,      Name: Any Arreola MRN: [id number]    Age: 64 y.o.     Sex: female   Language: English   Congregational: Anabaptist   Pneumonia due to organism     Date: 2/25/2025                           Spiritual Assessment began in [facility]        Referral/Consult From: Rounding   Encounter Overview/Reason:  Encounter, Attempted Encounter (Patient was unavailable for a visit.)  Service Provided For: Patient    [place]ez, Belief, Meaning:   Patient unable to assess at this time  Family/Friends No family/friends present      Importance and Influence:  Patient unable to assess at this time  Family/Friends No family/friends present    Community:  Patient Other: Unable to assess  Family/Friends No family/friends present    Assessment and Plan of Care:     Patient Interventions include: Other: None  Family/Friends Interventions include: No family/friends present    Patient Plan of Care: Spiritual Care available upon further referral  Family/Friends Plan of Care: Spiritual Care available upon further referral    Electronically signed by Chaplain Derek on 2/25/2025 at 6:55 PM   
[facility] notified of consult via the office  Kelly mackenzie  
injection 1 mg  1 mg SubCUTAneous PRN Kyrie Jay DO        dextrose 10 % infusion   IntraVENous Continuous PRN Kyrie Jay DO        atorvastatin (LIPITOR) tablet 40 mg  40 mg Oral Nightly Gage Torres PA-C   40 mg at 03/01/25 2127    benztropine (COGENTIN) tablet 0.5 mg  0.5 mg Oral BID Gage Torres PA-C   0.5 mg at 02/28/25 0807    lactulose (CHRONULAC) 10 GM/15ML solution 20 g  20 g Oral Daily Gage Torres PA-C   20 g at 03/02/25 0856    [START ON 3/3/2025] paliperidone palmitate ER (INVEGA SUSTENNA) IM injection 234 mg (Patient Supplied)  234 mg IntraMUSCular Once Gage Torres PA-C        [Held by provider] rivastigmine (EXELON) capsule 3 mg  3 mg Oral BID WC Gage Torres PA-C   3 mg at 02/28/25 0806    sertraline (ZOLOFT) tablet 100 mg  100 mg Oral Daily Gage Torres PA-C   100 mg at 02/28/25 0806    [Held by provider] topiramate (TOPAMAX) tablet 100 mg  100 mg Oral BID Gage Torres PA-C   100 mg at 02/28/25 0806    sodium chloride flush 0.9 % injection 10 mL  10 mL IntraVENous 2 times per day Gage Torres PA-C   10 mL at 03/02/25 0908    sodium chloride flush 0.9 % injection 10 mL  10 mL IntraVENous PRN Gage Torres PA-C        0.9 % sodium chloride infusion   IntraVENous PRN Gage Torres PA-C        potassium chloride (KLOR-CON M) extended release tablet 40 mEq  40 mEq Oral PRN Gage Torres, PA-C   40 mEq at 02/28/25 0807    Or    potassium bicarb-citric acid (EFFER-K) effervescent tablet 40 mEq  40 mEq Oral PRN Gage Torres PA-C        Or    potassium chloride 10 mEq/100 mL IVPB (Peripheral Line)  10 mEq IntraVENous PRN Gage Torres PA-C        magnesium sulfate 2000 mg in 50 mL IVPB premix  2,000 mg IntraVENous PRN Gage Torres PA-C        enoxaparin (LOVENOX) injection 40 mg  40 mg SubCUTAneous Daily Gage Torres PA-C        ondansetron (ZOFRAN-ODT) disintegrating tablet 4 mg  4 mg Oral Q8H PRN Gage Torres PA-C        Or    ondansetron (ZOFRAN) 
03/03/2025     03/03/2025    K 4.0 03/03/2025     03/03/2025    CREATININE 0.8 03/03/2025    BUN 22 03/03/2025    CO2 26 03/03/2025    GLUCOSE 79 03/03/2025    ALT 68 (H) 03/03/2025    AST 50 (H) 03/03/2025    TSH 5.97 (H) 02/24/2025    LABA1C 5.3 10/06/2020       CT ABDOMEN PELVIS W WO CONTRAST Additional Contrast? Oral   Final Result   1. Dilated urinary bladder   2. Pleural effusions   3. Constipation suggested         US ABDOMEN LIMITED Specify organ? LIVER, SPLEEN   Final Result   1. Mildly coarsened echogenicity of the liver with mild hepatomegaly.   2. Bilateral pleural effusions.   3. Normal size spleen.         CT CHEST WO CONTRAST   Final Result   1. Ground-glass opacity in the posterior right upper lobe.   There is a combination of atelectasis, consolidation and minimal ground-glass   opacity in both lower lobes.  Consider multifocal pneumonia and CHF.   2. Small bilateral pleural effusions.   3. Mild cardiomegaly with dilatation of the atria and left ventricle.         XR CHEST PORTABLE   Final Result   1. Right perihilar and right lower lobe pneumonia   2. Left lower lobe pneumonia   3. Trace left pleural effusion         CT Head W/O Contrast   Final Result   There is no acute intracranial hemorrhage or acute intracranial abnormality.      Old lacunar infarct within the right and left basal ganglia.             Echocardiogram 2/6/2023  Normal left ventricular size.   LV systolic function is low normal.   Ejection fraction is visually estimated at 50%.   Grade I diastolic dysfunction.   No regional wall motion abnormalities seen.   Mild left ventricular concentric hypertrophy noted.   Normal right ventricular size and function.   Mild-moderate mitral regurgitation directed posteriorly.   Left pleural effusion.    Assessment   Active Hospital Problems    Diagnosis     Hypothermia [T68.XXXA]      Priority: Medium    Bradycardia [R00.1]      Priority: Medium    AMS (altered mental status) 
bowel syndrome    Schizoaffective disorder, bipolar type (HCC)    AMS (altered mental status)    Hypothermia    Bradycardia    Pneumonia due to organism    Septic shock (HCC)    Encephalopathy    VHD (valvular heart disease)         CPT code:  26319  bedside swallow rio Lao M.S. CCC-SLP/L  Speech Language Pathologist  [id number]      
GABINO PorterN - CNS on 2/28/2025 at 11:31 AM    This is confirmation that I have personally performed a substantial portion of medical decision making (>50%) related to this patient encounter.  The medications & laboratory data and imagery were discussed and adjusted where necessary. Key issues of the case were discussed among consultants.  Review of CNP documentation was conducted and revisions were made as appropriate. I agree with the above documented exam, problem list and plan of care with the following additions:     Doing well 98% on RA  Stop IV antibiotics and change to augmentin x 5 days  Repeat CT 6 weeks  Pulmonary will sign off       Josie Cadet MD                   
evidence   of treadmill stress induced left ventricular myocardial ischemia.      2/6/2023 TTE (Dr. Navarro):  Summary   Normal left ventricular size.   LV systolic function is low normal.   Ejection fraction is visually estimated at 50%.   Grade I diastolic dysfunction.   No regional wall motion abnormalities seen.   Mild left ventricular concentric hypertrophy noted.   Normal right ventricular size and function.   Mild-moderate mitral regurgitation directed posteriorly.   Left pleural effusion  I have personally participated in the history, exam, diagnosis with my Advanced Practice Nurse/Physician Assistant on the date of service.  I discussed pertinent history, exam findings, and treatment plan with our physician extender.     Impression  Sinus bradycardia  First-degree AV delay  Pneumonia  Acute metabolic encephalopathy  Hyperlipidemia  Schizophrenia  Valvular heart disease     Recommendations:  Heart rate is 58 bpm on telemetry and denies symptoms  Blood pressure is stable today  Currently not on AV michael blocking agents or blood pressure lowering medication  Avoid AV michael blocking agent  Follow-up with cardiology in the outpatient once discharge  Consider 14-day Zio patch monitor for further evaluation prior to discharge  TSH elevated.  Free T4 within normal limits  Echo to guide therapy  If blood pressure becomes uncontrolled initiate hydralazine for blood pressure control  Call cardiology back if echo is abnormal    Greater than 35 minutes was spent counseling the patient, reviewing the rationale for the above recommendations and reviewing the patient's current medication list, problem list and results of all previously ordered testing.          Zack Arriola MD  [facility]    NOTE: This report was transcribed using voice recognition software. Every effort was made to ensure accuracy; however, inadvertent computerized transcription errors may be present.    
loss and poor oral intakes at the SNF recent  Remeron on hold for now  Encourage oral intakes  Dietitian to see for supplements     Chronic Headaches  Continue Topamax     Chronic Constipation  Continue bowel regimen as at SNF  Having some pain in the RUQ this AM -- etiology is unclear  Continue to monitor for now     Continue NH medications other than as noted above.  Follow labs  DVT prophylaxis with Subcutaneous Lovenox  Please see orders for further management and care.   for discharge planning  Discharge plan: back to [facility] when stable and ok with consultants -- not today    The pertinent details of this case were discussed with Dr. Jay.    Electronically signed by IFEANYI Ross CNP on 2/27/2025 at 7:13 AM    Addendum: I have personally participated in a face-to-face history and physical exam on the date of service with the patient. I have discussed the case with the nurse practitioner. I also participated in medical decision making on the date of service and I agree with all of the pertinent clinical information unless indicated in my editing of the note. I have reviewed and edited the note above based on my findings during my history, exam, and decision making on the same day of service.     My additional thoughts:   She was seen and examined in her room lying in bed resting comfortably and denied new complaints or issues  She has recurrent hypothermia and is requiring a Bear hugger last evening  Endocrinology is ordering a cosyntropin stimulation test to evaluate for adrenal insufficiency  ID is deferring antibiotics for her pneumonia to the pulmonary service and has signed off  The Enterococcus in her urine is related to asymptomatic bacteriuria and does not need treated per ID  Hematology has been consulted for pancytopenia-their input is appreciated  She did have some abdominal pain on examination- Check CT abdomen/pelvis    Electronically signed by Kyrie Jay DO on 2/27/2025 
service and I agree with all of the pertinent clinical information unless indicated in my editing of the note. I have reviewed and edited the note above based on my findings during my history, exam, and decision making on the same day of service.     My additional thoughts:   She was seen and examined in room lying in bed resting comfortably  Thankfully she is awake and talking today  Endocrinology has been consulted for possible adrenal insufficiency  Infectious disease has been consulted for the profound hypothermia  Antibiotics per pulmonology for the pneumonia  Hematology has been consulted for pancytopenia  No discharge today as I am not completely sure exactly what is going on with her    Electronically signed by Kyrie Jay DO on 2/26/2025 at 11:53 AM    I can be reached through [facility].      
symptomatic bradycardia.          Theron Moore MD, [facility] Card[facility]    NOTE: This report was transcribed using voice recognition software. Every effort was made to ensure accuracy; however, inadvertent computerized transcription errors may be present.

## 2025-03-13 NOTE — DISCHARGE INSTR - COC
Continuity of Care Form    Patient Name: Krista Michelle   :  1960  MRN:  [id number]    Admit date:  2020  Discharge date:  20    Code Status Order: Full Code   Advance Directives:   [address] [facility] Documentation       Date/Time Healthcare Directive Type of Healthcare Directive Copy in [address] [facility] [address] Agent's Name Healthcare Agent's Phone Number    20 [phone]  No, patient does not have an advance directive for healthcare treatment  --  --  --  --  --            Admitting Physician:  Jeny Valero MD  PCP: Julia Weinberg MD    Discharging Nurse: Lindsay Cardozo 23 Unit/Room#: 6463/6032-N  Discharging Unit Phone Number: [phone]    Emergency Contact:   Extended Emergency Contact Information  Primary Emergency Contact: [facility], [address] [facility] Phone: [phone]  Mobile Phone: [phone]  Relation: Legal Guardian  Preferred language: English   needed?  No  Secondary Emergency Contact: Jose Garcia [facility] [address] [facility] Phone: [phone]  Mobile Phone: [phone]  Relation: Domestic Partner    Past Surgical History:  Past Surgical History:   Procedure Laterality Date    CYST REMOVAL Right     behind knee by Dr. Marvin Wu    by Dr. Aida Monique       Immunization History:   Immunization History   Administered Date(s) Administered    Influenza Virus Vaccine 2012    Influenza Whole 2015    Influenza, Quadv, IM, PF (6 mo and older Fluzone, Flulaval, Fluarix, and 3 yrs and older Afluria) 09/15/2016, 2017       Active Problems:  Patient Active Problem List   Diagnosis Code    Chest pain R07.9    Schizo-affective schizophrenia, chronic condition ([facility] [address] F25.9    Hyponatremia E87.1    Trigeminal neuralgia syndrome G50.0    Chronic nonintractable headache R51.9, G89.29    Anxiety F41.9    Chronic constipation K59.09    Head injury S09.90XA    Jaw pain R68.84
[place])

## 2025-03-18 ENCOUNTER — TELEPHONE (OUTPATIENT)
Dept: CARDIOLOGY CLINIC | Age: 65
End: 2025-03-18

## 2025-03-18 NOTE — TELEPHONE ENCOUNTER
[facility] to [facility] hosp fup appt with Dr Arriola.  Please call Mikaela to schedule [phone]

## 2025-04-10 ENCOUNTER — HOSPITAL ENCOUNTER (OUTPATIENT)
Dept: CT IMAGING | Age: 65
Discharge: HOME | End: 2025-04-12
Attending: INTERNAL MEDICINE
Payer: MEDICAID

## 2025-04-10 DIAGNOSIS — J18.9 MULTIFOCAL PNEUMONIA: ICD-10-CM

## 2025-04-10 PROCEDURE — 71250 CT THORAX DX C-: CPT

## 2025-06-28 ENCOUNTER — HOSPITAL ENCOUNTER (EMERGENCY)
Age: 65
Discharge: LEFT BEFORE DISCHARGE | End: 2025-06-29
Attending: STUDENT IN AN ORGANIZED HEALTH CARE EDUCATION/TRAINING PROGRAM
Payer: MEDICAID

## 2025-06-28 ENCOUNTER — APPOINTMENT (OUTPATIENT)
Dept: CT IMAGING | Age: 65
End: 2025-06-28
Payer: MEDICAID

## 2025-06-28 DIAGNOSIS — R10.84 GENERALIZED ABDOMINAL PAIN: Primary | ICD-10-CM

## 2025-06-28 DIAGNOSIS — R11.2 NAUSEA AND VOMITING, UNSPECIFIED VOMITING TYPE: ICD-10-CM

## 2025-06-28 DIAGNOSIS — R51.9 INTRACTABLE EPISODIC HEADACHE, UNSPECIFIED HEADACHE TYPE: ICD-10-CM

## 2025-06-28 DIAGNOSIS — Z53.29 LEFT AGAINST MEDICAL ADVICE: ICD-10-CM

## 2025-06-28 LAB
ALBUMIN SERPL-MCNC: 4 G/DL (ref 3.5–5.2)
ALP SERPL-CCNC: 71 U/L (ref 35–104)
ALT SERPL-CCNC: 10 U/L (ref 0–35)
ANION GAP SERPL CALCULATED.3IONS-SCNC: 10 MMOL/L (ref 7–16)
AST SERPL-CCNC: 18 U/L (ref 0–35)
BASOPHILS # BLD: 0.04 K/UL (ref 0–0.2)
BASOPHILS NFR BLD: 1 % (ref 0–2)
BILIRUB DIRECT SERPL-MCNC: 0.2 MG/DL (ref 0–0.2)
BILIRUB INDIRECT SERPL-MCNC: 0.3 MG/DL (ref 0–1)
BILIRUB SERPL-MCNC: 0.5 MG/DL (ref 0–1.2)
BUN SERPL-MCNC: 15 MG/DL (ref 8–23)
CALCIUM SERPL-MCNC: 10.2 MG/DL (ref 8.8–10.2)
CHLORIDE SERPL-SCNC: 102 MMOL/L (ref 98–107)
CO2 SERPL-SCNC: 28 MMOL/L (ref 22–29)
CREAT SERPL-MCNC: 0.7 MG/DL (ref 0.5–1)
EOSINOPHIL # BLD: 0.06 K/UL (ref 0.05–0.5)
EOSINOPHILS RELATIVE PERCENT: 1 % (ref 0–6)
ERYTHROCYTE [DISTWIDTH] IN BLOOD BY AUTOMATED COUNT: 13.3 % (ref 11.5–15)
GFR, ESTIMATED: >90 ML/MIN/1.73M2
GLUCOSE SERPL-MCNC: 100 MG/DL (ref 74–99)
HCT VFR BLD AUTO: 33.7 % (ref 34–48)
HGB BLD-MCNC: 11.5 G/DL (ref 11.5–15.5)
IMM GRANULOCYTES # BLD AUTO: <0.03 K/UL (ref 0–0.58)
IMM GRANULOCYTES NFR BLD: 0 % (ref 0–5)
INR PPP: 1.1
LACTATE BLDV-SCNC: 1 MMOL/L (ref 0.5–2.2)
LIPASE SERPL-CCNC: 26 U/L (ref 13–60)
LYMPHOCYTES NFR BLD: 1.73 K/UL (ref 1.5–4)
LYMPHOCYTES RELATIVE PERCENT: 29 % (ref 20–42)
MAGNESIUM SERPL-MCNC: 1.8 MG/DL (ref 1.6–2.4)
MCH RBC QN AUTO: 30.9 PG (ref 26–35)
MCHC RBC AUTO-ENTMCNC: 34.1 G/DL (ref 32–34.5)
MCV RBC AUTO: 90.6 FL (ref 80–99.9)
MONOCYTES NFR BLD: 0.42 K/UL (ref 0.1–0.95)
MONOCYTES NFR BLD: 7 % (ref 2–12)
NEUTROPHILS NFR BLD: 62 % (ref 43–80)
NEUTS SEG NFR BLD: 3.76 K/UL (ref 1.8–7.3)
PLATELET # BLD AUTO: 206 K/UL (ref 130–450)
PMV BLD AUTO: 10 FL (ref 7–12)
POTASSIUM SERPL-SCNC: 3.8 MMOL/L (ref 3.5–5.1)
PROT SERPL-MCNC: 6.5 G/DL (ref 6.4–8.3)
PROTHROMBIN TIME: 11.1 SEC (ref 9.3–12.4)
RBC # BLD AUTO: 3.72 M/UL (ref 3.5–5.5)
SODIUM SERPL-SCNC: 140 MMOL/L (ref 136–145)
TROPONIN I SERPL HS-MCNC: 12 NG/L (ref 0–14)
WBC OTHER # BLD: 6 K/UL (ref 4.5–11.5)

## 2025-06-28 PROCEDURE — 84484 ASSAY OF TROPONIN QUANT: CPT

## 2025-06-28 PROCEDURE — 93005 ELECTROCARDIOGRAM TRACING: CPT | Performed by: STUDENT IN AN ORGANIZED HEALTH CARE EDUCATION/TRAINING PROGRAM

## 2025-06-28 PROCEDURE — 80053 COMPREHEN METABOLIC PANEL: CPT

## 2025-06-28 PROCEDURE — 82248 BILIRUBIN DIRECT: CPT

## 2025-06-28 PROCEDURE — 85025 COMPLETE CBC W/AUTO DIFF WBC: CPT

## 2025-06-28 PROCEDURE — 99284 EMERGENCY DEPT VISIT MOD MDM: CPT

## 2025-06-28 PROCEDURE — 83690 ASSAY OF LIPASE: CPT

## 2025-06-28 PROCEDURE — 83605 ASSAY OF LACTIC ACID: CPT

## 2025-06-28 PROCEDURE — 85610 PROTHROMBIN TIME: CPT

## 2025-06-28 PROCEDURE — 83735 ASSAY OF MAGNESIUM: CPT

## 2025-06-28 PROCEDURE — 2580000003 HC RX 258: Performed by: STUDENT IN AN ORGANIZED HEALTH CARE EDUCATION/TRAINING PROGRAM

## 2025-06-28 RX ORDER — 0.9 % SODIUM CHLORIDE 0.9 %
1000 INTRAVENOUS SOLUTION INTRAVENOUS ONCE
Status: COMPLETED | OUTPATIENT
Start: 2025-06-28 | End: 2025-06-28

## 2025-06-28 RX ORDER — MAGNESIUM SULFATE 1 G/100ML
1000 INJECTION INTRAVENOUS ONCE
Status: DISCONTINUED | OUTPATIENT
Start: 2025-06-28 | End: 2025-06-29 | Stop reason: HOSPADM

## 2025-06-28 RX ORDER — DIPHENHYDRAMINE HYDROCHLORIDE 50 MG/ML
25 INJECTION, SOLUTION INTRAMUSCULAR; INTRAVENOUS ONCE
Status: DISCONTINUED | OUTPATIENT
Start: 2025-06-28 | End: 2025-06-29 | Stop reason: HOSPADM

## 2025-06-28 RX ORDER — TOPIRAMATE 25 MG/1
25 TABLET, FILM COATED ORAL ONCE
Status: DISCONTINUED | OUTPATIENT
Start: 2025-06-28 | End: 2025-06-29 | Stop reason: HOSPADM

## 2025-06-28 RX ORDER — IOPAMIDOL 755 MG/ML
75 INJECTION, SOLUTION INTRAVASCULAR
Status: DISCONTINUED | OUTPATIENT
Start: 2025-06-28 | End: 2025-06-29 | Stop reason: HOSPADM

## 2025-06-28 RX ORDER — FENTANYL CITRATE 50 UG/ML
50 INJECTION, SOLUTION INTRAMUSCULAR; INTRAVENOUS ONCE
Status: DISCONTINUED | OUTPATIENT
Start: 2025-06-28 | End: 2025-06-29 | Stop reason: HOSPADM

## 2025-06-28 RX ORDER — METOCLOPRAMIDE HYDROCHLORIDE 5 MG/ML
10 INJECTION INTRAMUSCULAR; INTRAVENOUS ONCE
Status: DISCONTINUED | OUTPATIENT
Start: 2025-06-28 | End: 2025-06-29 | Stop reason: HOSPADM

## 2025-06-28 RX ADMIN — SODIUM CHLORIDE 1000 ML: 0.9 INJECTION, SOLUTION INTRAVENOUS at 20:44

## 2025-06-28 ASSESSMENT — PAIN - FUNCTIONAL ASSESSMENT: PAIN_FUNCTIONAL_ASSESSMENT: 0-10

## 2025-06-28 ASSESSMENT — PAIN SCALES - GENERAL: PAINLEVEL_OUTOF10: 5

## 2025-06-28 ASSESSMENT — PAIN DESCRIPTION - LOCATION: LOCATION: ABDOMEN

## 2025-06-29 VITALS
BODY MASS INDEX: 20.83 KG/M2 | TEMPERATURE: 97.7 F | DIASTOLIC BLOOD PRESSURE: 98 MMHG | WEIGHT: 125 LBS | OXYGEN SATURATION: 98 % | HEIGHT: 65 IN | SYSTOLIC BLOOD PRESSURE: 159 MMHG | RESPIRATION RATE: 15 BRPM | HEART RATE: 63 BPM

## 2025-06-29 RX ORDER — DROPERIDOL 2.5 MG/ML
2.5 INJECTION, SOLUTION INTRAMUSCULAR; INTRAVENOUS ONCE
Status: DISCONTINUED | OUTPATIENT
Start: 2025-06-29 | End: 2025-06-29

## 2025-06-29 NOTE — ED NOTES
Pt became aggressive with staff stating that she was going to leave and she doesn't have to be in her room. Pt states she is going to walk home and doesn't have to wait for a ride. [facility] ETA 0400 to take pt back to nursing facility. DO aware of pt behaviors. Clinical Manager called for .

## 2025-06-29 NOTE — DISCHARGE INSTRUCTIONS
Please return to the ER for any new or worsening symptoms  If prescribed, please be sure to  your prescriptions from the pharmacy  Please follow-up with Primary care provider as instructed  You are encouraged to return to the ER at any point in time for re-evaluation

## 2025-06-29 NOTE — ED PROVIDER NOTES
[facility] EMERGENCY DEPARTMENT  EMERGENCY DEPARTMENT ENCOUNTER        Pt Name: Any Arreola  MRN: [id number]  Birthdate 1960  Date of evaluation: 6/28/2025  Provider: Ivanna Suarez DO  PCP: Kyrie Jay DO  Note Started: 10:12 PM EDT 6/28/25    CHIEF COMPLAINT       Chief Complaint   Patient presents with    Abdominal Pain     For \"quite some time\". From [facility], not compliant with meds        HISTORY OF PRESENT ILLNESS: 1 or more Elements     Limitations to history : None    Any Arreola is a 64 y.o. female with past medical history of trigeminal neuralgia, chronic headaches, schizoaffective disorder, bipolar type, vascular dementia with depression, IBS, schizophrenia, HLD, HTN.  She presents to the emergency department for evaluation of generalized abdominal pain.  She is from [facility].  Reportedly is not compliant with her medications.  Patient states that she has been having headaches for years and states over the past few days her headaches have been worse.  She is requesting Topamax stating that she used to take it but no longer is prescribed it and it is the only thing that will work for her.  She also reports lower abdominal pain, states sometimes she has nausea and vomiting and she was experiencing that last night.  States she feels like she is constipated and may need lactulose for her constipation.  She does states she passed a bowel movement last night however and it was normal but still feels like she might be backed up.  Pain is described as cramping, nonradiating.  Headache is generalized, it is not the worst headache of her life but it will not go away.  She has not tried anything for the headache however.  She requested coming to the ER due to her symptoms.  Patient denies any diarrhea, black or bloody stools.  She has not had any fevers.  She denies any chest pain palpitations or shortness of breath.  No falls or injuries.  No

## 2025-06-29 NOTE — ED NOTES
Patient refusing Topamax stating that \"that is not topamax\". Attempted to educate patient that the medication may look different than the Topamax she takes at her facility. This RN showed patient the medication packaging, patient still refusing medication stating \"I dont know what game you are trying to play.\" Patient refusing all other meds, CT scan and requesting IV to be taken out. Dr. Scott notified and at bedside with this RN. Dr. Scott attempted to educate patient on medications, importance for CT and confirmed that the Topamax she has been requesting is the medication that this RN is offering. Patient continuing to refuse all medical treatment and is leaving AMA.

## 2025-06-29 NOTE — ED NOTES
Walked into patients room due to bed alarm going off. Patient was attempting to crawl out of bed and had disconnected cardiac monitor leads. Assisted patient to bathroom and back to bed. Patient started stating that her IV was not helping her and requesting this RN to take it out. Explained to patient the need for the IV for the CT scans and for IV medicine. Patient refused all IV meds and demanded Topamax. Patient continuing to request for IV to be taken out. IV left in for the time being, Dr. Scott notified. Topamax ordered.

## 2025-06-30 LAB
EKG ATRIAL RATE: 52 BPM
EKG P AXIS: 76 DEGREES
EKG P-R INTERVAL: 180 MS
EKG Q-T INTERVAL: 432 MS
EKG QRS DURATION: 98 MS
EKG QTC CALCULATION (BAZETT): 401 MS
EKG R AXIS: 90 DEGREES
EKG T AXIS: 81 DEGREES
EKG VENTRICULAR RATE: 52 BPM

## 2025-06-30 PROCEDURE — 93010 ELECTROCARDIOGRAM REPORT: CPT | Performed by: INTERNAL MEDICINE

## 2025-07-14 ENCOUNTER — HOSPITAL ENCOUNTER (INPATIENT)
Age: 65
LOS: 8 days | Discharge: NURSING, REHAB OR LONG-TERM CARE | DRG: 761 | End: 2025-07-23
Attending: STUDENT IN AN ORGANIZED HEALTH CARE EDUCATION/TRAINING PROGRAM | Admitting: PSYCHIATRY & NEUROLOGY
Payer: MEDICAID

## 2025-07-14 DIAGNOSIS — R46.89 AGGRESSIVE BEHAVIOR: ICD-10-CM

## 2025-07-14 DIAGNOSIS — F99 PSYCHIATRIC DISTURBANCE: Primary | ICD-10-CM

## 2025-07-14 LAB
ALBUMIN SERPL-MCNC: 4 G/DL (ref 3.5–5.2)
ALP SERPL-CCNC: 67 U/L (ref 35–104)
ALT SERPL-CCNC: 10 U/L (ref 0–35)
AMPHET UR QL SCN: NEGATIVE
ANION GAP SERPL CALCULATED.3IONS-SCNC: 11 MMOL/L (ref 7–16)
APAP SERPL-MCNC: <5 UG/ML (ref 10–30)
AST SERPL-CCNC: 20 U/L (ref 0–35)
BARBITURATES UR QL SCN: NEGATIVE
BASOPHILS # BLD: 0.04 K/UL (ref 0–0.2)
BASOPHILS NFR BLD: 1 % (ref 0–2)
BENZODIAZ UR QL: POSITIVE
BILIRUB SERPL-MCNC: 0.5 MG/DL (ref 0–1.2)
BILIRUB UR QL STRIP: NEGATIVE
BUN SERPL-MCNC: 10 MG/DL (ref 8–23)
BUPRENORPHINE UR QL: NEGATIVE
CALCIUM SERPL-MCNC: 9.9 MG/DL (ref 8.8–10.2)
CANNABINOIDS UR QL SCN: NEGATIVE
CHLORIDE SERPL-SCNC: 98 MMOL/L (ref 98–107)
CLARITY UR: CLEAR
CO2 SERPL-SCNC: 28 MMOL/L (ref 22–29)
COCAINE UR QL SCN: NEGATIVE
COLOR UR: YELLOW
CREAT SERPL-MCNC: 0.8 MG/DL (ref 0.5–1)
EOSINOPHIL # BLD: 0.04 K/UL (ref 0.05–0.5)
EOSINOPHILS RELATIVE PERCENT: 1 % (ref 0–6)
ERYTHROCYTE [DISTWIDTH] IN BLOOD BY AUTOMATED COUNT: 13.2 % (ref 11.5–15)
ETHANOLAMINE SERPL-MCNC: <10 MG/DL (ref 0–0.08)
FENTANYL UR QL: NEGATIVE
GFR, ESTIMATED: 88 ML/MIN/1.73M2
GLUCOSE SERPL-MCNC: 152 MG/DL (ref 74–99)
GLUCOSE UR STRIP-MCNC: NEGATIVE MG/DL
HCT VFR BLD AUTO: 31.7 % (ref 34–48)
HGB BLD-MCNC: 10.7 G/DL (ref 11.5–15.5)
HGB UR QL STRIP.AUTO: ABNORMAL
IMM GRANULOCYTES # BLD AUTO: <0.03 K/UL (ref 0–0.58)
IMM GRANULOCYTES NFR BLD: 1 % (ref 0–5)
KETONES UR STRIP-MCNC: NEGATIVE MG/DL
LEUKOCYTE ESTERASE UR QL STRIP: NEGATIVE
LYMPHOCYTES NFR BLD: 1.03 K/UL (ref 1.5–4)
LYMPHOCYTES RELATIVE PERCENT: 25 % (ref 20–42)
MAGNESIUM SERPL-MCNC: 1.7 MG/DL (ref 1.6–2.4)
MCH RBC QN AUTO: 30.2 PG (ref 26–35)
MCHC RBC AUTO-ENTMCNC: 33.8 G/DL (ref 32–34.5)
MCV RBC AUTO: 89.5 FL (ref 80–99.9)
METHADONE UR QL: NEGATIVE
MONOCYTES NFR BLD: 0.31 K/UL (ref 0.1–0.95)
MONOCYTES NFR BLD: 8 % (ref 2–12)
NEUTROPHILS NFR BLD: 65 % (ref 43–80)
NEUTS SEG NFR BLD: 2.69 K/UL (ref 1.8–7.3)
NITRITE UR QL STRIP: NEGATIVE
OPIATES UR QL SCN: NEGATIVE
OXYCODONE UR QL SCN: NEGATIVE
PCP UR QL SCN: NEGATIVE
PH UR STRIP: 6 [PH] (ref 5–8)
PLATELET # BLD AUTO: 237 K/UL (ref 130–450)
PMV BLD AUTO: 10.4 FL (ref 7–12)
POTASSIUM SERPL-SCNC: 3.5 MMOL/L (ref 3.5–5.1)
PROT SERPL-MCNC: 6.4 G/DL (ref 6.4–8.3)
PROT UR STRIP-MCNC: NEGATIVE MG/DL
RBC # BLD AUTO: 3.54 M/UL (ref 3.5–5.5)
RBC #/AREA URNS HPF: ABNORMAL /HPF
SALICYLATES SERPL-MCNC: <0.5 MG/DL (ref 0–30)
SODIUM SERPL-SCNC: 137 MMOL/L (ref 136–145)
SP GR UR STRIP: 1.02 (ref 1–1.03)
TEST INFORMATION: ABNORMAL
TOXIC TRICYCLIC SC,BLOOD: NEGATIVE
UROBILINOGEN UR STRIP-ACNC: 0.2 EU/DL (ref 0–1)
WBC #/AREA URNS HPF: ABNORMAL /HPF
WBC OTHER # BLD: 4.1 K/UL (ref 4.5–11.5)

## 2025-07-14 PROCEDURE — 80179 DRUG ASSAY SALICYLATE: CPT

## 2025-07-14 PROCEDURE — 6360000002 HC RX W HCPCS

## 2025-07-14 PROCEDURE — 80053 COMPREHEN METABOLIC PANEL: CPT

## 2025-07-14 PROCEDURE — 81001 URINALYSIS AUTO W/SCOPE: CPT

## 2025-07-14 PROCEDURE — 84436 ASSAY OF TOTAL THYROXINE: CPT

## 2025-07-14 PROCEDURE — 90791 PSYCH DIAGNOSTIC EVALUATION: CPT

## 2025-07-14 PROCEDURE — 85025 COMPLETE CBC W/AUTO DIFF WBC: CPT

## 2025-07-14 PROCEDURE — 84443 ASSAY THYROID STIM HORMONE: CPT

## 2025-07-14 PROCEDURE — 99285 EMERGENCY DEPT VISIT HI MDM: CPT

## 2025-07-14 PROCEDURE — G0480 DRUG TEST DEF 1-7 CLASSES: HCPCS

## 2025-07-14 PROCEDURE — 80307 DRUG TEST PRSMV CHEM ANLYZR: CPT

## 2025-07-14 PROCEDURE — 83735 ASSAY OF MAGNESIUM: CPT

## 2025-07-14 PROCEDURE — 93005 ELECTROCARDIOGRAM TRACING: CPT | Performed by: STUDENT IN AN ORGANIZED HEALTH CARE EDUCATION/TRAINING PROGRAM

## 2025-07-14 PROCEDURE — 87086 URINE CULTURE/COLONY COUNT: CPT

## 2025-07-14 PROCEDURE — 80143 DRUG ASSAY ACETAMINOPHEN: CPT

## 2025-07-14 PROCEDURE — 96372 THER/PROPH/DIAG INJ SC/IM: CPT

## 2025-07-14 RX ORDER — MIDAZOLAM HYDROCHLORIDE 1 MG/ML
INJECTION, SOLUTION INTRAMUSCULAR; INTRAVENOUS
Status: COMPLETED
Start: 2025-07-14 | End: 2025-07-14

## 2025-07-14 RX ORDER — DROPERIDOL 2.5 MG/ML
5 INJECTION, SOLUTION INTRAMUSCULAR; INTRAVENOUS ONCE
Status: COMPLETED | OUTPATIENT
Start: 2025-07-14 | End: 2025-07-14

## 2025-07-14 RX ORDER — DIPHENHYDRAMINE HYDROCHLORIDE 50 MG/ML
50 INJECTION, SOLUTION INTRAMUSCULAR; INTRAVENOUS ONCE
Status: DISCONTINUED | OUTPATIENT
Start: 2025-07-14 | End: 2025-07-14

## 2025-07-14 RX ORDER — DIPHENHYDRAMINE HYDROCHLORIDE 50 MG/ML
50 INJECTION, SOLUTION INTRAMUSCULAR; INTRAVENOUS ONCE
Status: COMPLETED | OUTPATIENT
Start: 2025-07-14 | End: 2025-07-14

## 2025-07-14 RX ORDER — DROPERIDOL 2.5 MG/ML
INJECTION, SOLUTION INTRAMUSCULAR; INTRAVENOUS
Status: COMPLETED
Start: 2025-07-14 | End: 2025-07-14

## 2025-07-14 RX ORDER — DIPHENHYDRAMINE HYDROCHLORIDE 50 MG/ML
INJECTION, SOLUTION INTRAMUSCULAR; INTRAVENOUS
Status: COMPLETED
Start: 2025-07-14 | End: 2025-07-14

## 2025-07-14 RX ORDER — MIDAZOLAM HYDROCHLORIDE 2 MG/2ML
2 INJECTION, SOLUTION INTRAMUSCULAR; INTRAVENOUS ONCE
Status: COMPLETED | OUTPATIENT
Start: 2025-07-14 | End: 2025-07-14

## 2025-07-14 RX ADMIN — DIPHENHYDRAMINE HYDROCHLORIDE 50 MG: 50 INJECTION INTRAMUSCULAR; INTRAVENOUS at 18:59

## 2025-07-14 RX ADMIN — DIPHENHYDRAMINE HYDROCHLORIDE 50 MG: 50 INJECTION, SOLUTION INTRAMUSCULAR; INTRAVENOUS at 18:59

## 2025-07-14 RX ADMIN — MIDAZOLAM HYDROCHLORIDE 2 MG: 1 INJECTION, SOLUTION INTRAMUSCULAR; INTRAVENOUS at 18:59

## 2025-07-14 RX ADMIN — DROPERIDOL 5 MG: 2.5 INJECTION, SOLUTION INTRAMUSCULAR; INTRAVENOUS at 17:39

## 2025-07-14 RX ADMIN — MIDAZOLAM HYDROCHLORIDE 2 MG: 2 INJECTION, SOLUTION INTRAMUSCULAR; INTRAVENOUS at 18:59

## 2025-07-14 ASSESSMENT — PAIN - FUNCTIONAL ASSESSMENT: PAIN_FUNCTIONAL_ASSESSMENT: NONE - DENIES PAIN
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
## 2025-07-14 NOTE — ED NOTES
Patient remains in her room with CO present.  Patient continues to refuse labs, urine, EKG or to change into hospital attire.

## 2025-07-14 NOTE — ED NOTES
Patient refusing to allow blood work, urine sample or EKG.  Refusing to change.  Keeps trying to leave the unit.  CO  in place and this writer was able to redirect to her room despite verbal threats by patient.
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
## 2025-07-14 NOTE — ED NOTES
SW reached out to pt's legal guardian Ifrah Madrid [phone]. Consent to treat was provided for pt.

## 2025-07-14 NOTE — ED NOTES
Patient awake but resting in bed.  Asked patient to change and allow blood work and EKG and she continues to refuse.

## 2025-07-15 PROBLEM — F25.9 ACUTE EXACERBATION OF CHRONIC SCHIZOAFFECTIVE SCHIZOPHRENIA (HCC): Status: ACTIVE | Noted: 2025-07-15

## 2025-07-15 LAB
EKG ATRIAL RATE: 69 BPM
EKG P AXIS: 78 DEGREES
EKG P-R INTERVAL: 182 MS
EKG Q-T INTERVAL: 416 MS
EKG QRS DURATION: 100 MS
EKG QTC CALCULATION (BAZETT): 445 MS
EKG R AXIS: 89 DEGREES
EKG T AXIS: 80 DEGREES
EKG VENTRICULAR RATE: 69 BPM
T4 SERPL-MCNC: 7.6 UG/DL (ref 4.5–11.7)
TSH SERPL DL<=0.05 MIU/L-ACNC: 1.17 UIU/ML (ref 0.27–4.2)

## 2025-07-15 PROCEDURE — 93010 ELECTROCARDIOGRAM REPORT: CPT | Performed by: INTERNAL MEDICINE

## 2025-07-15 PROCEDURE — 6370000000 HC RX 637 (ALT 250 FOR IP)

## 2025-07-15 PROCEDURE — 1240000000 HC EMOTIONAL WELLNESS R&B

## 2025-07-15 PROCEDURE — 90792 PSYCH DIAG EVAL W/MED SRVCS: CPT

## 2025-07-15 RX ORDER — POLYETHYLENE GLYCOL 3350 17 G/17G
17 POWDER, FOR SOLUTION ORAL DAILY PRN
Status: DISCONTINUED | OUTPATIENT
Start: 2025-07-15 | End: 2025-07-23 | Stop reason: HOSPADM

## 2025-07-15 RX ORDER — LISINOPRIL 5 MG/1
5 TABLET ORAL DAILY
Status: DISCONTINUED | OUTPATIENT
Start: 2025-07-15 | End: 2025-07-23 | Stop reason: HOSPADM

## 2025-07-15 RX ORDER — SERTRALINE HYDROCHLORIDE 25 MG/1
125 TABLET, FILM COATED ORAL DAILY
Status: ON HOLD | COMMUNITY
End: 2025-07-23 | Stop reason: HOSPADM

## 2025-07-15 RX ORDER — NICOTINE 21 MG/24HR
1 PATCH, TRANSDERMAL 24 HOURS TRANSDERMAL DAILY
Status: DISCONTINUED | OUTPATIENT
Start: 2025-07-15 | End: 2025-07-23 | Stop reason: HOSPADM

## 2025-07-15 RX ORDER — BISACODYL 10 MG
10 SUPPOSITORY, RECTAL RECTAL DAILY PRN
Status: DISCONTINUED | OUTPATIENT
Start: 2025-07-15 | End: 2025-07-23 | Stop reason: HOSPADM

## 2025-07-15 RX ORDER — HALOPERIDOL 2 MG/1
3 TABLET ORAL EVERY 6 HOURS PRN
Status: DISCONTINUED | OUTPATIENT
Start: 2025-07-15 | End: 2025-07-23 | Stop reason: HOSPADM

## 2025-07-15 RX ORDER — QUETIAPINE FUMARATE 50 MG/1
250 TABLET, EXTENDED RELEASE ORAL NIGHTLY
Status: ON HOLD | COMMUNITY
End: 2025-07-23 | Stop reason: HOSPADM

## 2025-07-15 RX ORDER — SIMETHICONE 80 MG
80 TABLET,CHEWABLE ORAL EVERY 6 HOURS PRN
COMMUNITY

## 2025-07-15 RX ORDER — ACETAMINOPHEN 325 MG/1
650 TABLET ORAL EVERY 4 HOURS PRN
Status: DISCONTINUED | OUTPATIENT
Start: 2025-07-15 | End: 2025-07-23 | Stop reason: HOSPADM

## 2025-07-15 RX ORDER — BENZTROPINE MESYLATE 0.5 MG/1
0.5 TABLET ORAL 2 TIMES DAILY
Status: DISCONTINUED | OUTPATIENT
Start: 2025-07-15 | End: 2025-07-23 | Stop reason: HOSPADM

## 2025-07-15 RX ORDER — OLANZAPINE 5 MG/1
5 TABLET, FILM COATED ORAL NIGHTLY
Status: DISCONTINUED | OUTPATIENT
Start: 2025-07-15 | End: 2025-07-22

## 2025-07-15 RX ORDER — SERTRALINE HYDROCHLORIDE 25 MG/1
75 TABLET, FILM COATED ORAL DAILY
Status: DISCONTINUED | OUTPATIENT
Start: 2025-07-15 | End: 2025-07-23 | Stop reason: HOSPADM

## 2025-07-15 RX ORDER — HYDROXYZINE HYDROCHLORIDE 25 MG/1
25 TABLET, FILM COATED ORAL 3 TIMES DAILY PRN
Status: DISCONTINUED | OUTPATIENT
Start: 2025-07-15 | End: 2025-07-23 | Stop reason: HOSPADM

## 2025-07-15 RX ORDER — LACTULOSE 10 G/15ML
20 SOLUTION ORAL DAILY
Status: DISCONTINUED | OUTPATIENT
Start: 2025-07-15 | End: 2025-07-15

## 2025-07-15 RX ORDER — LISINOPRIL 5 MG/1
5 TABLET ORAL DAILY
COMMUNITY

## 2025-07-15 RX ORDER — HALOPERIDOL 5 MG/ML
3 INJECTION INTRAMUSCULAR EVERY 6 HOURS PRN
Status: DISCONTINUED | OUTPATIENT
Start: 2025-07-15 | End: 2025-07-23 | Stop reason: HOSPADM

## 2025-07-15 RX ORDER — M-VIT,TX,IRON,MINS/CALC/FOLIC 27MG-0.4MG
1 TABLET ORAL DAILY
Status: DISCONTINUED | OUTPATIENT
Start: 2025-07-15 | End: 2025-07-23 | Stop reason: HOSPADM

## 2025-07-15 RX ORDER — LACTULOSE 10 G/15ML
30 SOLUTION ORAL DAILY
Status: DISCONTINUED | OUTPATIENT
Start: 2025-07-15 | End: 2025-07-23 | Stop reason: HOSPADM

## 2025-07-15 RX ORDER — ATORVASTATIN CALCIUM 40 MG/1
40 TABLET, FILM COATED ORAL NIGHTLY
Status: DISCONTINUED | OUTPATIENT
Start: 2025-07-15 | End: 2025-07-23 | Stop reason: HOSPADM

## 2025-07-15 RX ORDER — MAGNESIUM HYDROXIDE/ALUMINUM HYDROXICE/SIMETHICONE 120; 1200; 1200 MG/30ML; MG/30ML; MG/30ML
30 SUSPENSION ORAL PRN
Status: DISCONTINUED | OUTPATIENT
Start: 2025-07-15 | End: 2025-07-23 | Stop reason: HOSPADM

## 2025-07-15 RX ORDER — QUETIAPINE 200 MG/1
250 TABLET, FILM COATED, EXTENDED RELEASE ORAL NIGHTLY
Status: ON HOLD | COMMUNITY
End: 2025-07-15

## 2025-07-15 RX ORDER — TOPIRAMATE 25 MG/1
50 TABLET, FILM COATED ORAL 2 TIMES DAILY
Status: DISCONTINUED | OUTPATIENT
Start: 2025-07-15 | End: 2025-07-17

## 2025-07-15 RX ADMIN — ATORVASTATIN CALCIUM 40 MG: 40 TABLET, FILM COATED ORAL at 21:24

## 2025-07-15 RX ADMIN — BENZTROPINE MESYLATE 0.5 MG: 0.5 TABLET ORAL at 12:14

## 2025-07-15 RX ADMIN — LISINOPRIL 5 MG: 10 TABLET ORAL at 12:13

## 2025-07-15 RX ADMIN — TOPIRAMATE 50 MG: 100 TABLET, FILM COATED ORAL at 12:14

## 2025-07-15 RX ADMIN — Medication 1 TABLET: at 13:06

## 2025-07-15 RX ADMIN — SERTRALINE 75 MG: 50 TABLET, FILM COATED ORAL at 12:14

## 2025-07-15 RX ADMIN — LACTULOSE 30 G: 20 SOLUTION ORAL at 12:13

## 2025-07-15 RX ADMIN — TOPIRAMATE 50 MG: 100 TABLET, FILM COATED ORAL at 21:24

## 2025-07-15 ASSESSMENT — PATIENT HEALTH QUESTIONNAIRE - PHQ9
SUM OF ALL RESPONSES TO PHQ QUESTIONS 1-9: 0
SUM OF ALL RESPONSES TO PHQ QUESTIONS 1-9: 0
SUM OF ALL RESPONSES TO PHQ QUESTIONS 1-9: 1
SUM OF ALL RESPONSES TO PHQ QUESTIONS 1-9: 0
1. LITTLE INTEREST OR PLEASURE IN DOING THINGS: SEVERAL DAYS
SUM OF ALL RESPONSES TO PHQ QUESTIONS 1-9: 0
2. FEELING DOWN, DEPRESSED OR HOPELESS: NOT AT ALL
SUM OF ALL RESPONSES TO PHQ QUESTIONS 1-9: 1
2. FEELING DOWN, DEPRESSED OR HOPELESS: NOT AT ALL
1. LITTLE INTEREST OR PLEASURE IN DOING THINGS: NOT AT ALL

## 2025-07-15 ASSESSMENT — SLEEP AND FATIGUE QUESTIONNAIRES
DO YOU HAVE DIFFICULTY SLEEPING: NO
SLEEP PATTERN: DISTURBED/INTERRUPTED SLEEP
SLEEP PATTERN: NORMAL
AVERAGE NUMBER OF SLEEP HOURS: 5
DO YOU USE A SLEEP AID: NO
DO YOU USE A SLEEP AID: NO
AVERAGE NUMBER OF SLEEP HOURS: 6
DO YOU HAVE DIFFICULTY SLEEPING: YES

## 2025-07-15 ASSESSMENT — PAIN DESCRIPTION - DESCRIPTORS: DESCRIPTORS: DISCOMFORT;ACHING;SORE

## 2025-07-15 ASSESSMENT — PAIN SCALES - GENERAL: PAINLEVEL_OUTOF10: 7

## 2025-07-15 ASSESSMENT — LIFESTYLE VARIABLES
HOW MANY STANDARD DRINKS CONTAINING ALCOHOL DO YOU HAVE ON A TYPICAL DAY: PATIENT DOES NOT DRINK
HOW OFTEN DO YOU HAVE A DRINK CONTAINING ALCOHOL: NEVER
HOW OFTEN DO YOU HAVE A DRINK CONTAINING ALCOHOL: NEVER
HOW MANY STANDARD DRINKS CONTAINING ALCOHOL DO YOU HAVE ON A TYPICAL DAY: PATIENT DOES NOT DRINK

## 2025-07-15 ASSESSMENT — PAIN DESCRIPTION - LOCATION: LOCATION: BACK;HEAD;ABDOMEN

## 2025-07-15 NOTE — BH NOTE
Patient agitated, labile, and uncooperative. Adamantly refusing vital signs after arrival to the unit. Patient calling staff \"assholes\" and states \"I'm not staying here, got it\".

## 2025-07-15 NOTE — BH NOTE
Patient noncompliant. She will not fill out OQ analyst, dietary menu or let us take vital signs. She is calling all staff names such as \"assholes\" telling us that she is going to lay us, that she is going home no matter what. She is pounding her fists on the desk, calling us expletives and then went to her room and slammed the door. Mood is labile. She goes from crying to anger. Refusing all interactions. Will continue to monitor and support.

## 2025-07-15 NOTE — BH NOTE
Patient leaves her room and approaches desk to say that she is leaving no matter what, \"give me my clothes!\" She continues to be verbally threatening and name calling, then returns to her room and slams the door. Patient is not redirectable. This has happened on two occasions since admission to unit. Will continue to monitor and support.

## 2025-07-15 NOTE — H&P
Department of Psychiatry  History and Physical - Adult     CHIEF COMPLAINT:    Chief Complaint   Patient presents with    Psychiatric Evaluation     Pt pink slipped from [facility] for throwing stuff yesterday and becoming verbally aggressive.       Patient was seen after discussing with the treatment team and reviewing the chart    CIRCUMSTANCES OF ADMISSION:     Patient name: Any Arreola  Patient's past mental health and addiction history: History of schizoaffective disorder with previous inpatient psychiatric hospitalization  Patient's presentation to the ED and why the patient needs admission: Sent in from nursing home for aggression, agitation, lability  Legal status:  []  Voluntary  [x]  Involuntary  []  Probate  Triggering/precipitating events: History of noncompliance with medication  Duration of triggering/precipitating events: Prior to arrival    HISTORY OF PRESENT ILLNESS:      The patient is a 64 y.o. female with significant past history of schizoaffective disorder with previous inpatient psychiatric hospitalizations patient was last here at [facility] in June 2021 presented to the ED from [facility] after reported the patient has becoming more aggressive throwing things, refusing medications, combative, mood lability.  Urine drug screen positive for benzodiazepines, alcohol negative, QTc 445.  Patient was medically cleared involuntarily admitted to  W inpatient psychiatric unit for further psychiatric assessment stabilization and treatment    Upon evaluation today patient was seen in her room she is lying in bed she does appear somewhat disheveled she is flat, blunted some underling irritability is noted she is superficially cooperative.  Patient has a significant history of schizoaffective disorder with a history of noncompliance with medication and has had multiple inpatient psychiatric hospitalizations known to these providers.  Patient is currently

## 2025-07-15 NOTE — ED PROVIDER NOTES
[facility] EMERGENCY DEPARTMENT  EMERGENCY DEPARTMENT ENCOUNTER        Pt Name: Any Arreola  MRN: [id number]  Birthdate 1960  Date of evaluation: 7/14/2025  Provider: Ivanna Suarez DO  PCP: Kyrie Jay DO  Note Started: 10:31 PM EDT 7/14/25    CHIEF COMPLAINT       Chief Complaint   Patient presents with   • Psychiatric Evaluation     Pt pink slipped from [place] for throwing stuff yesterday and becoming verbally aggressive.       HISTORY OF PRESENT ILLNESS: 1 or more Elements     Limitations to history : Behavior, psychiatric disturbance    Any Arreola is a 64 y.o. female with past medical history of irritable bowel syndrome, vascular dementia, moderate protein calorie malnutrition, chronic headaches, who presents to the emergency department for psychiatric evaluation.  She resides at a nursing facility,  [facility]; apparently has been becoming verbally aggressive and throwing objects so she was pink slipped.  The patient has a history of refusing medications and being combative/uncooperative.  She certainly is doing the same today.  She was actually seen by me for abdominal pain and a headache on 6/28 and had some similar behavior but not to this level.  Just as she did last time, she remains completely oriented to self place and time.  She is oriented to purpose, but is not cooperative and calling everyone in the emergency department liars, \"assholes\", and other expletives.  She is shuffling around in the behavioral health area, covering by the door, at times trying to leave, threatening to lay everyone, etc.  She is not cooperative at this time.  She denies SI/HI.  She denies AH/VH.  While denying these things, she intermixes expletives with her answers.  The patient will not allow me to examine her and continues to call me expletives.  She is not cooperative with exam or workup at this time.  Although she does have a history of behavioral/agitation

## 2025-07-15 NOTE — ED NOTES
Patient is medically cleared for inpatient mental health/psychiatric evaluation.    Ivanna Suarez DO  Emergency medicine  10:33 PM       Ivanna Suarez DO  07/14/25 3433

## 2025-07-15 NOTE — VIRTUAL HEALTH
(LIPITOR) 40 MG tablet Take 1 tablet by mouth at bedtime    Trevor Guerrero MD   sertraline (ZOLOFT) 100 MG tablet Take 1 tablet by mouth daily    Trevor Guerrero MD   acetaminophen (TYLENOL) 325 MG tablet Take 2 tablets by mouth every 4 hours as needed for Pain or Fever    Trevor Guerrero MD   benztropine (COGENTIN) 0.5 MG tablet Take 1 tablet by mouth 2 times daily    Trevor Guerrero MD   Multiple Vitamins-Minerals (THERAPEUTIC MULTIVITAMIN-MINERALS) tablet Take 1 tablet by mouth daily    Trevor Guerrero MD   magnesium hydroxide (MILK OF MAGNESIA) 400 MG/5ML suspension Take 30 mLs by mouth daily as needed for Constipation 8/20/20   Alexandre Eason MD        Labs:  UDS: positive for benzodiazepines   ETOH: negative  HCG: Negative      Mental Status Exam:  Level of consciousness:  awake   Appearance:  hospital attire and lying in bed.  Does appear stated age. No acute distress.  Behavior/Motor:  no abnormalities noted  Attitude toward examiner:  poor eye contact, guarded, and withdrawn  SI/HI:Denies SI/HI  Speech:  pressured , Tone: normal tone  Mood: angry, depressed, irritable, labile, and sad  Affect: intense  Thought Processes:  illogical.   Thought Content: No delusions or other perceptual abnormalities  Hallucinations:  Hallucinations: Denies AVOT-H  Cognition:  disoriented   Concentration: distractible  Memory: impaired , though not formally tested.  Insight: poor   Judgement: poor   Fund of Knowledge: marginal      Risk Assessment:  C-SSRS Score       7/14/2025     4:13 PM   C-SSRS Suicide Screening   1) Within the past month, have you wished you were dead or wished you could go to sleep and not wake up?  No   2) Have you actually had any thoughts of killing yourself?  No   6) Have you ever done anything, started to do anything, or prepared to do anything to end your life? No    :    Protective Factors:  Protective: Female gender, Does not have access to guns, and No active

## 2025-07-15 NOTE — CARE COORDINATION
Biopsychosocial Assessment Note    Social work met with patient to complete the biopsychosocial assessment and C-SSRS.     Chief Complaint: \"They just brought me here\"    Mental Status Exam: Pt is alert and oriented x4. Pt's mood is depressed and sad, affect is blunt. Pt's eye contact is fair. Pt's insight and judgement is poor. Pt denied SI, HI, AVH.      Clinical Summary: Pt is a 64-year-old female, who presented to the ER due to \"they just brought me here.\" Per ED notes, \"Patient is a 64-year-old female who presents to the emergency department for psychiatric evaluation.  She resides at a nursing facility, [facility]; apparently has been becoming verbally aggressive and throwing objects so she was pink slipped.  The patient has a history of refusing medications and being combative/uncooperative.\"    Pt stated that she has a past psychiatric admission. She stated that her last admission was about 5-6 years ago. She lives at [facility] and they manage her mental health needs. She denied any past suicide attempts. She denied trauma/abuse. She denied substance use. She stated that sleep and appetite are normal.     Pt is unemployed and receives SSI. She denied a legal history. She is single with no kids. She stated that she grew up with her mom and dad and they are both alive but not supports. She denied nay family history of mental health.     She will return to [facility].       Risk Factors:   Previous  admission  Mental Health Diagnosis  Impulsive behaviors   Poor insight/judgement  Poor historian  Gender Risk (female 35-64)     Protective Factors:   Safe and stable housing   Access to essential/ basic needs   No access to weapons     Gender  [] Male [x] Female [] Transgender  [] Other    Sexual Orientation    [x] Heterosexual [] Homosexual [] Bisexual [] Other    Suicidal Ideation  [] Past [] Present [x] Denies     C-SSRS Screening Completed: Current Suicide Risk:  [x] No Risk  [] Low [] Moderate []

## 2025-07-16 LAB
CHOLEST SERPL-MCNC: 222 MG/DL
HBA1C MFR BLD: 5.5 % (ref 4–5.6)
HDLC SERPL-MCNC: 70 MG/DL
LDLC SERPL CALC-MCNC: 141 MG/DL
MICROORGANISM SPEC CULT: NO GROWTH
SERVICE CMNT-IMP: NORMAL
SPECIMEN DESCRIPTION: NORMAL
TRIGL SERPL-MCNC: 51 MG/DL
VLDLC SERPL CALC-MCNC: 10 MG/DL

## 2025-07-16 PROCEDURE — 1240000000 HC EMOTIONAL WELLNESS R&B

## 2025-07-16 PROCEDURE — 99232 SBSQ HOSP IP/OBS MODERATE 35: CPT

## 2025-07-16 PROCEDURE — 6370000000 HC RX 637 (ALT 250 FOR IP)

## 2025-07-16 PROCEDURE — 80061 LIPID PANEL: CPT

## 2025-07-16 PROCEDURE — 83036 HEMOGLOBIN GLYCOSYLATED A1C: CPT

## 2025-07-16 PROCEDURE — 36415 COLL VENOUS BLD VENIPUNCTURE: CPT

## 2025-07-16 RX ADMIN — BENZTROPINE MESYLATE 0.5 MG: 0.5 TABLET ORAL at 08:53

## 2025-07-16 RX ADMIN — TOPIRAMATE 50 MG: 100 TABLET, FILM COATED ORAL at 08:53

## 2025-07-16 RX ADMIN — LISINOPRIL 5 MG: 10 TABLET ORAL at 09:07

## 2025-07-16 RX ADMIN — SERTRALINE 75 MG: 50 TABLET, FILM COATED ORAL at 08:53

## 2025-07-16 RX ADMIN — TOPIRAMATE 50 MG: 100 TABLET, FILM COATED ORAL at 20:47

## 2025-07-16 RX ADMIN — ATORVASTATIN CALCIUM 40 MG: 40 TABLET, FILM COATED ORAL at 20:48

## 2025-07-16 RX ADMIN — Medication 1 TABLET: at 08:53

## 2025-07-16 ASSESSMENT — PAIN SCALES - GENERAL
PAINLEVEL_OUTOF10: 8
PAINLEVEL_OUTOF10: 0

## 2025-07-16 ASSESSMENT — PAIN DESCRIPTION - DESCRIPTORS: DESCRIPTORS: ACHING;DISCOMFORT;DULL

## 2025-07-16 ASSESSMENT — PAIN DESCRIPTION - LOCATION: LOCATION: ABDOMEN;BACK;HEAD

## 2025-07-16 NOTE — BH NOTE
Denies suicidal/homicidal ideations, as well as auditory/visual hallucinations at this time.     Denies anxiety and depression levels at this time.  Pt observed in room where she has been isolative, withdrawn this shift. Pt is lying in bed, makes very little eye contact throughout assessment. Pt is flat, blunt, guarded though superficially cooperative. Pt appears sad, depressed, anxious. Pt has many somatic complaints of headache, stomachache, backache, etc.   Pt is non-compliant with PM medication, refusing all psychiatric medications stating \"I am not schizoaffective, or bipolar\". RN attempted to provide education on importance of medication compliance, pt refusing education at this time.  According to previous notes pt did not attend daytime groups.  No acute behavioral concerns voiced/noted at this time.  Purposeful Q15min rounding continues.

## 2025-07-16 NOTE — CARE COORDINATION
Rogelio called Teresa at [facility] [phone] to see if pt is able to return to the facility. No answer, rogelio left a voicemail.

## 2025-07-16 NOTE — CARE COORDINATION
Rogelio called Teresa at [facility] [phone] to see if pt is able to return to the facility. No answer, rogelio left a voicemail.     Rogelio called pts legal guardian- Ifrah [phone] to discuss when pt last had her Invega injection. She stated that her last injection was in February of 2025.

## 2025-07-16 NOTE — CARE COORDINATION
Treatment team met with pt. She stated that she is hanging in there. She stated that she doesn't want Zyprexa or Seroquel. She stated that she doesn't need it.

## 2025-07-17 PROCEDURE — 6370000000 HC RX 637 (ALT 250 FOR IP)

## 2025-07-17 PROCEDURE — 1240000000 HC EMOTIONAL WELLNESS R&B

## 2025-07-17 PROCEDURE — 99232 SBSQ HOSP IP/OBS MODERATE 35: CPT

## 2025-07-17 RX ORDER — TOPIRAMATE 100 MG/1
100 TABLET, FILM COATED ORAL 2 TIMES DAILY
Status: DISCONTINUED | OUTPATIENT
Start: 2025-07-17 | End: 2025-07-23 | Stop reason: HOSPADM

## 2025-07-17 RX ADMIN — Medication 1 TABLET: at 08:47

## 2025-07-17 RX ADMIN — BENZTROPINE MESYLATE 0.5 MG: 0.5 TABLET ORAL at 08:47

## 2025-07-17 RX ADMIN — TOPIRAMATE 100 MG: 100 TABLET, FILM COATED ORAL at 22:00

## 2025-07-17 RX ADMIN — BENZTROPINE MESYLATE 0.5 MG: 0.5 TABLET ORAL at 22:00

## 2025-07-17 RX ADMIN — LACTULOSE 30 G: 20 SOLUTION ORAL at 08:47

## 2025-07-17 RX ADMIN — OLANZAPINE 5 MG: 5 TABLET, FILM COATED ORAL at 21:59

## 2025-07-17 RX ADMIN — TOPIRAMATE 50 MG: 100 TABLET, FILM COATED ORAL at 08:47

## 2025-07-17 RX ADMIN — SERTRALINE 75 MG: 50 TABLET, FILM COATED ORAL at 08:47

## 2025-07-17 RX ADMIN — ATORVASTATIN CALCIUM 40 MG: 40 TABLET, FILM COATED ORAL at 21:59

## 2025-07-17 ASSESSMENT — PAIN SCALES - GENERAL
PAINLEVEL_OUTOF10: 0
PAINLEVEL_OUTOF10: 0

## 2025-07-17 NOTE — BH NOTE
Denies suicidal/homicidal ideations, as well as auditory/visual hallucinations at this time.     Denies anxiety and depression levels at this time by shaking her head \"no\", would not verbally respond to questioning.  Pt observed in room where she has been isolative, withdrawn this shift. Pt is lying in bed, makes no eye contact throughout assessment, keeps eyes shut. Pt is flat, blunt, guarded though superficially cooperative. Pt appears sad, depressed, anxious somewhat preoccupied. Pt continues to have somatic complaints of headache, stomachache, backache, etc. Pt offers very little information verbally on assessment. Pt states \"I'm just tired\".  Pt is non-compliant with PM medication, refusing all psychiatric medications, only taking Topamax and Lipitor.  According to previous notes pt did not attend daytime groups.  No acute behavioral concerns voiced/noted at this time.  Purposeful Q15min rounding continues.

## 2025-07-17 NOTE — GROUP NOTE
Group Therapy Note    Date: 7/17/2025    Group Start Time: 1000  Group End Time: 1015  Group Topic: Community Meeting    [facility]    Angeline Trujillo, ALEX    Type of Group:Community Meeting    Patient's Goal:  Patient will be able to id staffing assignments, expectations of patients, and general information re: floor rules.   Will be prompted to share goal for the day.     Notes:  Patient appeared to be an active listener, taking in information presented and was prompted to share goal for the day.    Status After Intervention:  improved    Participation Level: active listener    Participation Quality: appropriate    Speech:  normal    Thought Process/Content: logical     Affective Functioning:congruent    Mood: euthymic    Level of consciousness:  alert     Response to Learning: verbalize, retain     Endings:none reported    Modes of Intervention: education, support, clarifying     Discipline Responsible: Psychoeducation       Signature:  ALEX Marques

## 2025-07-17 NOTE — BH NOTE
PT WAS UP IN HER ROOM. WHEN GETTING HER MEDICATIONS READY PT ASKED FOR THE NAME OF EACH MEDICATION. PT REFUSED HER LISINOPRIL THIS AM. PT RESTING HER ROOM. PT RN MADE AWARE.

## 2025-07-17 NOTE — CARE COORDINATION
Tony called [facility] [phone] to see if pt is able to return to their facility. Tony left a voicemail for admissions.

## 2025-07-18 PROCEDURE — 99232 SBSQ HOSP IP/OBS MODERATE 35: CPT | Performed by: NURSE PRACTITIONER

## 2025-07-18 PROCEDURE — 6370000000 HC RX 637 (ALT 250 FOR IP)

## 2025-07-18 PROCEDURE — 1240000000 HC EMOTIONAL WELLNESS R&B

## 2025-07-18 RX ADMIN — BENZTROPINE MESYLATE 0.5 MG: 0.5 TABLET ORAL at 09:42

## 2025-07-18 RX ADMIN — BENZTROPINE MESYLATE 0.5 MG: 0.5 TABLET ORAL at 21:45

## 2025-07-18 RX ADMIN — OLANZAPINE 5 MG: 5 TABLET, FILM COATED ORAL at 21:45

## 2025-07-18 RX ADMIN — LISINOPRIL 5 MG: 10 TABLET ORAL at 09:42

## 2025-07-18 RX ADMIN — LACTULOSE 30 G: 20 SOLUTION ORAL at 09:41

## 2025-07-18 RX ADMIN — ATORVASTATIN CALCIUM 40 MG: 40 TABLET, FILM COATED ORAL at 21:45

## 2025-07-18 RX ADMIN — Medication 1 TABLET: at 09:42

## 2025-07-18 RX ADMIN — TOPIRAMATE 100 MG: 100 TABLET, FILM COATED ORAL at 09:42

## 2025-07-18 RX ADMIN — SERTRALINE 75 MG: 50 TABLET, FILM COATED ORAL at 09:42

## 2025-07-18 RX ADMIN — TOPIRAMATE 100 MG: 100 TABLET, FILM COATED ORAL at 21:45

## 2025-07-18 ASSESSMENT — PAIN DESCRIPTION - LOCATION: LOCATION: GENERALIZED

## 2025-07-18 ASSESSMENT — PAIN SCALES - GENERAL: PAINLEVEL_OUTOF10: 6

## 2025-07-18 ASSESSMENT — PAIN DESCRIPTION - DESCRIPTORS: DESCRIPTORS: ACHING;SORE

## 2025-07-18 NOTE — GROUP NOTE
Group Therapy Note    Date: 7/18/2025    Group Start Time: 1600  Group End Time: 1630  Group Topic: Topic Group    [facility]    Bib Roy        Group Therapy Note    Attendees: 16      Topic: Rethinking Tools      Goal: To find different ways to change negative thought patterns to postive       Patient's Goal:  none    Notes:  Declined to attend     Status After Intervention:  Unchanged    Participation Level: None    Participation Quality: Resistant      Speech:        Thought Process/Content: Logical      Affective Functioning: Flat      Mood: euthymic      Level of consciousness:  Alert      Response to Learning: Resistant      Endings: None Reported    Modes of Intervention: Problem-solving      Discipline Responsible: Behavorial Health Tech      Signature:  Bib Roy

## 2025-07-18 NOTE — CARE COORDINATION
Tony spoke with Teresa at [facility] [phone]. She stated that pt can return and no auth is needed.

## 2025-07-19 PROCEDURE — 1240000000 HC EMOTIONAL WELLNESS R&B

## 2025-07-19 PROCEDURE — 99232 SBSQ HOSP IP/OBS MODERATE 35: CPT | Performed by: NURSE PRACTITIONER

## 2025-07-19 PROCEDURE — 6370000000 HC RX 637 (ALT 250 FOR IP): Performed by: PSYCHIATRY & NEUROLOGY

## 2025-07-19 PROCEDURE — 6370000000 HC RX 637 (ALT 250 FOR IP)

## 2025-07-19 RX ADMIN — TOPIRAMATE 100 MG: 100 TABLET, FILM COATED ORAL at 08:48

## 2025-07-19 RX ADMIN — SERTRALINE 75 MG: 50 TABLET, FILM COATED ORAL at 08:48

## 2025-07-19 RX ADMIN — ALUMINUM HYDROXIDE, MAGNESIUM HYDROXIDE, AND SIMETHICONE 30 ML: 200; 200; 20 SUSPENSION ORAL at 22:24

## 2025-07-19 RX ADMIN — BENZTROPINE MESYLATE 0.5 MG: 0.5 TABLET ORAL at 21:07

## 2025-07-19 RX ADMIN — LISINOPRIL 5 MG: 10 TABLET ORAL at 08:48

## 2025-07-19 RX ADMIN — BENZTROPINE MESYLATE 0.5 MG: 0.5 TABLET ORAL at 08:48

## 2025-07-19 RX ADMIN — OLANZAPINE 5 MG: 5 TABLET, FILM COATED ORAL at 21:07

## 2025-07-19 RX ADMIN — TOPIRAMATE 100 MG: 100 TABLET, FILM COATED ORAL at 21:07

## 2025-07-19 RX ADMIN — ATORVASTATIN CALCIUM 40 MG: 40 TABLET, FILM COATED ORAL at 21:07

## 2025-07-19 RX ADMIN — Medication 1 TABLET: at 08:48

## 2025-07-19 RX ADMIN — LACTULOSE 30 G: 20 SOLUTION ORAL at 08:48

## 2025-07-19 RX ADMIN — HYDROXYZINE HYDROCHLORIDE 25 MG: 50 TABLET ORAL at 08:49

## 2025-07-19 ASSESSMENT — PAIN DESCRIPTION - DESCRIPTORS: DESCRIPTORS: ACHING;SORE;DISCOMFORT

## 2025-07-19 ASSESSMENT — PAIN SCALES - GENERAL
PAINLEVEL_OUTOF10: 0
PAINLEVEL_OUTOF10: 10

## 2025-07-19 ASSESSMENT — PAIN DESCRIPTION - LOCATION: LOCATION: BACK;ABDOMEN

## 2025-07-19 NOTE — BH NOTE
Patient denies suicidal ideation, homicidal ideations and AVH.  However, Pt observed talking to unseen others. Presents flat, blunt, withdrawn, preoccupied, internally stimulated, labile, underlining irritability, calm and cooperative during assessment.  Patient is isolative to room.  Medications taken without issue.  No complaints or concerns verbalized at this time.  No unit problems reported.  Will continue to observe and support.

## 2025-07-20 PROCEDURE — 1240000000 HC EMOTIONAL WELLNESS R&B

## 2025-07-20 PROCEDURE — 6370000000 HC RX 637 (ALT 250 FOR IP): Performed by: PSYCHIATRY & NEUROLOGY

## 2025-07-20 PROCEDURE — 6370000000 HC RX 637 (ALT 250 FOR IP)

## 2025-07-20 PROCEDURE — 99232 SBSQ HOSP IP/OBS MODERATE 35: CPT | Performed by: NURSE PRACTITIONER

## 2025-07-20 RX ADMIN — HYDROXYZINE HYDROCHLORIDE 25 MG: 50 TABLET ORAL at 23:03

## 2025-07-20 RX ADMIN — LISINOPRIL 5 MG: 10 TABLET ORAL at 08:33

## 2025-07-20 RX ADMIN — BENZTROPINE MESYLATE 0.5 MG: 0.5 TABLET ORAL at 23:03

## 2025-07-20 RX ADMIN — SERTRALINE 75 MG: 50 TABLET, FILM COATED ORAL at 08:33

## 2025-07-20 RX ADMIN — BENZTROPINE MESYLATE 0.5 MG: 0.5 TABLET ORAL at 08:33

## 2025-07-20 RX ADMIN — Medication 1 TABLET: at 08:33

## 2025-07-20 RX ADMIN — LACTULOSE 30 G: 20 SOLUTION ORAL at 08:33

## 2025-07-20 RX ADMIN — TOPIRAMATE 100 MG: 100 TABLET, FILM COATED ORAL at 08:33

## 2025-07-20 RX ADMIN — TOPIRAMATE 100 MG: 100 TABLET, FILM COATED ORAL at 23:02

## 2025-07-20 RX ADMIN — Medication 3 MG: at 23:03

## 2025-07-20 RX ADMIN — OLANZAPINE 5 MG: 5 TABLET, FILM COATED ORAL at 23:03

## 2025-07-20 RX ADMIN — ATORVASTATIN CALCIUM 40 MG: 40 TABLET, FILM COATED ORAL at 23:03

## 2025-07-20 ASSESSMENT — PAIN DESCRIPTION - LOCATION: LOCATION: GENERALIZED

## 2025-07-20 ASSESSMENT — PAIN SCALES - GENERAL
PAINLEVEL_OUTOF10: 0
PAINLEVEL_OUTOF10: 0
PAINLEVEL_OUTOF10: 9

## 2025-07-20 ASSESSMENT — PAIN DESCRIPTION - ORIENTATION: ORIENTATION: OTHER (COMMENT)

## 2025-07-20 ASSESSMENT — PAIN - FUNCTIONAL ASSESSMENT: PAIN_FUNCTIONAL_ASSESSMENT: ACTIVITIES ARE NOT PREVENTED

## 2025-07-20 ASSESSMENT — PAIN DESCRIPTION - PAIN TYPE: TYPE: ACUTE PAIN

## 2025-07-20 ASSESSMENT — PAIN DESCRIPTION - FREQUENCY: FREQUENCY: INTERMITTENT

## 2025-07-20 ASSESSMENT — PAIN DESCRIPTION - ONSET: ONSET: ON-GOING

## 2025-07-20 ASSESSMENT — PAIN DESCRIPTION - DESCRIPTORS: DESCRIPTORS: ACHING;DISCOMFORT;SORE

## 2025-07-20 NOTE — BH NOTE
During lunch pt was observed speaking then arguing with an unseen other.  She began to cry and handing me her tray and stated she was too upset to eat.  I went to check on her and asked her if someone was upsetting her.  She stated \"I hate my family\" when I asked why she said \"they are doing things to me\".  Pt choose not to speak further about it.  I asked her if she feels safe and she said \"yes\".  I let her know that if she wanted to speak to her, we will be available to have a conversation.

## 2025-07-20 NOTE — BH NOTE
Denies suicidal/homicidal ideations, and auditory/visual hallucinations at this time.     Pt has been observed speaking to unseen others under her breath, appears preoccupied.  Denies anxiety and depression at this time.  Pt is isolative to room, withdrawn, guarded, makes fair eye contact. Pt is labile, tearful on assessment with many somatic c/o pain to head, back, stomach. Pt is incongruent in affect denies depression/anxiety, but appears sad, depressed, and anxious.  Pt is compliant with PM medication, requested PRN Maalox for stomach ache.  According to previous notes pt did not attend daytime groups.  No acute behavioral concerns voiced/noted at this time.  Purposeful Q15min rounding continues.

## 2025-07-20 NOTE — BH NOTE
THIS NURSE WAS ROUNDING WHEN PT WAS IN DINING AREA WITH HER TRAY AT AN EMPTY TABLE. PT WAS TALKING AND ARGUING WITH UNSEEN OTHERS. PT THEN GOT UP CRYING AND WENT TO HER ROOM. TECH TOOK PTS TRAY TO PT ROOM TO ENCOURAGE HER TO CONTINUE TO EAT.

## 2025-07-21 PROBLEM — F60.89 CLUSTER B PERSONALITY DISORDER (HCC): Status: ACTIVE | Noted: 2025-07-21

## 2025-07-21 PROCEDURE — 6370000000 HC RX 637 (ALT 250 FOR IP)

## 2025-07-21 PROCEDURE — 99232 SBSQ HOSP IP/OBS MODERATE 35: CPT

## 2025-07-21 PROCEDURE — 1240000000 HC EMOTIONAL WELLNESS R&B

## 2025-07-21 PROCEDURE — 6360000002 HC RX W HCPCS: Performed by: PSYCHIATRY & NEUROLOGY

## 2025-07-21 RX ADMIN — POLYETHYLENE GLYCOL 3350 17 G: 17 POWDER, FOR SOLUTION ORAL at 09:45

## 2025-07-21 RX ADMIN — BENZTROPINE MESYLATE 0.5 MG: 0.5 TABLET ORAL at 21:32

## 2025-07-21 RX ADMIN — OLANZAPINE 5 MG: 5 TABLET, FILM COATED ORAL at 21:32

## 2025-07-21 RX ADMIN — LACTULOSE 30 G: 20 SOLUTION ORAL at 09:43

## 2025-07-21 RX ADMIN — BENZTROPINE MESYLATE 0.5 MG: 0.5 TABLET ORAL at 09:44

## 2025-07-21 RX ADMIN — TOPIRAMATE 100 MG: 100 TABLET, FILM COATED ORAL at 21:32

## 2025-07-21 RX ADMIN — TOPIRAMATE 100 MG: 100 TABLET, FILM COATED ORAL at 09:44

## 2025-07-21 RX ADMIN — Medication 1 TABLET: at 09:44

## 2025-07-21 RX ADMIN — LISINOPRIL 5 MG: 10 TABLET ORAL at 09:44

## 2025-07-21 RX ADMIN — HALOPERIDOL LACTATE 3 MG: 5 INJECTION, SOLUTION INTRAMUSCULAR at 12:42

## 2025-07-21 RX ADMIN — ATORVASTATIN CALCIUM 40 MG: 40 TABLET, FILM COATED ORAL at 21:32

## 2025-07-21 RX ADMIN — SERTRALINE 75 MG: 50 TABLET, FILM COATED ORAL at 09:44

## 2025-07-21 ASSESSMENT — PAIN SCALES - GENERAL: PAINLEVEL_OUTOF10: 0

## 2025-07-21 NOTE — CARE COORDINATION
Treatment team met with pt. She stated that she is upset. She stated that she isnt able to go to the bathroom. She was standing in the hallway holding her stomach. She stated that it hurts. She stated that it takes her a while to urinate. She stated that this started yesterday.

## 2025-07-22 PROCEDURE — 99232 SBSQ HOSP IP/OBS MODERATE 35: CPT

## 2025-07-22 PROCEDURE — 1240000000 HC EMOTIONAL WELLNESS R&B

## 2025-07-22 PROCEDURE — 6370000000 HC RX 637 (ALT 250 FOR IP)

## 2025-07-22 RX ADMIN — Medication 1 TABLET: at 09:36

## 2025-07-22 RX ADMIN — BENZTROPINE MESYLATE 0.5 MG: 0.5 TABLET ORAL at 09:36

## 2025-07-22 RX ADMIN — SERTRALINE 75 MG: 50 TABLET, FILM COATED ORAL at 09:37

## 2025-07-22 RX ADMIN — OLANZAPINE 7.5 MG: 2.5 TABLET, FILM COATED ORAL at 21:36

## 2025-07-22 RX ADMIN — ATORVASTATIN CALCIUM 40 MG: 40 TABLET, FILM COATED ORAL at 21:36

## 2025-07-22 RX ADMIN — BENZTROPINE MESYLATE 0.5 MG: 0.5 TABLET ORAL at 21:36

## 2025-07-22 RX ADMIN — LACTULOSE 30 G: 20 SOLUTION ORAL at 09:36

## 2025-07-22 RX ADMIN — LISINOPRIL 5 MG: 10 TABLET ORAL at 09:36

## 2025-07-22 RX ADMIN — TOPIRAMATE 100 MG: 100 TABLET, FILM COATED ORAL at 21:36

## 2025-07-22 RX ADMIN — TOPIRAMATE 100 MG: 100 TABLET, FILM COATED ORAL at 09:37

## 2025-07-22 ASSESSMENT — PAIN DESCRIPTION - FREQUENCY: FREQUENCY: INTERMITTENT

## 2025-07-22 ASSESSMENT — PAIN DESCRIPTION - LOCATION: LOCATION: GENERALIZED

## 2025-07-22 ASSESSMENT — PAIN DESCRIPTION - ORIENTATION: ORIENTATION: OTHER (COMMENT)

## 2025-07-22 ASSESSMENT — PAIN SCALES - GENERAL
PAINLEVEL_OUTOF10: 0
PAINLEVEL_OUTOF10: 4

## 2025-07-22 ASSESSMENT — PAIN - FUNCTIONAL ASSESSMENT: PAIN_FUNCTIONAL_ASSESSMENT: ACTIVITIES ARE NOT PREVENTED

## 2025-07-22 ASSESSMENT — PAIN DESCRIPTION - PAIN TYPE: TYPE: ACUTE PAIN

## 2025-07-22 NOTE — BH NOTE
Denies suicidal/homicidal ideations, and auditory/visual hallucinations at this time.     Pt appears preoccupied.  Denies anxiety and depression at this time.  Pt is isolative to room, withdrawn, makes poor eye contact. Flat, guarded, evasive, labile.  Pt is compliant with PM medication.  According to previous notes pt did not attend daytime groups.  No acute behavioral concerns voiced/noted at this time.  Purposeful Q15min rounding continues.

## 2025-07-22 NOTE — CARE COORDINATION
SW met with pt to check in. Pt was seen in her room laying in bed with the lights off. Pt's pillows are on the ground and pt stated that this is because she was upset. Pt began to discuss her medications with SW, SW advised pt that she will have an RN talk with her regarding this as SW does not work with the medications. Pt was understanding of this.

## 2025-07-23 VITALS
SYSTOLIC BLOOD PRESSURE: 127 MMHG | BODY MASS INDEX: 20.83 KG/M2 | TEMPERATURE: 98.2 F | OXYGEN SATURATION: 96 % | WEIGHT: 125 LBS | HEIGHT: 65 IN | RESPIRATION RATE: 16 BRPM | DIASTOLIC BLOOD PRESSURE: 62 MMHG | HEART RATE: 78 BPM

## 2025-07-23 PROCEDURE — 6370000000 HC RX 637 (ALT 250 FOR IP)

## 2025-07-23 RX ORDER — OLANZAPINE 7.5 MG/1
7.5 TABLET, FILM COATED ORAL NIGHTLY
Qty: 30 TABLET | Refills: 3
Start: 2025-07-23

## 2025-07-23 RX ORDER — SERTRALINE HYDROCHLORIDE 25 MG/1
75 TABLET, FILM COATED ORAL DAILY
Qty: 30 TABLET | Refills: 3
Start: 2025-07-24

## 2025-07-23 RX ORDER — TOPIRAMATE 100 MG/1
100 TABLET, FILM COATED ORAL 2 TIMES DAILY
Qty: 60 TABLET | Refills: 3
Start: 2025-07-23

## 2025-07-23 RX ADMIN — TOPIRAMATE 100 MG: 100 TABLET, FILM COATED ORAL at 09:30

## 2025-07-23 RX ADMIN — Medication 1 TABLET: at 09:30

## 2025-07-23 RX ADMIN — LISINOPRIL 5 MG: 10 TABLET ORAL at 09:30

## 2025-07-23 RX ADMIN — SERTRALINE 75 MG: 50 TABLET, FILM COATED ORAL at 09:30

## 2025-07-23 RX ADMIN — BENZTROPINE MESYLATE 0.5 MG: 0.5 TABLET ORAL at 09:30

## 2025-07-23 ASSESSMENT — PAIN DESCRIPTION - LOCATION: LOCATION: ABDOMEN

## 2025-07-23 ASSESSMENT — PAIN SCALES - GENERAL: PAINLEVEL_OUTOF10: 4

## 2025-07-23 NOTE — PLAN OF CARE
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 elaborate as to why. Reports appetite and sleep are poor. Appears flat, sad, and depressed during assessment. Appears preoccupied, paranoid, and suspicious. Patient in the milieu for meals only. No groups attended. Patient is medication focused. Patient is taking medications without issues. Remains in control of behaviors.     Problem: Risk for Elopement  Goal: Patient will not exit the unit/facility without proper excort  Outcome: Progressing     Problem: Anxiety  Goal: Will report anxiety at manageable levels  Outcome: Progressing     Problem: Coping  Goal: Pt/Family able to verbalize concerns and demonstrate effective coping strategies  Outcome: Progressing     Problem: Decision Making  Goal: Pt/Family able to effectively weigh alternatives and participate in decision making related to treatment and care  Outcome: Progressing     Problem: Behavior  Goal: Pt/Family maintain appropriate behavior and adhere to behavioral management agreement, if implemented  Outcome: Progressing     Problem: Depression/Self Harm  Goal: Effect of psychiatric condition will be minimized and patient will be protected from self harm  Outcome: Progressing     Problem: Involuntary Admit  Goal: Will cooperate with staff recommendations and doctor's orders and will demonstrate appropriate behavior  Outcome: Progressing     Problem: Chronic Conditions and Co-morbidities  Goal: Patient's chronic conditions and co-morbidity symptoms are monitored and maintained or improved  Outcome: Progressing     Problem: Pain  Goal: Verbalizes/displays adequate comfort level or baseline comfort level  Outcome: Progressing     Problem: Nutrition Deficit:  Goal: Optimize nutritional status  Outcome: Progressing     Problem: Self Harm/Suicidality  Goal: Will have no self-injury during hospital stay  Outcome: Progressing     Problem: Psychosis  Goal: Will report no hallucinations or 
Patient denies anxiety, depression, SI, HI, AVH, racing thoughts and paranoia. Patient is isolative to their room. Patient made statement that she cares to much about others and not enough about herself. Encouraged the patient to come to group to learn healthy coping skills.   Problem: Depression/Self Harm  Goal: Effect of psychiatric condition will be minimized and patient will be protected from self harm  Description: INTERVENTIONS:  1. Assess impact of patient's symptoms on level of functioning, self care needs and offer support as indicated  2. Assess patient/family knowledge of depression, impact on illness and need for teaching  3. Provide emotional support, presence and reassurance  4. Assess for possible suicidal thoughts or ideation. If patient expresses suicidal thoughts or statements do not leave alone, initiate Suicide Precautions, move to a room close to the nursing station and obtain sitter  5. Initiate consults as appropriate with Mental Health Professional, Spiritual Care, Psychosocial CNS, and consider a recommendation to the L[facility] for a Psychiatric Consultation  Outcome: Progressing     Problem: Behavior  Goal: Pt/Family maintain appropriate behavior and adhere to behavioral management agreement, if implemented  Description: INTERVENTIONS:  1. Assess patient/family's coping skills and  non-compliant behavior (including use of illegal substances)  2. Notify security of behavior or suspected illegal substances which indicate the need for search of the family and/or belongings  3. Encourage verbalization of thoughts and concerns in a socially appropriate manner  4. Utilize positive, consistent limit setting strategies supporting safety of patient, staff and others  5. Encourage participation in the decision making process about the behavioral management agreement  6. If a visitor's behavior poses a threat to safety call refer to organization policy.  7. Initiate consult with , Psychosocial 
orientation  3. Assess if patient's hallucinations or delusions are encouraging self harm or harm to others and intervene as appropriate  7/23/2025 0306 by Mercedez Albarran, RN  Outcome: Not Progressing

## 2025-07-23 NOTE — BH NOTE
Patient resting quietly with eyes closed. No S/S of distress noted or observed. No prn medications  given at this time. Will continue to monitor, provide needs and safe environment with continue rounding every 15 minutes.

## 2025-07-23 NOTE — CARE COORDINATION
Discharge:    Sw met with pt. She understands that she will be discharged to [facility]. She denied SI/HI/AVH.     Collateral:    Sw called pts legal guardian- Ifrah [phone] to inform her of discharge. She had no concerns or questions.     Sw called [place] at [facility] [phone] to inform her of discharge. She stated that this is fine. Sw stated that we will let her know of an ETA.     Transportation:    Via ambulance    Appointments:    [facility] will continue to manage pts mental health needs.

## 2025-07-23 NOTE — CARE COORDINATION
SW called [facility] Phone: [phone] to schedule transportation for the pt to go to [facility], address: [address] [place] [address].      time scheduled for 4pm.    The doses suggested are not the appropriate doses for COPD. Wixela sent at the dose for COPD. If they decline this and only cover the lower dose then it can be changed to that, as it is still better than nothing.
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
## 2025-07-23 NOTE — BH NOTE
Patient had complaints of no BM, offered MOM and suppository. Patient refused both. Will continue to monitor.
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
## 2025-07-23 NOTE — DISCHARGE INSTR - COC
Delivery:[id number]}    Wound Care Documentation and Therapy:        Elimination:  Continence:   Bowel: {YES / NO:}  Bladder: {YES / NO:}  Urinary Catheter: {Urinary Catheter:546384024}   Colostomy/Ileostomy/Ileal Conduit: {YES / NO:}       Date of Last BM: ***  No intake or output data in the 24 hours ending 25 1117  No intake/output data recorded.    Safety Concerns:     { [facility] Safety Concerns:518582772}    Impairments/Disabilities:      {[facility] [place]abilities:958210682}    Nutrition Therapy:  Current Nutrition Therapy:   {[facility] Diet [place]:836423415}    Routes of Feeding: {[facility] Other Feedings:035020049}  Liquids: {Slp liquid thickness:61531}  Daily Fluid Restriction: {[facility] DME Yes amt example:034409729}  Last Modified Barium Swallow with Video (Video Swallowing Test): {Done Not Done Date:016700952}    Treatments at the Time of Hospital Discharge:   Respiratory Treatments: ***  Oxygen Therapy:  {Therapy; copd oxygen:18810}  Ventilator:    {[facility] Vent List:871023606}    Rehab Therapies: {THERAPEUTIC INTERVENTION:7229357655}  Weight Bearing Status/Restrictions: {[facility] Weight Bearin}  Other Medical Equipment (for information only, NOT a DME order):  {EQUIPMENT:732188106}  Other Treatments: ***    Patient's personal belongings (please select all that are sent with patient):  {[facility] DME Belongings:778468853}    RN SIGNATURE:  {Esignature:860969094}    CASE MANAGEMENT/SOCIAL WORK SECTION    Inpatient Status Date: 2025    Readmission Risk Assessment Score:  [facility] RISK OF UNPLANNED READMISSION 2.0             9.7 Total Score        Discharging to Facility/ Agency   Name: [facility]   Address: [address] [place] [address]   Phone: [phone]   Fax:    Dialysis Facility (if applicable)   Name:  Address:  Dialysis Schedule:  Phone:  Fax:    / signature: Electronically signed by LIZA Corbett on 25 at

## 2025-07-23 NOTE — DISCHARGE INSTR - DIET

## 2025-07-23 NOTE — TRANSITION OF CARE
Behavioral Health Transition Record    Patient Name: Any Arreola  YOB: 1960   Medical Record Number: [id number]  Date of Admission: 7/14/2025  4:11 PM   Date of Discharge: 7/23/2025    Attending Provider: Fadi Rodriguez MD   Discharging Provider: Fadi Rodriguez MD  To contact this individual call [phone] and ask the  to page.  If unavailable, ask to be transferred to Behavioral Health Provider on call.  A Behavioral Health Provider will be available on call 24/7 and during holidays.    Primary Care Provider: Kyrie Jay DO    Allergies   Allergen Reactions    Aripiprazole Other (See Comments)    Clozapine Other (See Comments)    Ibuprofen     Paliperidone Other (See Comments)    Carbamazepine, Eslicarbazepine, And Oxcarbazepine Nausea And Vomiting     Generic for Tegretol       Reason for Admission:    Patient name: Any Arreola  Patient's past mental health and addiction history: History of schizoaffective disorder with previous inpatient psychiatric hospitalization  Patient's presentation to the ED and why the patient needs admission: Sent in from nursing home for aggression, agitation, lability  Legal status:  []  Voluntary  [x]  Involuntary  []  Probate  Triggering/precipitating events: History of noncompliance with medication  Duration of triggering/precipitating events: Prior to arrival    Admission Diagnosis:   Acute exacerbation of chronic schizoaffective schizophrenia (HCC) [F25.9]    * No surgery found *    Results for orders placed or performed during the hospital encounter of 07/14/25   Culture, Urine    Specimen: Urine, clean catch   Result Value Ref Range    Specimen Description .CLEAN CATCH URINE     Special Requests Site: Urine     Culture NO GROWTH    CBC with Auto Differential   Result Value Ref Range    WBC 4.1 (L) 4.5 - 11.5 k/uL    RBC 3.54 3.50 - 5.50 m/uL    Hemoglobin 10.7 (L) 11.5 - 15.5 g/dL    Hematocrit 31.7 (L) 34.0 - 48.0 %    MCV 89.5

## 2025-07-23 NOTE — BH NOTE
Patient awake in her room upon approach. Alert and oriented x 4.  Patient has poor eye contact. Patient presents as preoccupied, with drawn,guarded,flat  Appears well groomed/slightly disheveled  Patient denies suicidal/homicidal ideations and auditory/visual hallucinations.  Patient denies anxiety and  denies depression but is observed very,flat and with drawn.  Patient reports pain generalized pain all over body.  No unit behaviors observed or reported.  Patient is taking ordered medications without issue. Patient is not attending select groups per note review.   Purposeful Q15 minute rounds continue.

## 2025-07-24 NOTE — CARE COORDINATION
BARBARA attempted to contact the facility several times as well as the liaison. No answer. BARBARA left a VM